# Patient Record
Sex: MALE | Race: WHITE | NOT HISPANIC OR LATINO | Employment: OTHER | URBAN - METROPOLITAN AREA
[De-identification: names, ages, dates, MRNs, and addresses within clinical notes are randomized per-mention and may not be internally consistent; named-entity substitution may affect disease eponyms.]

---

## 2017-02-08 DIAGNOSIS — K21.9 GERD (GASTROESOPHAGEAL REFLUX DISEASE): Primary | ICD-10-CM

## 2017-02-08 RX ORDER — OMEPRAZOLE 40 MG/1
CAPSULE, DELAYED RELEASE ORAL
Qty: 90 CAPSULE | Refills: 2 | Status: SHIPPED | OUTPATIENT
Start: 2017-02-08 | End: 2017-09-13

## 2017-02-08 NOTE — TELEPHONE ENCOUNTER
omeprazole (PRILOSEC) 40 MG capsule     Last Written Prescription Date: 2/22/16  Last Fill Quantity: 90,  # refills: 2   Last Office Visit with [facility], [facility] or [facility] prescribing provider: 11/3/16                                             Prescription approved per [facility] Refill Protocol.

## 2017-02-08 NOTE — TELEPHONE ENCOUNTER
Reason for Call:  Medication or medication refill:    Do you use a [facility]?  Name of the pharmacy and phone number for the current request:  [facility]    Name of the medication requested: omeprazole (PRILOSEC) 40 MG capsule    Other request: Pt would like refills and filled as soon as possible.    Can we leave a detailed message on this number? YES    Phone number patient can be reached at: Home number on file [phone] (home)    Best Time: any    Call taken on 2/8/2017 at 2:14 PM by YARON LUO

## 2017-03-16 ENCOUNTER — OFFICE VISIT (OUTPATIENT)
Dept: FAMILY MEDICINE | Facility: CLINIC | Age: 82
End: 2017-03-16
Payer: COMMERCIAL

## 2017-03-16 VITALS
BODY MASS INDEX: 26.37 KG/M2 | TEMPERATURE: 96.5 F | HEIGHT: 67 IN | WEIGHT: 168 LBS | HEART RATE: 84 BPM | DIASTOLIC BLOOD PRESSURE: 80 MMHG | OXYGEN SATURATION: 97 % | SYSTOLIC BLOOD PRESSURE: 136 MMHG | RESPIRATION RATE: 18 BRPM

## 2017-03-16 DIAGNOSIS — M25.512 ACUTE PAIN OF LEFT SHOULDER: Primary | ICD-10-CM

## 2017-03-16 PROCEDURE — 99213 OFFICE O/P EST LOW 20 MIN: CPT | Performed by: FAMILY MEDICINE

## 2017-03-16 RX ORDER — METHYLPREDNISOLONE 4 MG
TABLET, DOSE PACK ORAL
Qty: 21 TABLET | Refills: 0 | Status: SHIPPED | OUTPATIENT
Start: 2017-03-16 | End: 2017-09-13

## 2017-03-16 NOTE — MR AVS SNAPSHOT
"              After Visit Summary   3/16/2017    Cale Wagoner    MRN: [id number]           Patient Information     Date Of Birth          5/26/1933        Visit Information        Provider Department      3/16/2017 10:15 AM Mukesh Luna MD [facility]        Today's Diagnoses     Acute pain of left shoulder    -  1      Care Instructions    Ice packs to area for 3-4 days, then alternate ice & heat.  Use Ice massage on trigger point areas.  Do gentle Range of motion exercises        Follow-ups after your visit        Who to contact     If you have questions or need follow up information about today's clinic visit or your schedule please contact [facility] directly at [phone].  Normal or non-critical lab and imaging results will be communicated to you by [employer], letter or phone within 4 business days after the clinic has received the results. If you do not hear from us within 7 days, please contact the clinic through [employer] or phone. If you have a critical or abnormal lab result, we will notify you by phone as soon as possible.  Submit refill requests through [employer] or call your pharmacy and they will forward the refill request to us. Please allow 3 business days for your refill to be completed.          Additional Information About Your Visit        [employer] Information     [employer] lets you send messages to your doctor, view your test results, renew your prescriptions, schedule appointments and more. To sign up, go to [email or web address] . Click on \"Log in\" on the left side of the screen, which will take you to the Welcome page. Then click on \"Sign up Now\" on the right side of the page.     You will be asked to enter the access code listed below, as well as some personal information. Please follow the directions to create your username and password.     Your access code is: [email or web address]  Expires: 6/14/2017 10:27 AM     Your access code " "will  in 90 days. If you need help or a new code, please call your [place] clinic or [phone].        Care EveryWhere ID     This is your Care EveryWhere ID. This could be used by other organizations to access your [place] medical records  [id number]        Your Vitals Were     Pulse Temperature Respirations Height Pulse Oximetry BMI (Body Mass Index)    84 96.5  F (35.8  C) (Tympanic) 18 5' 6.5\" (1.689 m) 97% 26.71 kg/m2       Blood Pressure from Last 3 Encounters:   17 136/80   16 144/80   16 158/70    Weight from Last 3 Encounters:   17 168 lb (76.2 kg)   16 176 lb 8 oz (80.1 kg)   16 176 lb (79.8 kg)              Today, you had the following     No orders found for display         Today's Medication Changes          These changes are accurate as of: 3/16/17 10:27 AM.  If you have any questions, ask your nurse or doctor.               Start taking these medicines.        Dose/Directions    methylPREDNISolone 4 MG tablet   Commonly known as:  MEDROL DOSEPAK   Used for:  Acute pain of left shoulder   Started by:  Mukesh Luna MD        Follow package instructions   Quantity:  21 tablet   Refills:  0            Where to get your medicines      [id number] [address]  [address], [facility] [address]     Phone:  [phone]     methylPREDNISolone 4 MG tablet                Primary Care Provider Office Phone # Fax #    Gwyn Zheng [phone]       [facility] [address] [facility] [address]        Thank you!     Thank you for choosing [facility]  for your care. Our goal is always to provide you with excellent care. Hearing back from our patients is one way we can continue to improve our services. Please take a few minutes to complete the written survey that you may receive in the mail after your visit with " us. Thank you!             Your Updated Medication List - Protect others around you: Learn how to safely use, store and throw away your medicines at www.disposemymeds.org.          This list is accurate as of: 3/16/17 10:27 AM.  Always use your most recent med list.                   Brand Name Dispense Instructions for use    aspirin 325 MG EC tablet     30 tablet    Take 1 tablet (325 mg) by mouth daily       benzonatate 200 MG capsule    TESSALON    21 capsule    Take 1 capsule (200 mg) by mouth 3 times daily as needed for cough       FAMOTIDINE PO      Take 20 mg by mouth 2 times daily       methylPREDNISolone 4 MG tablet    MEDROL DOSEPAK    21 tablet    Follow package instructions       MULTIVITAMIN & MINERAL PO      Take 1 tablet by mouth daily       niacin 500 MG tablet      Take 1,000 mg by mouth daily (with breakfast)       OMEGA-3 FISH OIL PO      Take 1 g by mouth daily       omeprazole 40 MG capsule    priLOSEC    90 capsule    Take 1 capsule by mouth once daily (Take 30 to 60 minutes before a meal)       polyethylene glycol powder    MIRALAX/GLYCOLAX    1581 g    Dissolve 17 grams in 4 to 8 ounces of water, juice, soda, or coffee and drink once daily in the evening       predniSONE 20 MG tablet    DELTASONE    7 tablet    Take 1 tablet (20 mg) by mouth daily

## 2017-03-16 NOTE — PROGRESS NOTES
"  SUBJECTIVE:                                                    Cale Wagoner is a 83 year old male who presents to clinic today for the following health issues:    Musculoskeletal problem/pain      Duration: x 1month    Description  Location: Left Arm, Below the Shoulder Blade    Intensity:  moderate    Accompanying signs and symptoms: none    History  Previous similar problem: no   Previous evaluation:  none    Precipitating or alleviating factors:  Trauma or overuse: no   Aggravating factors include: overuse    Therapies tried and outcome: rest/inactivity and NSAID - Advil/ Some Relief   No injury, no chest pain or dyspnea      Problem list and histories reviewed & adjusted, as indicated.  Additional history: as documented    Labs reviewed in EPIC    Reviewed and updated as needed this visit by clinical staff  Tobacco  Allergies  Meds  Med Hx  Surg Hx  Fam Hx  Soc Hx      Reviewed and updated as needed this visit by Provider         ROS:  CONSTITUTIONAL:NEGATIVE for fever, chills, change in weight  RESP:NEGATIVE for significant cough or SOB  CV: NEGATIVE for chest pain, palpitations or peripheral edema  MUSCULOSKELETAL: POSITIVE  for arthralgias Lt shoulder and upper arm    OBJECTIVE:                                                    /80 (BP Location: Right arm, Patient Position: Chair, Cuff Size: Adult Regular)  Pulse 84  Temp 96.5  F (35.8  C) (Tympanic)  Resp 18  Ht 5' 6.5\" (1.689 m)  Wt 168 lb (76.2 kg)  SpO2 97%  BMI 26.71 kg/m2  Body mass index is 26.71 kg/(m^2).  GENERAL APPEARANCE: healthy, alert and no distress  RESP: lungs clear to auscultation - no rales, rhonchi or wheezes  CV: regular rates and rhythm, normal S1 S2, no S3 or S4 and no murmur, click or rub  MS: extremities normal- no gross deformities noted  ORTHO:   SHOULDER Exam-Left   Inspection: no swelling, no bruising, no discoloration, no obvious deformity, no asymmetry, no glenohumeral joint anterior bulge, no distal " clavicle elevation, no muscle atrophy, no scapular winging   Tenderness of: SC joint- no , clavicle(prox-mid)- no , clavicle-(mid-distal)- no , AC joint- no , acromion- no , anterior capsule- no , prox bicep tendon- YES, greater tuberosity- no , prox humerus- YES, supraspinatous- no , infraspinatous- no , superior trapezious- no , rhomboids- no    Range of Motion: Active- limited due to pain.  Range of Motion: Passive- limited due to pain.   Strength: forward flexion- 5/5, abduction- 5/5, internal rotation- 5/5, external rotation- 5/5 and bicep- full   Special tests: none               ASSESSMENT/PLAN:                                                        ICD-10-CM    1. Acute pain of left shoulder M25.512 methylPREDNISolone (MEDROL DOSEPAK) 4 MG tablet       Follow up with Provider - as needed if not improving   Patient Instructions   Ice packs to area for 3-4 days, then alternate ice & heat.  Use Ice massage on trigger point areas.  Do gentle Range of motion exercises      Mukesh Luna MD  [facility]

## 2017-03-16 NOTE — NURSING NOTE
"Chief Complaint   Patient presents with     Musculoskeletal Problem     /80 (BP Location: Right arm, Patient Position: Chair, Cuff Size: Adult Regular)  Pulse 84  Temp 96.5  F (35.8  C) (Tympanic)  Resp 18  Ht 5' 6.5\" (1.689 m)  Wt 168 lb (76.2 kg)  SpO2 97%  BMI 26.71 kg/m2 Estimated body mass index is 26.71 kg/(m^2) as calculated from the following:    Height as of this encounter: 5' 6.5\" (1.689 m).    Weight as of this encounter: 168 lb (76.2 kg).  BP completed using cuff size: regular   Donna Chirinos CMA    Health Maintenance Due   Topic Date Due     PNEUMOCOCCAL (1 of 2 - PCV13) 05/26/1998     Health Maintenance reviewed at today's visit patient asked to schedule/complete:   Immunizations:  Patient agrees to schedule    "

## 2017-03-16 NOTE — PATIENT INSTRUCTIONS
Ice packs to area for 3-4 days, then alternate ice & heat.  Use Ice massage on trigger point areas.  Do gentle Range of motion exercises

## 2017-09-13 ENCOUNTER — OFFICE VISIT (OUTPATIENT)
Dept: FAMILY MEDICINE | Facility: CLINIC | Age: 82
End: 2017-09-13
Payer: COMMERCIAL

## 2017-09-13 VITALS
DIASTOLIC BLOOD PRESSURE: 70 MMHG | WEIGHT: 169 LBS | RESPIRATION RATE: 16 BRPM | HEIGHT: 67 IN | TEMPERATURE: 98 F | BODY MASS INDEX: 26.53 KG/M2 | SYSTOLIC BLOOD PRESSURE: 130 MMHG | HEART RATE: 74 BPM

## 2017-09-13 DIAGNOSIS — Z00.00 ROUTINE MEDICAL EXAM: Primary | ICD-10-CM

## 2017-09-13 DIAGNOSIS — K59.01 SLOW TRANSIT CONSTIPATION: ICD-10-CM

## 2017-09-13 DIAGNOSIS — M25.561 CHRONIC PAIN OF RIGHT KNEE: ICD-10-CM

## 2017-09-13 DIAGNOSIS — K21.9 GASTROESOPHAGEAL REFLUX DISEASE WITHOUT ESOPHAGITIS: ICD-10-CM

## 2017-09-13 DIAGNOSIS — Z23 NEED FOR PROPHYLACTIC VACCINATION AGAINST STREPTOCOCCUS PNEUMONIAE (PNEUMOCOCCUS): ICD-10-CM

## 2017-09-13 DIAGNOSIS — G89.29 CHRONIC PAIN OF RIGHT KNEE: ICD-10-CM

## 2017-09-13 DIAGNOSIS — I63.9 CEREBROVASCULAR ACCIDENT (CVA), UNSPECIFIED MECHANISM (H): ICD-10-CM

## 2017-09-13 DIAGNOSIS — E78.2 MIXED HYPERLIPIDEMIA: ICD-10-CM

## 2017-09-13 DIAGNOSIS — Z12.5 SCREENING FOR PROSTATE CANCER: ICD-10-CM

## 2017-09-13 LAB
ALBUMIN SERPL-MCNC: 3.8 G/DL (ref 3.4–5)
ALP SERPL-CCNC: 57 U/L (ref 40–150)
ALT SERPL W P-5'-P-CCNC: 34 U/L (ref 0–70)
ANION GAP SERPL CALCULATED.3IONS-SCNC: 6 MMOL/L (ref 3–14)
AST SERPL W P-5'-P-CCNC: 25 U/L (ref 0–45)
BASOPHILS # BLD AUTO: 0 10E9/L (ref 0–0.2)
BASOPHILS NFR BLD AUTO: 0.6 %
BILIRUB SERPL-MCNC: 0.9 MG/DL (ref 0.2–1.3)
BUN SERPL-MCNC: 11 MG/DL (ref 7–30)
CALCIUM SERPL-MCNC: 8.7 MG/DL (ref 8.5–10.1)
CHLORIDE SERPL-SCNC: 104 MMOL/L (ref 94–109)
CHOLEST SERPL-MCNC: 177 MG/DL
CO2 SERPL-SCNC: 28 MMOL/L (ref 20–32)
CREAT SERPL-MCNC: 0.95 MG/DL (ref 0.66–1.25)
DIFFERENTIAL METHOD BLD: NORMAL
EOSINOPHIL # BLD AUTO: 0 10E9/L (ref 0–0.7)
EOSINOPHIL NFR BLD AUTO: 0.6 %
ERYTHROCYTE [DISTWIDTH] IN BLOOD BY AUTOMATED COUNT: 12.7 % (ref 10–15)
GFR SERPL CREATININE-BSD FRML MDRD: 75 ML/MIN/1.7M2
GLUCOSE SERPL-MCNC: 100 MG/DL (ref 70–99)
HCT VFR BLD AUTO: 46 % (ref 40–53)
HDLC SERPL-MCNC: 40 MG/DL
HGB BLD-MCNC: 16.2 G/DL (ref 13.3–17.7)
LDLC SERPL CALC-MCNC: 115 MG/DL
LYMPHOCYTES # BLD AUTO: 2.2 10E9/L (ref 0.8–5.3)
LYMPHOCYTES NFR BLD AUTO: 40 %
MCH RBC QN AUTO: 32.2 PG (ref 26.5–33)
MCHC RBC AUTO-ENTMCNC: 35.2 G/DL (ref 31.5–36.5)
MCV RBC AUTO: 92 FL (ref 78–100)
MONOCYTES # BLD AUTO: 0.7 10E9/L (ref 0–1.3)
MONOCYTES NFR BLD AUTO: 12.1 %
NEUTROPHILS # BLD AUTO: 2.5 10E9/L (ref 1.6–8.3)
NEUTROPHILS NFR BLD AUTO: 46.7 %
NONHDLC SERPL-MCNC: 137 MG/DL
PLATELET # BLD AUTO: 164 10E9/L (ref 150–450)
POTASSIUM SERPL-SCNC: 4.1 MMOL/L (ref 3.4–5.3)
PROT SERPL-MCNC: 7.6 G/DL (ref 6.8–8.8)
PSA SERPL-ACNC: 1.8 UG/L (ref 0–4)
RBC # BLD AUTO: 5.03 10E12/L (ref 4.4–5.9)
SODIUM SERPL-SCNC: 138 MMOL/L (ref 133–144)
TRIGL SERPL-MCNC: 109 MG/DL
WBC # BLD AUTO: 5.4 10E9/L (ref 4–11)

## 2017-09-13 PROCEDURE — 85025 COMPLETE CBC W/AUTO DIFF WBC: CPT | Performed by: FAMILY MEDICINE

## 2017-09-13 PROCEDURE — G0009 ADMIN PNEUMOCOCCAL VACCINE: HCPCS | Performed by: FAMILY MEDICINE

## 2017-09-13 PROCEDURE — 36415 COLL VENOUS BLD VENIPUNCTURE: CPT | Performed by: FAMILY MEDICINE

## 2017-09-13 PROCEDURE — 80053 COMPREHEN METABOLIC PANEL: CPT | Performed by: FAMILY MEDICINE

## 2017-09-13 PROCEDURE — G0103 PSA SCREENING: HCPCS | Performed by: FAMILY MEDICINE

## 2017-09-13 PROCEDURE — 99397 PER PM REEVAL EST PAT 65+ YR: CPT | Mod: 25 | Performed by: FAMILY MEDICINE

## 2017-09-13 PROCEDURE — 80061 LIPID PANEL: CPT | Performed by: FAMILY MEDICINE

## 2017-09-13 PROCEDURE — 90670 PCV13 VACCINE IM: CPT | Performed by: FAMILY MEDICINE

## 2017-09-13 RX ORDER — OMEPRAZOLE 40 MG/1
CAPSULE, DELAYED RELEASE ORAL
Qty: 90 CAPSULE | Refills: 3 | Status: SHIPPED | OUTPATIENT
Start: 2017-09-13 | End: 2017-09-27

## 2017-09-13 ASSESSMENT — PATIENT HEALTH QUESTIONNAIRE - PHQ9
SUM OF ALL RESPONSES TO PHQ QUESTIONS 1-9: 0
SUM OF ALL RESPONSES TO PHQ QUESTIONS 1-9: 0

## 2017-09-13 NOTE — NURSING NOTE
"Chief Complaint   Patient presents with     Physical       Initial /70  Pulse 74  Temp 98  F (36.7  C) (Tympanic)  Resp 16  Ht 5' 6.5\" (1.689 m)  Wt 169 lb (76.7 kg)  BMI 26.87 kg/m2 Estimated body mass index is 26.87 kg/(m^2) as calculated from the following:    Height as of this encounter: 5' 6.5\" (1.689 m).    Weight as of this encounter: 169 lb (76.7 kg).  Medication Reconciliation: complete     Ann Joel CMA      "

## 2017-09-13 NOTE — PROGRESS NOTES
SUBJECTIVE:   Cale Wagoner is a 84 year old male who presents for Preventive Visit.  click delete button to remove this line now  click delete button to remove this line now  Are you in the first 12 months of your Medicare coverage?  No    Physical   Annual:     Getting at least 3 servings of Calcium per day::  Yes    Bi-annual eye exam::  NO    Dental care twice a year::  NO    Sleep apnea or symptoms of sleep apnea::  None    Diet::  Regular (no restrictions)    Frequency of exercise::  2-3 days/week    Duration of exercise::  30-45 minutes    Taking medications regularly::  Yes    Medication side effects::  None    Additional concerns today::  No      COGNITIVE SCREEN  1) Repeat 3 items (Banana, Sunrise, Chair)    2) Clock draw: NORMAL  3) 3 item recall: Recalls 2 objects   Results: NORMAL clock, 1-2 items recalled: COGNITIVE IMPAIRMENT LESS LIKELY    Mini-CogTM Copyright WILLIE Fontaine. Licensed by the author for use in [facility]; reprinted with permission (jann@[facility]). All rights reserved.          Reviewed and updated as needed this visit by clinical staffTobacco  Allergies  Meds  Med Hx  Surg Hx  Fam Hx  Soc Hx        Reviewed and updated as needed this visit by Provider        Social History   Substance Use Topics     Smoking status: Former Smoker     Quit date: 8/19/1968     Smokeless tobacco: Never Used     Alcohol use Yes      Comment: Occ       The patient does not drink >3 drinks per day nor >7 drinks per week.              Today's PHQ-2 Score:   PHQ-2 ( 1999 Pfizer) 9/13/2017   Q1: Little interest or pleasure in doing things 0   Q2: Feeling down, depressed or hopeless 0   PHQ-2 Score 0   Q1: Little interest or pleasure in doing things Nearly every day   Q2: Feeling down, depressed or hopeless Not at all   PHQ-2 Score 3       Do you feel safe in your environment - Yes    Do you have a Health Care Directive?: Yes: Advance Directive has been received and scanned.    Current  providers sharing in care for this patient include:   Patient Care Team:  Gwyn Zheng MD as PCP - General (Family Practice)      Hearing impairment: Yes,    Ability to successfully perform activities of daily living: Yes, no assistance needed     Fall risk:  Fallen 2 or more times in the past year?: No  Any fall with injury in the past year?: No      Home safety:  none identified  click delete button to remove this line now    The following health maintenance items are reviewed in Epic and correct as of today:  Health Maintenance   Topic Date Due     JULIETA QUESTIONNAIRE 1 YEAR  05/26/1951     PNEUMOCOCCAL (2 of 2 - PCV13) 10/09/2011     INFLUENZA VACCINE (SYSTEM ASSIGNED)  09/01/2017     FALL RISK ASSESSMENT  03/16/2018     PHQ-9 Q1YR  09/13/2018     ADVANCE DIRECTIVE PLANNING Q5 YRS  02/04/2020     TETANUS IMMUNIZATION (SYSTEM ASSIGNED)  08/12/2020     Patient Active Problem List   Diagnosis     Bloating     GERD (gastroesophageal reflux disease)     Health Care Home     Cerebrovascular accident (CVA), unspecified mechanism (H)     Hyperlipidemia     Slow transit constipation     Tingling of left upper extremity     Chronic pain of right knee     Past Surgical History:   Procedure Laterality Date     APPENDECTOMY       ENT SURGERY      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     EYE SURGERY      cataracts     HERNIA REPAIR      twice       Social History   Substance Use Topics     Smoking status: Former Smoker     Quit date: 8/19/1968     Smokeless tobacco: Never Used     Alcohol use Yes      Comment: Occ     Family History   Problem Relation Age of Onset     Respiratory Father      pneumonia     Unknown/Adopted Father      Alzheimer Disease Brother      Alzheimer Disease Sister      Respiratory Sister      COPD     Unknown/Adopted Mother      DIABETES No family hx of      Coronary Artery Disease No family hx of      Hypertension No family hx of      Hyperlipidemia No family hx of      CEREBROVASCULAR DISEASE No  "family hx of      Breast Cancer No family hx of      Colon Cancer No family hx of      Prostate Cancer No family hx of      Other Cancer No family hx of      Depression No family hx of      Anxiety Disorder No family hx of      MENTAL ILLNESS No family hx of      Substance Abuse No family hx of      Anesthesia Reaction No family hx of      Asthma No family hx of      OSTEOPOROSIS No family hx of      Genetic Disorder No family hx of      Thyroid Disease No family hx of      Obesity No family hx of              Pneumonia Vaccine:Adults age 65+ who received Pneumovax (PPSV23) at 65 years or older: Should be given PCV13 > 1 year after their most recent PPSV23    ROS:  C: NEGATIVE for fever, chills, change in weight  I: NEGATIVE for worrisome rashes, moles or lesions  E: NEGATIVE for vision changes or irritation  E/M: NEGATIVE for ear, mouth and throat problems  R: NEGATIVE for significant cough or SOB  B: NEGATIVE for masses, tenderness or discharge  CV: NEGATIVE for chest pain, palpitations or peripheral edema  GI: NEGATIVE for nausea, abdominal pain, heartburn, or change in bowel habits  : NEGATIVE for frequency, dysuria, or hematuria  MUSCULOSKELETAL:POSITIVE  for arthralgias right knee  N: NEGATIVE for weakness, dizziness or paresthesias  E: NEGATIVE for temperature intolerance, skin/hair changes  H: NEGATIVE for bleeding problems  P: NEGATIVE for changes in mood or affect    OBJECTIVE:   /70  Pulse 74  Temp 98  F (36.7  C) (Tympanic)  Resp 16  Ht 5' 6.5\" (1.689 m)  Wt 169 lb (76.7 kg)  BMI 26.87 kg/m2 Estimated body mass index is 26.87 kg/(m^2) as calculated from the following:    Height as of this encounter: 5' 6.5\" (1.689 m).    Weight as of this encounter: 169 lb (76.7 kg).  EXAM:   GENERAL: healthy, alert and no distress  EYES: Eyes grossly normal to inspection, PERRL and conjunctivae and sclerae normal  HENT: ear canals and TM's normal, nose and mouth without ulcers or lesions  NECK: no " "adenopathy, no asymmetry, masses, or scars and thyroid normal to palpation  RESP: lungs clear to auscultation - no rales, rhonchi or wheezes  CV: regular rate and rhythm, normal S1 S2, no S3 or S4, no murmur, click or rub, no peripheral edema and peripheral pulses strong  ABDOMEN: soft, nontender, no hepatosplenomegaly, no masses and bowel sounds normal   (male): normal male genitalia without lesions or urethral discharge, no hernia  MS: no gross musculoskeletal defects noted, no edema  SKIN: no suspicious lesions or rashes  NEURO: Normal strength and tone, mentation intact and speech normal  PSYCH: mentation appears normal, affect normal/bright    ASSESSMENT / PLAN:       ICD-10-CM    1. Routine medical exam Z00.00    2. Need for prophylactic vaccination against Streptococcus pneumoniae (pneumococcus) Z23 PNEUMOCOCCAL CONJ VACCINE 13 VALENT IM     ADMIN 1st VACCINE   3. Gastroesophageal reflux disease without esophagitis K21.9 omeprazole (PRILOSEC) 40 MG capsule   4. Mixed hyperlipidemia E78.2    5. Slow transit constipation K59.01    6. Cerebrovascular accident (CVA), unspecified mechanism (H) I63.9    7. Chronic pain of right knee M25.561     G89.29        End of Life Planning:  Patient currently has an advanced directive: yes and the patient will get us a copy    COUNSELING:  Reviewed preventive health counseling, as reflected in patient instructions       Regular exercise       Healthy diet/nutrition       Vision screening       Immunizations    Vaccinated for: Pneumococcal              Estimated body mass index is 26.87 kg/(m^2) as calculated from the following:    Height as of this encounter: 5' 6.5\" (1.689 m).    Weight as of this encounter: 169 lb (76.7 kg).     reports that he quit smoking about 49 years ago. He has never used smokeless tobacco.        Appropriate preventive services were discussed with this patient, including applicable screening as appropriate for cardiovascular disease, diabetes, " osteopenia/osteoporosis, and glaucoma.  As appropriate for age/gender, discussed screening for colorectal cancer, prostate cancer, breast cancer, and cervical cancer. Checklist reviewing preventive services available has been given to the patient.    Reviewed patients plan of care and provided an AVS. The Basic Care Plan (routine screening as documented in Health Maintenance) for Cale meets the Care Plan requirement. This Care Plan has been established and reviewed with the Patient.    Counseling Resources:  ATP IV Guidelines  Pooled Cohorts Equation Calculator  Breast Cancer Risk Calculator  FRAX Risk Assessment  ICSI Preventive Guidelines  Dietary Guidelines for Americans, 2010  [employer] MyPlate  ASA Prophylaxis  Lung CA Screening    Gwyn Zheng MD  [facility]ESAnswers for HPI/ROS submitted by the patient on 9/13/2017   PHQ-2 Score: 3  PHQ9 TOTAL SCORE: 0

## 2017-09-13 NOTE — NURSING NOTE
Screening Questionnaire for Adult Immunization    Are you sick today?   No   Do you have allergies to medications, food, a vaccine component or latex?   No   Have you ever had a serious reaction after receiving a vaccination?   No   Do you have a long-term health problem with heart disease, lung disease, asthma, kidney disease, metabolic disease (e.g. diabetes), anemia, or other blood disorder?   No   Do you have cancer, leukemia, HIV/AIDS, or any other immune system problem?   No   In the past 3 months, have you taken medications that affect  your immune system, such as prednisone, other steroids, or anticancer drugs; drugs for the treatment of rheumatoid arthritis, Crohn s disease, or psoriasis; or have you had radiation treatments?   No   Have you had a seizure, or a brain or other nervous system problem?   No   During the past year, have you received a transfusion of blood or blood     products, or been given immune (gamma) globulin or antiviral drug?   No   For women: Are you pregnant or is there a chance you could become        pregnant during the next month?   No   Have you received any vaccinations in the past 4 weeks?   No     Immunization questionnaire answers were all negative.        Per orders of Dr. Zheng, injection of prevnar given by Ann Joel. Patient instructed to remain in clinic for 15 minutes afterwards, and to report any adverse reaction to me immediately.       Screening performed by Ann Joel on 9/13/2017 at 10:25 AM.

## 2017-09-13 NOTE — MR AVS SNAPSHOT
After Visit Summary   9/13/2017    Cale Wagoner    MRN: [id number]           Patient Information     Date Of Birth          5/26/1933        Visit Information        Provider Department      9/13/2017 9:30 AM Gwyn Zheng MD [facility]        Today's Diagnoses     Routine medical exam    -  1    Need for prophylactic vaccination against Streptococcus pneumoniae (pneumococcus)        Gastroesophageal reflux disease without esophagitis        Mixed hyperlipidemia        Slow transit constipation        Cerebrovascular accident (CVA), unspecified mechanism (H)        Chronic pain of right knee        Screening for prostate cancer          Care Instructions      Preventive Health Recommendations:   Male Ages 65 and over    Yearly exam:             See your health care provider every year in order to  o   Review health changes.   o   Discuss preventive care.    o   Review your medicines if your doctor has prescribed any.    Talk with your health care provider about whether you should have a test to screen for prostate cancer (PSA).    Every 3 years, have a diabetes test (fasting glucose). If you are at risk for diabetes, you should have this test more often.    Every 5 years, have a cholesterol test. Have this test more often if you are at risk for high cholesterol or heart disease.     Every 10 years, have a colonoscopy. Or, have a yearly FIT test (stool test). These exams will check for colon cancer.    Talk to with your health care provider about screening for Abdominal Aortic Aneurysm if you have a family history of AAA or have a history of smoking.    Shots:     Get a flu shot each year.     Get a tetanus shot every 10 years.     Talk to your doctor about your pneumonia vaccines. There are now two you should receive - Pneumovax (PPSV 23) and Prevnar (PCV 13).     Talk to your doctor about a shingles vaccine.     Talk to your doctor about the hepatitis B  vaccine.  Nutrition:     Eat at least 5 servings of fruits and vegetables each day.     Eat whole-grain bread, whole-wheat pasta and brown rice instead of white grains and rice.     Talk to your provider about Calcium and Vitamin D.   Lifestyle    Exercise for at least 150 minutes a week (30 minutes a day, 5 days a week). This will help you control your weight and prevent disease.     Limit alcohol to one drink per day.     No smoking.     Wear sunscreen to prevent skin cancer.     See your dentist every six months for an exam and cleaning.     See your eye doctor every 1 to 2 years to screen for conditions such as glaucoma, macular degeneration, cataracts, etc           Follow-ups after your visit        Additional Services     ORTHOPEDICS ADULT REFERRAL       Your provider has referred you to: [facility]  [place] [phone]   [email or web address]    Please be aware that coverage of these services is subject to the terms and limitations of your health insurance plan.  Call member services at your health plan with any benefit or coverage questions.      Please bring the following to your appointment:    >>   Any x-rays, CTs or MRIs which have been performed.  Contact the facility where they were done to arrange for  prior to your scheduled appointment.    >>   List of current medications   >>   This referral request   >>   Any documents/labs given to you for this referral                  Who to contact     If you have questions or need follow up information about today's clinic visit or your schedule please contact [facility] directly at [phone].  Normal or non-critical lab and imaging results will be communicated to you by MyChart, letter or phone within 4 business days after the clinic has received the results. If you do not hear from us within 7 days, please contact the clinic through MyChart or phone. If you have a critical or abnormal  "lab result, we will notify you by phone as soon as possible.  Submit refill requests through [employer] or call your pharmacy and they will forward the refill request to us. Please allow 3 business days for your refill to be completed.          Additional Information About Your Visit        Care EveryWhere ID     This is your Care EveryWhere ID. This could be used by other organizations to access your [place] medical records  [id number]        Your Vitals Were     Pulse Temperature Respirations Height BMI (Body Mass Index)       74 98  F (36.7  C) (Tympanic) 16 5' 6.5\" (1.689 m) 26.87 kg/m2        Blood Pressure from Last 3 Encounters:   09/13/17 130/70   03/16/17 136/80   11/03/16 144/80    Weight from Last 3 Encounters:   09/13/17 169 lb (76.7 kg)   03/16/17 168 lb (76.2 kg)   11/03/16 176 lb 8 oz (80.1 kg)              We Performed the Following     ADMIN 1st VACCINE     CBC with platelets differential     Comprehensive metabolic panel     Lipid Profile     ORTHOPEDICS ADULT REFERRAL     PNEUMOCOCCAL CONJ VACCINE 13 VALENT IM     Prostate spec antigen screen     UA with Microscopic          Today's Medication Changes          These changes are accurate as of: 9/13/17 10:20 AM.  If you have any questions, ask your nurse or doctor.               These medicines have changed or have updated prescriptions.        Dose/Directions    aspirin 325 MG EC tablet   This may have changed:  how much to take   Used for:  TIA (transient ischaemic attack)        Dose:  325 mg   Take 1 tablet (325 mg) by mouth daily   Quantity:  30 tablet   Refills:  0         Stop taking these medicines if you haven't already. Please contact your care team if you have questions.     niacin 500 MG tablet   Stopped by:  Gwyn Zheng MD                Where to get your medicines      These medications were sent to [id number] - [place], [place] - [address]   [address] [facility] [address]     Phone:  " [phone]     omeprazole 40 MG capsule                Primary Care Provider Office Phone # Fax #    Gwyn Zheng [phone]       [address] [facility] [address]        Equal Access to Services     TAYO SANCHEZ : Hadii porter ku hadnéstoro Soomaali, waaxda luqadaha, qaybta kaalmada adeegyada, waxay idiin haysandran adewilliam khjulia lucila montoya. So [facility] [phone].    ATENCIÓN: Si habla español, tiene a rene disposición servicios gratuitos de asistencia lingüística. Llame al [phone].    We comply with applicable federal civil rights laws and [place] laws. We do not discriminate on the basis of race, color, national origin, age, disability sex, sexual orientation or gender identity.            Thank you!     Thank you for choosing [facility]  for your care. Our goal is always to provide you with excellent care. Hearing back from our patients is one way we can continue to improve our services. Please take a few minutes to complete the written survey that you may receive in the mail after your visit with us. Thank you!             Your Updated Medication List - Protect others around you: Learn how to safely use, store and throw away your medicines at www.disposemymeds.org.          This list is accurate as of: 9/13/17 10:20 AM.  Always use your most recent med list.                   Brand Name Dispense Instructions for use Diagnosis    aspirin 325 MG EC tablet     30 tablet    Take 1 tablet (325 mg) by mouth daily    TIA (transient ischaemic attack)       FAMOTIDINE PO      Take 20 mg by mouth 2 times daily        MULTIVITAMIN & MINERAL PO      Take 1 tablet by mouth daily        OMEGA-3 FISH OIL PO      Take 1 g by mouth daily        omeprazole 40 MG capsule    priLOSEC    90 capsule    Take 1 capsule by mouth once daily (Take 30 to 60 minutes before a meal)    Gastroesophageal reflux disease without esophagitis       polyethylene glycol powder     MIRALAX/GLYCOLAX    1581 g    Dissolve 17 grams in 4 to 8 ounces of water, juice, soda, or coffee and drink once daily in the evening    Slow transit constipation

## 2017-09-13 NOTE — LETTER
September 16, 2017      Cale Wagoner  [address]  [facility] [address]        Dear ,    We are writing to inform you of your test results.    Your test results fall within the expected range(s) or remain unchanged from previous results.  Please continue with current treatment plan.    Resulted Orders   CBC with platelets differential   Result Value Ref Range    WBC 5.4 4.0 - 11.0 10e9/L    RBC Count 5.03 4.4 - 5.9 10e12/L    Hemoglobin 16.2 13.3 - 17.7 g/dL    Hematocrit 46.0 40.0 - 53.0 %    MCV 92 78 - 100 fl    MCH 32.2 26.5 - 33.0 pg    MCHC 35.2 31.5 - 36.5 g/dL    RDW 12.7 10.0 - 15.0 %    Platelet Count 164 150 - 450 10e9/L    Diff Method Automated Method     % Neutrophils 46.7 %    % Lymphocytes 40.0 %    % Monocytes 12.1 %    % Eosinophils 0.6 %    % Basophils 0.6 %    Absolute Neutrophil 2.5 1.6 - 8.3 10e9/L    Absolute Lymphocytes 2.2 0.8 - 5.3 10e9/L    Absolute Monocytes 0.7 0.0 - 1.3 10e9/L    Absolute Eosinophils 0.0 0.0 - 0.7 10e9/L    Absolute Basophils 0.0 0.0 - 0.2 10e9/L   Comprehensive metabolic panel   Result Value Ref Range    Sodium 138 133 - 144 mmol/L    Potassium 4.1 3.4 - 5.3 mmol/L    Chloride 104 94 - 109 mmol/L    Carbon Dioxide 28 20 - 32 mmol/L    Anion Gap 6 3 - 14 mmol/L    Glucose 100 (H) 70 - 99 mg/dL      Comment:      Fasting specimen    Urea Nitrogen 11 7 - 30 mg/dL    Creatinine 0.95 0.66 - 1.25 mg/dL    GFR Estimate 75 >60 mL/min/1.7m2      Comment:      Non  GFR Calc    GFR Estimate If Black >90 >60 mL/min/1.7m2      Comment:       GFR Calc    Calcium 8.7 8.5 - 10.1 mg/dL    Bilirubin Total 0.9 0.2 - 1.3 mg/dL    Albumin 3.8 3.4 - 5.0 g/dL    Protein Total 7.6 6.8 - 8.8 g/dL    Alkaline Phosphatase 57 40 - 150 U/L    ALT 34 0 - 70 U/L    AST 25 0 - 45 U/L   Lipid Profile   Result Value Ref Range    Cholesterol 177 <200 mg/dL    Triglycerides 109 <150 mg/dL      Comment:      Fasting specimen    HDL Cholesterol 40  >39 mg/dL    LDL Cholesterol Calculated 115 (H) <100 mg/dL      Comment:      Above desirable:  100-129 mg/dl  Borderline High:  130-159 mg/dL  High:             160-189 mg/dL  Very high:       >189 mg/dl      Non HDL Cholesterol 137 (H) <130 mg/dL      Comment:      Above Desirable:  130-159 mg/dl  Borderline high:  160-189 mg/dl  High:             190-219 mg/dl  Very high:       >219 mg/dl     Prostate spec antigen screen   Result Value Ref Range    PSA 1.80 0 - 4 ug/L      Comment:      Assay Method:  Chemiluminescence using Siemens Vista analyzer       If you have any questions or concerns, please call the clinic at the number listed above.       Sincerely,        Gwyn Zheng MD

## 2017-09-14 ENCOUNTER — APPOINTMENT (OUTPATIENT)
Dept: GENERAL RADIOLOGY | Facility: CLINIC | Age: 82
DRG: 247 | End: 2017-09-14
Attending: EMERGENCY MEDICINE
Payer: COMMERCIAL

## 2017-09-14 ENCOUNTER — HOSPITAL ENCOUNTER (INPATIENT)
Facility: CLINIC | Age: 82
LOS: 2 days | Discharge: HOME | DRG: 247 | End: 2017-09-16
Attending: EMERGENCY MEDICINE | Admitting: HOSPITALIST
Payer: COMMERCIAL

## 2017-09-14 DIAGNOSIS — R07.9 CHEST PAIN, UNSPECIFIED TYPE: Primary | ICD-10-CM

## 2017-09-14 DIAGNOSIS — I21.4 NSTEMI (NON-ST ELEVATED MYOCARDIAL INFARCTION) (H): ICD-10-CM

## 2017-09-14 PROBLEM — R07.89 ATYPICAL CHEST PAIN: Status: ACTIVE | Noted: 2017-09-14

## 2017-09-14 LAB
ALBUMIN SERPL-MCNC: 3.8 G/DL (ref 3.4–5)
ALP SERPL-CCNC: 73 U/L (ref 40–150)
ALT SERPL W P-5'-P-CCNC: 39 U/L (ref 0–70)
ANION GAP SERPL CALCULATED.3IONS-SCNC: 7 MMOL/L (ref 3–14)
AST SERPL W P-5'-P-CCNC: 28 U/L (ref 0–45)
BASOPHILS # BLD AUTO: 0 10E9/L (ref 0–0.2)
BASOPHILS NFR BLD AUTO: 0.6 %
BILIRUB SERPL-MCNC: 0.6 MG/DL (ref 0.2–1.3)
BUN SERPL-MCNC: 13 MG/DL (ref 7–30)
CALCIUM SERPL-MCNC: 8.7 MG/DL (ref 8.5–10.1)
CHLORIDE SERPL-SCNC: 102 MMOL/L (ref 94–109)
CO2 SERPL-SCNC: 28 MMOL/L (ref 20–32)
CREAT SERPL-MCNC: 1.04 MG/DL (ref 0.66–1.25)
DIFFERENTIAL METHOD BLD: NORMAL
EOSINOPHIL # BLD AUTO: 0 10E9/L (ref 0–0.7)
EOSINOPHIL NFR BLD AUTO: 0.5 %
ERYTHROCYTE [DISTWIDTH] IN BLOOD BY AUTOMATED COUNT: 12.6 % (ref 10–15)
GFR SERPL CREATININE-BSD FRML MDRD: 68 ML/MIN/1.7M2
GLUCOSE SERPL-MCNC: 110 MG/DL (ref 70–99)
HCT VFR BLD AUTO: 46.2 % (ref 40–53)
HGB BLD-MCNC: 16.6 G/DL (ref 13.3–17.7)
IMM GRANULOCYTES # BLD: 0 10E9/L (ref 0–0.4)
IMM GRANULOCYTES NFR BLD: 0.2 %
INTERPRETATION ECG - MUSE: NORMAL
INTERPRETATION ECG - MUSE: NORMAL
LIPASE SERPL-CCNC: 350 U/L (ref 73–393)
LYMPHOCYTES # BLD AUTO: 2.7 10E9/L (ref 0.8–5.3)
LYMPHOCYTES NFR BLD AUTO: 42 %
MCH RBC QN AUTO: 32.5 PG (ref 26.5–33)
MCHC RBC AUTO-ENTMCNC: 35.9 G/DL (ref 31.5–36.5)
MCV RBC AUTO: 91 FL (ref 78–100)
MONOCYTES # BLD AUTO: 0.6 10E9/L (ref 0–1.3)
MONOCYTES NFR BLD AUTO: 9.1 %
NEUTROPHILS # BLD AUTO: 3 10E9/L (ref 1.6–8.3)
NEUTROPHILS NFR BLD AUTO: 47.6 %
NRBC # BLD AUTO: 0 10*3/UL
NRBC BLD AUTO-RTO: 0 /100
PLATELET # BLD AUTO: 165 10E9/L (ref 150–450)
POTASSIUM SERPL-SCNC: 3.8 MMOL/L (ref 3.4–5.3)
PROT SERPL-MCNC: 8.2 G/DL (ref 6.8–8.8)
RBC # BLD AUTO: 5.1 10E12/L (ref 4.4–5.9)
SODIUM SERPL-SCNC: 137 MMOL/L (ref 133–144)
TROPONIN I SERPL-MCNC: 1.98 UG/L (ref 0–0.04)
TROPONIN I SERPL-MCNC: <0.015 UG/L (ref 0–0.04)
WBC # BLD AUTO: 6.4 10E9/L (ref 4–11)

## 2017-09-14 PROCEDURE — 25000132 ZZH RX MED GY IP 250 OP 250 PS 637: Performed by: EMERGENCY MEDICINE

## 2017-09-14 PROCEDURE — 85025 COMPLETE CBC W/AUTO DIFF WBC: CPT | Performed by: EMERGENCY MEDICINE

## 2017-09-14 PROCEDURE — 93005 ELECTROCARDIOGRAM TRACING: CPT

## 2017-09-14 PROCEDURE — 83690 ASSAY OF LIPASE: CPT | Performed by: EMERGENCY MEDICINE

## 2017-09-14 PROCEDURE — 99223 1ST HOSP IP/OBS HIGH 75: CPT | Mod: AI | Performed by: HOSPITALIST

## 2017-09-14 PROCEDURE — 25000132 ZZH RX MED GY IP 250 OP 250 PS 637: Performed by: HOSPITALIST

## 2017-09-14 PROCEDURE — 25000128 H RX IP 250 OP 636: Performed by: HOSPITALIST

## 2017-09-14 PROCEDURE — 25000125 ZZHC RX 250: Performed by: EMERGENCY MEDICINE

## 2017-09-14 PROCEDURE — 99285 EMERGENCY DEPT VISIT HI MDM: CPT | Mod: 25

## 2017-09-14 PROCEDURE — 84484 ASSAY OF TROPONIN QUANT: CPT | Performed by: HOSPITALIST

## 2017-09-14 PROCEDURE — 40000275 ZZH STATISTIC RCP TIME EA 10 MIN

## 2017-09-14 PROCEDURE — 21000001 ZZH R&B HEART CARE

## 2017-09-14 PROCEDURE — 36415 COLL VENOUS BLD VENIPUNCTURE: CPT | Performed by: HOSPITALIST

## 2017-09-14 PROCEDURE — 80053 COMPREHEN METABOLIC PANEL: CPT | Performed by: EMERGENCY MEDICINE

## 2017-09-14 PROCEDURE — 84484 ASSAY OF TROPONIN QUANT: CPT | Performed by: EMERGENCY MEDICINE

## 2017-09-14 PROCEDURE — 93005 ELECTROCARDIOGRAM TRACING: CPT | Mod: 76

## 2017-09-14 PROCEDURE — 71020 XR CHEST 2 VW: CPT

## 2017-09-14 PROCEDURE — 25000132 ZZH RX MED GY IP 250 OP 250 PS 637: Performed by: PHYSICIAN ASSISTANT

## 2017-09-14 PROCEDURE — 96374 THER/PROPH/DIAG INJ IV PUSH: CPT

## 2017-09-14 PROCEDURE — G0378 HOSPITAL OBSERVATION PER HR: HCPCS

## 2017-09-14 RX ORDER — NITROGLYCERIN 20 MG/100ML
0.07-2 INJECTION INTRAVENOUS CONTINUOUS
Status: DISCONTINUED | OUTPATIENT
Start: 2017-09-14 | End: 2017-09-15

## 2017-09-14 RX ORDER — NITROGLYCERIN 20 MG/100ML
.07-2 INJECTION INTRAVENOUS CONTINUOUS
Status: DISCONTINUED | OUTPATIENT
Start: 2017-09-14 | End: 2017-09-14

## 2017-09-14 RX ORDER — NITROGLYCERIN 0.4 MG/1
0.4 TABLET SUBLINGUAL EVERY 5 MIN PRN
Status: COMPLETED | OUTPATIENT
Start: 2017-09-14 | End: 2017-09-14

## 2017-09-14 RX ORDER — NITROGLYCERIN 0.4 MG/1
0.4 TABLET SUBLINGUAL EVERY 5 MIN PRN
Status: DISCONTINUED | OUTPATIENT
Start: 2017-09-14 | End: 2017-09-15

## 2017-09-14 RX ORDER — ACETAMINOPHEN 325 MG/1
650 TABLET ORAL EVERY 4 HOURS PRN
Status: DISCONTINUED | OUTPATIENT
Start: 2017-09-14 | End: 2017-09-15 | Stop reason: DRUGHIGH

## 2017-09-14 RX ORDER — NITROGLYCERIN 20 MG/100ML
INJECTION INTRAVENOUS
Status: DISCONTINUED
Start: 2017-09-14 | End: 2017-09-14 | Stop reason: HOSPADM

## 2017-09-14 RX ORDER — MORPHINE SULFATE 2 MG/ML
2-4 INJECTION, SOLUTION INTRAMUSCULAR; INTRAVENOUS
Status: DISCONTINUED | OUTPATIENT
Start: 2017-09-14 | End: 2017-09-15

## 2017-09-14 RX ORDER — LIDOCAINE 40 MG/G
CREAM TOPICAL
Status: DISCONTINUED | OUTPATIENT
Start: 2017-09-14 | End: 2017-09-16 | Stop reason: HOSPADM

## 2017-09-14 RX ORDER — ASPIRIN 81 MG/1
162 TABLET, CHEWABLE ORAL ONCE
Status: COMPLETED | OUTPATIENT
Start: 2017-09-14 | End: 2017-09-14

## 2017-09-14 RX ORDER — ASPIRIN 81 MG/1
81 TABLET ORAL DAILY
Status: DISCONTINUED | OUTPATIENT
Start: 2017-09-15 | End: 2017-09-15 | Stop reason: DRUGHIGH

## 2017-09-14 RX ORDER — HYDRALAZINE HYDROCHLORIDE 20 MG/ML
10 INJECTION INTRAMUSCULAR; INTRAVENOUS EVERY 4 HOURS PRN
Status: DISCONTINUED | OUTPATIENT
Start: 2017-09-14 | End: 2017-09-16 | Stop reason: HOSPADM

## 2017-09-14 RX ORDER — FAMOTIDINE 20 MG/1
20 TABLET, FILM COATED ORAL 2 TIMES DAILY
Status: DISCONTINUED | OUTPATIENT
Start: 2017-09-14 | End: 2017-09-16 | Stop reason: HOSPADM

## 2017-09-14 RX ORDER — METOPROLOL TARTRATE 25 MG/1
25 TABLET, FILM COATED ORAL 2 TIMES DAILY
Status: DISCONTINUED | OUTPATIENT
Start: 2017-09-14 | End: 2017-09-15

## 2017-09-14 RX ORDER — ALUMINA, MAGNESIA, AND SIMETHICONE 2400; 2400; 240 MG/30ML; MG/30ML; MG/30ML
15-30 SUSPENSION ORAL EVERY 4 HOURS PRN
Status: DISCONTINUED | OUTPATIENT
Start: 2017-09-14 | End: 2017-09-16 | Stop reason: HOSPADM

## 2017-09-14 RX ORDER — NALOXONE HYDROCHLORIDE 0.4 MG/ML
.1-.4 INJECTION, SOLUTION INTRAMUSCULAR; INTRAVENOUS; SUBCUTANEOUS
Status: DISCONTINUED | OUTPATIENT
Start: 2017-09-14 | End: 2017-09-15

## 2017-09-14 RX ORDER — ACETAMINOPHEN 650 MG/1
650 SUPPOSITORY RECTAL EVERY 4 HOURS PRN
Status: DISCONTINUED | OUTPATIENT
Start: 2017-09-14 | End: 2017-09-16 | Stop reason: HOSPADM

## 2017-09-14 RX ADMIN — ALUMINUM HYDROXIDE, MAGNESIUM HYDROXIDE, AND DIMETHICONE 30 ML: 400; 400; 40 SUSPENSION ORAL at 18:38

## 2017-09-14 RX ADMIN — NITROGLYCERIN 0.4 MG: 0.4 TABLET SUBLINGUAL at 23:16

## 2017-09-14 RX ADMIN — NITROGLYCERIN 0.4 MG: 0.4 TABLET SUBLINGUAL at 21:26

## 2017-09-14 RX ADMIN — NITROGLYCERIN 0.4 MG: 0.4 TABLET SUBLINGUAL at 12:40

## 2017-09-14 RX ADMIN — LIDOCAINE HYDROCHLORIDE 30 ML: 20 SOLUTION ORAL; TOPICAL at 12:56

## 2017-09-14 RX ADMIN — HYDRALAZINE HYDROCHLORIDE 10 MG: 20 INJECTION INTRAMUSCULAR; INTRAVENOUS at 17:35

## 2017-09-14 RX ADMIN — NITROGLYCERIN 0.4 MG: 0.4 TABLET SUBLINGUAL at 21:52

## 2017-09-14 RX ADMIN — ACETAMINOPHEN 650 MG: 325 TABLET, FILM COATED ORAL at 15:11

## 2017-09-14 RX ADMIN — METOPROLOL TARTRATE 25 MG: 25 TABLET ORAL at 20:36

## 2017-09-14 RX ADMIN — NITROGLYCERIN 0.4 MG: 0.4 TABLET SUBLINGUAL at 12:52

## 2017-09-14 RX ADMIN — HEPARIN SODIUM 850 UNITS/HR: 10000 INJECTION, SOLUTION INTRAVENOUS at 21:33

## 2017-09-14 RX ADMIN — Medication 4300 UNITS: at 21:27

## 2017-09-14 RX ADMIN — ASPIRIN 81 MG 162 MG: 81 TABLET ORAL at 17:19

## 2017-09-14 RX ADMIN — FAMOTIDINE 20 MG: 20 TABLET, FILM COATED ORAL at 19:29

## 2017-09-14 RX ADMIN — NITROGLYCERIN 0.4 MG: 0.4 TABLET SUBLINGUAL at 12:46

## 2017-09-14 ASSESSMENT — ACTIVITIES OF DAILY LIVING (ADL)
BATHING: 0-->INDEPENDENT
RETIRED_COMMUNICATION: 0-->UNDERSTANDS/COMMUNICATES WITHOUT DIFFICULTY
AMBULATION: 0-->INDEPENDENT
FALL_HISTORY_WITHIN_LAST_SIX_MONTHS: NO
DRESS: 0-->INDEPENDENT
TRANSFERRING: 0-->INDEPENDENT
TOILETING: 0-->INDEPENDENT
RETIRED_EATING: 0-->INDEPENDENT
COGNITION: 0 - NO COGNITION ISSUES REPORTED
SWALLOWING: 0-->SWALLOWS FOODS/LIQUIDS WITHOUT DIFFICULTY

## 2017-09-14 ASSESSMENT — ENCOUNTER SYMPTOMS
BLOOD IN STOOL: 0
HEMATURIA: 0
VOMITING: 0
DIARRHEA: 0
SHORTNESS OF BREATH: 0
CONSTIPATION: 0
DIZZINESS: 0
ABDOMINAL PAIN: 1
DYSURIA: 0
NAUSEA: 0
BACK PAIN: 0
FREQUENCY: 0
LIGHT-HEADEDNESS: 0

## 2017-09-14 ASSESSMENT — PAIN DESCRIPTION - DESCRIPTORS: DESCRIPTORS: DULL

## 2017-09-14 NOTE — PLAN OF CARE
Observation goals PRIOR TO DISCHARGE     Comments: List all goals to be met before discharge home:   - Serial troponins and stress test complete: not met  - Seen and cleared by consultant if applicable: not met  - Adequate pain control on oral analgesia: partially met  - Vital signs normal or at patient baseline:  Not met  - Safe disposition plan has been identified: met  - Nurse to notify provider when observation goals have been met and patient is ready for discharge.

## 2017-09-14 NOTE — ED NOTES
[facility]  ED Nurse Handoff Report    ED Chief complaint: Chest Pain (x 1hr )      ED Diagnosis:   Final diagnoses:   Chest pain, unspecified type       Code Status: See H&P    Allergies:   Allergies   Allergen Reactions     Penicillins        Activity level - Baseline/Home:  Independent    Activity Level - Current:   Independent     Needed?: No    Isolation: No  Infection: Not Applicable    Bariatric?: No    Vital Signs:   Vitals:    09/14/17 1320 09/14/17 1330 09/14/17 1350 09/14/17 1352   BP: (!) 152/92 (!) 158/97 (!) 144/93    Resp:    8   SpO2:    94%       Cardiac Rhythm: ,        Pain level: 0-10 Pain Scale: 5    Is this patient confused?: No    Patient Report: Initial Complaint: midline upper chest pain, started when walking from garage into house, rating 10/10 on arrival.   Focused Assessment: AOx3, hypertensive on arrival. Chest pain improved after SL nitro x 3 and GI cocktail. Denies SOB. Now c/o abdominal pain, no nausea. NPO in ED.   Tests Performed: ekg x2, labs, chest xray  Abnormal Results: none  Treatments provided: nitro SL x 3, GI cocktail    Family Comments: at bedside    OBS brochure/video discussed/provided to patient: Yes    ED Medications:   Medications   nitroGLYcerin (NITROSTAT) sublingual tablet 0.4 mg (0.4 mg Sublingual Given 9/14/17 1252)   lidocaine (viscous) (XYLOCAINE) 2 % 15 mL, alum & mag hydroxide-simethicone (MYLANTA ES/MAALOX  ES) 15 mL GI Cocktail (30 mLs Oral Given 9/14/17 1256)       Drips infusing?:  No      ED NURSE PHONE NUMBER: *[phone]

## 2017-09-14 NOTE — H&P
PRIMARY CARE PHYSICIAN:  Gwyn Zheng MD.       DATE OF ADMISSION:  09/14/2017.       CHIEF COMPLAINT:  Chest pain and epigastric pain.      HISTORY OF PRESENT ILLNESS:  Mr. Cale Wagoner is an 84-year-old pleasant male with a past medical history significant for GERD, TIA, chronic right knee pain, PFO, who presented to the ER today with complaints of chest pain and epigastric pain.  He states for past 2 days he has been having pain in the abdomen, in the epigastric area with no specific aggravating factor but did get somewhat better with Tylenol but then today he started having substernal chest pain, felt like pressure radiated to his throat and did improve with sublingual nitroglycerin.  He denies any prior CAD history.  No specific aggravating or relieving factor for the chest pain except for the nitroglycerin.  He denies any fever, chills or rigors.  No shortness of breath.  Denies bowel or bladder disturbances.  Here in the ER, he was seen by Dr. Cruz.  His blood pressure was initially elevated at 206/114.  It did improve subsequently and he is currently chest pain-free.  Hospitalist was requested admission for further evaluation.      REVIEW OF SYSTEMS:  A 10-point review of systems was done and was negative apart from those mentioned in the history of present illness.      PAST MEDICAL HISTORY:   1.  GERD.   2.  TIA, 09/2014.   3.  Chronic right knee pain.   4.  PFO noted while evaluating TIA in 2014.      MEDICATIONS PRIOR TO ADMISSION:   Prescriptions Prior to Admission   Medication Sig Dispense Refill Last Dose     ASPIRIN EC PO Take 81 mg by mouth At Bedtime   9/13/2017 at hs     Acetaminophen (TYLENOL PO) Take 500 mg by mouth every 8 hours as needed for mild pain or fever   9/14/2017 at am     omeprazole (PRILOSEC) 40 MG capsule Take 1 capsule by mouth once daily (Take 30 to 60 minutes before a meal) 90 capsule 3 9/14/2017 at am     polyethylene glycol (MIRALAX/GLYCOLAX) powder Dissolve 17  grams in 4 to 8 ounces of water, juice, soda, or coffee and drink once daily in the evening (Patient taking differently: Take 17 g by mouth daily as needed Dissolve 17 grams in 4 to 8 ounces of water, juice, soda, or coffee and drink once daily in the evening) 1581 g 3 prn     FAMOTIDINE PO Take 20 mg by mouth 2 times daily   9/14/2017 at am     Omega-3 Fatty Acids (OMEGA-3 FISH OIL PO) Take 2 g by mouth daily    9/14/2017 at am     Multiple Vitamins-Minerals (MULTIVITAMIN & MINERAL PO) Take 1 tablet by mouth daily   9/14/2017 at am         ALLERGIES:  Penicillin.      SOCIAL HISTORY:  He denies smoking, alcohol or illicit drug use.      FAMILY HISTORY:  Reviewed and not pertinent to current presentation, particularly no history of CAD or CVA.      PHYSICAL EXAMINATION:   GENERAL:  The patient is conscious, alert, oriented x3, lying comfortably in bed in no apparent distress.   VITAL SIGNS:  Temperature 98 degrees Fahrenheit, heart rate of 74, blood pressure 144/93, saturation 94% on room air.   HEENT:  Pupils are equal and reactive to light and accommodation.  Extraocular movements are intact.  Oral mucosa is moist.   NECK:  Supple.  No JVD.   RESPIRATORY:  Lungs sounds bilaterally clear to auscultation, no wheezes or crepitation.   CARDIOVASCULAR:  Normal S1-S2, regular rate and rhythm, no murmur.   ABDOMEN:  Soft, nontender, nondistended, no guarding, rigidity or rebound tenderness.   LOWER EXTREMITIES:  With no edema.   NEUROLOGIC:  No focal neurologic deficits noted.  Cranial nerves II-XII grossly intact.   PSYCHIATRIC:  Normal mood and affect.      LABORATORY DATA:  CMP reviewed from today and yesterday is mostly unremarkable.  Troponin I negative x1.  CBC with WBC of 6.4, hemoglobin 16.6, platelet of 165.  EKG were reviewed by me shows normal sinus rhythm with no acute ST-T wave changes.      Chest x-ray reviewed by me shows no acute infiltrate, effusion or pneumothorax.  Final radiology read is pending.       ASSESSMENT AND PLAN:  Mr. Cale Wagoner is an 84-year-old male with past medical history significant for gastroesophageal reflux disease, transient ischemic attack, chronic right knee pain, patent foramen ovale, who presented to the ER today with complaints of chest pain and epigastric pain.     1.  Atypical chest pain/epigastric pain.  His epigastric pain is probably related to his history of gastroesophageal reflux disease and probably has some underlying peptic ulcer disease which will probably need further evaluation as an outpatient with upper gastrointestinal endoscopy once he rules out for acute coronary syndrome.  Regarding the chest pain, we will admit him for observation to rule out acute coronary syndrome.  We will follow serial troponins, will monitor him on telemetry and will obtain a Lexiscan in a.m.  Will continue with aspirin and sublingual nitroglycerin p.r.n.     2.  Elevated blood pressure, likely related to pain and anxiety of being in the hospital.  He has no diagnosed hypertension.  His blood pressure a primary care physician's visit yesterday was noted to be normal.  We will monitor blood pressure.   3.  Gastroesophageal reflux disease.  Resume Prilosec and famotidine when verified by pharmacy.   4.  History of transient ischemic attack with patent foramen ovale.  The patent foramen ovale has not been intervened.  He has not had any transient ischemic attack symptoms after his presentation in .  Will continue with aspirin.   5.  Deep venous encourage ambulation.  Anticipate a short stay.      CODE STATUS:  Full code.         JAMI TAI MD             D: 2017 14:03   T: 2017 15:40   MT: JEAN MARIE      Name:     CALE WAGONER   MRN:      [id number]        Account:      [id number]   :      1933           Admitted:     [id number]      Document: [id number]       cc: Gwyn Zheng MD

## 2017-09-14 NOTE — ED PROVIDER NOTES
"  History     Chief Complaint:  Chest Pain      HPI   Cale Wagoner is a 84 year old male on aspirin who presents to the emergency department today for evaluation of chest pain. The patient reports having central chest pain since 1130 this morning. Due to concern for symptoms, he presents to the emergency department for evaluation. On presentation, the patient reports central chest pain radiating into central upper abdomen. He states it feels like \"really bad heartburn.\" There are no alleviating or aggravating factors and he has never had pain like this before. He denies nausea, vomiting, shortness of breath, pain with inspiration/movement, back pain, no new bowel/urinary symptoms, lightheadedness, dizziness, heart problems, or abdominal surgery. Of note the patient ate toast, cereal, and milk around 0800 this morning.    Allergies:  Penicillins     Medications:    omeprazole (PRILOSEC) 40 MG capsule  aspirin  MG EC tablet  polyethylene glycol (MIRALAX/GLYCOLAX) powder  FAMOTIDINE PO    Past Medical History:    Hyperlipidemia  TIA  Hypertension  GERD  Bloating    Past Surgical History:    Appendectomy  ENT surgery  Cataracts surgery  Hernia repair x2    Family History:    Pneumonia  Alzheimer disease  COPD    Social History:  The patient was accompanied to the ED by family.  Smoking Status: Former  Smokeless Tobacco: Never  Alcohol Use: Yes  Marital Status:       Review of Systems   Respiratory: Negative for shortness of breath.    Cardiovascular: Positive for chest pain (central).   Gastrointestinal: Positive for abdominal pain (upper central). Negative for blood in stool, constipation, diarrhea, nausea and vomiting.   Genitourinary: Negative for dysuria, frequency and hematuria.   Musculoskeletal: Negative for back pain.   Neurological: Negative for dizziness and light-headedness.   All other systems reviewed and are negative.    Physical Exam   First Vitals:  BP (!) 206/114  SpO2 96%   Pulse: " 87    Physical Exam  Nursing note and vitals reviewed.  Constitutional:  Oriented to person, place, and time. Cooperative. Appears uncomfortable.  HENT:   Nose:    Nose normal.   Mouth/Throat:   Mucous membranes are normal.   Eyes:    Conjunctivae normal and EOM are normal.      Pupils are equal, round, and reactive to light.   Neck:    Trachea normal.   Cardiovascular:  Normal rate, regular rhythm, normal heart sounds and normal pulses. No murmur heard.  Pulmonary/Chest:  Effort normal and breath sounds normal.   Abdominal:   Soft. Normal appearance and bowel sounds are normal.      There is no tenderness.      There is no rebound and no CVA tenderness.   Musculoskeletal:  Extremities atraumatic x 4.   Lymphadenopathy:  No cervical adenopathy.   Neurological:   Alert and oriented to person, place, and time. Normal strength.      No cranial nerve deficit or sensory deficit. GCS eye subscore is 4. GCS verbal subscore is 5. GCS motor subscore is 6.   Skin:    Skin is intact. No rash noted.   Psychiatric:   Normal mood and affect.    Emergency Department Course     ECG:  Indication: chest pain   Completed at 1217.  Read at 1220.   Normal sinus rhythm  Normal ECG    Rate 86 bpm. CO interval 166. QRS duration 88. QT/QTc 358/428. P-R-T axes 57 20 40.    Completed at 1318.  Read at 1322.   Normal sinus rhythm  Normal ECG    Rate 68 bpm. CO interval 174. QRS duration 84. QT/QTc 382/406. P-R-T axes 34 20 41.    Imaging:  Radiology findings were communicated with the patient who voiced understanding of the findings.  XR Chest 2 Views  XR Chest 2 Views (Final result) Result time: 09/14/17 14:04:19     Final result by Ruperto Carvalho MD (09/14/17 14:04:19)     Impression:     IMPRESSION: The lungs are clear. No focal pulmonary opacities. Heart  and mediastinum are unremarkable. No acute cardiopulmonary  abnormalities.    RUPERTO CARVALHO MD     Narrative:     XR CHEST 2 VW 9/14/2017 1:40 PM    HISTORY: Pain.    COMPARISON: None.        Report per radiology        Laboratory:  Laboratory findings were communicated with the patient who voiced understanding of the findings.  CBC: AWNL. (WBC 6.4, HGB 16.6, )   CMP: Glucose 110(H) o/w WNL (Creatinine 1.04)  Lipase: 350  Troponin (Collected 1235): <0.015    Interventions:  1240 Nitrostat 0.4 mg Sublingual  1246 Nitrostat 0.4 mg Sublingual  1252 Nitrostat 0.4 mg Sublingual  1256 GI Cocktail 30mg PO     Emergency Department Course:  Nursing notes and vitals reviewed.  IV was inserted and blood was drawn for laboratory testing, results above.  The patient was sent for a XR Chest 2 Views while in the emergency department, results above.   1224: I performed an exam of the patient as documented above.   1347: I spoke with Dr. Hardin of the hospitalist service regarding patient's presentation, findings, and plan of care.  1358: Patient rechecked and updated.   I discussed the treatment plan with the patient. They expressed understanding of this plan and consented to admission. I discussed the patient with Dr. Hardin, who will admit the patient to a monitored bed for further evaluation and treatment.  I personally reviewed the laboratory and imaging results with the Patient and answered all related questions prior to admission.    Impression & Plan      Medical Decision Making:  Cale Wagoner is an 84 year old male who came in with chest pain. He appeared quite uncomfortable upon arrival to the emergency department. However, his ECG was normal. I proceeded with the above workup including the blood work and chest XR. He seemed to improve with sublingual nitroglycerin, which also brought down his blood pressure. He had a repeat ECG that was unchanged as well. I think it is best that he be brought in for a rule out protocol and likely stress testing. Certainly other etiologies are potentially the cause as well, including GERD, gastritis or gallbladder disease. However, he has no other  worrisome findings on his exam or with his laboratory workup to suggest any type of biliary obstruction or infection. I spoke with Dr. Hardin who will be taking care of him.    Diagnosis:    ICD-10-CM    1. Chest pain, unspecified type R07.9        Disposition:  Admitted to obs    Scribe Disclosure:  I, Niyah Love, am serving as a scribe at 12:20 PM on 9/14/2017 to document services personally performed by Orestes Cruz MD based on my observations and the provider's statements to me.    9/14/2017    EMERGENCY DEPARTMENT       Orestes Cruz MD  09/14/17 9989

## 2017-09-14 NOTE — PHARMACY-ADMISSION MEDICATION HISTORY
Admission medication history interview status for the 9/14/2017  admission is complete. See EPIC admission navigator for prior to admission medications     Medication history source reliability:Good , pt interview  Actions taken by pharmacist (provider contacted, etc): ASA dose is 81mg    Additional medication history information not noted on PTA med list :None    Medication reconciliation/reorder completed by provider prior to medication history? No    Time spent in this activity: 10 minutes    Prior to Admission medications    Medication Sig Last Dose Taking? Auth Provider   ASPIRIN EC PO Take 81 mg by mouth At Bedtime 9/13/2017 at hs Yes Unknown, Entered By History   Acetaminophen (TYLENOL PO) Take 500 mg by mouth every 8 hours as needed for mild pain or fever 9/14/2017 at am Yes Unknown, Entered By History   omeprazole (PRILOSEC) 40 MG capsule Take 1 capsule by mouth once daily (Take 30 to 60 minutes before a meal) 9/14/2017 at am Yes Gwyn Zheng MD   polyethylene glycol (MIRALAX/GLYCOLAX) powder Dissolve 17 grams in 4 to 8 ounces of water, juice, soda, or coffee and drink once daily in the evening  Patient taking differently: Take 17 g by mouth daily as needed Dissolve 17 grams in 4 to 8 ounces of water, juice, soda, or coffee and drink once daily in the evening prn Yes Gwyn Zheng MD   FAMOTIDINE PO Take 20 mg by mouth 2 times daily 9/14/2017 at am Yes Reported, Patient   Omega-3 Fatty Acids (OMEGA-3 FISH OIL PO) Take 2 g by mouth daily  9/14/2017 at am Yes Unknown, Entered By History   Multiple Vitamins-Minerals (MULTIVITAMIN & MINERAL PO) Take 1 tablet by mouth daily 9/14/2017 at am Yes Reported, Patient       9/15 UPDATE:  Pt's spouse brought in bag of meds. List updated to include metamucin, garlic. Gwendolyn CalderonD

## 2017-09-14 NOTE — PROVIDER NOTIFICATION
MD Notification    Notified Person:  MD    Notified Persons Name:    Notification Date/Time:~1630    Notification Interaction:  Talked with Physician Lashawn    Purpose of Notification: SBP consists >170's    Orders Received: Hydralazine PRN SBP> 180    Comments:

## 2017-09-14 NOTE — IP AVS SNAPSHOT
MRN:[id number]                      After Visit Summary   9/14/2017    Cale Wagoner    MRN: [id number]           Thank you!     Thank you for choosing [facility] for your care. Our goal is always to provide you with excellent care. Hearing back from our patients is one way we can continue to improve our services. Please take a few minutes to complete the written survey that you may receive in the mail after you visit with us. Thank you!        Patient Information     Date Of Birth          5/26/1933        Designated Caregiver       Most Recent Value    Caregiver    Will someone help with your care after discharge? yes    Name of designated caregiver daughter Argentina and wife Dalila    Phone number of caregiver in EPIC    Caregiver address In [facility]      About your hospital stay     You were admitted on:  September 14, 2017 You last received care in the:  [facility]    You were discharged on:  September 16, 2017        Reason for your hospital stay       You were admitted with unstable angina and underwent heart cath and stent placement.                  Who to Call     For medical emergencies, please call 911.  For non-urgent questions about your medical care, please call your primary care provider or clinic, [phone]          Attending Provider     Provider Specialty    Orestes Cruz MD Emergency Medicine    MauriceCarlos bourgeois MD Internal Medicine       Primary Care Provider Office Phone # Fax #    Gwyn Zheng [phone]      After Care Instructions     Activity       Your activity upon discharge: activity as tolerated            Diet       Follow this diet upon discharge: Orders Placed This Encounter      Advance Diet as Tolerated: Regular Diet Adult; 2 gm NA Diet; Low Saturated Fat Diet                  Follow-up Appointments     Follow-up and recommended labs and tests        Follow up with primary care provider, Gwyn Wiseman  "Marce, in 2-3 weeks  for hospital follow- up.  No follow up labs or test are needed. Please call his office for appointment    [facility] will contact you this week to help you schedule an appointment with a Nurse Practitioner/Physicians Assistant.   [address]  [place] [address]  Phone: [phone].    Need follow up with P.A. 7-10 days  Need follow up with Dr Yang 2-3 months                  Additional Services     CARDIAC REHAB REFERRAL       Patient may choose their preference of the site for Cardiac Rehab.            CARDIAC REHAB REFERRAL       Please be aware that coverage of these services is subject to the terms and limitations of your health insurance plan. Call member services at your health plan with any benefit or coverage questions.    Order is sent electronically to central rehab scheduling. Call [phone] if you haven't been contacted regarding these appointments within 2 business days of discharge.            Follow-Up with Cardiac Advanced Practice Provider           Follow-Up with Cardiologist                 Pending Results     No orders found from 9/12/2017 to 9/15/2017.            Statement of Approval     Ordered          09/16/17 1326  I have reviewed and agree with all the recommendations and orders detailed in this document.  EFFECTIVE NOW     Approved and electronically signed by:  Juan Grubbs MD             Admission Information     Date & Time Provider Department Dept. Phone    9/14/2017 Carlos Hardin MD [facility] [phone]      Your Vitals Were     Blood Pressure Pulse Temperature Respirations Height Weight    105/66 77 97.9  F (36.6  C) (Oral) 18 1.702 m (5' 7\") 77.1 kg (169 lb 15.6 oz)    Pulse Oximetry BMI (Body Mass Index)                93% 26.62 kg/m2          Care EveryWhere ID     This is your Care EveryWhere ID. This could be used by other organizations to access your " [place] medical records  [id number]        Equal Access to Services     HODANERNIE LAURA : Hadii aad ku hadnéstornaomi Rossijoe, wakyrada luiggyadaha, qaroldanta kacrpaulo cosbycampbellpaulo, wilton chawlasvetlanajazmin montoya. So [facility] [phone].    ATENCIÓN: Si habla español, tiene a rene disposición servicios gratuitos de asistencia lingüística. Llame al [phone].    We comply with applicable federal civil rights laws and [place] laws. We do not discriminate on the basis of race, color, national origin, age, disability sex, sexual orientation or gender identity.               Review of your medicines      START taking        Dose / Directions    atorvastatin 40 MG tablet   Commonly known as:  LIPITOR   Used for:  Chest pain, unspecified type        Dose:  40 mg   Take 1 tablet (40 mg) by mouth daily   Quantity:  30 tablet   Refills:  3       clopidogrel 75 MG tablet   Commonly known as:  PLAVIX   Used for:  Chest pain, unspecified type        Dose:  75 mg   Take 1 tablet (75 mg) by mouth daily   Quantity:  30 tablet   Refills:  3       metoprolol 50 MG tablet   Commonly known as:  LOPRESSOR   Used for:  Chest pain, unspecified type        Dose:  50 mg   Take 1 tablet (50 mg) by mouth 2 times daily   Quantity:  60 tablet   Refills:  3       nitroGLYcerin 0.4 MG sublingual tablet   Commonly known as:  NITROSTAT   Used for:  Chest pain, unspecified type        For chest pain place 1 tablet under the tongue every 5 minutes for 3 doses. If symptoms persist 5 minutes after 1st dose call 911.   Quantity:  25 tablet   Refills:  1         CONTINUE these medicines which may have CHANGED, or have new prescriptions. If we are uncertain of the size of tablets/capsules you have at home, strength may be listed as something that might have changed.        Dose / Directions    polyethylene glycol powder   Commonly known as:  MIRALAX/GLYCOLAX   This may have changed:    - how much to take  - how to take this  - when to take this  - reasons to  take this  - additional instructions   Used for:  Slow transit constipation        Dissolve 17 grams in 4 to 8 ounces of water, juice, soda, or coffee and drink once daily in the evening   Quantity:  1581 g   Refills:  3         CONTINUE these medicines which have NOT CHANGED        Dose / Directions    ASPIRIN EC PO        Dose:  81 mg   Take 81 mg by mouth At Bedtime   Refills:  0       FAMOTIDINE PO        Dose:  20 mg   Take 20 mg by mouth 2 times daily   Refills:  0       Garlic 1000 MG Caps        Dose:  1 capsule   Take 1 capsule by mouth daily   Refills:  0       MULTIVITAMIN & MINERAL PO        Dose:  1 tablet   Take 1 tablet by mouth daily   Refills:  0       OMEGA-3 FISH OIL PO        Dose:  2 g   Take 2 g by mouth daily   Refills:  0       omeprazole 40 MG capsule   Commonly known as:  priLOSEC   Used for:  Gastroesophageal reflux disease without esophagitis        Take 1 capsule by mouth once daily (Take 30 to 60 minutes before a meal)   Quantity:  90 capsule   Refills:  3       psyllium Packet   Commonly known as:  METAMUCIL/KONSYL        Dose:  1 packet   Take 1 packet by mouth daily   Refills:  0       TYLENOL PO        Dose:  500 mg   Take 500 mg by mouth every 8 hours as needed for mild pain or fever   Refills:  0            Where to get your medicines      Some of these will need a paper prescription and others can be bought over the counter. Ask your nurse if you have questions.     Bring a paper prescription for each of these medications     atorvastatin 40 MG tablet    clopidogrel 75 MG tablet    metoprolol 50 MG tablet    nitroGLYcerin 0.4 MG sublingual tablet                Protect others around you: Learn how to safely use, store and throw away your medicines at www.disposemymeds.org.             Medication List: This is a list of all your medications and when to take them. Check marks below indicate your daily home schedule. Keep this list as a reference.      Medications           Morning  Afternoon Evening Bedtime As Needed    ASPIRIN EC PO   Take 81 mg by mouth At Bedtime   Last time this was given:  81 mg on 9/16/2017  8:47 AM   Next Dose Due:  9/17                                   atorvastatin 40 MG tablet   Commonly known as:  LIPITOR   Take 1 tablet (40 mg) by mouth daily   Last time this was given:  40 mg on 9/15/2017  8:50 PM   Next Dose Due:  9/16                                   clopidogrel 75 MG tablet   Commonly known as:  PLAVIX   Take 1 tablet (75 mg) by mouth daily   Last time this was given:  75 mg on 9/16/2017  8:48 AM   Next Dose Due:  9/17                                     FAMOTIDINE PO   Take 20 mg by mouth 2 times daily   Last time this was given:  20 mg on 9/16/2017  8:48 AM   Next Dose Due:  9/16                                      Garlic 1000 MG Caps   Take 1 capsule by mouth daily   Next Dose Due:  9/17                                   metoprolol 50 MG tablet   Commonly known as:  LOPRESSOR   Take 1 tablet (50 mg) by mouth 2 times daily   Last time this was given:  50 mg on 9/16/2017  8:48 AM   Next Dose Due:  9/16                                      MULTIVITAMIN & MINERAL PO   Take 1 tablet by mouth daily                                   nitroGLYcerin 0.4 MG sublingual tablet   Commonly known as:  NITROSTAT   For chest pain place 1 tablet under the tongue every 5 minutes for 3 doses. If symptoms persist 5 minutes after 1st dose call 911.   Last time this was given:  0.4 mg on 9/15/2017 10:32 AM                                   OMEGA-3 FISH OIL PO   Take 2 g by mouth daily   Next Dose Due:  9/17                                   omeprazole 40 MG capsule   Commonly known as:  priLOSEC   Take 1 capsule by mouth once daily (Take 30 to 60 minutes before a meal)   Last time this was given:  40 mg on 9/16/2017  8:47 AM   Next Dose Due:  9/17                                   polyethylene glycol powder   Commonly known as:  MIRALAX/GLYCOLAX   Dissolve 17 grams in 4 to 8  ounces of water, juice, soda, or coffee and drink once daily in the evening                                psyllium Packet   Commonly known as:  METAMUCIL/KONSYL   Take 1 packet by mouth daily                                TYLENOL PO   Take 500 mg by mouth every 8 hours as needed for mild pain or fever   Last time this was given:  650 mg on 9/14/2017  3:11 PM

## 2017-09-14 NOTE — IP AVS SNAPSHOT
[facility]    [address]    [place] [address]    Phone:  [phone]                                       After Visit Summary   9/14/2017    Cale Wagoner    MRN: [id number]           After Visit Summary Signature Page     I have received my discharge instructions, and my questions have been answered. I have discussed any challenges I see with this plan with the nurse or doctor.    ..........................................................................................................................................  Patient/Patient Representative Signature      ..........................................................................................................................................  Patient Representative Print Name and Relationship to Patient    ..................................................               ................................................  Date                                            Time    ..........................................................................................................................................  Reviewed by Signature/Title    ...................................................              ..............................................  Date                                                            Time

## 2017-09-15 ENCOUNTER — APPOINTMENT (OUTPATIENT)
Dept: CARDIOLOGY | Facility: CLINIC | Age: 82
DRG: 247 | End: 2017-09-15
Attending: INTERNAL MEDICINE
Payer: COMMERCIAL

## 2017-09-15 LAB
KCT BLD-ACNC: 149 SEC (ref 105–167)
LMWH PPP CHRO-ACNC: 0.37 IU/ML
LMWH PPP CHRO-ACNC: 0.5 IU/ML
TROPONIN I SERPL-MCNC: 11.13 UG/L (ref 0–0.04)

## 2017-09-15 PROCEDURE — 27210759 ZZH DEVICE INFLATION CR6

## 2017-09-15 PROCEDURE — 27210946 ZZH KIT HC TOTES DISP CR8

## 2017-09-15 PROCEDURE — 27210998 ZZH ACCESS HEART CATH CR6

## 2017-09-15 PROCEDURE — 25000128 H RX IP 250 OP 636: Performed by: INTERNAL MEDICINE

## 2017-09-15 PROCEDURE — 99233 SBSQ HOSP IP/OBS HIGH 50: CPT | Performed by: INTERNAL MEDICINE

## 2017-09-15 PROCEDURE — C1769 GUIDE WIRE: HCPCS

## 2017-09-15 PROCEDURE — 27211089 ZZH KIT ACIST INJECTOR CR3

## 2017-09-15 PROCEDURE — 25000132 ZZH RX MED GY IP 250 OP 250 PS 637: Performed by: INTERNAL MEDICINE

## 2017-09-15 PROCEDURE — 99152 MOD SED SAME PHYS/QHP 5/>YRS: CPT | Performed by: INTERNAL MEDICINE

## 2017-09-15 PROCEDURE — 36415 COLL VENOUS BLD VENIPUNCTURE: CPT | Performed by: INTERNAL MEDICINE

## 2017-09-15 PROCEDURE — 93005 ELECTROCARDIOGRAM TRACING: CPT

## 2017-09-15 PROCEDURE — C1725 CATH, TRANSLUMIN NON-LASER: HCPCS

## 2017-09-15 PROCEDURE — C9600 PERC DRUG-EL COR STENT SING: HCPCS | Mod: LC

## 2017-09-15 PROCEDURE — 85520 HEPARIN ASSAY: CPT | Performed by: HOSPITALIST

## 2017-09-15 PROCEDURE — 84484 ASSAY OF TROPONIN QUANT: CPT | Performed by: HOSPITALIST

## 2017-09-15 PROCEDURE — C1887 CATHETER, GUIDING: HCPCS

## 2017-09-15 PROCEDURE — C1874 STENT, COATED/COV W/DEL SYS: HCPCS

## 2017-09-15 PROCEDURE — 93454 CORONARY ARTERY ANGIO S&I: CPT | Mod: 26 | Performed by: INTERNAL MEDICINE

## 2017-09-15 PROCEDURE — 85520 HEPARIN ASSAY: CPT | Performed by: INTERNAL MEDICINE

## 2017-09-15 PROCEDURE — 25000125 ZZHC RX 250: Performed by: INTERNAL MEDICINE

## 2017-09-15 PROCEDURE — 27210795 ZZH PAD DEFIB QUICK CR4

## 2017-09-15 PROCEDURE — 85347 COAGULATION TIME ACTIVATED: CPT

## 2017-09-15 PROCEDURE — 99223 1ST HOSP IP/OBS HIGH 75: CPT | Mod: 25 | Performed by: INTERNAL MEDICINE

## 2017-09-15 PROCEDURE — 99153 MOD SED SAME PHYS/QHP EA: CPT

## 2017-09-15 PROCEDURE — 27210787 ZZH MANIFOLD CR2

## 2017-09-15 PROCEDURE — 25000132 ZZH RX MED GY IP 250 OP 250 PS 637: Performed by: HOSPITALIST

## 2017-09-15 PROCEDURE — 92928 PRQ TCAT PLMT NTRAC ST 1 LES: CPT | Mod: LC | Performed by: INTERNAL MEDICINE

## 2017-09-15 PROCEDURE — B2111ZZ FLUOROSCOPY OF MULTIPLE CORONARY ARTERIES USING LOW OSMOLAR CONTRAST: ICD-10-PCS | Performed by: INTERNAL MEDICINE

## 2017-09-15 PROCEDURE — 99152 MOD SED SAME PHYS/QHP 5/>YRS: CPT

## 2017-09-15 PROCEDURE — 27210910 ZZH NEEDLE CR6

## 2017-09-15 PROCEDURE — 21000000 ZZH R&B IMCU HEART CARE

## 2017-09-15 PROCEDURE — 027135Z DILATION OF CORONARY ARTERY, TWO ARTERIES WITH TWO DRUG-ELUTING INTRALUMINAL DEVICES, PERCUTANEOUS APPROACH: ICD-10-PCS | Performed by: INTERNAL MEDICINE

## 2017-09-15 PROCEDURE — 27210742 ZZH CATH CR1

## 2017-09-15 PROCEDURE — 93454 CORONARY ARTERY ANGIO S&I: CPT

## 2017-09-15 PROCEDURE — 93010 ELECTROCARDIOGRAM REPORT: CPT | Performed by: INTERNAL MEDICINE

## 2017-09-15 PROCEDURE — 36415 COLL VENOUS BLD VENIPUNCTURE: CPT | Performed by: HOSPITALIST

## 2017-09-15 RX ORDER — PROCHLORPERAZINE 25 MG
12.5 SUPPOSITORY, RECTAL RECTAL EVERY 12 HOURS PRN
Status: DISCONTINUED | OUTPATIENT
Start: 2017-09-15 | End: 2017-09-16 | Stop reason: HOSPADM

## 2017-09-15 RX ORDER — EPTIFIBATIDE 2 MG/ML
2 INJECTION, SOLUTION INTRAVENOUS CONTINUOUS PRN
Status: DISCONTINUED | OUTPATIENT
Start: 2017-09-15 | End: 2017-09-15 | Stop reason: HOSPADM

## 2017-09-15 RX ORDER — POTASSIUM CHLORIDE 7.45 MG/ML
10 INJECTION INTRAVENOUS
Status: DISCONTINUED | OUTPATIENT
Start: 2017-09-15 | End: 2017-09-15 | Stop reason: HOSPADM

## 2017-09-15 RX ORDER — NITROGLYCERIN 0.4 MG/1
0.4 TABLET SUBLINGUAL EVERY 5 MIN PRN
Status: DISCONTINUED | OUTPATIENT
Start: 2017-09-15 | End: 2017-09-16 | Stop reason: HOSPADM

## 2017-09-15 RX ORDER — ASPIRIN 325 MG
325 TABLET ORAL
Status: DISCONTINUED | OUTPATIENT
Start: 2017-09-15 | End: 2017-09-15 | Stop reason: HOSPADM

## 2017-09-15 RX ORDER — METHYLPREDNISOLONE SODIUM SUCCINATE 125 MG/2ML
125 INJECTION, POWDER, LYOPHILIZED, FOR SOLUTION INTRAMUSCULAR; INTRAVENOUS
Status: DISCONTINUED | OUTPATIENT
Start: 2017-09-15 | End: 2017-09-15 | Stop reason: HOSPADM

## 2017-09-15 RX ORDER — NITROGLYCERIN 5 MG/ML
100-500 VIAL (ML) INTRAVENOUS
Status: DISCONTINUED | OUTPATIENT
Start: 2017-09-15 | End: 2017-09-15 | Stop reason: HOSPADM

## 2017-09-15 RX ORDER — LORAZEPAM 2 MG/ML
.5-2 INJECTION INTRAMUSCULAR EVERY 4 HOURS PRN
Status: DISCONTINUED | OUTPATIENT
Start: 2017-09-15 | End: 2017-09-15 | Stop reason: HOSPADM

## 2017-09-15 RX ORDER — FENTANYL CITRATE 50 UG/ML
25-50 INJECTION, SOLUTION INTRAMUSCULAR; INTRAVENOUS
Status: COMPLETED | OUTPATIENT
Start: 2017-09-15 | End: 2017-09-15

## 2017-09-15 RX ORDER — METOPROLOL TARTRATE 1 MG/ML
5 INJECTION, SOLUTION INTRAVENOUS EVERY 5 MIN PRN
Status: DISCONTINUED | OUTPATIENT
Start: 2017-09-15 | End: 2017-09-15 | Stop reason: HOSPADM

## 2017-09-15 RX ORDER — PHENYLEPHRINE HCL IN 0.9% NACL 1 MG/10 ML
20-100 SYRINGE (ML) INTRAVENOUS
Status: DISCONTINUED | OUTPATIENT
Start: 2017-09-15 | End: 2017-09-15 | Stop reason: HOSPADM

## 2017-09-15 RX ORDER — ASPIRIN 81 MG/1
81-324 TABLET, CHEWABLE ORAL
Status: COMPLETED | OUTPATIENT
Start: 2017-09-15 | End: 2017-09-15

## 2017-09-15 RX ORDER — FENTANYL CITRATE 50 UG/ML
25-50 INJECTION, SOLUTION INTRAMUSCULAR; INTRAVENOUS
Status: DISCONTINUED | OUTPATIENT
Start: 2017-09-15 | End: 2017-09-15 | Stop reason: HOSPADM

## 2017-09-15 RX ORDER — CLOPIDOGREL 300 MG/1
300-600 TABLET, FILM COATED ORAL
Status: DISCONTINUED | OUTPATIENT
Start: 2017-09-15 | End: 2017-09-15 | Stop reason: HOSPADM

## 2017-09-15 RX ORDER — LORAZEPAM 0.5 MG/1
0.5 TABLET ORAL
Status: DISCONTINUED | OUTPATIENT
Start: 2017-09-15 | End: 2017-09-15 | Stop reason: HOSPADM

## 2017-09-15 RX ORDER — NALOXONE HYDROCHLORIDE 0.4 MG/ML
.2-.4 INJECTION, SOLUTION INTRAMUSCULAR; INTRAVENOUS; SUBCUTANEOUS
Status: ACTIVE | OUTPATIENT
Start: 2017-09-15 | End: 2017-09-16

## 2017-09-15 RX ORDER — POTASSIUM CHLORIDE 29.8 MG/ML
20 INJECTION INTRAVENOUS
Status: DISCONTINUED | OUTPATIENT
Start: 2017-09-15 | End: 2017-09-15 | Stop reason: HOSPADM

## 2017-09-15 RX ORDER — LIDOCAINE HYDROCHLORIDE 10 MG/ML
1-10 INJECTION, SOLUTION EPIDURAL; INFILTRATION; INTRACAUDAL; PERINEURAL
Status: DISCONTINUED | OUTPATIENT
Start: 2017-09-15 | End: 2017-09-15 | Stop reason: HOSPADM

## 2017-09-15 RX ORDER — PRASUGREL 10 MG/1
10-60 TABLET, FILM COATED ORAL
Status: DISCONTINUED | OUTPATIENT
Start: 2017-09-15 | End: 2017-09-15 | Stop reason: HOSPADM

## 2017-09-15 RX ORDER — LIDOCAINE HYDROCHLORIDE 10 MG/ML
1-10 INJECTION, SOLUTION INFILTRATION; PERINEURAL
Status: DISCONTINUED | OUTPATIENT
Start: 2017-09-15 | End: 2017-09-15 | Stop reason: HOSPADM

## 2017-09-15 RX ORDER — FENTANYL CITRATE 50 UG/ML
25-50 INJECTION, SOLUTION INTRAMUSCULAR; INTRAVENOUS
Status: ACTIVE | OUTPATIENT
Start: 2017-09-15 | End: 2017-09-16

## 2017-09-15 RX ORDER — NALOXONE HYDROCHLORIDE 0.4 MG/ML
0.4 INJECTION, SOLUTION INTRAMUSCULAR; INTRAVENOUS; SUBCUTANEOUS EVERY 5 MIN PRN
Status: DISCONTINUED | OUTPATIENT
Start: 2017-09-15 | End: 2017-09-15 | Stop reason: HOSPADM

## 2017-09-15 RX ORDER — SODIUM NITROPRUSSIDE 25 MG/ML
100-200 INJECTION INTRAVENOUS
Status: DISCONTINUED | OUTPATIENT
Start: 2017-09-15 | End: 2017-09-15 | Stop reason: HOSPADM

## 2017-09-15 RX ORDER — LIDOCAINE 40 MG/G
CREAM TOPICAL
Status: DISCONTINUED | OUTPATIENT
Start: 2017-09-15 | End: 2017-09-15

## 2017-09-15 RX ORDER — ADENOSINE 3 MG/ML
12-12000 INJECTION, SOLUTION INTRAVENOUS
Status: DISCONTINUED | OUTPATIENT
Start: 2017-09-15 | End: 2017-09-15 | Stop reason: HOSPADM

## 2017-09-15 RX ORDER — SODIUM CHLORIDE 9 MG/ML
INJECTION, SOLUTION INTRAVENOUS CONTINUOUS
Status: DISCONTINUED | OUTPATIENT
Start: 2017-09-15 | End: 2017-09-15 | Stop reason: HOSPADM

## 2017-09-15 RX ORDER — PROTAMINE SULFATE 10 MG/ML
25-100 INJECTION, SOLUTION INTRAVENOUS EVERY 5 MIN PRN
Status: DISCONTINUED | OUTPATIENT
Start: 2017-09-15 | End: 2017-09-15 | Stop reason: HOSPADM

## 2017-09-15 RX ORDER — DEXTROSE MONOHYDRATE 25 G/50ML
12.5-5 INJECTION, SOLUTION INTRAVENOUS EVERY 30 MIN PRN
Status: DISCONTINUED | OUTPATIENT
Start: 2017-09-15 | End: 2017-09-15 | Stop reason: HOSPADM

## 2017-09-15 RX ORDER — HEPARIN SODIUM 1000 [USP'U]/ML
1000-10000 INJECTION, SOLUTION INTRAVENOUS; SUBCUTANEOUS EVERY 5 MIN PRN
Status: DISCONTINUED | OUTPATIENT
Start: 2017-09-15 | End: 2017-09-15 | Stop reason: HOSPADM

## 2017-09-15 RX ORDER — IOPAMIDOL 755 MG/ML
150 INJECTION, SOLUTION INTRAVASCULAR ONCE
Status: DISCONTINUED | OUTPATIENT
Start: 2017-09-15 | End: 2017-09-15

## 2017-09-15 RX ORDER — ONDANSETRON 2 MG/ML
4 INJECTION INTRAMUSCULAR; INTRAVENOUS EVERY 4 HOURS PRN
Status: DISCONTINUED | OUTPATIENT
Start: 2017-09-15 | End: 2017-09-15 | Stop reason: HOSPADM

## 2017-09-15 RX ORDER — LORAZEPAM 2 MG/ML
0.5 INJECTION INTRAMUSCULAR
Status: DISCONTINUED | OUTPATIENT
Start: 2017-09-15 | End: 2017-09-15 | Stop reason: HOSPADM

## 2017-09-15 RX ORDER — PROTAMINE SULFATE 10 MG/ML
1-5 INJECTION, SOLUTION INTRAVENOUS
Status: DISCONTINUED | OUTPATIENT
Start: 2017-09-15 | End: 2017-09-15 | Stop reason: HOSPADM

## 2017-09-15 RX ORDER — LIDOCAINE HYDROCHLORIDE 10 MG/ML
30 INJECTION, SOLUTION EPIDURAL; INFILTRATION; INTRACAUDAL; PERINEURAL
Status: COMPLETED | OUTPATIENT
Start: 2017-09-15 | End: 2017-09-15

## 2017-09-15 RX ORDER — NICARDIPINE HYDROCHLORIDE 2.5 MG/ML
100 INJECTION INTRAVENOUS
Status: DISCONTINUED | OUTPATIENT
Start: 2017-09-15 | End: 2017-09-15 | Stop reason: HOSPADM

## 2017-09-15 RX ORDER — ONDANSETRON 2 MG/ML
8 INJECTION INTRAMUSCULAR; INTRAVENOUS EVERY 6 HOURS PRN
Status: DISCONTINUED | OUTPATIENT
Start: 2017-09-15 | End: 2017-09-16 | Stop reason: HOSPADM

## 2017-09-15 RX ORDER — FUROSEMIDE 10 MG/ML
20-100 INJECTION INTRAMUSCULAR; INTRAVENOUS
Status: DISCONTINUED | OUTPATIENT
Start: 2017-09-15 | End: 2017-09-15 | Stop reason: HOSPADM

## 2017-09-15 RX ORDER — DOPAMINE HYDROCHLORIDE 160 MG/100ML
2-20 INJECTION, SOLUTION INTRAVENOUS CONTINUOUS PRN
Status: DISCONTINUED | OUTPATIENT
Start: 2017-09-15 | End: 2017-09-15 | Stop reason: HOSPADM

## 2017-09-15 RX ORDER — HYDROCODONE BITARTRATE AND ACETAMINOPHEN 5; 325 MG/1; MG/1
1-2 TABLET ORAL EVERY 4 HOURS PRN
Status: DISCONTINUED | OUTPATIENT
Start: 2017-09-15 | End: 2017-09-16 | Stop reason: HOSPADM

## 2017-09-15 RX ORDER — FLUMAZENIL 0.1 MG/ML
0.2 INJECTION, SOLUTION INTRAVENOUS
Status: DISCONTINUED | OUTPATIENT
Start: 2017-09-15 | End: 2017-09-15 | Stop reason: HOSPADM

## 2017-09-15 RX ORDER — LIDOCAINE HYDROCHLORIDE 10 MG/ML
30 INJECTION, SOLUTION EPIDURAL; INFILTRATION; INTRACAUDAL; PERINEURAL
Status: DISCONTINUED | OUTPATIENT
Start: 2017-09-15 | End: 2017-09-15 | Stop reason: HOSPADM

## 2017-09-15 RX ORDER — NITROGLYCERIN 5 MG/ML
100-200 VIAL (ML) INTRAVENOUS
Status: DISCONTINUED | OUTPATIENT
Start: 2017-09-15 | End: 2017-09-15 | Stop reason: HOSPADM

## 2017-09-15 RX ORDER — DIPHENHYDRAMINE HYDROCHLORIDE 50 MG/ML
25-50 INJECTION INTRAMUSCULAR; INTRAVENOUS
Status: DISCONTINUED | OUTPATIENT
Start: 2017-09-15 | End: 2017-09-15 | Stop reason: HOSPADM

## 2017-09-15 RX ORDER — CLOPIDOGREL BISULFATE 75 MG/1
75 TABLET ORAL
Status: DISCONTINUED | OUTPATIENT
Start: 2017-09-15 | End: 2017-09-15 | Stop reason: HOSPADM

## 2017-09-15 RX ORDER — MORPHINE SULFATE 2 MG/ML
1-2 INJECTION, SOLUTION INTRAMUSCULAR; INTRAVENOUS EVERY 5 MIN PRN
Status: DISCONTINUED | OUTPATIENT
Start: 2017-09-15 | End: 2017-09-15 | Stop reason: HOSPADM

## 2017-09-15 RX ORDER — ACETAMINOPHEN 325 MG/1
325-650 TABLET ORAL EVERY 4 HOURS PRN
Status: DISCONTINUED | OUTPATIENT
Start: 2017-09-15 | End: 2017-09-16 | Stop reason: HOSPADM

## 2017-09-15 RX ORDER — PROMETHAZINE HYDROCHLORIDE 25 MG/ML
6.25-25 INJECTION, SOLUTION INTRAMUSCULAR; INTRAVENOUS EVERY 4 HOURS PRN
Status: DISCONTINUED | OUTPATIENT
Start: 2017-09-15 | End: 2017-09-15 | Stop reason: HOSPADM

## 2017-09-15 RX ORDER — HYDRALAZINE HYDROCHLORIDE 20 MG/ML
10-20 INJECTION INTRAMUSCULAR; INTRAVENOUS
Status: DISCONTINUED | OUTPATIENT
Start: 2017-09-15 | End: 2017-09-15 | Stop reason: HOSPADM

## 2017-09-15 RX ORDER — ASPIRIN 81 MG/1
81 TABLET ORAL DAILY
Status: DISCONTINUED | OUTPATIENT
Start: 2017-09-16 | End: 2017-09-16 | Stop reason: HOSPADM

## 2017-09-15 RX ORDER — VERAPAMIL HYDROCHLORIDE 2.5 MG/ML
1-2.5 INJECTION, SOLUTION INTRAVENOUS
Status: DISCONTINUED | OUTPATIENT
Start: 2017-09-15 | End: 2017-09-15 | Stop reason: HOSPADM

## 2017-09-15 RX ORDER — SODIUM CHLORIDE 9 MG/ML
INJECTION, SOLUTION INTRAVENOUS CONTINUOUS
Status: ACTIVE | OUTPATIENT
Start: 2017-09-15 | End: 2017-09-15

## 2017-09-15 RX ORDER — NIFEDIPINE 10 MG/1
10 CAPSULE ORAL
Status: DISCONTINUED | OUTPATIENT
Start: 2017-09-15 | End: 2017-09-15 | Stop reason: HOSPADM

## 2017-09-15 RX ORDER — FENTANYL CITRATE 50 UG/ML
25 INJECTION, SOLUTION INTRAMUSCULAR; INTRAVENOUS EVERY 5 MIN PRN
Status: ACTIVE | OUTPATIENT
Start: 2017-09-15 | End: 2017-09-16

## 2017-09-15 RX ORDER — EPTIFIBATIDE 2 MG/ML
180 INJECTION, SOLUTION INTRAVENOUS EVERY 10 MIN PRN
Status: DISCONTINUED | OUTPATIENT
Start: 2017-09-15 | End: 2017-09-15 | Stop reason: HOSPADM

## 2017-09-15 RX ORDER — METOPROLOL TARTRATE 1 MG/ML
5 INJECTION, SOLUTION INTRAVENOUS EVERY 5 MIN PRN
Status: DISCONTINUED | OUTPATIENT
Start: 2017-09-15 | End: 2017-09-16 | Stop reason: HOSPADM

## 2017-09-15 RX ORDER — MORPHINE SULFATE 2 MG/ML
2-4 INJECTION, SOLUTION INTRAMUSCULAR; INTRAVENOUS
Status: DISCONTINUED | OUTPATIENT
Start: 2017-09-15 | End: 2017-09-16 | Stop reason: HOSPADM

## 2017-09-15 RX ORDER — DOBUTAMINE HYDROCHLORIDE 200 MG/100ML
2-20 INJECTION INTRAVENOUS CONTINUOUS PRN
Status: DISCONTINUED | OUTPATIENT
Start: 2017-09-15 | End: 2017-09-15 | Stop reason: HOSPADM

## 2017-09-15 RX ORDER — NITROGLYCERIN 20 MG/100ML
.07-2 INJECTION INTRAVENOUS CONTINUOUS PRN
Status: DISCONTINUED | OUTPATIENT
Start: 2017-09-15 | End: 2017-09-15 | Stop reason: HOSPADM

## 2017-09-15 RX ORDER — NALOXONE HYDROCHLORIDE 0.4 MG/ML
.1-.4 INJECTION, SOLUTION INTRAMUSCULAR; INTRAVENOUS; SUBCUTANEOUS
Status: DISCONTINUED | OUTPATIENT
Start: 2017-09-15 | End: 2017-09-15

## 2017-09-15 RX ORDER — METOPROLOL TARTRATE 50 MG
50 TABLET ORAL 2 TIMES DAILY
Status: DISCONTINUED | OUTPATIENT
Start: 2017-09-15 | End: 2017-09-16 | Stop reason: HOSPADM

## 2017-09-15 RX ORDER — CLOPIDOGREL 300 MG/1
300-600 TABLET, FILM COATED ORAL
Status: COMPLETED | OUTPATIENT
Start: 2017-09-15 | End: 2017-09-15

## 2017-09-15 RX ORDER — ENALAPRILAT 1.25 MG/ML
1.25-2.5 INJECTION INTRAVENOUS
Status: DISCONTINUED | OUTPATIENT
Start: 2017-09-15 | End: 2017-09-15 | Stop reason: HOSPADM

## 2017-09-15 RX ORDER — ATROPINE SULFATE 0.1 MG/ML
.5-1 INJECTION INTRAVENOUS
Status: DISCONTINUED | OUTPATIENT
Start: 2017-09-15 | End: 2017-09-15 | Stop reason: HOSPADM

## 2017-09-15 RX ORDER — BUPIVACAINE HYDROCHLORIDE 2.5 MG/ML
1-10 INJECTION, SOLUTION EPIDURAL; INFILTRATION; INTRACAUDAL
Status: DISCONTINUED | OUTPATIENT
Start: 2017-09-15 | End: 2017-09-15 | Stop reason: HOSPADM

## 2017-09-15 RX ORDER — PROCHLORPERAZINE MALEATE 5 MG
5 TABLET ORAL EVERY 6 HOURS PRN
Status: DISCONTINUED | OUTPATIENT
Start: 2017-09-15 | End: 2017-09-16 | Stop reason: HOSPADM

## 2017-09-15 RX ORDER — ATORVASTATIN CALCIUM 40 MG/1
40 TABLET, FILM COATED ORAL
Status: DISCONTINUED | OUTPATIENT
Start: 2017-09-15 | End: 2017-09-16 | Stop reason: HOSPADM

## 2017-09-15 RX ORDER — ONDANSETRON 2 MG/ML
INJECTION INTRAMUSCULAR; INTRAVENOUS
Status: DISPENSED
Start: 2017-09-15 | End: 2017-09-16

## 2017-09-15 RX ORDER — NALOXONE HYDROCHLORIDE 0.4 MG/ML
.1-.4 INJECTION, SOLUTION INTRAMUSCULAR; INTRAVENOUS; SUBCUTANEOUS
Status: DISCONTINUED | OUTPATIENT
Start: 2017-09-15 | End: 2017-09-16 | Stop reason: HOSPADM

## 2017-09-15 RX ORDER — FLUMAZENIL 0.1 MG/ML
0.2 INJECTION, SOLUTION INTRAVENOUS
Status: ACTIVE | OUTPATIENT
Start: 2017-09-15 | End: 2017-09-16

## 2017-09-15 RX ORDER — NITROGLYCERIN 0.4 MG/1
0.4 TABLET SUBLINGUAL EVERY 5 MIN PRN
Status: DISCONTINUED | OUTPATIENT
Start: 2017-09-15 | End: 2017-09-15 | Stop reason: HOSPADM

## 2017-09-15 RX ORDER — ASPIRIN 81 MG/1
81-324 TABLET, CHEWABLE ORAL
Status: DISCONTINUED | OUTPATIENT
Start: 2017-09-15 | End: 2017-09-15 | Stop reason: HOSPADM

## 2017-09-15 RX ORDER — ATROPINE SULFATE 0.1 MG/ML
0.5 INJECTION INTRAVENOUS EVERY 5 MIN PRN
Status: DISPENSED | OUTPATIENT
Start: 2017-09-15 | End: 2017-09-16

## 2017-09-15 RX ORDER — LORAZEPAM 0.5 MG/1
0.5 TABLET ORAL EVERY 4 HOURS PRN
Status: DISCONTINUED | OUTPATIENT
Start: 2017-09-15 | End: 2017-09-16 | Stop reason: HOSPADM

## 2017-09-15 RX ORDER — LIDOCAINE 40 MG/G
CREAM TOPICAL
Status: DISCONTINUED | OUTPATIENT
Start: 2017-09-15 | End: 2017-09-15 | Stop reason: HOSPADM

## 2017-09-15 RX ORDER — FLUMAZENIL 0.1 MG/ML
0.2 INJECTION, SOLUTION INTRAVENOUS
Status: DISCONTINUED | OUTPATIENT
Start: 2017-09-15 | End: 2017-09-16 | Stop reason: HOSPADM

## 2017-09-15 RX ORDER — CLOPIDOGREL BISULFATE 75 MG/1
75 TABLET ORAL DAILY
Status: DISCONTINUED | OUTPATIENT
Start: 2017-09-16 | End: 2017-09-16 | Stop reason: HOSPADM

## 2017-09-15 RX ORDER — POTASSIUM CHLORIDE 1500 MG/1
20 TABLET, EXTENDED RELEASE ORAL
Status: DISCONTINUED | OUTPATIENT
Start: 2017-09-15 | End: 2017-09-15 | Stop reason: HOSPADM

## 2017-09-15 RX ORDER — CLOPIDOGREL 300 MG/1
300 TABLET, FILM COATED ORAL ONCE
Status: COMPLETED | OUTPATIENT
Start: 2017-09-15 | End: 2017-09-15

## 2017-09-15 RX ADMIN — FAMOTIDINE 20 MG: 20 TABLET, FILM COATED ORAL at 09:06

## 2017-09-15 RX ADMIN — FENTANYL CITRATE 25 MCG: 50 INJECTION, SOLUTION INTRAMUSCULAR; INTRAVENOUS at 11:58

## 2017-09-15 RX ADMIN — MORPHINE SULFATE 2 MG: 2 INJECTION, SOLUTION INTRAMUSCULAR; INTRAVENOUS at 02:25

## 2017-09-15 RX ADMIN — NITROGLYCERIN 0.07 MCG/KG/MIN: 20 INJECTION INTRAVENOUS at 00:50

## 2017-09-15 RX ADMIN — MIDAZOLAM HYDROCHLORIDE 0.5 MG: 1 INJECTION, SOLUTION INTRAMUSCULAR; INTRAVENOUS at 11:58

## 2017-09-15 RX ADMIN — NITROGLYCERIN 100 MCG: 5 INJECTION, SOLUTION INTRAVENOUS at 12:13

## 2017-09-15 RX ADMIN — ONDANSETRON 8 MG: 2 SOLUTION INTRAMUSCULAR; INTRAVENOUS at 13:41

## 2017-09-15 RX ADMIN — CLOPIDOGREL BISULFATE 300 MG: 300 TABLET, FILM COATED ORAL at 14:55

## 2017-09-15 RX ADMIN — BIVALIRUDIN 1.75 MG/KG/HR: 250 INJECTION, POWDER, LYOPHILIZED, FOR SOLUTION INTRAVENOUS at 12:09

## 2017-09-15 RX ADMIN — METOPROLOL TARTRATE 50 MG: 50 TABLET, FILM COATED ORAL at 20:50

## 2017-09-15 RX ADMIN — Medication 57.5 MG: at 12:09

## 2017-09-15 RX ADMIN — FENTANYL CITRATE 25 MCG: 50 INJECTION, SOLUTION INTRAMUSCULAR; INTRAVENOUS at 12:34

## 2017-09-15 RX ADMIN — METOROPROLOL TARTRATE 5 MG: 5 INJECTION, SOLUTION INTRAVENOUS at 01:04

## 2017-09-15 RX ADMIN — FENTANYL CITRATE 50 MCG: 50 INJECTION, SOLUTION INTRAMUSCULAR; INTRAVENOUS at 01:02

## 2017-09-15 RX ADMIN — MIDAZOLAM HYDROCHLORIDE 0.5 MG: 1 INJECTION, SOLUTION INTRAMUSCULAR; INTRAVENOUS at 12:30

## 2017-09-15 RX ADMIN — NITROGLYCERIN 0.4 MG: 0.4 TABLET SUBLINGUAL at 10:32

## 2017-09-15 RX ADMIN — ASPIRIN 81 MG: 81 TABLET, COATED ORAL at 09:06

## 2017-09-15 RX ADMIN — NITROGLYCERIN 200 MCG: 5 INJECTION, SOLUTION INTRAVENOUS at 12:34

## 2017-09-15 RX ADMIN — SODIUM CHLORIDE 150 ML/HR: 9 INJECTION, SOLUTION INTRAVENOUS at 10:32

## 2017-09-15 RX ADMIN — METOPROLOL TARTRATE 50 MG: 50 TABLET, FILM COATED ORAL at 09:06

## 2017-09-15 RX ADMIN — CLOPIDOGREL BISULFATE 600 MG: 300 TABLET, FILM COATED ORAL at 12:43

## 2017-09-15 RX ADMIN — ATORVASTATIN CALCIUM 40 MG: 40 TABLET, FILM COATED ORAL at 20:50

## 2017-09-15 RX ADMIN — ASPIRIN 81 MG 81 MG: 81 TABLET ORAL at 12:43

## 2017-09-15 RX ADMIN — LORAZEPAM 0.5 MG: 0.5 TABLET ORAL at 04:44

## 2017-09-15 RX ADMIN — NITROGLYCERIN 0.27 MCG/KG/MIN: 20 INJECTION INTRAVENOUS at 04:30

## 2017-09-15 RX ADMIN — ASPIRIN 325 MG: 325 TABLET, DELAYED RELEASE ORAL at 10:32

## 2017-09-15 RX ADMIN — LIDOCAINE HYDROCHLORIDE 80 MG: 10 INJECTION, SOLUTION EPIDURAL; INFILTRATION; INTRACAUDAL; PERINEURAL at 11:53

## 2017-09-15 RX ADMIN — MORPHINE SULFATE 2 MG: 2 INJECTION, SOLUTION INTRAMUSCULAR; INTRAVENOUS at 04:36

## 2017-09-15 RX ADMIN — OMEPRAZOLE 40 MG: 20 CAPSULE, DELAYED RELEASE ORAL at 09:06

## 2017-09-15 RX ADMIN — FAMOTIDINE 20 MG: 20 TABLET, FILM COATED ORAL at 20:50

## 2017-09-15 RX ADMIN — NITROGLYCERIN 0.17 MCG/KG/MIN: 20 INJECTION INTRAVENOUS at 01:09

## 2017-09-15 ASSESSMENT — PAIN DESCRIPTION - DESCRIPTORS
DESCRIPTORS: PRESSURE
DESCRIPTORS: DISCOMFORT
DESCRIPTORS: PRESSURE

## 2017-09-15 NOTE — PROVIDER NOTIFICATION
Pt transferred to [facility] [place] at 2230. Pt c/o chest pain. Nitro x1 was given and no relief. Paged Dr Grubbs, orders given: transfer to CCU for Nitro drip and PRN morphine.

## 2017-09-15 NOTE — PROGRESS NOTES
@2126 Pt c/o some chest pain/presure,  EKG just obtained prior to this episode, O2 applied, NTG SL 0.4 mg given /76, then pt has a large emesis undigested foods, pt felt much better.  Heparin gtt bolus and started, family has been informed.  Awaiting for bed available in [facility]  @2152 pt c/o chest pain rate at 7/10, NTG SL 0.4 mg given with bring chest pain to 3/10,  77, HR 84  @2205 12 lead EKG obtained no significant change, /82, HR 83., rate pain 1/10 now. Pt will be transferred to [facility]

## 2017-09-15 NOTE — PLAN OF CARE
Problem: Goal Outcome Summary  Goal: Goal Outcome Summary  Outcome: No Change  Pt transferred from [facility] with increased chest pain needing Nitro gtt; already started on Heparin gtt. O2 bumped to 4L after an episode of desaturation; current O2 WDL. Cardiology saw patient; treatment plan on board. SR/monitor. Morphine administered for another episode of chest pain without associating symptoms. Pt verbalized understanding of treatment process to improving quality outcome. RN will continue to monitor.

## 2017-09-15 NOTE — PROGRESS NOTES
[facility]    Hospitalist Progress Note    Date of Service (when I saw the patient): 09/15/2017    Assessment & Plan   Cale Wagoner is a 84 year old male with history of GERD, TIA, PFO who was admitted on 9/14/2017 with chest pain, found to have NSTEMI.    NSTEMI  Coronary artery disease s/p MOSES to mid-distal Cx x2  Presented with substernal chest pain, subsequently with troponin elevation with peak measured at 11.127.  - Cardiology following, appreciate assistance   - Angiogram 9/15/2017 with stent placement as above  - Continue aspirin, clopidogrel, metoprolol   - Ordered atorvastatin, reports he was on a statin following his TIA, stopped after a month however not for any particular allergy / intolerance  - Ordered echocardiogram   - Cardiac rehab    Elevated blood pressure  No formal diagnosis of hypertension, receives regular medical care and just had his regular physical the day prior to admission (BP documented as 130/70).   - Continue metoprolol, add ACE-I pending EF assessment on angiogram     GERD  Epigastric pain  Nausea/vomiting  Patient and family have noted epigastric pain occurring 30-60 minutes after eating, lasting for hours particularly after evening meal.  - Continue prior to admission omeprazole, famotidine  - Anti-emetics ordered  - Has Maalox PRN  - Monitor symptom progression following stent placement. If persists, consider outpatient GI referral for EGD once further out from acute MI.     History of TIA with PFO  - Continue anti-platelets as above     DVT Prophylaxis: Pneumatic Compression Devices  Code Status: Full Code    Disposition: Possible discharge 9/16/17 post-PCI pending work with cardiac rehab, cardiology clearance.     Truong Brown    Interval History   Underwent angiogram. Has been having epigastric pain, however none since angiogram and stent placed. Has been having some nausea and vomiting, currently trying to eat again and has received anti-emetic now.  Denies any chest pain or shortness of breath.     -Data reviewed today: I reviewed all new labs and imaging results over the last 24 hours. I personally reviewed no images or EKG's today.    Physical Exam   Temp: 97.6  F (36.4  C) Temp src: Oral BP: 114/67 Pulse: 63 Heart Rate: 67 Resp: 14 SpO2: 95 % O2 Device: Nasal cannula Oxygen Delivery: 2 LPM  Vitals:    09/14/17 1543 09/15/17 0049   Weight: 78 kg (172 lb) 76.7 kg (169 lb)     Vital Signs with Ranges  Temp:  [95.7  F (35.4  C)-98.4  F (36.9  C)] 97.6  F (36.4  C)  Pulse:  [63-85] 63  Heart Rate:  [55-85] 67  Resp:  [9-19] 14  BP: (102-182)/(65-91) 114/67  SpO2:  [91 %-100 %] 95 %  I/O last 3 completed shifts:  In: 360 [P.O.:360]  Out: 400 [Emesis/NG output:400]    Constitutional: Well-appearing, NAD  Respiratory: Clear to auscultation bilaterally, good air movement bilaterally  Cardiovascular: RRR, no m/r/g. No peripheral edema.  GI: Soft, non-tender, non-distended. BS normoactive.   Skin/Integumen: Warm, dry  Other:     Medications     NaCl 75 mL/hr at 09/15/17 1310     Percutaneous Coronary Intervention orders placed (this is information for BPA alerting)       - MEDICATION INSTRUCTIONS -       Continuing ACE inhibitor/ARB from home medication list OR ACE inhibitor/ARB order already placed during this visit       - MEDICATION INSTRUCTIONS -         metoprolol  50 mg Oral BID     sodium chloride (PF)  3 mL Intracatheter Q8H     [START ON 9/16/2017] aspirin EC  81 mg Oral Daily     [START ON 9/16/2017] clopidogrel  75 mg Oral Daily     clopidogrel  300 mg Oral Once     sodium chloride (PF)  3 mL Intracatheter Q8H     sodium chloride (PF)  10 mL Intravenous Once     famotidine (PEPCID) tablet 20 mg  20 mg Oral BID     omeprazole  40 mg Oral QAM       Data     Recent Labs  Lab 09/15/17  0045 09/14/17  1917 09/14/17  1235 09/13/17  1020   WBC  --   --  6.4 5.4   HGB  --   --  16.6 16.2   MCV  --   --  91 92   PLT  --   --  165 164   NA  --   --  137 138    POTASSIUM  --   --  3.8 4.1   CHLORIDE  --   --  102 104   CO2  --   --  28 28   BUN  --   --  13 11   CR  --   --  1.04 0.95   ANIONGAP  --   --  7 6   RUSTY  --   --  8.7 8.7   GLC  --   --  110* 100*   ALBUMIN  --   --  3.8 3.8   PROTTOTAL  --   --  8.2 7.6   BILITOTAL  --   --  0.6 0.9   ALKPHOS  --   --  73 57   ALT  --   --  39 34   AST  --   --  28 25   LIPASE  --   --  350  --    TROPI 11.127* 1.976* <0.015  --        No results found for this or any previous visit (from the past 24 hour(s)).

## 2017-09-15 NOTE — CONSULTS
Mr. Cale Wagoner is an 84-year-old pleasant male with a past medical history significant for GERD, TIA, chronic right knee pain, PFO, who presented to the ER today with complaints of chest pain and epigastric pain.  He states for past 2 days he has been having pain in the abdomen, in the epigastric area with no specific aggravating factor but did get somewhat better with Tylenol but then today he started having substernal chest pain, felt like pressure radiated to his throat and did improve with sublingual nitroglycerin.  He denies any prior CAD history.  No specific aggravating or relieving factor for the chest pain except for the nitroglycerin.     He has continued to have mid chest discomfort and is being moved appropriately to CICU.  EKGs are faintly suggestive of lateral ischemia but really non diagnostic.      Recommend coronary angiography in the AM.    I have reviewed the catheterization procedures including risks and benefits.   This could include angiography, angioplasty, stenting, and other coronary interventions as the  Interventional cardiologist deems necessary.  Risks were discussed but may not be limited to death, heart attack, bleeding, kidney injury, stroke, urgent coronary bypass surgery and vascular injury.  NICK Boggs        PAST MEDICAL HISTORY:   1.  GERD.   2.  TIA, 09/2014.   3.  Chronic right knee pain.   4.  PFO noted while evaluating TIA in 2014      Trop = 1.9    [place]/nonlocalized SEMI/CAD severity unclear.  Nrmal renal function  HTN  LDL ~ 130 to 155

## 2017-09-15 NOTE — PROGRESS NOTES
Paged by nursing regarding bump in troponin to 1.976. Pt admitted for ACS r/o. Currently chest pain free. Hypertensive. Plan to initiation heparin gtt, metoprolol 25 mg BID, transfer to [facility] [place], Cardiology consult in the AM for NSTEMI.  mg given on admission. EKG without overt ST depression, elevation or T wave abnormalities noted; will repeat EKG, however pt is chest pain free as above.

## 2017-09-15 NOTE — PLAN OF CARE
Observation goals PRIOR TO DISCHARGE     Comments: List all goals to be met before discharge home:   - Serial troponins and stress test complete: not met  - Seen and cleared by consultant if applicable: not met  - Adequate pain control on oral analgesia: partially met  - Vital signs normal or at patient baseline:  Not met  - Safe disposition plan has been identified: met  - Nurse to notify provider when observation goals have been met and patient is ready for discharge.     Troponin elevated to 1.976, Jennyfer CUNHA on call was notified, pt will be transferred to in patient/[facility].  EKG obtained, heparin gtt protocol per pharmacy, Metoprolol 25 mg given, BP somewhat is better than previously.  Pt has some epigastric discomfort earlier, sx resolved with maalox and walking.  Report will be given to [facility] RN, family will be notified when pt is transferred.

## 2017-09-15 NOTE — CONSULTS
BRIEF NUTRITION NOTE:    Received routine Nutrition Consult for Heart Healthy Diet education s/p angiogram --> stent procedure today.  Will check diet education needs prior to discharge.    Penelope Jensen, BRAD, LD, CNSC

## 2017-09-15 NOTE — PROGRESS NOTES
X cover    Patient had ongoing chest pain not relieved with SL NTG x3 with noted troponin elevation.  EKG shows no acute injury pattern.    Discussed with Dr Boggs who briefly saw the patient with me.    Plan:  1. Transfer to CICU  2. Start NTG gtt  3. Better blood pressure control  4. Morphine for pain.      Juan Grubbs MD

## 2017-09-15 NOTE — PLAN OF CARE
Problem: Goal Outcome Summary  Goal: Goal Outcome Summary  Outcome: No Change  Angio today, right groin sheath, bedrest, VSS, 2L NC sat 98%, voiding in the urinal. Angiomax is done at 1400.

## 2017-09-15 NOTE — PLAN OF CARE
Problem: Goal Outcome Summary  Goal: Goal Outcome Summary  Outcome: Declining  See progress notes, pt will be transferred to [facility]

## 2017-09-15 NOTE — PROGRESS NOTES
Assuming care from Dr. Boggs.  Discussed angiogram with patient again.  Still having minor chest discomfort.  ECG without acute ST-T abnormalities.

## 2017-09-15 NOTE — CONSULTS
DATE OF CONSULTATION:  09/14/2017      URGENT CARDIOLOGY CONSULTATION       PRIMARY CARE PHYSICIAN:  Gwyn Zheng MD      REQUESTING PHYSICIAN:  None stated      HISTORY OF PRESENT ILLNESS:  Cale Wagoner is 84.  He noted the onset this morning out of the blue of upper chest discomfort going somewhat into the throat.  This was a new experience for him and he had not had it.  He told me that it was new this morning, although he told the admitting physician that he has had pain also in the upper abdomen and epigastric area.  This did not scream unstable angina, but the discomfort he had today was upper chest going into the throat.  It did not radiate to any other area.  He was not short of breath.  He subsequently came to the emergency room.  There was a clinical suspicion that this could be cardiac and he was admitted to observation for evaluation.  Four serial EKGs were done.  All showed sinus rhythm with a little bit of ST depression in V5 and V6 faintly in I and aVL.  The EKGs are nondiagnostic but faintly suggestive of lateral apical ischemia.  With this, he continued to have chest discomfort on and off and finally he was transferred to the [facility] and subsequently to CICU.  When I saw the patient at midnight, he had very mild residual chest discomfort.  Heparin has been initiated.  I am going to increase his beta blocker and a nitroglycerin drip has been ordered.      He has not had a history of known coronary disease in the past.  He had a history of a TIA several years ago and an echo was done in 09/2014 that showed normal ejection fraction of 55% to 60%.  The right heart was normal.  Cardiac valves were normal.  A bubble study was faintly positive.  His rhythm is normal sinus.      Accordingly, he was being monitored.  His troponin initially was normal, but it jumped to 1.976.  Hemoglobin 16.6 grams, white count 6000.  Creatinine 1.0, BUN 13, potassium 3.8.  Electrolytes were otherwise normal.  Serum  albumin 3.8.  Liver profile normal.  Cholesterol has been in the 170-200 range.  LDL in the 115-130 range.      MEDICATIONS PRIOR TO ADMISSION:   1.  Aspirin 81 mg a day.   2.  Prilosec 40 mg daily.   3.  Famotidine 20 mg 2 times daily.   4.  Multivitamins.   5.  Omega 3 fatty acids.    6.  Tylenol.   7.  MiraLAX p.r.n.      SOCIAL HISTORY:  He lives with his wife.  He drives a car.  They live in a townhouse.  He does not drink or smoke.  He is retired.      REVIEW OF SYSTEMS:  Essentially negative for a 10-point review.      DRUG INTOLERANCES:  He reportedly has an allergy to penicillin.      PAST MEDICAL HISTORY:   1.  GERD.   2.  TIA in 09/2014.   3.  Chronic knee pain.   4.  PFO was suspected in 2014, although the bubble study was faintly positive.  This supported the presence of a PFO at the time.      PHYSICAL EXAMINATION:   GENERAL:  Well-developed, well-nourished, bright, alert male with faint chest discomfort that persists.   VITAL SIGNS:  Blood pressure has been in the 120/70 to 150/80 range and as high as 170.  Heart rates have been 80 beats per minute.  He was hypertensive earlier today.  O2 sats 96% to 98% on 3 liters of nasal oxygen.   HEENT:  Normal.   NECK:  Free of neck vein distention or bruit.   HEART:  Regular without gallop or murmur.   LUNGS:  Clear.   ABDOMEN:  Soft without organomegaly.   EXTREMITIES:  Free of edema.      This gentleman I believe has unstable angina and a nonlocalized subendocardial myocardial infarction.  I believe he has coronary disease based on his history, vaguely abnormal EKGs and his positive troponins.  Coronary angiography I feel is indicated.  Will try to quiet him down with nitro, beta blocker, heparin through the evening and recommend coronary angiography in the morning.  I have reviewed the risks and benefits with him.  He understands and agrees to proceed.  My team will be involved in his care in the morning in concert with Internal Medicine.      IMPRESSION:    1.  Unstable angina-myocardial infarction, currently relatively minor.   2.  Coronary artery disease, severity unclear.   3.  Intermittent hypotension.   4.  Hyperlipidemia.      PLAN:  Coronary angiography to direct therapy and usual care for arterial disease if documented.      Hypertension control tonight with beta blocker, heparin, nitrates and sedation.         JAYA ALMEIDA MD, [facility]             D: 09/15/2017 00:36   T: 09/15/2017 01:27   MT: mg      Name:     EDMAR LEMUS   MRN:      [id number]        Account:       [id number]   :      1933           Consult Date:  2017      Document: [id number]       cc: Gwyn Zheng MD

## 2017-09-16 ENCOUNTER — APPOINTMENT (OUTPATIENT)
Dept: OCCUPATIONAL THERAPY | Facility: CLINIC | Age: 82
DRG: 247 | End: 2017-09-16
Attending: INTERNAL MEDICINE
Payer: COMMERCIAL

## 2017-09-16 ENCOUNTER — APPOINTMENT (OUTPATIENT)
Dept: CARDIOLOGY | Facility: CLINIC | Age: 82
DRG: 247 | End: 2017-09-16
Attending: INTERNAL MEDICINE
Payer: COMMERCIAL

## 2017-09-16 VITALS
BODY MASS INDEX: 26.68 KG/M2 | OXYGEN SATURATION: 93 % | RESPIRATION RATE: 18 BRPM | WEIGHT: 169.97 LBS | TEMPERATURE: 97.9 F | SYSTOLIC BLOOD PRESSURE: 105 MMHG | DIASTOLIC BLOOD PRESSURE: 66 MMHG | HEART RATE: 77 BPM | HEIGHT: 67 IN

## 2017-09-16 LAB
ANION GAP SERPL CALCULATED.3IONS-SCNC: 7 MMOL/L (ref 3–14)
BUN SERPL-MCNC: 12 MG/DL (ref 7–30)
CALCIUM SERPL-MCNC: 7.8 MG/DL (ref 8.5–10.1)
CHLORIDE SERPL-SCNC: 102 MMOL/L (ref 94–109)
CO2 SERPL-SCNC: 29 MMOL/L (ref 20–32)
CREAT SERPL-MCNC: 1.01 MG/DL (ref 0.66–1.25)
ERYTHROCYTE [DISTWIDTH] IN BLOOD BY AUTOMATED COUNT: 12.9 % (ref 10–15)
GFR SERPL CREATININE-BSD FRML MDRD: 70 ML/MIN/1.7M2
GLUCOSE SERPL-MCNC: 102 MG/DL (ref 70–99)
HCT VFR BLD AUTO: 39.6 % (ref 40–53)
HGB BLD-MCNC: 14 G/DL (ref 13.3–17.7)
INTERPRETATION ECG - MUSE: NORMAL
MCH RBC QN AUTO: 32.5 PG (ref 26.5–33)
MCHC RBC AUTO-ENTMCNC: 35.4 G/DL (ref 31.5–36.5)
MCV RBC AUTO: 92 FL (ref 78–100)
PLATELET # BLD AUTO: 136 10E9/L (ref 150–450)
POTASSIUM SERPL-SCNC: 4.4 MMOL/L (ref 3.4–5.3)
RBC # BLD AUTO: 4.31 10E12/L (ref 4.4–5.9)
SODIUM SERPL-SCNC: 138 MMOL/L (ref 133–144)
TROPONIN I SERPL-MCNC: 19.59 UG/L (ref 0–0.04)
TROPONIN I SERPL-MCNC: 34.55 UG/L (ref 0–0.04)
WBC # BLD AUTO: 8.2 10E9/L (ref 4–11)

## 2017-09-16 PROCEDURE — 93306 TTE W/DOPPLER COMPLETE: CPT | Mod: 26 | Performed by: INTERNAL MEDICINE

## 2017-09-16 PROCEDURE — 84484 ASSAY OF TROPONIN QUANT: CPT | Performed by: INTERNAL MEDICINE

## 2017-09-16 PROCEDURE — 80048 BASIC METABOLIC PNL TOTAL CA: CPT | Performed by: INTERNAL MEDICINE

## 2017-09-16 PROCEDURE — 99207 ZZC CDG-CODE INCORRECT PER BILLING BASED ON TIME: CPT | Performed by: INTERNAL MEDICINE

## 2017-09-16 PROCEDURE — 97535 SELF CARE MNGMENT TRAINING: CPT | Mod: GO | Performed by: OCCUPATIONAL THERAPIST

## 2017-09-16 PROCEDURE — 36415 COLL VENOUS BLD VENIPUNCTURE: CPT | Performed by: INTERNAL MEDICINE

## 2017-09-16 PROCEDURE — 99239 HOSP IP/OBS DSCHRG MGMT >30: CPT | Performed by: INTERNAL MEDICINE

## 2017-09-16 PROCEDURE — 97165 OT EVAL LOW COMPLEX 30 MIN: CPT | Mod: GO | Performed by: OCCUPATIONAL THERAPIST

## 2017-09-16 PROCEDURE — 25000132 ZZH RX MED GY IP 250 OP 250 PS 637: Performed by: INTERNAL MEDICINE

## 2017-09-16 PROCEDURE — 40000133 ZZH STATISTIC OT WARD VISIT: Performed by: OCCUPATIONAL THERAPIST

## 2017-09-16 PROCEDURE — 25500064 ZZH RX 255 OP 636: Performed by: HOSPITALIST

## 2017-09-16 PROCEDURE — 40000264 ECHO COMPLETE WITH LUMASON

## 2017-09-16 PROCEDURE — 99233 SBSQ HOSP IP/OBS HIGH 50: CPT | Mod: 25 | Performed by: INTERNAL MEDICINE

## 2017-09-16 PROCEDURE — 25000132 ZZH RX MED GY IP 250 OP 250 PS 637: Performed by: HOSPITALIST

## 2017-09-16 PROCEDURE — 85027 COMPLETE CBC AUTOMATED: CPT | Performed by: INTERNAL MEDICINE

## 2017-09-16 PROCEDURE — 97110 THERAPEUTIC EXERCISES: CPT | Mod: GO | Performed by: OCCUPATIONAL THERAPIST

## 2017-09-16 RX ORDER — METOPROLOL TARTRATE 50 MG
50 TABLET ORAL 2 TIMES DAILY
Qty: 60 TABLET | Refills: 3 | Status: SHIPPED | OUTPATIENT
Start: 2017-09-16 | End: 2017-09-27

## 2017-09-16 RX ORDER — CLOPIDOGREL BISULFATE 75 MG/1
75 TABLET ORAL DAILY
Qty: 30 TABLET | Refills: 3 | Status: SHIPPED | OUTPATIENT
Start: 2017-09-16 | End: 2017-10-09

## 2017-09-16 RX ORDER — NITROGLYCERIN 0.4 MG/1
TABLET SUBLINGUAL
Qty: 25 TABLET | Refills: 1 | Status: SHIPPED | OUTPATIENT
Start: 2017-09-16

## 2017-09-16 RX ORDER — ATORVASTATIN CALCIUM 40 MG/1
40 TABLET, FILM COATED ORAL DAILY
Qty: 30 TABLET | Refills: 3 | Status: SHIPPED | OUTPATIENT
Start: 2017-09-16 | End: 2017-10-09

## 2017-09-16 RX ADMIN — SULFUR HEXAFLUORIDE 5 ML: KIT at 09:46

## 2017-09-16 RX ADMIN — CLOPIDOGREL 75 MG: 75 TABLET, FILM COATED ORAL at 08:48

## 2017-09-16 RX ADMIN — ASPIRIN 81 MG: 81 TABLET, COATED ORAL at 08:47

## 2017-09-16 RX ADMIN — FAMOTIDINE 20 MG: 20 TABLET, FILM COATED ORAL at 08:48

## 2017-09-16 RX ADMIN — METOPROLOL TARTRATE 50 MG: 50 TABLET, FILM COATED ORAL at 08:48

## 2017-09-16 RX ADMIN — OMEPRAZOLE 40 MG: 20 CAPSULE, DELAYED RELEASE ORAL at 08:47

## 2017-09-16 ASSESSMENT — ACTIVITIES OF DAILY LIVING (ADL): PREVIOUS_RESPONSIBILITIES: SHOPPING;DRIVING

## 2017-09-16 NOTE — PLAN OF CARE
Problem: Goal Outcome Summary  Goal: Goal Outcome Summary  Outcome: Adequate for Discharge Date Met:  09/16/17  Pt d/c'd home with all belongings. All medications, d/c instructions, and follow up instructions/recommendations reviewed with pt and family. Pt verbalized understanding. He will make all follow up appointments as noted in AVS.

## 2017-09-16 NOTE — PLAN OF CARE
Problem: Individualization  Goal: Patient Preferences  Outcome: Improving  Patient had stable shift and slept fair.  Up to BR every 2 hours.  VSS.  Right groin C/D/I.  Patient's mentation seems to be improving compared to evening shift (taking off gown/tele leads/thinking he was at home), but patient still did not use call light and put on shorts in morning with plans of going home.  Will have Echo and Cardiac Rehab today.

## 2017-09-16 NOTE — PROGRESS NOTES
[facility]  Hospitalist Progress Note  Juan Grubbs MD  09/16/2017    Assessment & Plan   Cale Wagoner is a 84 year old male with history of GERD, TIA, PFO who was admitted on 9/14/2017 with chest pain, found to have NSTEMI.     NSTEMI  Coronary artery disease s/p MOSES to mid-distal Cx x2  Presented with substernal chest pain, subsequently with troponin elevation with peak measured at 11.127.  - Cardiology following, appreciate assistance   - Angiogram 9/15/2017 with stent placement as above  - Continue aspirin, clopidogrel, metoprolol   - Ordered atorvastatin, reports he was on a statin following his TIA, stopped after a month however not for any particular allergy / intolerance  - awaiting results of echocardiogram   - doing well with cardiac rehab     Elevated blood pressure  No formal diagnosis of hypertension, receives regular medical care and just had his regular physical the day prior to admission (BP documented as 130/70).   - Continue metoprolol, add ACE-I pending EF assessment on angiogram      GERD  Epigastric pain  Nausea/vomiting  Patient and family have noted epigastric pain occurring 30-60 minutes after eating, lasting for hours particularly after evening meal.  - Continue prior to admission omeprazole, famotidine  - Anti-emetics ordered  - Has Maalox PRN  - symptoms have resolved since stent placement. If persists, consider outpatient GI referral for EGD once further out from acute MI.      History of TIA with PFO  - Continue anti-platelets as above      DVT Prophylaxis: Pneumatic Compression Devices    Code Status: Full Code     Disposition:    - home today    Interval History   - chart reviewed  - echo done and results pending  - working with cardiac rehab    -Data reviewed today: I reviewed all new labs and imaging over the last 24 hours. I personally reviewed no images or EKG's today.    Physical Exam   Heart Rate: 86, Blood pressure 105/66, pulse 77, temperature 97.9  " F (36.6  C), temperature source Oral, resp. rate 18, height 1.702 m (5' 7\"), weight 77.1 kg (169 lb 15.6 oz), SpO2 94 %.  Vitals:    09/14/17 1543 09/15/17 0049 09/16/17 0500   Weight: 78 kg (172 lb) 76.7 kg (169 lb) 77.1 kg (169 lb 15.6 oz)     Vital Signs with Ranges  Temp:  [97.8  F (36.6  C)-98.9  F (37.2  C)] 97.9  F (36.6  C)  Pulse:  [77-81] 77  Heart Rate:  [56-86] 86  Resp:  [9-23] 18  BP: ()/(51-85) 105/66  SpO2:  [93 %-100 %] 94 %  I/O's Last 24 hours  I/O last 3 completed shifts:  In: 982.5 [P.O.:450; I.V.:532.5]  Out: 1650 [Urine:1050; Emesis/NG output:600]    Constitutional: Awake, alert, cooperative, no apparent distress  Respiratory: Clear to auscultation bilaterally, no crackles or wheezing  Cardiovascular: Regular rate and rhythm, normal S1 and S2, and no murmur noted  GI: Normal bowel sounds, soft, non-distended, non-tender  Skin/Integumen: No rashes, no cyanosis, no edema  Other:      Medications   All medications were reviewed.    Percutaneous Coronary Intervention orders placed (this is information for BPA alerting)       - MEDICATION INSTRUCTIONS -       - MEDICATION INSTRUCTIONS -         metoprolol  50 mg Oral BID     sodium chloride (PF)  3 mL Intracatheter Q8H     aspirin EC  81 mg Oral Daily     clopidogrel  75 mg Oral Daily     atorvastatin  40 mg Oral Daily at 8 pm     famotidine (PEPCID) tablet 20 mg  20 mg Oral BID     omeprazole  40 mg Oral QAM        Data     Recent Labs  Lab 09/16/17  0517 09/15/17  0045 09/14/17  1917 09/14/17  1235 09/13/17  1020   WBC 8.2  --   --  6.4 5.4   HGB 14.0  --   --  16.6 16.2   MCV 92  --   --  91 92   *  --   --  165 164     --   --  137 138   POTASSIUM 4.4  --   --  3.8 4.1   CHLORIDE 102  --   --  102 104   CO2 29  --   --  28 28   BUN 12  --   --  13 11   CR 1.01  --   --  1.04 0.95   ANIONGAP 7  --   --  7 6   RUSTY 7.8*  --   --  8.7 8.7   *  --   --  110* 100*   ALBUMIN  --   --   --  3.8 3.8   PROTTOTAL  --   --   --  " 8.2 7.6   BILITOTAL  --   --   --  0.6 0.9   ALKPHOS  --   --   --  73 57   ALT  --   --   --  39 34   AST  --   --   --  28 25   LIPASE  --   --   --  350  --    TROPI  --  11.127* 1.976* <0.015  --        No results found for this or any previous visit (from the past 24 hour(s)).    Juan Grubbs MD  Pager [phone]

## 2017-09-16 NOTE — PROGRESS NOTES
" 09/16/17 0904   Quick Adds   Type of Visit Initial Occupational Therapy Evaluation   Living Environment   Lives With spouse   Living Arrangements house   Home Accessibility stairs within home   Number of Stairs to Enter Home 0   Number of Stairs Within Home 13   Stair Railings at Home inside, present on right side   Transportation Available car;family or friend will provide   Self-Care   Usual Activity Tolerance good   Current Activity Tolerance moderate   Regular Exercise yes   Activity/Exercise/Self-Care Comment uses TM at home 1-2 times per week at 1.5-2.0 mph and walks 1 mile.  No longer volunteers   Functional Level Prior   Ambulation 0-->independent   Transferring 0-->independent   Toileting 0-->independent   Bathing 0-->independent   Dressing 0-->independent   Eating 0-->independent   Communication 0-->understands/communicates without difficulty   Swallowing 0-->swallows foods/liquids without difficulty   Cognition 0 - no cognition issues reported   General Information   Onset of Illness/Injury or Date of Surgery - Date 09/14/17   Referring Physician Imtiaz Rizzo   Patient/Family Goals Statement To return home when medically able   Additional Occupational Profile Info/Pertinent History of Current Problem NSTEMI, PTCA/MOSES Cx x2,  Came to ED with chest discomfort feeling like \"heartburn\".  Hx TIA 9/2014, PFO,  chronic right knee pain, GERD      Precautions/Limitations other (see comments)  (post PTCR groin precautions)   Heart Disease Risk Factors Smoking;High blood pressure;Dislipidemia;Overweight;Gender  (former smoker )   General Observations PT cooperative with all aspects of OT/CR evaluation and treatment.  PT alert and oriented, however appears a bit forgetful.   General Info Comments CV responses WNL's.   Cognitive Status Examination   Orientation orientation to person, place and time   Level of Consciousness alert   Able to Follow Commands WNL/WFL   Personal Safety (Cognitive) WNL/WFL   Memory " impaired   Attention No deficits were identified   Cognitive Comment PT appears forgetful when discussing the events thus far in the hospital.   Visual Perception   Visual Perception Wears glasses   Sensory Examination   Sensory Quick Adds No deficits were identified   Pain Assessment   Patient Currently in Pain No   Posture   Posture not impaired   Range of Motion (ROM)   ROM Comment Grossly WFL's   Muscle Tone Assessment   Muscle Tone Quick Adds No deficits were identified   Coordination   Upper Extremity Coordination No deficits were identified   Mobility   Bed Mobility Comments SBA   Transfer Skills   Transfer Comments CGA to SBA    Balance   Balance Comments CGA-SBA for safety.  No Loss of balance noted, however PT does walk in short, shuffling steps.      Instrumental Activities of Daily Living (IADL)   Previous Responsibilities shopping;driving   IADL Comments PT retired.  Likes to do woodworking (small and larger/furniture projects)   Activities of Daily Living Analysis   Impairments Contributing to Impaired Activities of Daily Living post surgical precautions;strength decreased   General Therapy Interventions   Planned Therapy Interventions home program guidelines;progressive activity/exercise;risk factor education   Clinical Impression   Criteria for Skilled Therapeutic Interventions Met yes, treatment indicated   OT Diagnosis Decreased independence in ADL's/IADL's and decreased knowledge of post MI/PTCR self care.    Influenced by the following impairments Post PTCR precautions (groin approach)   Assessment of Occupational Performance 1-3 Performance Deficits   Identified Performance Deficits Decreased strength/activity tolerance and decreased knowledge of post MI self care.    Clinical Decision Making (Complexity) Low complexity   Therapy Frequency 2 times/day   Predicted Duration of Therapy Intervention (days/wks) 2 days   Anticipated Discharge Disposition Home with Assist;Home with Outpatient Therapy  "  Risks and Benefits of Treatment have been explained. Yes   Patient, Family & other staff in agreement with plan of care Yes   [facility] TM \"6 Clicks\"   2016, Trustees of [employer], under license to [employer].  All rights reserved.   6 Clicks Short Forms Daily Activity Inpatient Short Form   [employer] [facility] [facility] [employer] [facility] AM-PAC  \"6 Clicks\" Daily Activity Inpatient Short Form   1. Putting on and taking off regular lower body clothing? 3 - A Little   2. Bathing (including washing, rinsing, drying)? 3 - A Little   3. Toileting, which includes using toilet, bedpan or urinal? 4 - None   4. Putting on and taking off regular upper body clothing? 4 - None   5. Taking care of personal grooming such as brushing teeth? 4 - None   6. Eating meals? 4 - None   Daily Activity Raw Score (Score out of 24.Lower scores equate to lower levels of function) 22   Total Evaluation Time   Total Evaluation Time (Minutes) 13     "

## 2017-09-16 NOTE — PLAN OF CARE
Problem: Goal Outcome Summary  Goal: Goal Outcome Summary  VSS. Tele SR. Denies CP, SOB. R groin site CDI, no bruit. Bedrest until 2300. Cardiac rehab and echo tomorrow. Continue to monitor.

## 2017-09-16 NOTE — PLAN OF CARE
Problem: Goal Outcome Summary  Goal: Goal Outcome Summary  Outcome: Improving  VSS, denies pain, right groin site cdi with thrombix patch in place. Trop was 34.553 this AM, Dr. Watkins wanted one added on now and states he can discharge if the number has leveled out. Up independently, tolerating cardiac rehab, echo in process.  Continue to monitor.

## 2017-09-16 NOTE — PLAN OF CARE
Problem: Goal Outcome Summary  Goal: Goal Outcome Summary  Occupational Therapy Discharge Summary     Reason for therapy discharge:    Discharged to home with outpatient therapy.     Progress towards therapy goal(s). See goals on Care Plan in [facility] electronic health record for goal details.  Goals partially met.  Barriers to achieving goals:   discharge from facility.     Therapy recommendation(s):    Continued therapy is recommended.  Rationale/Recommendations:  Out PT cardiac rehab for monitored exercise progression and education/behavior change counseling related to secondary CAD prevention.

## 2017-09-16 NOTE — PROGRESS NOTES
Pt to be discharged. UMP and Outpatient Cardiac Rehab orders in- to call him Monday with appointments. 4 Rx called in to [facility] Elisa Barber

## 2017-09-16 NOTE — PLAN OF CARE
Problem: Goal Outcome Summary  Goal: Goal Outcome Summary  Outcome: Therapy, progress toward functional goals as expected  Discharge Planner OT   Patient plan for discharge:  Return home with assist from wife at discharge.  Current status: PT ambulates with CGA-SBA in hallway.  Has short steps/shuffling gait, however no LOB noted.  Performed 15 steps with SBA and rare cue for safety.  Denies all cv sx with activity.  CV resposes WNL's.  O2 sats WNL's on RA.   Barriers to return to prior living situation: 13 steps to main living area.  Recent MI/stent with multiple new medications (PT states no medication routine prior to admit).  Mild forgetfulness.     Recommendations for discharge:  Return home with assist from wife and Out PT cardiac rehab at [facility].  Rationale for recommendations: PT able to tolerate 15 min almanza walk (in 2 bouts) and 15 steps with normal cv responses and lack of sx.  PT alert and oriented x3, although mildly forgetful regarding some events of his stay.  Recommend PT to have assist from wife for setting up medications at discharge.           Entered by: Dejan Tomlin 09/16/2017 11:23 AM

## 2017-09-16 NOTE — DISCHARGE SUMMARY
[facility]  Discharge Summary        Cale Wagoner MRN# [id number]   YOB: 1933 Age: 84 year old     Date of Admission:  9/14/2017  Date of Discharge:  9/16/2017  Admitting Physician:  Carlos Hardin MD  Discharge Physician: Juan Grubbs MD  Discharging Service: Hospitalist     Primary Provider:  Gwyn Zheng  Primary Care Physician Phone Number: [phone]         Discharge Diagnoses/Problem Oriented Hospital Course (Providers):    Cale Wagoner was admitted on 9/14/2017 by Carlos Hardin MD and I would refer you to their history and physical.  The following problems were addressed during his hospitalization:    Cale Wagoner is a 84 year old male with history of GERD, TIA, PFO who was admitted on 9/14/2017 with chest pain, found to have NSTEMI.      NSTEMI  Coronary artery disease s/p MOSES to mid-distal Cx x2  Presented with substernal chest pain, subsequently with troponin elevation with peak measured at 34.55.  - Cardiology following, appreciate assistance   - Angiogram 9/15/2017 with stent placement as above  - Continue aspirin, clopidogrel, metoprolol   - Ordered atorvastatin, reports he was on a statin following his TIA, stopped after a month however not for any particular allergy / intolerance  - echocardiogram shows inferolateral hypokinesis but LVEF is 55%  - doing well with cardiac rehab      Elevated blood pressure  No formal diagnosis of hypertension, receives regular medical care and just had his regular physical the day prior to admission (BP documented as 130/70).   - Continue metoprolol, add ACE-I preserved EF       GERD  Epigastric pain  Nausea/vomiting  Patient and family have noted epigastric pain occurring 30-60 minutes after eating, lasting for hours particularly after evening meal.  - Continue prior to admission omeprazole, famotidine  - Anti-emetics ordered  - Has Maalox PRN  - symptoms have resolved since stent  placement. If persists, consider outpatient GI referral for EGD once further out from acute MI.       History of TIA with PFO  - Continue anti-platelets as above             Code Status:      Full Code        Brief Hospital Stay Summary Sent Home With Patient in AVS:        Reason for your hospital stay       You were admitted with unstable angina and underwent heart cath and stent   placement.                        Important Results:      See below        Pending Results:        Unresulted Labs Ordered in the Past 30 Days of this Admission     No orders found from 7/16/2017 to 9/15/2017.            Discharge Instructions and Follow-Up:      Follow-up Appointments     Follow-up and recommended labs and tests        Follow up with primary care provider, Gwyn Zhegn, in 2-3 weeks    for hospital follow- up.  No follow up labs or test are needed. Please   call his office for appointment    [facility] will contact you this week to   help you schedule an appointment with a Nurse Practitioner/Physicians   Assistant.   [address]  [place] [address]  Phone: [phone].    Need follow up with P.A. 7-10 days  Need follow up with Dr Yang 2-3 months                      Discharge Disposition:      Discharged to home        Discharge Medications:        Current Discharge Medication List      START taking these medications    Details   nitroGLYcerin (NITROSTAT) 0.4 MG sublingual tablet For chest pain place 1 tablet under the tongue every 5 minutes for 3 doses. If symptoms persist 5 minutes after 1st dose call 911.  Qty: 25 tablet, Refills: 1    Associated Diagnoses: Chest pain, unspecified type      atorvastatin (LIPITOR) 40 MG tablet Take 1 tablet (40 mg) by mouth daily  Qty: 30 tablet, Refills: 3    Associated Diagnoses: Chest pain, unspecified type      metoprolol (LOPRESSOR) 50 MG tablet Take 1 tablet (50 mg) by mouth 2 times daily  Qty: 60 tablet, Refills: 3     Associated Diagnoses: Chest pain, unspecified type      clopidogrel (PLAVIX) 75 MG tablet Take 1 tablet (75 mg) by mouth daily  Qty: 30 tablet, Refills: 3    Associated Diagnoses: Chest pain, unspecified type         CONTINUE these medications which have NOT CHANGED    Details   Garlic 1000 MG CAPS Take 1 capsule by mouth daily       psyllium (METAMUCIL/KONSYL) Packet Take 1 packet by mouth daily      ASPIRIN EC PO Take 81 mg by mouth At Bedtime      Acetaminophen (TYLENOL PO) Take 500 mg by mouth every 8 hours as needed for mild pain or fever      omeprazole (PRILOSEC) 40 MG capsule Take 1 capsule by mouth once daily (Take 30 to 60 minutes before a meal)  Qty: 90 capsule, Refills: 3    Associated Diagnoses: Gastroesophageal reflux disease without esophagitis      polyethylene glycol (MIRALAX/GLYCOLAX) powder Dissolve 17 grams in 4 to 8 ounces of water, juice, soda, or coffee and drink once daily in the evening  Qty: 1581 g, Refills: 3    Associated Diagnoses: Slow transit constipation      FAMOTIDINE PO Take 20 mg by mouth 2 times daily      Omega-3 Fatty Acids (OMEGA-3 FISH OIL PO) Take 2 g by mouth daily       Multiple Vitamins-Minerals (MULTIVITAMIN & MINERAL PO) Take 1 tablet by mouth daily               Allergies:         Allergies   Allergen Reactions     Penicillins            Consultations This Hospital Stay:      Consultation during this admission received from cardiology        Discharge Time:       Greater than 30 minutes.        Image Results From This Hospital Stay (For Non-EPIC Providers):        Results for orders placed or performed during the hospital encounter of 09/14/17   XR Chest 2 Views    Narrative    XR CHEST 2 VW 9/14/2017 1:40 PM    HISTORY: Pain.    COMPARISON: None.      Impression    IMPRESSION: The lungs are clear. No focal pulmonary opacities. Heart  and mediastinum are unremarkable. No acute cardiopulmonary  abnormalities.    ERNESTO CARVALHO MD           Most Recent Lab Results In EPIC  (For Non-EPIC Providers):    Most Recent 3 CBC's:  Recent Labs   Lab Test  09/16/17   0517  09/14/17   1235  09/13/17   1020   WBC  8.2  6.4  5.4   HGB  14.0  16.6  16.2   MCV  92  91  92   PLT  136*  165  164      Most Recent 3 BMP's:  Recent Labs   Lab Test  09/16/17   0517  09/14/17   1235  09/13/17   1020   NA  138  137  138   POTASSIUM  4.4  3.8  4.1   CHLORIDE  102  102  104   CO2  29  28  28   BUN  12  13  11   CR  1.01  1.04  0.95   ANIONGAP  7  7  6   RUSTY  7.8*  8.7  8.7   GLC  102*  110*  100*     Most Recent 3 Troponin's:  Recent Labs   Lab Test  09/16/17   1503  09/16/17   0517  09/15/17   0045   TROPI  19.590*  34.553*  11.127*     Most Recent 3 INR's:  Recent Labs   Lab Test  09/12/14   1510   INR  1.01     Most Recent 2 LFT's:  Recent Labs   Lab Test  09/14/17   1235  09/13/17   1020   AST  28  25   ALT  39  34   ALKPHOS  73  57   BILITOTAL  0.6  0.9     Most Recent Cholesterol Panel:  Recent Labs   Lab Test  09/13/17   1020   CHOL  177   LDL  115*   HDL  40   TRIG  109     Most Recent 6 Bacteria Isolates From Any Culture (See EPIC Reports for Culture Details):No lab results found.  Most Recent TSH, T4 and HgbA1c:   Recent Labs   Lab Test  09/13/14   0857   TSH  2.27

## 2017-09-16 NOTE — PROGRESS NOTES
[facility] Cardiology Progress Note          Assessment and Plan:   1. NSTEMI s/p PCI with MOSES placement in the distal circumflex  2. Hypertension  3. Hyperlipidemia    - He is hemodynamically and denies recurrent chest pain  - Dual antiplatelet therapy for minimum of year. Continue rest of medical program  - Follow TnI trend and d/c today if declining or leveling off         Interval History:   Doing well. No acute events overnight       Review of Systems:   C: NEGATIVE for fever, chills, change in weight  E/M: NEGATIVE for ear, mouth and throat problems  R: NEGATIVE for significant cough or SOB  CV: NEGATIVE for chest pain, palpitations or peripheral edema          Medications:     Current Outpatient Prescriptions   Medication Sig Dispense Refill     nitroGLYcerin (NITROSTAT) 0.4 MG sublingual tablet For chest pain place 1 tablet under the tongue every 5 minutes for 3 doses. If symptoms persist 5 minutes after 1st dose call 911. 25 tablet 1     atorvastatin (LIPITOR) 40 MG tablet Take 1 tablet (40 mg) by mouth daily 30 tablet 3     metoprolol (LOPRESSOR) 50 MG tablet Take 1 tablet (50 mg) by mouth 2 times daily 60 tablet 3     clopidogrel (PLAVIX) 75 MG tablet Take 1 tablet (75 mg) by mouth daily 30 tablet 3               Physical Exam:   All vitals have been reviewed  Patient Vitals for the past 24 hrs:   BP Temp Temp src Pulse Heart Rate Resp SpO2 Weight   09/16/17 1228 - 97.9  F (36.6  C) Oral - - - - -   09/16/17 1110 105/66 - - - 73 - 93 % -   09/16/17 1100 105/66 - - 77 - 18 94 % -   09/16/17 0848 112/54 - - 81 - 18 99 % -   09/16/17 0700 114/64 97.8  F (36.6  C) Oral - 86 17 - -   09/16/17 0500 - - - - - - - 77.1 kg (169 lb 15.6 oz)   09/16/17 0200 108/62 - - - 70 16 - -   09/16/17 0100 104/51 - - - 76 22 - -   09/16/17 0000 105/59 98  F (36.7  C) Oral - 73 20 100 % -   09/15/17 2300 104/58 - - - 71 21 - -   09/15/17 2200 96/75 - - - 70 15 - -   09/15/17 2100 120/67 - - - 75 22 - -   09/15/17 2000  121/69 - - - 78 18 - -   09/15/17 1940 - 98.9  F (37.2  C) Oral - - - - -   09/15/17 1845 118/67 - - - 71 20 - -   09/15/17 1830 114/68 - - - 68 19 - -   09/15/17 1815 118/70 - - - 74 19 - -   09/15/17 1800 110/64 - - - 68 19 - -   09/15/17 1745 111/65 - - - 67 18 - -   09/15/17 1730 111/56 - - - 73 14 - -   09/15/17 1715 118/69 - - - 74 18 99 % -   09/15/17 1710 102/68 - - - 68 16 - -   09/15/17 1706 117/67 - - - 72 17 - -   09/15/17 1700 112/70 - - - 79 16 98 % -   09/15/17 1630 114/68 - - - 65 18 - -   09/15/17 1530 - 98.2  F (36.8  C) Oral - - - - -   09/15/17 1445 109/85 - - - 68 16 98 % -       Intake/Output Summary (Last 24 hours) at 09/16/17 1440  Last data filed at 09/16/17 0900   Gross per 24 hour   Intake              825 ml   Output              850 ml   Net              -25 ml     Gen: NAD  Neck: Normal JVP  Lung: CTAB  CV: RRR, no murmurs  Ext: no edema, right groin free of complications.           Data:   All laboratory data reviewed  ROUTINE IP LABS (Last four results)  BMP  Recent Labs  Lab 09/16/17 0517 09/14/17  1235 09/13/17  1020    137 138   POTASSIUM 4.4 3.8 4.1   CHLORIDE 102 102 104   RUSTY 7.8* 8.7 8.7   CO2 29 28 28   BUN 12 13 11   CR 1.01 1.04 0.95   * 110* 100*     CBC  Recent Labs  Lab 09/16/17  0517 09/14/17  1235 09/13/17  1020   WBC 8.2 6.4 5.4   RBC 4.31* 5.10 5.03   HGB 14.0 16.6 16.2   HCT 39.6* 46.2 46.0   MCV 92 91 92   MCH 32.5 32.5 32.2   MCHC 35.4 35.9 35.2   RDW 12.9 12.6 12.7   * 165 164     INRNo lab results found in last 7 days.               Attestation:  I have reviewed today's vital signs, notes, medications, labs and imaging.  Amount of time performed on this hospital visit: 20 minutes.     Sumanth Watkins MD

## 2017-09-19 ENCOUNTER — TELEPHONE (OUTPATIENT)
Dept: FAMILY MEDICINE | Facility: CLINIC | Age: 82
End: 2017-09-19

## 2017-09-20 NOTE — TELEPHONE ENCOUNTER
"Hospital/TCU/ED for chronic condition Discharge Protocol    \"Hi, my name is Jesus Manuel Pearson, a registered nurse, and I am calling from [facility].  I am calling to follow up and see how things are going for you after your recent emergency visit/hospital/TCU stay.\"    Tell me how you are doing now that you are home?\" good      Discharge Instructions    \"Let's review your discharge instructions.  What is/are the follow-up recommendations?  Pt. Response: follow up with PCP  \"Has an appointment with your primary care provider been scheduled?\"   Yes. (confirm)    \"When you see the provider, I would recommend that you bring your medications with you.\"    Medications    \"Tell me what changed about your medicines when you discharged?\"    Changes to chronic meds?    2 or more - Epic MTM referral needed    \"What questions do you have about your medications?\"    None     New diagnoses of heart failure, COPD, diabetes, or MI?    Chest Pain, Unspecified Type                  Medication reconciliation completed? Yes  Was MTM referral placed (*Make sure to put transitions as reason for referral)?   No    Call Summary    \"What questions or concerns do you have about your recent visit and your follow-up care?\"     none    \"If you have questions or things don't continue to improve, we encourage you contact us through the main clinic number (give number).  Even if the clinic is not open, triage nurses are available 24/7 to help you.     We would like you to know that our clinic has extended hours (provide information).  We also have urgent care (provide details on closest location and hours/contact info)\"      \"Thank you for your time and take care!\"         "

## 2017-09-25 ENCOUNTER — OFFICE VISIT (OUTPATIENT)
Dept: CARDIOLOGY | Facility: CLINIC | Age: 82
End: 2017-09-25
Attending: INTERNAL MEDICINE
Payer: COMMERCIAL

## 2017-09-25 VITALS
BODY MASS INDEX: 26.93 KG/M2 | HEIGHT: 67 IN | SYSTOLIC BLOOD PRESSURE: 144 MMHG | DIASTOLIC BLOOD PRESSURE: 75 MMHG | WEIGHT: 171.6 LBS | HEART RATE: 60 BPM

## 2017-09-25 DIAGNOSIS — I10 BENIGN ESSENTIAL HYPERTENSION: ICD-10-CM

## 2017-09-25 DIAGNOSIS — I25.10 CORONARY ARTERY DISEASE INVOLVING NATIVE CORONARY ARTERY OF NATIVE HEART WITHOUT ANGINA PECTORIS: Primary | ICD-10-CM

## 2017-09-25 DIAGNOSIS — E78.5 HYPERLIPIDEMIA LDL GOAL <70: ICD-10-CM

## 2017-09-25 PROCEDURE — 99213 OFFICE O/P EST LOW 20 MIN: CPT | Performed by: PHYSICIAN ASSISTANT

## 2017-09-25 NOTE — MR AVS SNAPSHOT
After Visit Summary   9/25/2017    Cale Wagoner    MRN: [id number]           Patient Information     Date Of Birth          5/26/1933        Visit Information        Provider Department      9/25/2017 1:00 PM Addis Lopez PA-C [facility] [place]        Today's Diagnoses     Coronary artery disease involving native coronary artery of native heart without angina pectoris    -  1    NSTEMI (non-ST elevated myocardial infarction) (H)        Hyperlipidemia LDL goal <70        Benign essential hypertension          Care Instructions    Today's Plan:   1) Continue with same medications.   2) Your blood pressure is high. If it is still high in follow up, we may need to add a second blood pressure medication.   3) Follow up with Dr. Watkins/Dr. Yang in November with fasting lab work beforehand.     If you have questions or concerns please call my nurse at [phone].     Scheduling phone number: [phone]  Reminder: Please bring in all current medications, over the counter supplements and vitamin bottles to your next appointment.    It was a pleasure seeing you today!     Addis Lopez  9/25/2017              Follow-ups after your visit        Your next 10 appointments already scheduled     Sep 27, 2017 10:30 AM CDT   Office Visit with Gwyn Zheng MD   [facility] ([facility])    [address] [place] [id number] [phone]           Bring a current list of meds and any records pertaining to this visit. For Physicals, please bring immunization records and any forms needing to be filled out. Please arrive 10 minutes early to complete paperwork.              Future tests that were ordered for you today     Open Future Orders        Priority Expected Expires Ordered    Lipid Profile Routine 10/25/2017 9/25/2018 9/25/2017    ALT Routine 10/25/2017  "9/25/2018 9/25/2017            Who to contact     If you have questions or need follow up information about today's clinic visit or your schedule please contact [facility] [place] directly at [phone].  Normal or non-critical lab and imaging results will be communicated to you by MyChart, letter or phone within 4 business days after the clinic has received the results. If you do not hear from us within 7 days, please contact the clinic through MyChart or phone. If you have a critical or abnormal lab result, we will notify you by phone as soon as possible.  Submit refill requests through [employer] or call your pharmacy and they will forward the refill request to us. Please allow 3 business days for your refill to be completed.          Additional Information About Your Visit        Care EveryWhere ID     This is your Care EveryWhere ID. This could be used by other organizations to access your [facility] medical records  [id number]        Your Vitals Were     Pulse Height BMI (Body Mass Index)             60 1.689 m (5' 6.5\") 27.28 kg/m2          Blood Pressure from Last 3 Encounters:   09/25/17 144/75   09/16/17 105/66   09/13/17 130/70    Weight from Last 3 Encounters:   09/25/17 77.8 kg (171 lb 9.6 oz)   09/16/17 77.1 kg (169 lb 15.6 oz)   09/13/17 76.7 kg (169 lb)              We Performed the Following     Follow-Up with Cardiac Advanced Practice Provider        Primary Care Provider Office Phone # Fax #    Gwyn Zheng [phone] [address] [facility] [address]        Equal Access to Services     TAYO SANCHEZ AH: Hadii porter Leslie, waaxda luqish, qaybta kaalwilton holder. So [facility] [phone].    ATENCIÓN: Si habla español, tiene a rene disposición servicios gratuitos de asistencia lingüística. Llame al [phone].    We comply with applicable federal civil rights laws and " [place] laws. We do not discriminate on the basis of race, color, national origin, age, disability sex, sexual orientation or gender identity.            Thank you!     Thank you for choosing [facility] AT [place]  for your care. Our goal is always to provide you with excellent care. Hearing back from our patients is one way we can continue to improve our services. Please take a few minutes to complete the written survey that you may receive in the mail after your visit with us. Thank you!             Your Updated Medication List - Protect others around you: Learn how to safely use, store and throw away your medicines at www.disposemymeds.org.          This list is accurate as of: 9/25/17  1:36 PM.  Always use your most recent med list.                   Brand Name Dispense Instructions for use Diagnosis    ASPIRIN EC PO      Take 81 mg by mouth At Bedtime        atorvastatin 40 MG tablet    LIPITOR    30 tablet    Take 1 tablet (40 mg) by mouth daily    Chest pain, unspecified type       clopidogrel 75 MG tablet    PLAVIX    30 tablet    Take 1 tablet (75 mg) by mouth daily    Chest pain, unspecified type       FAMOTIDINE PO      Take 20 mg by mouth 2 times daily        Garlic 1000 MG Caps      Take 1 capsule by mouth daily        metoprolol 50 MG tablet    LOPRESSOR    60 tablet    Take 1 tablet (50 mg) by mouth 2 times daily    Chest pain, unspecified type       MULTIVITAMIN & MINERAL PO      Take 1 tablet by mouth daily        nitroGLYcerin 0.4 MG sublingual tablet    NITROSTAT    25 tablet    For chest pain place 1 tablet under the tongue every 5 minutes for 3 doses. If symptoms persist 5 minutes after 1st dose call 911.    Chest pain, unspecified type       OMEGA-3 FISH OIL PO      Take 2 g by mouth daily        omeprazole 40 MG capsule    priLOSEC    90 capsule    Take 1 capsule by mouth once daily (Take 30 to 60 minutes before a meal)    Gastroesophageal reflux disease  without esophagitis       PANTOPRAZOLE SODIUM PO      Take 40 mg by mouth daily        psyllium Packet    METAMUCIL/KONSYL     Take 1 packet by mouth daily        TYLENOL PO      Take 500 mg by mouth every 8 hours as needed for mild pain or fever

## 2017-09-25 NOTE — PROGRESS NOTES
History of Present Illness:   This is a pleasant 84-year-old gentleman who presents to cardiology clinic today for posthospitalization follow-up.  He was admitted on 9/14/2017 with central chest discomfort that was described as burning sensation.  His past medical history is notable for hypertension, hyperlipidemia, and TIA in 2014 with discovery of PFO.  He received 2 drug-eluting stents to mid and distal left circumflex with complete resolution of his symptoms.  He was started on metoprolol and atorvastatin.  He was placed on Plavix and aspirin as part of dual antiplatelet therapy.  His echocardiogram demonstrated preserved ejection fraction.    The patient states that he is doing well.  He denies recurrent chest discomfort.  No lightheadedness, dizziness, near syncope, or syncope.  He is tolerating his medications.  He has no bleeding issues.  He is not interested in cardiac rehabilitation.    Physical examination:  General-NAD  Neck-normal JVP at 90 degree angle no carotid bruit  Respiratory-clear to auscultation bilaterally  Cardiac-regular rate and rhythm without murmur rub or gallop  Abdomen-soft nontender  Extremities-no edema    Assessment and plan:  This is a very pleasant 84-year-old gentleman who presents to cardiology clinic today for posthospitalization follow-up.  He was admitted recently with central chest discomfort.  He received 2 drug-eluting stents to his left circumflex with complete resolution of his symptoms.  He is doing well without any recurrent symptoms.  He is tolerating his medications.  His baseline LDL is 1:15.  We will repeat lipid panel and ALT when he follows up with Dr. Watkins/Dr. Yang in November.  His blood pressure is supple optimal.  I recommended addition of lisinopril.  Patient declined this as he claims his blood pressure always been perfect.  He would prefer to recheck this during his follow-up in November.    I encourage importance of cardiac rehabilitation and ask him to  reconsider participating in this.    Thank you for allowing me to participate in the care of this delightful patient today.    This note was completed in part using Dragon voice recognition software. Although reviewed after completion, some word and grammatical errors may occur.    Orders this Visit:  Orders Placed This Encounter   Procedures     Lipid Profile     ALT     Orders Placed This Encounter   Medications     PANTOPRAZOLE SODIUM PO     Sig: Take 40 mg by mouth daily     Medications Discontinued During This Encounter   Medication Reason     polyethylene glycol (MIRALAX/GLYCOLAX) powder Medication Reconciliation Clean Up         Encounter Diagnoses   Name Primary?     NSTEMI (non-ST elevated myocardial infarction) (H)      Coronary artery disease involving native coronary artery of native heart without angina pectoris Yes     Hyperlipidemia LDL goal <70      Benign essential hypertension        CURRENT MEDICATIONS:  Current Outpatient Prescriptions   Medication Sig Dispense Refill     PANTOPRAZOLE SODIUM PO Take 40 mg by mouth daily       nitroGLYcerin (NITROSTAT) 0.4 MG sublingual tablet For chest pain place 1 tablet under the tongue every 5 minutes for 3 doses. If symptoms persist 5 minutes after 1st dose call 911. 25 tablet 1     atorvastatin (LIPITOR) 40 MG tablet Take 1 tablet (40 mg) by mouth daily 30 tablet 3     metoprolol (LOPRESSOR) 50 MG tablet Take 1 tablet (50 mg) by mouth 2 times daily 60 tablet 3     clopidogrel (PLAVIX) 75 MG tablet Take 1 tablet (75 mg) by mouth daily 30 tablet 3     Garlic 1000 MG CAPS Take 1 capsule by mouth daily        psyllium (METAMUCIL/KONSYL) Packet Take 1 packet by mouth daily       ASPIRIN EC PO Take 81 mg by mouth At Bedtime       Acetaminophen (TYLENOL PO) Take 500 mg by mouth every 8 hours as needed for mild pain or fever       omeprazole (PRILOSEC) 40 MG capsule Take 1 capsule by mouth once daily (Take 30 to 60 minutes before a meal) 90 capsule 3     FAMOTIDINE  PO Take 20 mg by mouth 2 times daily       Omega-3 Fatty Acids (OMEGA-3 FISH OIL PO) Take 2 g by mouth daily        Multiple Vitamins-Minerals (MULTIVITAMIN & MINERAL PO) Take 1 tablet by mouth daily         ALLERGIES     Allergies   Allergen Reactions     Penicillins        PAST MEDICAL HISTORY:  Past Medical History:   Diagnosis Date     Benign essential hypertension      Bloating      CAD (coronary artery disease)      Hyperlipidemia      NSTEMI (non-ST elevated myocardial infarction) (H)      PFO (patent foramen ovale)      TIA (transient ischemic attack)        PAST SURGICAL HISTORY:  Past Surgical History:   Procedure Laterality Date     APPENDECTOMY       CARDIAC CATHERIZATION  09/15/2017     MOSES to mid-distal Cx x2     ENT SURGERY      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     EYE SURGERY      cataracts     HERNIA REPAIR      twice       FAMILY HISTORY:  Family History   Problem Relation Age of Onset     Respiratory Father      pneumonia     Unknown/Adopted Father      Alzheimer Disease Brother      Alzheimer Disease Sister      Respiratory Sister      COPD     Unknown/Adopted Mother      DIABETES No family hx of      Coronary Artery Disease No family hx of      Hypertension No family hx of      Hyperlipidemia No family hx of      CEREBROVASCULAR DISEASE No family hx of      Breast Cancer No family hx of      Colon Cancer No family hx of      Prostate Cancer No family hx of      Other Cancer No family hx of      Depression No family hx of      Anxiety Disorder No family hx of      MENTAL ILLNESS No family hx of      Substance Abuse No family hx of      Anesthesia Reaction No family hx of      Asthma No family hx of      OSTEOPOROSIS No family hx of      Genetic Disorder No family hx of      Thyroid Disease No family hx of      Obesity No family hx of        SOCIAL HISTORY:  Social History     Social History     Marital status:      Spouse name: N/A     Number of children: N/A     Years of education: N/A  "    Social History Main Topics     Smoking status: Former Smoker     Quit date: 8/19/1968     Smokeless tobacco: Never Used     Alcohol use Yes      Comment: Occ     Drug use: No     Sexual activity: No     Other Topics Concern     Parent/Sibling W/ Cabg, Mi Or Angioplasty Before 65f 55m? No     Social History Narrative       Review of Systems:  Skin:  Negative     Eyes:  Positive for glasses;cataracts  ENT:  Negative    Respiratory:  Negative    Cardiovascular:    Positive for;exercise intolerance  Gastroenterology: Positive for reflux;heartburn  Genitourinary:  Positive for decreased urinary stream;urinary frequency  Musculoskeletal:       Neurologic:  Positive for stroke  Psychiatric:  Negative    Heme/Lymph/Imm:  Negative    Endocrine:  Negative      Physical Exam:  Vitals: /75  Pulse 60  Ht 1.689 m (5' 6.5\")  Wt 77.8 kg (171 lb 9.6 oz)  BMI 27.28 kg/m2   Please refer to dictation for physical exam    Recent Lab Results:  LIPID RESULTS:  Lab Results   Component Value Date    CHOL 177 09/13/2017    HDL 40 09/13/2017     (H) 09/13/2017    TRIG 109 09/13/2017    CHOLHDLRATIO 4.4 08/31/2015       LIVER ENZYME RESULTS:  Lab Results   Component Value Date    AST 28 09/14/2017    ALT 39 09/14/2017       CBC RESULTS:  Lab Results   Component Value Date    WBC 8.2 09/16/2017    RBC 4.31 (L) 09/16/2017    HGB 14.0 09/16/2017    HCT 39.6 (L) 09/16/2017    MCV 92 09/16/2017    MCH 32.5 09/16/2017    MCHC 35.4 09/16/2017    RDW 12.9 09/16/2017     (L) 09/16/2017       BMP RESULTS:  Lab Results   Component Value Date     09/16/2017    POTASSIUM 4.4 09/16/2017    CHLORIDE 102 09/16/2017    CO2 29 09/16/2017    ANIONGAP 7 09/16/2017     (H) 09/16/2017    BUN 12 09/16/2017    CR 1.01 09/16/2017    GFRESTIMATED 70 09/16/2017    GFRESTBLACK 85 09/16/2017    RUSTY 7.8 (L) 09/16/2017        A1C RESULTS:  No results found for: A1C    INR RESULTS:  Lab Results   Component Value Date    INR 1.01 " 09/12/2014           Addis Lopez PA-C   September 25, 2017

## 2017-09-25 NOTE — LETTER
9/25/2017    Gwyn Zheng MD  [address]  [facility] [address]    RE: Cale [place]omond       Dear Colleague,    I had the pleasure of seeing Cale Orozcostar RomanoCiaran in the [facility].    History of Present Illness:   This is a pleasant 84-year-old gentleman who presents to cardiology clinic today for posthospitalization follow-up.  He was admitted on 9/14/2017 with central chest discomfort that was described as burning sensation.  His past medical history is notable for hypertension, hyperlipidemia, and TIA in 2014 with discovery of PFO.  He received 2 drug-eluting stents to mid and distal left circumflex with complete resolution of his symptoms.  He was started on metoprolol and atorvastatin.  He was placed on Plavix and aspirin as part of dual antiplatelet therapy.  His echocardiogram demonstrated preserved ejection fraction.    The patient states that he is doing well.  He denies recurrent chest discomfort.  No lightheadedness, dizziness, near syncope, or syncope.  He is tolerating his medications.  He has no bleeding issues.  He is not interested in cardiac rehabilitation.    Physical examination:  General-NAD  Neck-normal JVP at 90 degree angle no carotid bruit  Respiratory-clear to auscultation bilaterally  Cardiac-regular rate and rhythm without murmur rub or gallop  Abdomen-soft nontender  Extremities-no edema    Assessment and plan:  This is a very pleasant 84-year-old gentleman who presents to cardiology clinic today for posthospitalization follow-up.  He was admitted recently with central chest discomfort.  He received 2 drug-eluting stents to his left circumflex with complete resolution of his symptoms.  He is doing well without any recurrent symptoms.  He is tolerating his medications.  His baseline LDL is 1:15.  We will repeat lipid panel and ALT when he follows up with Dr. Watkins/Dr. Yang in November.  His blood pressure is supple optimal.  I recommended  addition of lisinopril.  Patient declined this as he claims his blood pressure always been perfect.  He would prefer to recheck this during his follow-up in November.    I encourage importance of cardiac rehabilitation and ask him to reconsider participating in this.    Thank you for allowing me to participate in the care of this delightful patient today.    This note was completed in part using Dragon voice recognition software. Although reviewed after completion, some word and grammatical errors may occur.    Orders this Visit:  Orders Placed This Encounter   Procedures     Lipid Profile     ALT     Orders Placed This Encounter   Medications     PANTOPRAZOLE SODIUM PO     Sig: Take 40 mg by mouth daily     Medications Discontinued During This Encounter   Medication Reason     polyethylene glycol (MIRALAX/GLYCOLAX) powder Medication Reconciliation Clean Up         Encounter Diagnoses   Name Primary?     NSTEMI (non-ST elevated myocardial infarction) (H)      Coronary artery disease involving native coronary artery of native heart without angina pectoris Yes     Hyperlipidemia LDL goal <70      Benign essential hypertension        CURRENT MEDICATIONS:  Current Outpatient Prescriptions   Medication Sig Dispense Refill     PANTOPRAZOLE SODIUM PO Take 40 mg by mouth daily       nitroGLYcerin (NITROSTAT) 0.4 MG sublingual tablet For chest pain place 1 tablet under the tongue every 5 minutes for 3 doses. If symptoms persist 5 minutes after 1st dose call 911. 25 tablet 1     atorvastatin (LIPITOR) 40 MG tablet Take 1 tablet (40 mg) by mouth daily 30 tablet 3     metoprolol (LOPRESSOR) 50 MG tablet Take 1 tablet (50 mg) by mouth 2 times daily 60 tablet 3     clopidogrel (PLAVIX) 75 MG tablet Take 1 tablet (75 mg) by mouth daily 30 tablet 3     Garlic 1000 MG CAPS Take 1 capsule by mouth daily        psyllium (METAMUCIL/KONSYL) Packet Take 1 packet by mouth daily       ASPIRIN EC PO Take 81 mg by mouth At Bedtime        Acetaminophen (TYLENOL PO) Take 500 mg by mouth every 8 hours as needed for mild pain or fever       omeprazole (PRILOSEC) 40 MG capsule Take 1 capsule by mouth once daily (Take 30 to 60 minutes before a meal) 90 capsule 3     FAMOTIDINE PO Take 20 mg by mouth 2 times daily       Omega-3 Fatty Acids (OMEGA-3 FISH OIL PO) Take 2 g by mouth daily        Multiple Vitamins-Minerals (MULTIVITAMIN & MINERAL PO) Take 1 tablet by mouth daily         ALLERGIES     Allergies   Allergen Reactions     Penicillins        PAST MEDICAL HISTORY:  Past Medical History:   Diagnosis Date     Benign essential hypertension      Bloating      CAD (coronary artery disease)      Hyperlipidemia      NSTEMI (non-ST elevated myocardial infarction) (H)      PFO (patent foramen ovale)      TIA (transient ischemic attack)        PAST SURGICAL HISTORY:  Past Surgical History:   Procedure Laterality Date     APPENDECTOMY       CARDIAC CATHERIZATION  09/15/2017     MOSES to mid-distal Cx x2     ENT SURGERY      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     EYE SURGERY      cataracts     HERNIA REPAIR      twice       FAMILY HISTORY:  Family History   Problem Relation Age of Onset     Respiratory Father      pneumonia     Unknown/Adopted Father      Alzheimer Disease Brother      Alzheimer Disease Sister      Respiratory Sister      COPD     Unknown/Adopted Mother      DIABETES No family hx of      Coronary Artery Disease No family hx of      Hypertension No family hx of      Hyperlipidemia No family hx of      CEREBROVASCULAR DISEASE No family hx of      Breast Cancer No family hx of      Colon Cancer No family hx of      Prostate Cancer No family hx of      Other Cancer No family hx of      Depression No family hx of      Anxiety Disorder No family hx of      MENTAL ILLNESS No family hx of      Substance Abuse No family hx of      Anesthesia Reaction No family hx of      Asthma No family hx of      OSTEOPOROSIS No family hx of      Genetic Disorder No family hx  "of      Thyroid Disease No family hx of      Obesity No family hx of        SOCIAL HISTORY:  Social History     Social History     Marital status:      Spouse name: N/A     Number of children: N/A     Years of education: N/A     Social History Main Topics     Smoking status: Former Smoker     Quit date: 8/19/1968     Smokeless tobacco: Never Used     Alcohol use Yes      Comment: Occ     Drug use: No     Sexual activity: No     Other Topics Concern     Parent/Sibling W/ Cabg, Mi Or Angioplasty Before 65f 55m? No     Social History Narrative       Review of Systems:  Skin:  Negative     Eyes:  Positive for glasses;cataracts  ENT:  Negative    Respiratory:  Negative    Cardiovascular:    Positive for;exercise intolerance  Gastroenterology: Positive for reflux;heartburn  Genitourinary:  Positive for decreased urinary stream;urinary frequency  Musculoskeletal:       Neurologic:  Positive for stroke  Psychiatric:  Negative    Heme/Lymph/Imm:  Negative    Endocrine:  Negative      Physical Exam:  Vitals: /75  Pulse 60  Ht 1.689 m (5' 6.5\")  Wt 77.8 kg (171 lb 9.6 oz)  BMI 27.28 kg/m2   Please refer to dictation for physical exam    Recent Lab Results:  LIPID RESULTS:  Lab Results   Component Value Date    CHOL 177 09/13/2017    HDL 40 09/13/2017     (H) 09/13/2017    TRIG 109 09/13/2017    CHOLHDLRATIO 4.4 08/31/2015       LIVER ENZYME RESULTS:  Lab Results   Component Value Date    AST 28 09/14/2017    ALT 39 09/14/2017       CBC RESULTS:  Lab Results   Component Value Date    WBC 8.2 09/16/2017    RBC 4.31 (L) 09/16/2017    HGB 14.0 09/16/2017    HCT 39.6 (L) 09/16/2017    MCV 92 09/16/2017    MCH 32.5 09/16/2017    MCHC 35.4 09/16/2017    RDW 12.9 09/16/2017     (L) 09/16/2017       BMP RESULTS:  Lab Results   Component Value Date     09/16/2017    POTASSIUM 4.4 09/16/2017    CHLORIDE 102 09/16/2017    CO2 29 09/16/2017    ANIONGAP 7 09/16/2017     (H) 09/16/2017    BUN 12 " 09/16/2017    CR 1.01 09/16/2017    GFRESTIMATED 70 09/16/2017    GFRESTBLACK 85 09/16/2017    RUSTY 7.8 (L) 09/16/2017        A1C RESULTS:  No results found for: A1C    INR RESULTS:  Lab Results   Component Value Date    INR 1.01 09/12/2014     Thank you for allowing me to participate in the care of your patient.    Sincerely,     Addis Lopez PA-C     [facility]

## 2017-09-25 NOTE — PATIENT INSTRUCTIONS
Today's Plan:   1) Continue with same medications.   2) Your blood pressure is high. If it is still high in follow up, we may need to add a second blood pressure medication.   3) Follow up with Dr. Watkins/Dr. Yang in November with fasting lab work beforehand.     If you have questions or concerns please call my nurse at [phone].     Scheduling phone number: [phone]  Reminder: Please bring in all current medications, over the counter supplements and vitamin bottles to your next appointment.    It was a pleasure seeing you today!     Addis Lopez  9/25/2017

## 2017-09-27 ENCOUNTER — OFFICE VISIT (OUTPATIENT)
Dept: FAMILY MEDICINE | Facility: CLINIC | Age: 82
End: 2017-09-27
Payer: COMMERCIAL

## 2017-09-27 ENCOUNTER — TELEPHONE (OUTPATIENT)
Dept: FAMILY MEDICINE | Facility: CLINIC | Age: 82
End: 2017-09-27

## 2017-09-27 VITALS
WEIGHT: 167 LBS | RESPIRATION RATE: 20 BRPM | HEART RATE: 63 BPM | TEMPERATURE: 97.7 F | DIASTOLIC BLOOD PRESSURE: 84 MMHG | SYSTOLIC BLOOD PRESSURE: 136 MMHG | BODY MASS INDEX: 26.55 KG/M2 | OXYGEN SATURATION: 94 %

## 2017-09-27 DIAGNOSIS — R07.9 CHEST PAIN, UNSPECIFIED TYPE: Primary | ICD-10-CM

## 2017-09-27 DIAGNOSIS — I21.4 NSTEMI (NON-ST ELEVATED MYOCARDIAL INFARCTION) (H): ICD-10-CM

## 2017-09-27 DIAGNOSIS — I25.10 CORONARY ARTERY DISEASE INVOLVING NATIVE CORONARY ARTERY OF NATIVE HEART WITHOUT ANGINA PECTORIS: ICD-10-CM

## 2017-09-27 DIAGNOSIS — K21.9 GASTROESOPHAGEAL REFLUX DISEASE WITHOUT ESOPHAGITIS: ICD-10-CM

## 2017-09-27 DIAGNOSIS — R07.9 CHEST PAIN, UNSPECIFIED TYPE: ICD-10-CM

## 2017-09-27 DIAGNOSIS — I10 BENIGN ESSENTIAL HYPERTENSION: ICD-10-CM

## 2017-09-27 PROCEDURE — 99495 TRANSJ CARE MGMT MOD F2F 14D: CPT | Performed by: FAMILY MEDICINE

## 2017-09-27 RX ORDER — METOPROLOL TARTRATE 50 MG
75 TABLET ORAL 2 TIMES DAILY
Qty: 270 TABLET | Refills: 1 | Status: SHIPPED | OUTPATIENT
Start: 2017-09-27 | End: 2018-01-08

## 2017-09-27 RX ORDER — METOPROLOL TARTRATE 50 MG
75 TABLET ORAL 2 TIMES DAILY
Qty: 135 TABLET | Refills: 1 | Status: SHIPPED | OUTPATIENT
Start: 2017-09-27 | End: 2017-09-27

## 2017-09-27 ASSESSMENT — ANXIETY QUESTIONNAIRES
7. FEELING AFRAID AS IF SOMETHING AWFUL MIGHT HAPPEN: NOT AT ALL
1. FEELING NERVOUS, ANXIOUS, OR ON EDGE: NOT AT ALL
5. BEING SO RESTLESS THAT IT IS HARD TO SIT STILL: NOT AT ALL
GAD7 TOTAL SCORE: 0
GAD7 TOTAL SCORE: 0
3. WORRYING TOO MUCH ABOUT DIFFERENT THINGS: NOT AT ALL
7. FEELING AFRAID AS IF SOMETHING AWFUL MIGHT HAPPEN: NOT AT ALL
GAD7 TOTAL SCORE: 0
4. TROUBLE RELAXING: NOT AT ALL
2. NOT BEING ABLE TO STOP OR CONTROL WORRYING: NOT AT ALL
6. BECOMING EASILY ANNOYED OR IRRITABLE: NOT AT ALL

## 2017-09-27 NOTE — PROGRESS NOTES
SUBJECTIVE:   Cale Wagoner is a 84 year old male who presents to clinic today for the following health issues:          Hospital Follow-up Visit:    Hospital/Nursing Home/IP Rehab Facility: [facility]  Date of Admission: 9/14/17  Date of Discharge: 9/16/17  Reason(s) for Admission: chest pain            Problems taking medications regularly:  None       Medication changes since discharge: start-nitrostat, atorvastatin, metoprolol and clopidogrel       Problems adhering to non-medication therapy:  None    Summary of hospitalization:  [facility] discharge summary reviewed  Diagnostic Tests/Treatments reviewed.  Follow up needed: none  Other Healthcare Providers Involved in Patient s Care:         cardiology last monday and in December  Update since discharge: improved.     Post Discharge Medication Reconciliation: discharge medications reconciled, continue medications without change.  Plan of care communicated with patient     Coding guidelines for this visit:  Type of Medical   Decision Making Face-to-Face Visit       within 7 Days of discharge Face-to-Face Visit        within 14 days of discharge   Moderate Complexity [id number]   High Complexity [id number]                  Problem list and histories reviewed & adjusted, as indicated.  Additional history: as documented    Patient Active Problem List   Diagnosis     Bloating     GERD (gastroesophageal reflux disease)     Health Care Home     Cerebrovascular accident (CVA), unspecified mechanism (H)     Hyperlipidemia     Slow transit constipation     Tingling of left upper extremity     Chronic pain of right knee     Screening for prostate cancer     Atypical chest pain     NSTEMI (non-ST elevated myocardial infarction) (H)     CAD (coronary artery disease)     Benign essential hypertension     PFO (patent foramen ovale)     TIA (transient ischemic attack)     Past Surgical History:   Procedure Laterality Date     APPENDECTOMY        CARDIAC CATHERIZATION  09/15/2017     MOSES to mid-distal Cx x2     ENT SURGERY      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     EYE SURGERY      cataracts     HERNIA REPAIR      twice       Social History   Substance Use Topics     Smoking status: Former Smoker     Quit date: 8/19/1968     Smokeless tobacco: Never Used     Alcohol use Yes      Comment: Occ     Family History   Problem Relation Age of Onset     Respiratory Father      pneumonia     Unknown/Adopted Father      Alzheimer Disease Brother      Alzheimer Disease Sister      Respiratory Sister      COPD     Unknown/Adopted Mother      DIABETES No family hx of      Coronary Artery Disease No family hx of      Hypertension No family hx of      Hyperlipidemia No family hx of      CEREBROVASCULAR DISEASE No family hx of      Breast Cancer No family hx of      Colon Cancer No family hx of      Prostate Cancer No family hx of      Other Cancer No family hx of      Depression No family hx of      Anxiety Disorder No family hx of      MENTAL ILLNESS No family hx of      Substance Abuse No family hx of      Anesthesia Reaction No family hx of      Asthma No family hx of      OSTEOPOROSIS No family hx of      Genetic Disorder No family hx of      Thyroid Disease No family hx of      Obesity No family hx of              Reviewed and updated as needed this visit by clinical staffTobacco  Allergies  Meds  Med Hx  Surg Hx  Fam Hx  Soc Hx      Reviewed and updated as needed this visit by Provider         ROS:  Constitutional, HEENT, cardiovascular, pulmonary, gi and gu systems are negative, except as otherwise noted.  RESP:NEGATIVE for significant cough or SOB  CV: NEGATIVE for chest pain, palpitations or peripheral edema  GI: POSITIVE for heartburn or reflux      OBJECTIVE:                                                    /84  Pulse 63  Temp 97.7  F (36.5  C) (Tympanic)  Resp 20  Wt 167 lb (75.8 kg)  SpO2 94%  BMI 26.55 kg/m2  Body mass index is 26.55  kg/(m^2).  GENERAL APPEARANCE: healthy, alert and no distress  RESP: lungs clear to auscultation - no rales, rhonchi or wheezes  CV: regular rates and rhythm, normal S1 S2, no S3 or S4 and no murmur, click or rub         ASSESSMENT/PLAN:                                                        ICD-10-CM    1. Chest pain, unspecified type R07.9 metoprolol (LOPRESSOR) 50 MG tablet   2. NSTEMI (non-ST elevated myocardial infarction) (H) I21.4    3. Coronary artery disease involving native coronary artery of native heart without angina pectoris I25.10    4. Benign essential hypertension I10        Patient Instructions   Will increase metoprolol and see back in one month. Also, with heartburn, will take 2 tablespoons of liquid antacid as needed up to every 2 hours.  Potassium-Rich Foods  The normal adult diet usually contains 2,000 mg to 4,000 mg of potassium per day. More potassium is needed when you lose too much potassium from your body. This can happen if you have diarrhea or vomiting. It can also happen if you take a medicine to make you urinate more (diuretic). To increase the amount of potassium in your diet, include these high-potassium foods.     [The (*) indicates foods highest in potassium.]  Vegetables  Artichokes. Cooked 1/2 cup, 200 mg to 300 mg*  Asparagus. Cooked 1/2 cup, 200 mg to 300 mg  Beans. White, red, matthew cooked 1/2 cup, 300 mg to 500 mg*  Beets. Cooked 1/2 cup, 200 mg to 300 mg  Broccoli. Cooked or raw 1 cup, 200 mg to 500 mg*  [place] sprouts. Cooked 1/2 cup, 200 mg to 300 mg  Cabbage. Raw 1 cup, 100 mg to 200 mg  Carrots. Raw or cooked 1/2 cup, 100 mg to 200 mg  Celery. Raw 1 cup, 200 mg to 300 mg  Lima beans. Fresh or frozen 1/2 cup, 300 mg to 500 mg*   Mushrooms. Raw or cooked 1/2 cup, 100 mg to 300 mg  Peas. Cooked 1/2 cup, 150 mg to 250 mg   Potatoes. Baked 1 medium, 500 mg to 900 mg*   Spinach. Cooked 1 cup, 800 mg to 900 mg*   Spinach. Raw 2 cups, 300 mg to 400 mg *  Squash, winter.  Fresh, frozen, or cooked 1/2 cup, 200 mg to 400 mg   Tomato. Fresh 1 medium, 200 mg to 300 mg   Tomato juice. Canned 1/2 cup, 200 mg to 300 mg   Fruits  Apple juice. Unsweetened 1 cup, 200 mg to 300 mg   Apricots. Canned 1/2 cup, 200 mg to 300 mg   Apricots. Dried 4 pieces, 100 mg to 200 mg   Avocado. Raw 1/2 cup, 300 mg to 400 mg*  Banana. Fresh 1 small, 300 mg to 400 mg*   Cantaloupe. Fresh 1 cup diced, 300 mg to 400 mg*   Grape juice. Unsweetened 1 cup, 200 mg to 300 mg   Honeydew melon. Fresh 1 cup diced, 300 mg to 400 mg*   Orange. Fresh 1 medium, 200 mg to 300 mg    Orange juice. Unsweetened, fresh or frozen 1/2 cup, 200 mg to 300 mg  Pineapple juice. Unsweetened 1 cup, 300 mg to 400 mg   Prune juice. Unsweetened 1/2 cup, 300 mg to 400 mg*   Prunes. Dried 5 pieces, 300 mg to 400 mg*   Strawberries. Fresh or frozen 1 cup, 200 mg to 300 mg  Meat  Red meat. Cooked 3 ounces, 100 mg to 300 mg   Seafood  Cod, flounder, halibut. Cooked 3 ounces, 100 mg to 300 mg*  [place]. Cooked, 3 ounces 300 mg to 400 mg*   Scallops. Cooked 3 ounces, 200 mg to 300 mg*  Shrimp. Cooked 3/4 cup, 100 mg to 200 mg   Tuna. Fresh or canned 3/4 cup, 200 mg to 500 mg   Date Last Reviewed: 10/1/2016    4027-7752 [employer]. [address] [place] [address]. All rights reserved. This information is not intended as a substitute for professional medical care. Always follow your healthcare professional's instructions.            Gwyn Zheng MD  [facility]  Answers for HPI/ROS submitted by the patient on 9/27/2017   JULIETA 7 TOTAL SCORE: 0

## 2017-09-27 NOTE — NURSING NOTE
"Chief Complaint   Patient presents with     Hospital F/U       Initial /84  Pulse 63  Temp 97.7  F (36.5  C) (Tympanic)  Resp 20  Wt 167 lb (75.8 kg)  SpO2 94%  BMI 26.55 kg/m2 Estimated body mass index is 26.55 kg/(m^2) as calculated from the following:    Height as of 9/25/17: 5' 6.5\" (1.689 m).    Weight as of this encounter: 167 lb (75.8 kg).  Medication Reconciliation: complete    "

## 2017-09-27 NOTE — TELEPHONE ENCOUNTER
[facility] called asking if the provider meant for metoprolol be 45 days supply plus 1 refill.   Huddled with Dr. Zheng: he would like it to be 90 days supply plus 1 refill.   New Rx generated for 90 days supply plus 1 refill.

## 2017-09-27 NOTE — PATIENT INSTRUCTIONS
Will increase metoprolol and see back in one month. Also, with heartburn, will take 2 tablespoons of liquid antacid as needed up to every 2 hours.  Potassium-Rich Foods  The normal adult diet usually contains 2,000 mg to 4,000 mg of potassium per day. More potassium is needed when you lose too much potassium from your body. This can happen if you have diarrhea or vomiting. It can also happen if you take a medicine to make you urinate more (diuretic). To increase the amount of potassium in your diet, include these high-potassium foods.     [The (*) indicates foods highest in potassium.]  Vegetables  Artichokes. Cooked 1/2 cup, 200 mg to 300 mg*  Asparagus. Cooked 1/2 cup, 200 mg to 300 mg  Beans. White, red, matthew cooked 1/2 cup, 300 mg to 500 mg*  Beets. Cooked 1/2 cup, 200 mg to 300 mg  Broccoli. Cooked or raw 1 cup, 200 mg to 500 mg*  [place] sprouts. Cooked 1/2 cup, 200 mg to 300 mg  Cabbage. Raw 1 cup, 100 mg to 200 mg  Carrots. Raw or cooked 1/2 cup, 100 mg to 200 mg  Celery. Raw 1 cup, 200 mg to 300 mg  Lima beans. Fresh or frozen 1/2 cup, 300 mg to 500 mg*   Mushrooms. Raw or cooked 1/2 cup, 100 mg to 300 mg  Peas. Cooked 1/2 cup, 150 mg to 250 mg   Potatoes. Baked 1 medium, 500 mg to 900 mg*   Spinach. Cooked 1 cup, 800 mg to 900 mg*   Spinach. Raw 2 cups, 300 mg to 400 mg *  Squash, winter. Fresh, frozen, or cooked 1/2 cup, 200 mg to 400 mg   Tomato. Fresh 1 medium, 200 mg to 300 mg   Tomato juice. Canned 1/2 cup, 200 mg to 300 mg   Fruits  Apple juice. Unsweetened 1 cup, 200 mg to 300 mg   Apricots. Canned 1/2 cup, 200 mg to 300 mg   Apricots. Dried 4 pieces, 100 mg to 200 mg   Avocado. Raw 1/2 cup, 300 mg to 400 mg*  Banana. Fresh 1 small, 300 mg to 400 mg*   Cantaloupe. Fresh 1 cup diced, 300 mg to 400 mg*   Grape juice. Unsweetened 1 cup, 200 mg to 300 mg   Honeydew melon. Fresh 1 cup diced, 300 mg to 400 mg*   Orange. Fresh 1 medium, 200 mg to 300 mg    Orange juice. Unsweetened, fresh or frozen 1/2  cup, 200 mg to 300 mg  Pineapple juice. Unsweetened 1 cup, 300 mg to 400 mg   Prune juice. Unsweetened 1/2 cup, 300 mg to 400 mg*   Prunes. Dried 5 pieces, 300 mg to 400 mg*   Strawberries. Fresh or frozen 1 cup, 200 mg to 300 mg  Meat  Red meat. Cooked 3 ounces, 100 mg to 300 mg   Seafood  Cod, flounder, halibut. Cooked 3 ounces, 100 mg to 300 mg*  [place]. Cooked, 3 ounces 300 mg to 400 mg*   Scallops. Cooked 3 ounces, 200 mg to 300 mg*  Shrimp. Cooked 3/4 cup, 100 mg to 200 mg   Tuna. Fresh or canned 3/4 cup, 200 mg to 500 mg   Date Last Reviewed: 10/1/2016    8474-3511 [employer]. [address] [place] [address], [place] [address]. All rights reserved. This information is not intended as a substitute for professional medical care. Always follow your healthcare professional's instructions.    Will see back in one month.

## 2017-09-27 NOTE — MR AVS SNAPSHOT
After Visit Summary   9/27/2017    Cale Wagoner    MRN: [id number]           Patient Information     Date Of Birth          5/26/1933        Visit Information        Provider Department      9/27/2017 10:30 AM Gwyn Zheng MD [facility]        Today's Diagnoses     Chest pain, unspecified type    -  1    NSTEMI (non-ST elevated myocardial infarction) (H)        Coronary artery disease involving native coronary artery of native heart without angina pectoris        Benign essential hypertension        Gastroesophageal reflux disease without esophagitis          Care Instructions    Will increase metoprolol and see back in one month. Also, with heartburn, will take 2 tablespoons of liquid antacid as needed up to every 2 hours.  Potassium-Rich Foods  The normal adult diet usually contains 2,000 mg to 4,000 mg of potassium per day. More potassium is needed when you lose too much potassium from your body. This can happen if you have diarrhea or vomiting. It can also happen if you take a medicine to make you urinate more (diuretic). To increase the amount of potassium in your diet, include these high-potassium foods.     [The (*) indicates foods highest in potassium.]  Vegetables  Artichokes. Cooked 1/2 cup, 200 mg to 300 mg*  Asparagus. Cooked 1/2 cup, 200 mg to 300 mg  Beans. White, red, matthew cooked 1/2 cup, 300 mg to 500 mg*  Beets. Cooked 1/2 cup, 200 mg to 300 mg  Broccoli. Cooked or raw 1 cup, 200 mg to 500 mg*  Cook sprouts. Cooked 1/2 cup, 200 mg to 300 mg  Cabbage. Raw 1 cup, 100 mg to 200 mg  Carrots. Raw or cooked 1/2 cup, 100 mg to 200 mg  Celery. Raw 1 cup, 200 mg to 300 mg  Lima beans. Fresh or frozen 1/2 cup, 300 mg to 500 mg*   Mushrooms. Raw or cooked 1/2 cup, 100 mg to 300 mg  Peas. Cooked 1/2 cup, 150 mg to 250 mg   Potatoes. Baked 1 medium, 500 mg to 900 mg*   Spinach. Cooked 1 cup, 800 mg to 900 mg*   Spinach. Raw 2 cups, 300 mg to 400  mg *  Squash, winter. Fresh, frozen, or cooked 1/2 cup, 200 mg to 400 mg   Tomato. Fresh 1 medium, 200 mg to 300 mg   Tomato juice. Canned 1/2 cup, 200 mg to 300 mg   Fruits  Apple juice. Unsweetened 1 cup, 200 mg to 300 mg   Apricots. Canned 1/2 cup, 200 mg to 300 mg   Apricots. Dried 4 pieces, 100 mg to 200 mg   Avocado. Raw 1/2 cup, 300 mg to 400 mg*  Banana. Fresh 1 small, 300 mg to 400 mg*   Cantaloupe. Fresh 1 cup diced, 300 mg to 400 mg*   Grape juice. Unsweetened 1 cup, 200 mg to 300 mg   Honeydew melon. Fresh 1 cup diced, 300 mg to 400 mg*   Orange. Fresh 1 medium, 200 mg to 300 mg    Orange juice. Unsweetened, fresh or frozen 1/2 cup, 200 mg to 300 mg  Pineapple juice. Unsweetened 1 cup, 300 mg to 400 mg   Prune juice. Unsweetened 1/2 cup, 300 mg to 400 mg*   Prunes. Dried 5 pieces, 300 mg to 400 mg*   Strawberries. Fresh or frozen 1 cup, 200 mg to 300 mg  Meat  Red meat. Cooked 3 ounces, 100 mg to 300 mg   Seafood  Cod, flounder, halibut. Cooked 3 ounces, 100 mg to 300 mg*  [place]. Cooked, 3 ounces 300 mg to 400 mg*   Scallops. Cooked 3 ounces, 200 mg to 300 mg*  Shrimp. Cooked 3/4 cup, 100 mg to 200 mg   Tuna. Fresh or canned 3/4 cup, 200 mg to 500 mg   Date Last Reviewed: 10/1/2016    4712-3065 [employer]. [address] [place] [address], [place] [address]. All rights reserved. This information is not intended as a substitute for professional medical care. Always follow your healthcare professional's instructions.                Follow-ups after your visit        Who to contact     If you have questions or need follow up information about today's clinic visit or your schedule please contact [facility] directly at [phone].  Normal or non-critical lab and imaging results will be communicated to you by MyChart, letter or phone within 4 business days after the clinic has received the results. If you do not hear from us within 7 days, please contact the clinic through  [employer] or phone. If you have a critical or abnormal lab result, we will notify you by phone as soon as possible.  Submit refill requests through [employer] or call your pharmacy and they will forward the refill request to us. Please allow 3 business days for your refill to be completed.          Additional Information About Your Visit        Care EveryWhere ID     This is your Care EveryWhere ID. This could be used by other organizations to access your [place] medical records  [id number]        Your Vitals Were     Pulse Temperature Respirations Pulse Oximetry BMI (Body Mass Index)       63 97.7  F (36.5  C) (Tympanic) 20 94% 26.55 kg/m2        Blood Pressure from Last 3 Encounters:   09/27/17 136/84   09/25/17 144/75   09/16/17 105/66    Weight from Last 3 Encounters:   09/27/17 167 lb (75.8 kg)   09/25/17 171 lb 9.6 oz (77.8 kg)   09/16/17 169 lb 15.6 oz (77.1 kg)              Today, you had the following     No orders found for display         Today's Medication Changes          These changes are accurate as of: 9/27/17 11:03 AM.  If you have any questions, ask your nurse or doctor.               These medicines have changed or have updated prescriptions.        Dose/Directions    metoprolol 50 MG tablet   Commonly known as:  LOPRESSOR   This may have changed:  how much to take   Used for:  Chest pain, unspecified type   Changed by:  Gwyn Zheng MD        Dose:  75 mg   Take 1.5 tablets (75 mg) by mouth 2 times daily   Quantity:  135 tablet   Refills:  1         Stop taking these medicines if you haven't already. Please contact your care team if you have questions.     omeprazole 40 MG capsule   Commonly known as:  priLOSEC   Stopped by:  Gwyn Zheng MD                Where to get your medicines      These medications were sent to [facility][id number] - [place] [address] [facility]   [address] [facility] [address]     Phone:  [phone]     metoprolol 50 MG tablet                 Primary Care Provider Office Phone # Fax #    Gwyn Zheng [phone]       [address] [facility] [address]        Equal Access to Services     TAYO SANCHEZ : Promise abreu shainao Sogailali, waaxda luqadaha, qaybta kaalmada adeegyada, wilton huber laAbigailangela montoya. So [facility] [phone].    ATENCIÓN: Si habla español, tiene a rene disposición servicios gratuitos de asistencia lingüística. Llame al [phone].    We comply with applicable federal civil rights laws and [place] laws. We do not discriminate on the basis of race, color, national origin, age, disability sex, sexual orientation or gender identity.            Thank you!     Thank you for choosing [facility]  for your care. Our goal is always to provide you with excellent care. Hearing back from our patients is one way we can continue to improve our services. Please take a few minutes to complete the written survey that you may receive in the mail after your visit with us. Thank you!             Your Updated Medication List - Protect others around you: Learn how to safely use, store and throw away your medicines at www.disposemymeds.org.          This list is accurate as of: 9/27/17 11:03 AM.  Always use your most recent med list.                   Brand Name Dispense Instructions for use Diagnosis    ASPIRIN EC PO      Take 81 mg by mouth At Bedtime        atorvastatin 40 MG tablet    LIPITOR    30 tablet    Take 1 tablet (40 mg) by mouth daily    Chest pain, unspecified type       clopidogrel 75 MG tablet    PLAVIX    30 tablet    Take 1 tablet (75 mg) by mouth daily    Chest pain, unspecified type       FAMOTIDINE PO      Take 20 mg by mouth 2 times daily        Garlic 1000 MG Caps      Take 1 capsule by mouth daily        metoprolol 50 MG tablet    LOPRESSOR    135 tablet    Take 1.5 tablets (75 mg) by mouth 2 times daily    Chest pain, unspecified type        MULTIVITAMIN & MINERAL PO      Take 1 tablet by mouth daily        nitroGLYcerin 0.4 MG sublingual tablet    NITROSTAT    25 tablet    For chest pain place 1 tablet under the tongue every 5 minutes for 3 doses. If symptoms persist 5 minutes after 1st dose call 911.    Chest pain, unspecified type       OMEGA-3 FISH OIL PO      Take 2 g by mouth daily        PANTOPRAZOLE SODIUM PO      Take 40 mg by mouth daily        psyllium Packet    METAMUCIL/KONSYL     Take 1 packet by mouth daily        TYLENOL PO      Take 500 mg by mouth every 8 hours as needed for mild pain or fever

## 2017-09-28 ASSESSMENT — ANXIETY QUESTIONNAIRES: GAD7 TOTAL SCORE: 0

## 2017-10-09 DIAGNOSIS — R07.9 CHEST PAIN, UNSPECIFIED TYPE: ICD-10-CM

## 2017-10-09 NOTE — TELEPHONE ENCOUNTER
Atorvastatin 40 mg      Last Written Prescription Date: 9/16/17  Last Fill Quantity: 30, # refills: 3  Last Office Visit with [facility] or  Health prescribing provider: 9/27/17  Next 5 appointments (look out 90 days)     Oct 31, 2017  9:30 AM CDT   SHORT with Gwyn Zheng MD   [facility] ([facility]    [address] [place] [address] [address]  [facility] [address]   [phone]                   Lab Results   Component Value Date    CHOL 177 09/13/2017     Lab Results   Component Value Date    HDL 40 09/13/2017     Lab Results   Component Value Date     09/13/2017     Lab Results   Component Value Date    TRIG 109 09/13/2017     Lab Results   Component Value Date    CHOLHDLRATIO 4.4 08/31/2015       Pantoprazole dr 40 mg       Last Written Prescription Date:    Last Fill Quantity:  ,  # refills:     Last Office Visit with [facility], [facility] or  Health prescribing provider: 9/27/17                                         Next 5 appointments (look out 90 days)     Oct 31, 2017  9:30 AM CDT   SHORT with Gwyn Zheng MD   [facility] ([facility])    [address] [place] [address]   [phone]                    clopidogrel (PLAVIX) 75 MG tablet           Last Written Prescription Date: 9/16/17  Last Fill Quantity: 30, # refills: 0    Last Office Visit with [facility], [facility] or  Health prescribing provider:  9/27/17   Future Office Visit:    Next 5 appointments (look out 90 days)     Oct 31, 2017  9:30 AM CDT   SHORT with Gwyn Zheng MD   [facility] ([facility])    [address] [place] [address]   [phone]                   Lab Results   Component Value Date    WBC 8.2 09/16/2017     Lab Results   Component Value Date    RBC 4.31 09/16/2017     Lab  Results   Component Value Date    HGB 14.0 09/16/2017     Lab Results   Component Value Date    HCT 39.6 09/16/2017     No components found for: MCT  Lab Results   Component Value Date    MCV 92 09/16/2017     Lab Results   Component Value Date    MCH 32.5 09/16/2017     Lab Results   Component Value Date    MCHC 35.4 09/16/2017     Lab Results   Component Value Date    RDW 12.9 09/16/2017     Lab Results   Component Value Date     09/16/2017     Lab Results   Component Value Date    AST 28 09/14/2017     Lab Results   Component Value Date    ALT 39 09/14/2017     Creatinine   Date Value Ref Range Status   09/16/2017 1.01 0.66 - 1.25 mg/dL Final   ]

## 2017-10-10 RX ORDER — PANTOPRAZOLE SODIUM 40 MG/1
40 TABLET, DELAYED RELEASE ORAL DAILY
Qty: 90 TABLET | Refills: 3 | Status: SHIPPED | OUTPATIENT
Start: 2017-10-10 | End: 2018-09-17

## 2017-10-10 RX ORDER — ATORVASTATIN CALCIUM 40 MG/1
40 TABLET, FILM COATED ORAL DAILY
Qty: 90 TABLET | Refills: 3 | Status: SHIPPED | OUTPATIENT
Start: 2017-10-10 | End: 2018-09-26

## 2017-10-10 RX ORDER — CLOPIDOGREL BISULFATE 75 MG/1
75 TABLET ORAL DAILY
Qty: 90 TABLET | Refills: 3 | Status: SHIPPED | OUTPATIENT
Start: 2017-10-10 | End: 2018-09-26

## 2017-10-31 ENCOUNTER — OFFICE VISIT (OUTPATIENT)
Dept: FAMILY MEDICINE | Facility: CLINIC | Age: 82
End: 2017-10-31
Payer: COMMERCIAL

## 2017-10-31 VITALS
WEIGHT: 170 LBS | BODY MASS INDEX: 26.68 KG/M2 | DIASTOLIC BLOOD PRESSURE: 68 MMHG | RESPIRATION RATE: 16 BRPM | HEART RATE: 58 BPM | TEMPERATURE: 97.7 F | SYSTOLIC BLOOD PRESSURE: 134 MMHG | HEIGHT: 67 IN

## 2017-10-31 DIAGNOSIS — I10 BENIGN ESSENTIAL HYPERTENSION: ICD-10-CM

## 2017-10-31 DIAGNOSIS — K21.9 GASTROESOPHAGEAL REFLUX DISEASE, ESOPHAGITIS PRESENCE NOT SPECIFIED: ICD-10-CM

## 2017-10-31 DIAGNOSIS — I25.10 CORONARY ARTERY DISEASE INVOLVING NATIVE CORONARY ARTERY OF NATIVE HEART WITHOUT ANGINA PECTORIS: Primary | ICD-10-CM

## 2017-10-31 DIAGNOSIS — R07.89 ATYPICAL CHEST PAIN: ICD-10-CM

## 2017-10-31 DIAGNOSIS — E78.2 MIXED HYPERLIPIDEMIA: ICD-10-CM

## 2017-10-31 LAB
BASOPHILS # BLD AUTO: 0 10E9/L (ref 0–0.2)
BASOPHILS NFR BLD AUTO: 0.7 %
DIFFERENTIAL METHOD BLD: NORMAL
EOSINOPHIL # BLD AUTO: 0.2 10E9/L (ref 0–0.7)
EOSINOPHIL NFR BLD AUTO: 3.4 %
ERYTHROCYTE [DISTWIDTH] IN BLOOD BY AUTOMATED COUNT: 12.6 % (ref 10–15)
HCT VFR BLD AUTO: 44.9 % (ref 40–53)
HGB BLD-MCNC: 15.5 G/DL (ref 13.3–17.7)
LYMPHOCYTES # BLD AUTO: 2.1 10E9/L (ref 0.8–5.3)
LYMPHOCYTES NFR BLD AUTO: 36.9 %
MCH RBC QN AUTO: 32 PG (ref 26.5–33)
MCHC RBC AUTO-ENTMCNC: 34.5 G/DL (ref 31.5–36.5)
MCV RBC AUTO: 93 FL (ref 78–100)
MONOCYTES # BLD AUTO: 0.8 10E9/L (ref 0–1.3)
MONOCYTES NFR BLD AUTO: 13.6 %
NEUTROPHILS # BLD AUTO: 2.5 10E9/L (ref 1.6–8.3)
NEUTROPHILS NFR BLD AUTO: 45.4 %
PLATELET # BLD AUTO: 151 10E9/L (ref 150–450)
RBC # BLD AUTO: 4.84 10E12/L (ref 4.4–5.9)
WBC # BLD AUTO: 5.6 10E9/L (ref 4–11)

## 2017-10-31 PROCEDURE — 36415 COLL VENOUS BLD VENIPUNCTURE: CPT | Performed by: FAMILY MEDICINE

## 2017-10-31 PROCEDURE — 80048 BASIC METABOLIC PNL TOTAL CA: CPT | Performed by: FAMILY MEDICINE

## 2017-10-31 PROCEDURE — 99214 OFFICE O/P EST MOD 30 MIN: CPT | Performed by: FAMILY MEDICINE

## 2017-10-31 PROCEDURE — 80061 LIPID PANEL: CPT | Performed by: FAMILY MEDICINE

## 2017-10-31 PROCEDURE — 85025 COMPLETE CBC W/AUTO DIFF WBC: CPT | Performed by: FAMILY MEDICINE

## 2017-10-31 NOTE — MR AVS SNAPSHOT
After Visit Summary   10/31/2017    Cale Wagoner    MRN: [id number]           Patient Information     Date Of Birth          5/26/1933        Visit Information        Provider Department      10/31/2017 9:30 AM Gwyn Zheng MD [facility]        Today's Diagnoses     Coronary artery disease involving native coronary artery of native heart without angina pectoris    -  1    Benign essential hypertension        Atypical chest pain        Gastroesophageal reflux disease, esophagitis presence not specified        Mixed hyperlipidemia          Care Instructions    No changes in medications. Will follow up in one month. Still waiting for appointment with cardiology. Will see back pending review of tests.          Follow-ups after your visit        Follow-up notes from your care team     Return in about 4 weeks (around 11/28/2017).      Who to contact     If you have questions or need follow up information about today's clinic visit or your schedule please contact [facility] directly at [phone].  Normal or non-critical lab and imaging results will be communicated to you by MyChart, letter or phone within 4 business days after the clinic has received the results. If you do not hear from us within 7 days, please contact the clinic through MyChart or phone. If you have a critical or abnormal lab result, we will notify you by phone as soon as possible.  Submit refill requests through [employer] or call your pharmacy and they will forward the refill request to us. Please allow 3 business days for your refill to be completed.          Additional Information About Your Visit        Care EveryWhere ID     This is your Care EveryWhere ID. This could be used by other organizations to access your [facility] medical records  [id number]        Your Vitals Were     Pulse Temperature Respirations Height BMI (Body Mass Index)       58 97.7  F  "(36.5  C) (Tympanic) 16 5' 6.5\" (1.689 m) 27.03 kg/m2        Blood Pressure from Last 3 Encounters:   10/31/17 134/68   09/27/17 136/84   09/25/17 144/75    Weight from Last 3 Encounters:   10/31/17 170 lb (77.1 kg)   09/27/17 167 lb (75.8 kg)   09/25/17 171 lb 9.6 oz (77.8 kg)              We Performed the Following     Basic metabolic panel     CBC with platelets differential     Lipid Profile        Primary Care Provider Office Phone # Fax #    Gwyn Zheng [phone]       [address] [facility] [address]        Equal Access to Services     TAYO SANCHEZ : Hadii porter ku hadasho Soomaali, waaxda luqadaha, qaybta kaalmada adeegyada, waxay idiin haysandran alea gaytan . So [facility] [phone].    ATENCIÓN: Si habla español, tiene a rene disposición servicios gratuitos de asistencia lingüística. Llame al [phone].    We comply with applicable federal civil rights laws and [place] laws. We do not discriminate on the basis of race, color, national origin, age, disability, sex, sexual orientation, or gender identity.            Thank you!     Thank you for choosing [facility]  for your care. Our goal is always to provide you with excellent care. Hearing back from our patients is one way we can continue to improve our services. Please take a few minutes to complete the written survey that you may receive in the mail after your visit with us. Thank you!             Your Updated Medication List - Protect others around you: Learn how to safely use, store and throw away your medicines at www.disposemymeds.org.          This list is accurate as of: 10/31/17  9:51 AM.  Always use your most recent med list.                   Brand Name Dispense Instructions for use Diagnosis    ASPIRIN EC PO      Take 81 mg by mouth At Bedtime        atorvastatin 40 MG tablet    LIPITOR    90 tablet    Take 1 tablet (40 mg) by mouth daily    Chest pain, unspecified type    "    clopidogrel 75 MG tablet    PLAVIX    90 tablet    Take 1 tablet (75 mg) by mouth daily    Chest pain, unspecified type       FAMOTIDINE PO      Take 20 mg by mouth 2 times daily        Garlic 1000 MG Caps      Take 1 capsule by mouth daily        metoprolol 50 MG tablet    LOPRESSOR    270 tablet    Take 1.5 tablets (75 mg) by mouth 2 times daily    Chest pain, unspecified type       MULTIVITAMIN & MINERAL PO      Take 1 tablet by mouth daily        nitroGLYcerin 0.4 MG sublingual tablet    NITROSTAT    25 tablet    For chest pain place 1 tablet under the tongue every 5 minutes for 3 doses. If symptoms persist 5 minutes after 1st dose call 911.    Chest pain, unspecified type       OMEGA-3 FISH OIL PO      Take 2 g by mouth daily        pantoprazole 40 MG EC tablet    PROTONIX    90 tablet    Take 1 tablet (40 mg) by mouth daily    Chest pain, unspecified type       psyllium Packet    METAMUCIL/KONSYL     Take 1 packet by mouth daily        TYLENOL PO      Take 500 mg by mouth every 8 hours as needed for mild pain or fever

## 2017-10-31 NOTE — LETTER
November 2, 2017      Cale Wagoner  [address]  [facility] [address]        Dear ,    We are writing to inform you of your test results.    Your test results fall within the expected range(s) or remain unchanged from previous results.  Please continue with current treatment plan. This is all very stable and can be repeated in 6 months.    Resulted Orders   CBC with platelets differential   Result Value Ref Range    WBC 5.6 4.0 - 11.0 10e9/L    RBC Count 4.84 4.4 - 5.9 10e12/L    Hemoglobin 15.5 13.3 - 17.7 g/dL    Hematocrit 44.9 40.0 - 53.0 %    MCV 93 78 - 100 fl    MCH 32.0 26.5 - 33.0 pg    MCHC 34.5 31.5 - 36.5 g/dL    RDW 12.6 10.0 - 15.0 %    Platelet Count 151 150 - 450 10e9/L    Diff Method Automated Method     % Neutrophils 45.4 %    % Lymphocytes 36.9 %    % Monocytes 13.6 %    % Eosinophils 3.4 %    % Basophils 0.7 %    Absolute Neutrophil 2.5 1.6 - 8.3 10e9/L    Absolute Lymphocytes 2.1 0.8 - 5.3 10e9/L    Absolute Monocytes 0.8 0.0 - 1.3 10e9/L    Absolute Eosinophils 0.2 0.0 - 0.7 10e9/L    Absolute Basophils 0.0 0.0 - 0.2 10e9/L   Basic metabolic panel   Result Value Ref Range    Sodium 141 133 - 144 mmol/L    Potassium 4.2 3.4 - 5.3 mmol/L    Chloride 104 94 - 109 mmol/L    Carbon Dioxide 27 20 - 32 mmol/L    Anion Gap 10 3 - 14 mmol/L    Glucose 94 70 - 99 mg/dL      Comment:      Fasting specimen    Urea Nitrogen 20 7 - 30 mg/dL    Creatinine 1.11 0.66 - 1.25 mg/dL    GFR Estimate 63 >60 mL/min/1.7m2      Comment:      Non  GFR Calc    GFR Estimate If Black 76 >60 mL/min/1.7m2      Comment:       GFR Calc    Calcium 8.7 8.5 - 10.1 mg/dL   Lipid Profile   Result Value Ref Range    Cholesterol 98 <200 mg/dL    Triglycerides 92 <150 mg/dL      Comment:      Fasting specimen    HDL Cholesterol 39 (L) >39 mg/dL    LDL Cholesterol Calculated 41 <100 mg/dL      Comment:      Desirable:       <100 mg/dl    Non HDL Cholesterol 59 <130 mg/dL        If you have any questions or concerns, please call the clinic at the number listed above.       Sincerely,        Gwyn Zheng MD

## 2017-10-31 NOTE — PATIENT INSTRUCTIONS
No changes in medications. Will follow up in one month. Still waiting for appointment with cardiology. Will see back pending review of tests.

## 2017-10-31 NOTE — NURSING NOTE
"Chief Complaint   Patient presents with     Hypertension     Recheck Medication     Metoprolol       Initial /68  Pulse 58  Temp 97.7  F (36.5  C) (Tympanic)  Resp 16  Ht 5' 6.5\" (1.689 m)  Wt 170 lb (77.1 kg)  BMI 27.03 kg/m2 Estimated body mass index is 27.03 kg/(m^2) as calculated from the following:    Height as of this encounter: 5' 6.5\" (1.689 m).    Weight as of this encounter: 170 lb (77.1 kg).  Medication Reconciliation: complete     Ann Joel CMA      "

## 2017-10-31 NOTE — PROGRESS NOTES
SUBJECTIVE:   Cale Wagoner is a 84 year old male who presents to clinic today for the following health issues:      Hypertension Follow-up      Outpatient blood pressures are not being checked.    Low Salt Diet: not monitoring salt        Amount of exercise or physical activity: 2-3 days/week for an average of 30-45 minutes    Problems taking medications regularly: No    Medication side effects: none    Diet: regular (no restrictions)        Vascular Disease Follow-up:  Coronary Artery Disease (CAD)      Chest pain or pressure, left side neck or arm pain: No    Shortness of breath/increased sweats/nausea with exertion: No    Pain in calves walking 1-2 blocks: No    Worsened or new symptoms since last visit: No    Nitroglycerin use: yes    Daily aspirin use: Yes          Problem list and histories reviewed & adjusted, as indicated.  Additional history: as documented    Patient Active Problem List   Diagnosis     Bloating     GERD (gastroesophageal reflux disease)     Health Care Home     Cerebrovascular accident (CVA), unspecified mechanism (H)     Hyperlipidemia     Slow transit constipation     Tingling of left upper extremity     Chronic pain of right knee     Screening for prostate cancer     Atypical chest pain     NSTEMI (non-ST elevated myocardial infarction) (H)     CAD (coronary artery disease)     Benign essential hypertension     PFO (patent foramen ovale)     TIA (transient ischemic attack)     Past Surgical History:   Procedure Laterality Date     APPENDECTOMY       CARDIAC CATHERIZATION  09/15/2017     MOSES to mid-distal Cx x2     ENT SURGERY      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     EYE SURGERY      cataracts     HERNIA REPAIR      twice       Social History   Substance Use Topics     Smoking status: Former Smoker     Quit date: 8/19/1968     Smokeless tobacco: Never Used     Alcohol use Yes      Comment: Occ     Family History   Problem Relation Age of Onset     Respiratory Father      pneumonia  "    Unknown/Adopted Father      Alzheimer Disease Brother      Alzheimer Disease Sister      Respiratory Sister      COPD     Unknown/Adopted Mother      DIABETES No family hx of      Coronary Artery Disease No family hx of      Hypertension No family hx of      Hyperlipidemia No family hx of      CEREBROVASCULAR DISEASE No family hx of      Breast Cancer No family hx of      Colon Cancer No family hx of      Prostate Cancer No family hx of      Other Cancer No family hx of      Depression No family hx of      Anxiety Disorder No family hx of      MENTAL ILLNESS No family hx of      Substance Abuse No family hx of      Anesthesia Reaction No family hx of      Asthma No family hx of      OSTEOPOROSIS No family hx of      Genetic Disorder No family hx of      Thyroid Disease No family hx of      Obesity No family hx of              Reviewed and updated as needed this visit by clinical staffTobacco  Allergies  Meds  Med Hx  Surg Hx  Fam Hx  Soc Hx      Reviewed and updated as needed this visit by Provider         ROS:  Constitutional, neuro, ENT, endocrine, pulmonary, cardiac, gastrointestinal, genitourinary, musculoskeletal, integument and psychiatric systems are negative, except as otherwise noted.  RESP:NEGATIVE for significant cough or SOB  CV: POSITIVE for chest pain/chest pressure  GI: POSITIVE for dyspepsia and heartburn or reflux      OBJECTIVE:                                                    /68  Pulse 58  Temp 97.7  F (36.5  C) (Tympanic)  Resp 16  Ht 5' 6.5\" (1.689 m)  Wt 170 lb (77.1 kg)  BMI 27.03 kg/m2  Body mass index is 27.03 kg/(m^2).  GENERAL APPEARANCE: healthy, alert and no distress  RESP: lungs clear to auscultation - no rales, rhonchi or wheezes  CV: regular rates and rhythm, normal S1 S2, no S3 or S4 and no murmur, click or rub  ABDOMEN: soft, nontender, without hepatosplenomegaly or masses and bowel sounds normal         ASSESSMENT/PLAN:                                       "                  ICD-10-CM    1. Coronary artery disease involving native coronary artery of native heart without angina pectoris I25.10    2. Benign essential hypertension I10 CBC with platelets differential     Basic metabolic panel   3. Atypical chest pain R07.89    4. Gastroesophageal reflux disease, esophagitis presence not specified K21.9    5. Mixed hyperlipidemia E78.2 Lipid Profile       Patient Instructions   No changes in medications. Will follow up in one month. Still waiting for appointment with cardiology. Will see back pending review of tests.      Gwyn Zheng MD  [facility]

## 2017-11-01 LAB
ANION GAP SERPL CALCULATED.3IONS-SCNC: 10 MMOL/L (ref 3–14)
BUN SERPL-MCNC: 20 MG/DL (ref 7–30)
CALCIUM SERPL-MCNC: 8.7 MG/DL (ref 8.5–10.1)
CHLORIDE SERPL-SCNC: 104 MMOL/L (ref 94–109)
CHOLEST SERPL-MCNC: 98 MG/DL
CO2 SERPL-SCNC: 27 MMOL/L (ref 20–32)
CREAT SERPL-MCNC: 1.11 MG/DL (ref 0.66–1.25)
GFR SERPL CREATININE-BSD FRML MDRD: 63 ML/MIN/1.7M2
GLUCOSE SERPL-MCNC: 94 MG/DL (ref 70–99)
HDLC SERPL-MCNC: 39 MG/DL
LDLC SERPL CALC-MCNC: 41 MG/DL
NONHDLC SERPL-MCNC: 59 MG/DL
POTASSIUM SERPL-SCNC: 4.2 MMOL/L (ref 3.4–5.3)
SODIUM SERPL-SCNC: 141 MMOL/L (ref 133–144)
TRIGL SERPL-MCNC: 92 MG/DL

## 2017-11-03 DIAGNOSIS — E78.5 HYPERLIPIDEMIA LDL GOAL <70: ICD-10-CM

## 2017-11-03 DIAGNOSIS — I10 BENIGN ESSENTIAL HYPERTENSION: ICD-10-CM

## 2017-11-03 DIAGNOSIS — I25.10 CORONARY ARTERY DISEASE INVOLVING NATIVE CORONARY ARTERY OF NATIVE HEART WITHOUT ANGINA PECTORIS: ICD-10-CM

## 2017-11-03 PROCEDURE — 36415 COLL VENOUS BLD VENIPUNCTURE: CPT | Performed by: PHYSICIAN ASSISTANT

## 2017-11-03 PROCEDURE — 84460 ALANINE AMINO (ALT) (SGPT): CPT | Performed by: PHYSICIAN ASSISTANT

## 2017-11-03 PROCEDURE — 80061 LIPID PANEL: CPT | Performed by: PHYSICIAN ASSISTANT

## 2017-11-04 LAB
ALT SERPL W P-5'-P-CCNC: 33 U/L (ref 0–70)
CHOLEST SERPL-MCNC: 98 MG/DL
HDLC SERPL-MCNC: 40 MG/DL
LDLC SERPL CALC-MCNC: 41 MG/DL
NONHDLC SERPL-MCNC: 58 MG/DL
TRIGL SERPL-MCNC: 84 MG/DL

## 2017-11-28 ENCOUNTER — OFFICE VISIT (OUTPATIENT)
Dept: FAMILY MEDICINE | Facility: CLINIC | Age: 82
End: 2017-11-28
Payer: COMMERCIAL

## 2017-11-28 VITALS
BODY MASS INDEX: 26.87 KG/M2 | RESPIRATION RATE: 16 BRPM | HEART RATE: 56 BPM | WEIGHT: 169 LBS | SYSTOLIC BLOOD PRESSURE: 116 MMHG | DIASTOLIC BLOOD PRESSURE: 78 MMHG | OXYGEN SATURATION: 94 % | TEMPERATURE: 97.2 F

## 2017-11-28 DIAGNOSIS — I25.10 CORONARY ARTERY DISEASE INVOLVING NATIVE CORONARY ARTERY OF NATIVE HEART WITHOUT ANGINA PECTORIS: ICD-10-CM

## 2017-11-28 DIAGNOSIS — L57.0 AK (ACTINIC KERATOSIS): ICD-10-CM

## 2017-11-28 DIAGNOSIS — I10 BENIGN ESSENTIAL HYPERTENSION: ICD-10-CM

## 2017-11-28 DIAGNOSIS — I21.4 NSTEMI (NON-ST ELEVATED MYOCARDIAL INFARCTION) (H): Primary | ICD-10-CM

## 2017-11-28 PROCEDURE — 99214 OFFICE O/P EST MOD 30 MIN: CPT | Mod: 25 | Performed by: FAMILY MEDICINE

## 2017-11-28 PROCEDURE — 17000 DESTRUCT PREMALG LESION: CPT | Performed by: FAMILY MEDICINE

## 2017-11-28 NOTE — MR AVS SNAPSHOT
After Visit Summary   11/28/2017    Cale Wagoner    MRN: [id number]           Patient Information     Date Of Birth          5/26/1933        Visit Information        Provider Department      11/28/2017 9:30 AM Gwyn Zheng MD [facility] [address] [facility]        Today's Diagnoses     NSTEMI (non-ST elevated myocardial infarction) (H)    -  1    Coronary artery disease involving native coronary artery of native heart without angina pectoris        Benign essential hypertension        AK (actinic keratosis)          Care Instructions    Will see back in January. Increase aerobic activity to 30 minutes daily.    Scaled lesion was treated with freeze/thaw twice. Tolerated well.          Follow-ups after your visit        Your next 10 appointments already scheduled     Feb 09, 2018  9:45 AM CST   Return Visit with Chichi Yang MD   [facility] ([facility])    [address] [place] [id number] [phone]              Who to contact     If you have questions or need follow up information about today's clinic visit or your schedule please contact [facility] directly at [phone].  Normal or non-critical lab and imaging results will be communicated to you by MyChart, letter or phone within 4 business days after the clinic has received the results. If you do not hear from us within 7 days, please contact the clinic through MyChart or phone. If you have a critical or abnormal lab result, we will notify you by phone as soon as possible.  Submit refill requests through [employer] or call your pharmacy and they will forward the refill request to us. Please allow 3 business days for your refill to be completed.          Additional Information About Your Visit        Care EveryWhere ID     This is your Care EveryWhere ID. This could be used by other organizations to access your [facility]  medical records  [id number]        Your Vitals Were     Pulse Temperature Respirations Pulse Oximetry BMI (Body Mass Index)       56 97.2  F (36.2  C) (Tympanic) 16 94% 26.87 kg/m2        Blood Pressure from Last 3 Encounters:   11/28/17 116/78   10/31/17 134/68   09/27/17 136/84    Weight from Last 3 Encounters:   11/28/17 169 lb (76.7 kg)   10/31/17 170 lb (77.1 kg)   09/27/17 167 lb (75.8 kg)              Today, you had the following     No orders found for display       Primary Care Provider Office Phone # Fax #    Gwyn Zheng [phone]       [address] [facility] [address]        Equal Access to Services     TAYO SANCHEZ : Hadii porter abreu hadasho Soomaali, waaxda luqadaha, qaybta kaalmada adeegyada, wilton gaytan . So [facility] [phone].    ATENCIÓN: Si habla español, tiene a rene disposición servicios gratuitos de asistencia lingüística. Kaley al [phone].    We comply with applicable federal civil rights laws and [place] laws. We do not discriminate on the basis of race, color, national origin, age, disability, sex, sexual orientation, or gender identity.            Thank you!     Thank you for choosing [facility]  for your care. Our goal is always to provide you with excellent care. Hearing back from our patients is one way we can continue to improve our services. Please take a few minutes to complete the written survey that you may receive in the mail after your visit with us. Thank you!             Your Updated Medication List - Protect others around you: Learn how to safely use, store and throw away your medicines at www.disposemymeds.org.          This list is accurate as of: 11/28/17  9:59 AM.  Always use your most recent med list.                   Brand Name Dispense Instructions for use Diagnosis    ASPIRIN EC PO      Take 81 mg by mouth At Bedtime        atorvastatin 40 MG tablet    LIPITOR    90 tablet     Take 1 tablet (40 mg) by mouth daily    Chest pain, unspecified type       clopidogrel 75 MG tablet    PLAVIX    90 tablet    Take 1 tablet (75 mg) by mouth daily    Chest pain, unspecified type       FAMOTIDINE PO      Take 20 mg by mouth 2 times daily        Garlic 1000 MG Caps      Take 1 capsule by mouth daily        metoprolol 50 MG tablet    LOPRESSOR    270 tablet    Take 1.5 tablets (75 mg) by mouth 2 times daily    Chest pain, unspecified type       MULTIVITAMIN & MINERAL PO      Take 1 tablet by mouth daily        nitroGLYcerin 0.4 MG sublingual tablet    NITROSTAT    25 tablet    For chest pain place 1 tablet under the tongue every 5 minutes for 3 doses. If symptoms persist 5 minutes after 1st dose call 911.    Chest pain, unspecified type       OMEGA-3 FISH OIL PO      Take 2 g by mouth daily        pantoprazole 40 MG EC tablet    PROTONIX    90 tablet    Take 1 tablet (40 mg) by mouth daily    Chest pain, unspecified type       psyllium Packet    METAMUCIL/KONSYL     Take 1 packet by mouth daily        TYLENOL PO      Take 500 mg by mouth every 8 hours as needed for mild pain or fever

## 2017-11-28 NOTE — PATIENT INSTRUCTIONS
Will see back in January. Increase aerobic activity to 30 minutes daily.    Scaled lesion was treated with freeze/thaw twice. Tolerated well.

## 2017-11-28 NOTE — NURSING NOTE
"Chief Complaint   Patient presents with     Hypertension     Derm Problem     skin lesions on both sides of head       Initial /78  Pulse 56  Temp 97.2  F (36.2  C) (Tympanic)  Resp 16  Wt 169 lb (76.7 kg)  SpO2 94%  BMI 26.87 kg/m2 Estimated body mass index is 26.87 kg/(m^2) as calculated from the following:    Height as of 10/31/17: 5' 6.5\" (1.689 m).    Weight as of this encounter: 169 lb (76.7 kg).  Medication Reconciliation: complete     Ann Joel CMA      "

## 2017-11-28 NOTE — PROGRESS NOTES
SUBJECTIVE:   Cale Wagoner is a 84 year old male who presents to clinic today for the following health issues:      Hypertension Follow-up      Outpatient blood pressures are not being checked.    Low Salt Diet: not monitoring salt        Amount of exercise or physical activity: 6-7 days/week for an average of 15-30 minutes    Problems taking medications regularly: No    Medication side effects: none    Diet: regular (no restrictions)    Vascular Disease Follow-up:  Coronary Artery Disease (CAD)      Chest pain or pressure, left side neck or arm pain: No    Shortness of breath/increased sweats/nausea with exertion: No    Pain in calves walking 1-2 blocks: No    Worsened or new symptoms since last visit: No    Nitroglycerin use: no    Daily aspirin use: Yes       Skin lesion      Duration: many years    Description  Location: top of the left ear  Itching: no    Intensity:  mild    Accompanying signs and symptoms: None    History (similar episodes/previous evaluation): None    Precipitating or alleviating factors:  New exposures:  None  Recent travel: no      Therapies tried and outcome: none        Problem list and histories reviewed & adjusted, as indicated.  Additional history: as documented    Patient Active Problem List   Diagnosis     Bloating     GERD (gastroesophageal reflux disease)     Health Care Home     Cerebrovascular accident (CVA), unspecified mechanism (H)     Hyperlipidemia     Slow transit constipation     Tingling of left upper extremity     Chronic pain of right knee     Screening for prostate cancer     Atypical chest pain     NSTEMI (non-ST elevated myocardial infarction) (H)     CAD (coronary artery disease)     Benign essential hypertension     PFO (patent foramen ovale)     TIA (transient ischemic attack)     Past Surgical History:   Procedure Laterality Date     APPENDECTOMY       CARDIAC CATHERIZATION  09/15/2017     MOSES to mid-distal Cx x2     ENT SURGERY      malignant tumor on  the nose     EYE SURGERY      cataracts     HERNIA REPAIR      twice       Social History   Substance Use Topics     Smoking status: Former Smoker     Quit date: 8/19/1968     Smokeless tobacco: Never Used     Alcohol use Yes      Comment: Occ     Family History   Problem Relation Age of Onset     Respiratory Father      pneumonia     Unknown/Adopted Father      Alzheimer Disease Brother      Alzheimer Disease Sister      Respiratory Sister      COPD     Unknown/Adopted Mother      DIABETES No family hx of      Coronary Artery Disease No family hx of      Hypertension No family hx of      Hyperlipidemia No family hx of      CEREBROVASCULAR DISEASE No family hx of      Breast Cancer No family hx of      Colon Cancer No family hx of      Prostate Cancer No family hx of      Other Cancer No family hx of      Depression No family hx of      Anxiety Disorder No family hx of      MENTAL ILLNESS No family hx of      Substance Abuse No family hx of      Anesthesia Reaction No family hx of      Asthma No family hx of      OSTEOPOROSIS No family hx of      Genetic Disorder No family hx of      Thyroid Disease No family hx of      Obesity No family hx of              Reviewed and updated as needed this visit by clinical staffTobacco  Allergies  Meds  Med Hx  Surg Hx  Fam Hx  Soc Hx      Reviewed and updated as needed this visit by Provider         ROS:  Constitutional, HEENT, cardiovascular, pulmonary, gi and gu systems are negative, except as otherwise noted.      OBJECTIVE:                                                    /78  Pulse 56  Temp 97.2  F (36.2  C) (Tympanic)  Resp 16  Wt 169 lb (76.7 kg)  SpO2 94%  BMI 26.87 kg/m2  Body mass index is 26.87 kg/(m^2).  GENERAL APPEARANCE: healthy, alert and no distress  RESP: lungs clear to auscultation - no rales, rhonchi or wheezes  CV: regular rates and rhythm, normal S1 S2, no S3 or S4 and no murmur, click or rub  SKIN: lichenification - top of left ear with  scaling, measures 4 mm         ASSESSMENT/PLAN:                                                        ICD-10-CM    1. NSTEMI (non-ST elevated myocardial infarction) (H) I21.4    2. Coronary artery disease involving native coronary artery of native heart without angina pectoris I25.10    3. Benign essential hypertension I10    4. AK (actinic keratosis) L57.0        Patient Instructions   Will see back in January. Increase aerobic activity to 30 minutes daily.    Scaled lesion was treated with freeze/thaw twice. Tolerated well.      Gwyn Zheng MD  [facility]

## 2018-01-08 ENCOUNTER — OFFICE VISIT (OUTPATIENT)
Dept: FAMILY MEDICINE | Facility: CLINIC | Age: 83
End: 2018-01-08
Payer: COMMERCIAL

## 2018-01-08 VITALS
SYSTOLIC BLOOD PRESSURE: 124 MMHG | OXYGEN SATURATION: 96 % | WEIGHT: 170 LBS | BODY MASS INDEX: 26.68 KG/M2 | HEART RATE: 58 BPM | TEMPERATURE: 97.8 F | DIASTOLIC BLOOD PRESSURE: 82 MMHG | HEIGHT: 67 IN | RESPIRATION RATE: 14 BRPM

## 2018-01-08 DIAGNOSIS — I25.10 CORONARY ARTERY DISEASE INVOLVING NATIVE CORONARY ARTERY OF NATIVE HEART WITHOUT ANGINA PECTORIS: Primary | ICD-10-CM

## 2018-01-08 DIAGNOSIS — I21.4 NSTEMI (NON-ST ELEVATED MYOCARDIAL INFARCTION) (H): ICD-10-CM

## 2018-01-08 DIAGNOSIS — I10 BENIGN ESSENTIAL HYPERTENSION: ICD-10-CM

## 2018-01-08 DIAGNOSIS — R07.9 CHEST PAIN, UNSPECIFIED TYPE: ICD-10-CM

## 2018-01-08 PROCEDURE — 99214 OFFICE O/P EST MOD 30 MIN: CPT | Performed by: FAMILY MEDICINE

## 2018-01-08 RX ORDER — METOPROLOL TARTRATE 50 MG
50 TABLET ORAL 2 TIMES DAILY
Qty: 270 TABLET | Refills: 1
Start: 2018-01-08 | End: 2018-05-23

## 2018-01-08 NOTE — NURSING NOTE
"Chief Complaint   Patient presents with     Heart Problem     Follow up     Hypertension       Initial /82 (BP Location: Left arm, Patient Position: Sitting, Cuff Size: Adult Regular)  Pulse 58  Temp 97.8  F (36.6  C)  Resp 14  Ht 5' 6.5\" (1.689 m)  Wt 170 lb (77.1 kg)  SpO2 96%  BMI 27.03 kg/m2 Estimated body mass index is 27.03 kg/(m^2) as calculated from the following:    Height as of this encounter: 5' 6.5\" (1.689 m).    Weight as of this encounter: 170 lb (77.1 kg).  Medication Reconciliation: complete     Gladis Ly LPN  "

## 2018-01-08 NOTE — PROGRESS NOTES
SUBJECTIVE:   Cale Wagoner is a 84 year old male who presents to clinic today for the following health issues:        Hypertension Follow-up      Outpatient blood pressures are not being checked.    Low Salt Diet: no added salt    Vascular Disease Follow-up:  Coronary Artery Disease (CAD)      Chest pain or pressure, left side neck or arm pain: No    Shortness of breath/increased sweats/nausea with exertion: No    Pain in calves walking 1-2 blocks: No    Worsened or new symptoms since last visit: No    Nitroglycerin use: no    Daily aspirin use: Yes          Amount of exercise or physical activity: 6-7 days/week for an average of less than 15 minutes    Problems taking medications regularly: No    Medication side effects: none  Diet: low salt          Problem list and histories reviewed & adjusted, as indicated.  Additional history: as documented    Patient Active Problem List   Diagnosis     Bloating     GERD (gastroesophageal reflux disease)     Health Care Home     Cerebrovascular accident (CVA), unspecified mechanism (H)     Hyperlipidemia     Slow transit constipation     Tingling of left upper extremity     Chronic pain of right knee     Screening for prostate cancer     Atypical chest pain     NSTEMI (non-ST elevated myocardial infarction) (H)     CAD (coronary artery disease)     Benign essential hypertension     PFO (patent foramen ovale)     TIA (transient ischemic attack)     Past Surgical History:   Procedure Laterality Date     APPENDECTOMY       CARDIAC CATHERIZATION  09/15/2017     MOSES to mid-distal Cx x2     ENT SURGERY      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     EYE SURGERY      cataracts     HERNIA REPAIR      twice       Social History   Substance Use Topics     Smoking status: Former Smoker     Quit date: 8/19/1968     Smokeless tobacco: Never Used     Alcohol use Yes      Comment: Occ     Family History   Problem Relation Age of Onset     Respiratory Father      pneumonia     Unknown/Adopted  "Father      Alzheimer Disease Brother      Alzheimer Disease Sister      Respiratory Sister      COPD     Unknown/Adopted Mother      DIABETES No family hx of      Coronary Artery Disease No family hx of      Hypertension No family hx of      Hyperlipidemia No family hx of      CEREBROVASCULAR DISEASE No family hx of      Breast Cancer No family hx of      Colon Cancer No family hx of      Prostate Cancer No family hx of      Other Cancer No family hx of      Depression No family hx of      Anxiety Disorder No family hx of      MENTAL ILLNESS No family hx of      Substance Abuse No family hx of      Anesthesia Reaction No family hx of      Asthma No family hx of      OSTEOPOROSIS No family hx of      Genetic Disorder No family hx of      Thyroid Disease No family hx of      Obesity No family hx of              Reviewed and updated as needed this visit by clinical staffTobacco  Allergies  Meds  Med Hx  Surg Hx  Fam Hx  Soc Hx      Reviewed and updated as needed this visit by Provider         ROS:  Constitutional, neuro, ENT, endocrine, pulmonary, cardiac, gastrointestinal, genitourinary, musculoskeletal, integument and psychiatric systems are negative, except as otherwise noted.      OBJECTIVE:                                                    /82 (BP Location: Left arm, Patient Position: Sitting, Cuff Size: Adult Regular)  Pulse 58  Temp 97.8  F (36.6  C)  Resp 14  Ht 5' 6.5\" (1.689 m)  Wt 170 lb (77.1 kg)  SpO2 96%  BMI 27.03 kg/m2  Body mass index is 27.03 kg/(m^2).  GENERAL APPEARANCE: healthy, alert and no distress  RESP: lungs clear to auscultation - no rales, rhonchi or wheezes  CV: regular rates and rhythm, normal S1 S2, no S3 or S4 and no murmur, click or rub         ASSESSMENT/PLAN:                                                        ICD-10-CM    1. Coronary artery disease involving native coronary artery of native heart without angina pectoris I25.10    2. Chest pain, unspecified " type R07.9 metoprolol (LOPRESSOR) 50 MG tablet   3. Benign essential hypertension I10    4. NSTEMI (non-ST elevated myocardial infarction) (H) I21.4        There are no Patient Instructions on file for this visit.    Gwyn Zheng MD  [facility]

## 2018-01-08 NOTE — MR AVS SNAPSHOT
After Visit Summary   1/8/2018    Cale Wagoner    MRN: [id number]           Patient Information     Date Of Birth          5/26/1933        Visit Information        Provider Department      1/8/2018 9:30 AM Gwyn Zheng MD [facility]        Today's Diagnoses     Coronary artery disease involving native coronary artery of native heart without angina pectoris    -  1    Chest pain, unspecified type        Benign essential hypertension        NSTEMI (non-ST elevated myocardial infarction) (H)          Care Instructions    Will see back in May and will need labs at that time.          Follow-ups after your visit        Your next 10 appointments already scheduled     Feb 09, 2018  9:45 AM CST   Return Visit with Chichi Yang MD   [facility]    [address] [place] [id number] [phone]              Who to contact     If you have questions or need follow up information about today's clinic visit or your schedule please contact [facility] directly at [phone].  Normal or non-critical lab and imaging results will be communicated to you by "[employer]hart, letter or phone within 4 business days after the clinic has received the results. If you do not hear from us within 7 days, please contact the clinic through "[employer]hart or phone. If you have a critical or abnormal lab result, we will notify you by phone as soon as possible.  Submit refill requests through [employer] or call your pharmacy and they will forward the refill request to us. Please allow 3 business days for your refill to be completed.          Additional Information About Your Visit        MyChart Information     [employer] lets you send messages to your doctor, view your test results, renew your prescriptions, schedule appointments and more. To sign up, go to [email or web address] . Click on  "\"Log in\" on the left side of the screen, which will take you to the Welcome page. Then click on \"Sign up Now\" on the right side of the page.     You will be asked to enter the access code listed below, as well as some personal information. Please follow the directions to create your username and password.     Your access code is: [email or web address]  Expires: 2018  9:45 AM     Your access code will  in 90 days. If you need help or a new code, please call your [place] clinic or [phone].        Care EveryWhere ID     This is your Care EveryWhere ID. This could be used by other organizations to access your [place] medical records  [id number]        Your Vitals Were     Pulse Temperature Respirations Height Pulse Oximetry BMI (Body Mass Index)    58 97.8  F (36.6  C) 14 5' 6.5\" (1.689 m) 96% 27.03 kg/m2       Blood Pressure from Last 3 Encounters:   18 124/82   17 116/78   10/31/17 134/68    Weight from Last 3 Encounters:   18 170 lb (77.1 kg)   17 169 lb (76.7 kg)   10/31/17 170 lb (77.1 kg)              Today, you had the following     No orders found for display         Today's Medication Changes          These changes are accurate as of: 18  9:45 AM.  If you have any questions, ask your nurse or doctor.               These medicines have changed or have updated prescriptions.        Dose/Directions    metoprolol 50 MG tablet   Commonly known as:  LOPRESSOR   This may have changed:  how much to take   Used for:  Chest pain, unspecified type   Changed by:  Gwyn Zheng MD        Dose:  50 mg   Take 1 tablet (50 mg) by mouth 2 times daily   Quantity:  270 tablet   Refills:  1            Where to get your medicines      Some of these will need a paper prescription and others can be bought over the counter.  Ask your nurse if you have questions.     You don't need a prescription for these medications     metoprolol 50 MG tablet                Primary Care Provider Office " Phone # Fax #    Gwyn Zheng [phone] [address] [facility] [place] [facility] [address]        Equal Access to Services     TAYO SANCHEZ : Promise abreu shainao Sogailali, waaxda luqadaha, qaybta kaalmada adeannetteda, wilton fernandez normanwilliam huber lucila montoya. So [facility] [phone].    ATENCIÓN: Si habla español, tiene a rene disposición servicios gratuitos de asistencia lingüística. Llame al [phone].    We comply with applicable federal civil rights laws and [place] laws. We do not discriminate on the basis of race, color, national origin, age, disability, sex, sexual orientation, or gender identity.            Thank you!     Thank you for choosing [facility]  for your care. Our goal is always to provide you with excellent care. Hearing back from our patients is one way we can continue to improve our services. Please take a few minutes to complete the written survey that you may receive in the mail after your visit with us. Thank you!             Your Updated Medication List - Protect others around you: Learn how to safely use, store and throw away your medicines at www.disposemymeds.org.          This list is accurate as of: 1/8/18  9:45 AM.  Always use your most recent med list.                   Brand Name Dispense Instructions for use Diagnosis    ASPIRIN EC PO      Take 81 mg by mouth At Bedtime        atorvastatin 40 MG tablet    LIPITOR    90 tablet    Take 1 tablet (40 mg) by mouth daily    Chest pain, unspecified type       clopidogrel 75 MG tablet    PLAVIX    90 tablet    Take 1 tablet (75 mg) by mouth daily    Chest pain, unspecified type       FAMOTIDINE PO      Take 20 mg by mouth 2 times daily        Garlic 1000 MG Caps      Take 1 capsule by mouth daily        metoprolol 50 MG tablet    LOPRESSOR    270 tablet    Take 1 tablet (50 mg) by mouth 2 times daily    Chest pain, unspecified type       MULTIVITAMIN & MINERAL PO      Take 1 tablet by  mouth daily        nitroGLYcerin 0.4 MG sublingual tablet    NITROSTAT    25 tablet    For chest pain place 1 tablet under the tongue every 5 minutes for 3 doses. If symptoms persist 5 minutes after 1st dose call 911.    Chest pain, unspecified type       OMEGA-3 FISH OIL PO      Take 2 g by mouth daily        pantoprazole 40 MG EC tablet    PROTONIX    90 tablet    Take 1 tablet (40 mg) by mouth daily    Chest pain, unspecified type       psyllium Packet    METAMUCIL/KONSYL     Take 1 packet by mouth daily        TYLENOL PO      Take 500 mg by mouth every 8 hours as needed for mild pain or fever

## 2018-02-09 ENCOUNTER — OFFICE VISIT (OUTPATIENT)
Dept: CARDIOLOGY | Facility: CLINIC | Age: 83
End: 2018-02-09
Attending: INTERNAL MEDICINE
Payer: COMMERCIAL

## 2018-02-09 VITALS
WEIGHT: 170 LBS | DIASTOLIC BLOOD PRESSURE: 77 MMHG | HEART RATE: 73 BPM | BODY MASS INDEX: 26.68 KG/M2 | HEIGHT: 67 IN | SYSTOLIC BLOOD PRESSURE: 132 MMHG

## 2018-02-09 DIAGNOSIS — I21.4 NSTEMI (NON-ST ELEVATED MYOCARDIAL INFARCTION) (H): ICD-10-CM

## 2018-02-09 PROCEDURE — 99214 OFFICE O/P EST MOD 30 MIN: CPT | Performed by: INTERNAL MEDICINE

## 2018-02-09 RX ORDER — LOSARTAN POTASSIUM 50 MG/1
50 TABLET ORAL DAILY
Qty: 90 TABLET | Refills: 3 | Status: SHIPPED | OUTPATIENT
Start: 2018-02-09 | End: 2018-05-09

## 2018-02-09 NOTE — LETTER
"2/9/2018    Gwyn Zheng MD  [address]  [facility] [address]    RE: Cale Wagoner       Dear Colleague,    I had the pleasure of seeing Cale Wagoner in the [facility].    HPI and Plan:   Mr Cale Wagoner is 85 yo and had an MI 5 months ago. He is here for follow-up after he underwent PCI with 2 stents to the circumflex.  He had epigastric pain which resulted in his visit to the ED at that time.    He is new to myself but I do see his wife, Dalila. He still exercises 15 minutes on the treadmill but since his discharge, he has struggled.  His metoprolol has been decreased by Dr. Zheng but it is not clear that he has seen any difference.    His LV function has been demonstrated to be normal as of 5 months ago despite an inferolateral wall motion.  He does have \"heartburn\" but not with activity.  He has used nitroglycerin without benefit.  He does not have myalgias, abnormal bleeding or light-headedness    With his angiogram, he was noted to have single vessel disease and the occluded native circumflex gave rise to two sizeable marginal vessels..    On exam, Blood pressure 132/77, pulse 73, height 1.689 m (5' 6.5\"), weight 77.1 kg (170 lb).  The chest was clear and on cardiac exam, there was an S1 and an S2 without a murmur, even at the apex.    In assessment, Mr. Wagoner is post-PCI with decreased exertional tolerance since the procedure.  His tolerance has remained stable but at a somewhat lower level than prior to his infarct and coronary intervention.    Possibilities include intolerance of the metoprolol tartrate (including chronotropic incompetence), incompletely controlled hypertension, recurrent obstructive CAD, anemia on DAPT.    The plan is to:  1. Decrease metoprolol,  2. Add losartan 50 mg,  3. Check BMP (and hemoglobin),  4. Exercise lexiscan stress study,  5. A return visit is planned in follow-up.    Orders Placed This Encounter "   Procedures     NM Lexiscan stress test (nuc card)     Basic metabolic panel     CBC with platelets     Follow-Up with Cardiologist       Orders Placed This Encounter   Medications     losartan (COZAAR) 50 MG tablet     Sig: Take 1 tablet (50 mg) by mouth daily     Dispense:  90 tablet     Refill:  3       There are no discontinued medications.      Encounter Diagnosis   Name Primary?     NSTEMI (non-ST elevated myocardial infarction) (H)        CURRENT MEDICATIONS:  Current Outpatient Prescriptions   Medication Sig Dispense Refill     losartan (COZAAR) 50 MG tablet Take 1 tablet (50 mg) by mouth daily 90 tablet 3     metoprolol (LOPRESSOR) 50 MG tablet Take 1 tablet (50 mg) by mouth 2 times daily 270 tablet 1     clopidogrel (PLAVIX) 75 MG tablet Take 1 tablet (75 mg) by mouth daily 90 tablet 3     atorvastatin (LIPITOR) 40 MG tablet Take 1 tablet (40 mg) by mouth daily 90 tablet 3     pantoprazole (PROTONIX) 40 MG EC tablet Take 1 tablet (40 mg) by mouth daily 90 tablet 3     nitroGLYcerin (NITROSTAT) 0.4 MG sublingual tablet For chest pain place 1 tablet under the tongue every 5 minutes for 3 doses. If symptoms persist 5 minutes after 1st dose call 911. 25 tablet 1     Garlic 1000 MG CAPS Take 1 capsule by mouth daily        psyllium (METAMUCIL/KONSYL) Packet Take 1 packet by mouth daily       ASPIRIN EC PO Take 81 mg by mouth At Bedtime       Acetaminophen (TYLENOL PO) Take 500 mg by mouth every 8 hours as needed for mild pain or fever       FAMOTIDINE PO Take 20 mg by mouth 2 times daily       Omega-3 Fatty Acids (OMEGA-3 FISH OIL PO) Take 2 g by mouth daily        Multiple Vitamins-Minerals (MULTIVITAMIN & MINERAL PO) Take 1 tablet by mouth daily         ALLERGIES     Allergies   Allergen Reactions     Penicillins        PAST MEDICAL HISTORY:  Past Medical History:   Diagnosis Date     Benign essential hypertension      Bloating      CAD (coronary artery disease)      Hyperlipidemia      NSTEMI (non-ST  elevated myocardial infarction) (H)      PFO (patent foramen ovale)      TIA (transient ischemic attack)        PAST SURGICAL HISTORY:  Past Surgical History:   Procedure Laterality Date     APPENDECTOMY       CARDIAC CATHERIZATION  09/15/2017     MOSES to mid-distal Cx x2     ENT SURGERY      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     EYE SURGERY      cataracts     HERNIA REPAIR      twice       FAMILY HISTORY:  Family History   Problem Relation Age of Onset     Respiratory Father      pneumonia     Unknown/Adopted Father      Alzheimer Disease Brother      Alzheimer Disease Sister      Respiratory Sister      COPD     Unknown/Adopted Mother      DIABETES No family hx of      Coronary Artery Disease No family hx of      Hypertension No family hx of      Hyperlipidemia No family hx of      CEREBROVASCULAR DISEASE No family hx of      Breast Cancer No family hx of      Colon Cancer No family hx of      Prostate Cancer No family hx of      Other Cancer No family hx of      Depression No family hx of      Anxiety Disorder No family hx of      MENTAL ILLNESS No family hx of      Substance Abuse No family hx of      Anesthesia Reaction No family hx of      Asthma No family hx of      OSTEOPOROSIS No family hx of      Genetic Disorder No family hx of      Thyroid Disease No family hx of      Obesity No family hx of        SOCIAL HISTORY:  Social History     Social History     Marital status:      Spouse name: N/A     Number of children: N/A     Years of education: N/A     Social History Main Topics     Smoking status: Former Smoker     Quit date: 8/19/1968     Smokeless tobacco: Never Used     Alcohol use Yes      Comment: Occ     Drug use: No     Sexual activity: No     Other Topics Concern     Parent/Sibling W/ Cabg, Mi Or Angioplasty Before 65f 55m? No     Social History Narrative       Review of Systems:  Skin:  Negative       Eyes:  Positive for glasses;cataracts both eyes and surgery was 5 years ago.  ENT:  Negative     "  Respiratory:  Negative       Cardiovascular:    Positive for;exercise intolerance pt states he has regained strength but not stamina.  Gastroenterology: Positive for reflux;heartburn new medication pantoprazole not as effective as prilosec.  Genitourinary:  Positive for decreased urinary stream;urinary frequency    Musculoskeletal:         Neurologic:  Positive for stroke Pt reports  he had ministroke 5 years ago.  Psychiatric:  Negative      Heme/Lymph/Imm:  Negative      Endocrine:  Negative        Physical Exam:  Vitals: /77  Pulse 73  Ht 1.689 m (5' 6.5\")  Wt 77.1 kg (170 lb)  BMI 27.03 kg/m2    Constitutional:  cooperative, alert and oriented, well developed, well nourished, in no acute distress        Skin:  warm and dry to the touch          Head:  normocephalic        Eyes:  sclera white        Lymph:      ENT:           Neck:           Respiratory:  normal breath sounds, clear to auscultation, normal A-P diameter, normal symmetry, normal respiratory excursion, no use of accessory muscles         Cardiac: regular rhythm, normal S1/S2, no S3 or S4, apical impulse not displaced, no murmurs, gallops or rubs                                                         GI:           Extremities and Muscular Skeletal:  no deformities, clubbing, cyanosis, erythema observed;no edema              Neurological:  no gross motor deficits;affect appropriate        Psych:  Alert and Oriented x 3;affect appropriate, oriented to time, person and place            Thank you for allowing me to participate in the care of your patient.    Sincerely,     Chichi Yang MD     [facility]    "

## 2018-02-09 NOTE — PROGRESS NOTES
"HPI and Plan:   Mr Cale Wagoner is 83 yo and had an MI 5 months ago. He is here for follow-up after he underwent PCI with 2 stents to the circumflex.  He had epigastric pain which resulted in his visit to the ED at that time.    He is new to myself but I do see his wife, Dalila. He still exercises 15 minutes on the treadmill but since his discharge, he has struggled.  His metoprolol has been decreased by Dr. Zheng but it is not clear that he has seen any difference.    His LV function has been demonstrated to be normal as of 5 months ago despite an inferolateral wall motion.  He does have \"heartburn\" but not with activity.  He has used nitroglycerin without benefit.  He does not have myalgias, abnormal bleeding or light-headedness    With his angiogram, he was noted to have single vessel disease and the occluded native circumflex gave rise to two sizeable marginal vessels..    On exam, Blood pressure 132/77, pulse 73, height 1.689 m (5' 6.5\"), weight 77.1 kg (170 lb).  The chest was clear and on cardiac exam, there was an S1 and an S2 without a murmur, even at the apex.    In assessment, Mr. Wagoner is post-PCI with decreased exertional tolerance since the procedure.  His tolerance has remained stable but at a somewhat lower level than prior to his infarct and coronary intervention.    Possibilities include intolerance of the metoprolol tartrate (including chronotropic incompetence), incompletely controlled hypertension, recurrent obstructive CAD, anemia on DAPT.    The plan is to:  1. Decrease metoprolol,  2. Add losartan 50 mg,  3. Check BMP (and hemoglobin),  4. Exercise lexiscan stress study,  5. A return visit is planned in follow-up.    Orders Placed This Encounter   Procedures     NM Lexiscan stress test (nuc card)     Basic metabolic panel     CBC with platelets     Follow-Up with Cardiologist       Orders Placed This Encounter   Medications     losartan (COZAAR) 50 MG tablet     Sig: Take 1 tablet (50 " mg) by mouth daily     Dispense:  90 tablet     Refill:  3       There are no discontinued medications.      Encounter Diagnosis   Name Primary?     NSTEMI (non-ST elevated myocardial infarction) (H)        CURRENT MEDICATIONS:  Current Outpatient Prescriptions   Medication Sig Dispense Refill     losartan (COZAAR) 50 MG tablet Take 1 tablet (50 mg) by mouth daily 90 tablet 3     metoprolol (LOPRESSOR) 50 MG tablet Take 1 tablet (50 mg) by mouth 2 times daily 270 tablet 1     clopidogrel (PLAVIX) 75 MG tablet Take 1 tablet (75 mg) by mouth daily 90 tablet 3     atorvastatin (LIPITOR) 40 MG tablet Take 1 tablet (40 mg) by mouth daily 90 tablet 3     pantoprazole (PROTONIX) 40 MG EC tablet Take 1 tablet (40 mg) by mouth daily 90 tablet 3     nitroGLYcerin (NITROSTAT) 0.4 MG sublingual tablet For chest pain place 1 tablet under the tongue every 5 minutes for 3 doses. If symptoms persist 5 minutes after 1st dose call 911. 25 tablet 1     Garlic 1000 MG CAPS Take 1 capsule by mouth daily        psyllium (METAMUCIL/KONSYL) Packet Take 1 packet by mouth daily       ASPIRIN EC PO Take 81 mg by mouth At Bedtime       Acetaminophen (TYLENOL PO) Take 500 mg by mouth every 8 hours as needed for mild pain or fever       FAMOTIDINE PO Take 20 mg by mouth 2 times daily       Omega-3 Fatty Acids (OMEGA-3 FISH OIL PO) Take 2 g by mouth daily        Multiple Vitamins-Minerals (MULTIVITAMIN & MINERAL PO) Take 1 tablet by mouth daily         ALLERGIES     Allergies   Allergen Reactions     Penicillins        PAST MEDICAL HISTORY:  Past Medical History:   Diagnosis Date     Benign essential hypertension      Bloating      CAD (coronary artery disease)      Hyperlipidemia      NSTEMI (non-ST elevated myocardial infarction) (H)      PFO (patent foramen ovale)      TIA (transient ischemic attack)        PAST SURGICAL HISTORY:  Past Surgical History:   Procedure Laterality Date     APPENDECTOMY       CARDIAC CATHERIZATION  09/15/2017      MOSES to mid-distal Cx x2     ENT SURGERY      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     EYE SURGERY      cataracts     HERNIA REPAIR      twice       FAMILY HISTORY:  Family History   Problem Relation Age of Onset     Respiratory Father      pneumonia     Unknown/Adopted Father      Alzheimer Disease Brother      Alzheimer Disease Sister      Respiratory Sister      COPD     Unknown/Adopted Mother      DIABETES No family hx of      Coronary Artery Disease No family hx of      Hypertension No family hx of      Hyperlipidemia No family hx of      CEREBROVASCULAR DISEASE No family hx of      Breast Cancer No family hx of      Colon Cancer No family hx of      Prostate Cancer No family hx of      Other Cancer No family hx of      Depression No family hx of      Anxiety Disorder No family hx of      MENTAL ILLNESS No family hx of      Substance Abuse No family hx of      Anesthesia Reaction No family hx of      Asthma No family hx of      OSTEOPOROSIS No family hx of      Genetic Disorder No family hx of      Thyroid Disease No family hx of      Obesity No family hx of        SOCIAL HISTORY:  Social History     Social History     Marital status:      Spouse name: N/A     Number of children: N/A     Years of education: N/A     Social History Main Topics     Smoking status: Former Smoker     Quit date: 8/19/1968     Smokeless tobacco: Never Used     Alcohol use Yes      Comment: Occ     Drug use: No     Sexual activity: No     Other Topics Concern     Parent/Sibling W/ Cabg, Mi Or Angioplasty Before 65f 55m? No     Social History Narrative       Review of Systems:  Skin:  Negative       Eyes:  Positive for glasses;cataracts both eyes and surgery was 5 years ago.  ENT:  Negative      Respiratory:  Negative       Cardiovascular:    Positive for;exercise intolerance pt states he has regained strength but not stamina.  Gastroenterology: Positive for reflux;heartburn new medication pantoprazole not as effective as  "prilosec.  Genitourinary:  Positive for decreased urinary stream;urinary frequency    Musculoskeletal:         Neurologic:  Positive for stroke Pt reports  he had ministroke 5 years ago.  Psychiatric:  Negative      Heme/Lymph/Imm:  Negative      Endocrine:  Negative        Physical Exam:  Vitals: /77  Pulse 73  Ht 1.689 m (5' 6.5\")  Wt 77.1 kg (170 lb)  BMI 27.03 kg/m2    Constitutional:  cooperative, alert and oriented, well developed, well nourished, in no acute distress        Skin:  warm and dry to the touch          Head:  normocephalic        Eyes:  sclera white        Lymph:      ENT:           Neck:           Respiratory:  normal breath sounds, clear to auscultation, normal A-P diameter, normal symmetry, normal respiratory excursion, no use of accessory muscles         Cardiac: regular rhythm, normal S1/S2, no S3 or S4, apical impulse not displaced, no murmurs, gallops or rubs                                                         GI:           Extremities and Muscular Skeletal:  no deformities, clubbing, cyanosis, erythema observed;no edema              Neurological:  no gross motor deficits;affect appropriate        Psych:  Alert and Oriented x 3;affect appropriate, oriented to time, person and place          CC  Sumanth Watkins MD  [address]  [place] [address]                  "

## 2018-02-09 NOTE — MR AVS SNAPSHOT
After Visit Summary   2/9/2018    Cale Wagoner    MRN: [id number]           Patient Information     Date Of Birth          5/26/1933        Visit Information        Provider Department      2/9/2018 9:45 AM Chichi Yang MD [facility]        Today's Diagnoses     NSTEMI (non-ST elevated myocardial infarction) (H)          Care Instructions    [phone] (Angie and Maura): Call with any issues or questions.    2. Decrease metoprolol to 25 mg twice daily (that is half the dose you are now taking).    3. The next day add losartan 50 mg daily.    4. Stress test and hemoglobin.          Follow-ups after your visit        Additional Services     Follow-Up with Cardiologist                 Your next 10 appointments already scheduled     May 08, 2018  9:30 AM CDT   SHORT with Gwyn Zheng MD   [facility] ([facility])    [address] [place] [id number] [phone]              Future tests that were ordered for you today     Open Future Orders        Priority Expected Expires Ordered    Basic metabolic panel Routine 3/11/2018 2/9/2019 2/9/2018    CBC with platelets Routine 3/11/2018 2/9/2019 2/9/2018    NM Lexiscan stress test (nuc card) Routine 3/11/2018 2/9/2019 2/9/2018    Follow-Up with Cardiologist Routine 4/11/2018 2/9/2019 2/9/2018            Who to contact     If you have questions or need follow up information about today's clinic visit or your schedule please contact [facility] directly at [phone].  Normal or non-critical lab and imaging results will be communicated to you by MyChart, letter or phone within 4 business days after the clinic has received the results. If you do not hear from us within 7 days, please contact the clinic through MyChart or phone. If you have a critical or abnormal lab result, we  "will notify you by phone as soon as possible.  Submit refill requests through [employer] or call your pharmacy and they will forward the refill request to us. Please allow 3 business days for your refill to be completed.          Additional Information About Your Visit        [employer] Information     [employer] lets you send messages to your doctor, view your test results, renew your prescriptions, schedule appointments and more. To sign up, go to [email or web address] . Click on \"Log in\" on the left side of the screen, which will take you to the Welcome page. Then click on \"Sign up Now\" on the right side of the page.     You will be asked to enter the access code listed below, as well as some personal information. Please follow the directions to create your username and password.     Your access code is: [email or web address]  Expires: 2018  9:45 AM     Your access code will  in 90 days. If you need help or a new code, please call your [place] clinic or [phone].        Care EveryWhere ID     This is your Care EveryWhere ID. This could be used by other organizations to access your [place] medical records  [id number]        Your Vitals Were     Pulse Height BMI (Body Mass Index)             73 1.689 m (5' 6.5\") 27.03 kg/m2          Blood Pressure from Last 3 Encounters:   18 132/77   18 124/82   17 116/78    Weight from Last 3 Encounters:   18 77.1 kg (170 lb)   18 77.1 kg (170 lb)   17 76.7 kg (169 lb)              We Performed the Following     Follow-Up with Cardiologist          Today's Medication Changes          These changes are accurate as of 18 10:42 AM.  If you have any questions, ask your nurse or doctor.               Start taking these medicines.        Dose/Directions    losartan 50 MG tablet   Commonly known as:  COZAAR   Used for:  NSTEMI (non-ST elevated myocardial infarction) (H)   Started by:  Chichi Yang MD        Dose:  50 mg   Take 1 tablet (50 " mg) by mouth daily   Quantity:  90 tablet   Refills:  3            Where to get your medicines      These medications were sent to [id number] - [place], [place] - [address]  [address] [facility] [address]     Phone:  [phone]     losartan 50 MG tablet                Primary Care Provider Office Phone # Fax #    Gwyn Zheng [phone]       [address] [facility] [address]        Equal Access to Services     [facility] : Hadii aad ku hadasho Soomaali, waaxda luqadaha, qaybta kaalmada adeegyada, waxay idiin hayaan adeeg cecile gaytan . So [facility] [phone].    ATENCIÓN: Si habla español, tiene a rene disposición servicios gratuitos de asistencia lingüística. [facility] [phone].    We comply with applicable federal civil rights laws and [place] laws. We do not discriminate on the basis of race, color, national origin, age, disability, sex, sexual orientation, or gender identity.            Thank you!     Thank you for choosing [facility]  for your care. Our goal is always to provide you with excellent care. Hearing back from our patients is one way we can continue to improve our services. Please take a few minutes to complete the written survey that you may receive in the mail after your visit with us. Thank you!             Your Updated Medication List - Protect others around you: Learn how to safely use, store and throw away your medicines at www.disposemymeds.org.          This list is accurate as of 2/9/18 10:42 AM.  Always use your most recent med list.                   Brand Name Dispense Instructions for use Diagnosis    ASPIRIN EC PO      Take 81 mg by mouth At Bedtime        atorvastatin 40 MG tablet    LIPITOR    90 tablet    Take 1 tablet (40 mg) by mouth daily    Chest pain, unspecified type       clopidogrel 75 MG tablet    PLAVIX    90 tablet    Take 1 tablet (75 mg) by mouth daily     Chest pain, unspecified type       FAMOTIDINE PO      Take 20 mg by mouth 2 times daily        Garlic 1000 MG Caps      Take 1 capsule by mouth daily        losartan 50 MG tablet    COZAAR    90 tablet    Take 1 tablet (50 mg) by mouth daily    NSTEMI (non-ST elevated myocardial infarction) (H)       metoprolol tartrate 50 MG tablet    LOPRESSOR    270 tablet    Take 1 tablet (50 mg) by mouth 2 times daily    Chest pain, unspecified type       MULTIVITAMIN & MINERAL PO      Take 1 tablet by mouth daily        nitroGLYcerin 0.4 MG sublingual tablet    NITROSTAT    25 tablet    For chest pain place 1 tablet under the tongue every 5 minutes for 3 doses. If symptoms persist 5 minutes after 1st dose call 911.    Chest pain, unspecified type       OMEGA-3 FISH OIL PO      Take 2 g by mouth daily        pantoprazole 40 MG EC tablet    PROTONIX    90 tablet    Take 1 tablet (40 mg) by mouth daily    Chest pain, unspecified type       psyllium Packet    METAMUCIL/KONSYL     Take 1 packet by mouth daily        TYLENOL PO      Take 500 mg by mouth every 8 hours as needed for mild pain or fever

## 2018-02-09 NOTE — Clinical Note
"2/9/2018    Gwyn Zheng MD  [address]  [facility] [address]    RE: Cale Wagoner       Dear Colleague,    I had the pleasure of seeing Cale Wagoner in the [facility].    HPI and Plan:   Mr Cale Wagoner is 83 yo and had an MI 5 months ago. He is here for follow-up after he underwent PCI with 2 stents to the circumflex.  He had epigastric pain which resulted in his visit to the ED at that time.    He is new to myself but I do see his wife, Dalila. He still exercises 15 minutes on the treadmill but since his discharge, he has struggled.  His metoprolol has been decreased by Dr. Zheng but it is not clear that he has seen any difference.    His LV function has been demonstrated to be normal as of 5 months ago despite an inferolateral wall motion.  He does have \"heartburn\" but not with activity.  He has used nitroglycerin without benefit.  He does not have myalgias, abnormal bleeding or light-headedness    With his angiogram, he was noted to have single vessel disease and the occluded native circumflex gave rise to two sizeable marginal vessels..    On exam, Blood pressure 132/77, pulse 73, height 1.689 m (5' 6.5\"), weight 77.1 kg (170 lb).  The chest was clear and on cardiac exam, there was an S1 and an S2 without a murmur, even at the apex.    In assessment, Mr. Wagoner is post-PCI with decreased exertional tolerance since the procedure.  His tolerance has remained stable but at a somewhat lower level than prior to his infarct and coronary intervention.    Possibilities include intolerance of the metoprolol tartrate (including chronotropic incompetence), incompletely controlled hypertension, recurrent obstructive CAD, anemia on DAPT.    The plan is to:  1. Decrease metoprolol,  2. Add losartan 50 mg,  3. Check BMP (and hemoglobin),  4. Exercise lexiscan stress study,  5. A return visit is planned in follow-up.    Orders Placed This Encounter "   Procedures     NM Lexiscan stress test (nuc card)     Basic metabolic panel     CBC with platelets     Follow-Up with Cardiologist       Orders Placed This Encounter   Medications     losartan (COZAAR) 50 MG tablet     Sig: Take 1 tablet (50 mg) by mouth daily     Dispense:  90 tablet     Refill:  3       There are no discontinued medications.      Encounter Diagnosis   Name Primary?     NSTEMI (non-ST elevated myocardial infarction) (H)        CURRENT MEDICATIONS:  Current Outpatient Prescriptions   Medication Sig Dispense Refill     losartan (COZAAR) 50 MG tablet Take 1 tablet (50 mg) by mouth daily 90 tablet 3     metoprolol (LOPRESSOR) 50 MG tablet Take 1 tablet (50 mg) by mouth 2 times daily 270 tablet 1     clopidogrel (PLAVIX) 75 MG tablet Take 1 tablet (75 mg) by mouth daily 90 tablet 3     atorvastatin (LIPITOR) 40 MG tablet Take 1 tablet (40 mg) by mouth daily 90 tablet 3     pantoprazole (PROTONIX) 40 MG EC tablet Take 1 tablet (40 mg) by mouth daily 90 tablet 3     nitroGLYcerin (NITROSTAT) 0.4 MG sublingual tablet For chest pain place 1 tablet under the tongue every 5 minutes for 3 doses. If symptoms persist 5 minutes after 1st dose call 911. 25 tablet 1     Garlic 1000 MG CAPS Take 1 capsule by mouth daily        psyllium (METAMUCIL/KONSYL) Packet Take 1 packet by mouth daily       ASPIRIN EC PO Take 81 mg by mouth At Bedtime       Acetaminophen (TYLENOL PO) Take 500 mg by mouth every 8 hours as needed for mild pain or fever       FAMOTIDINE PO Take 20 mg by mouth 2 times daily       Omega-3 Fatty Acids (OMEGA-3 FISH OIL PO) Take 2 g by mouth daily        Multiple Vitamins-Minerals (MULTIVITAMIN & MINERAL PO) Take 1 tablet by mouth daily         ALLERGIES     Allergies   Allergen Reactions     Penicillins        PAST MEDICAL HISTORY:  Past Medical History:   Diagnosis Date     Benign essential hypertension      Bloating      CAD (coronary artery disease)      Hyperlipidemia      NSTEMI (non-ST  elevated myocardial infarction) (H)      PFO (patent foramen ovale)      TIA (transient ischemic attack)        PAST SURGICAL HISTORY:  Past Surgical History:   Procedure Laterality Date     APPENDECTOMY       CARDIAC CATHERIZATION  09/15/2017     MOSES to mid-distal Cx x2     ENT SURGERY      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     EYE SURGERY      cataracts     HERNIA REPAIR      twice       FAMILY HISTORY:  Family History   Problem Relation Age of Onset     Respiratory Father      pneumonia     Unknown/Adopted Father      Alzheimer Disease Brother      Alzheimer Disease Sister      Respiratory Sister      COPD     Unknown/Adopted Mother      DIABETES No family hx of      Coronary Artery Disease No family hx of      Hypertension No family hx of      Hyperlipidemia No family hx of      CEREBROVASCULAR DISEASE No family hx of      Breast Cancer No family hx of      Colon Cancer No family hx of      Prostate Cancer No family hx of      Other Cancer No family hx of      Depression No family hx of      Anxiety Disorder No family hx of      MENTAL ILLNESS No family hx of      Substance Abuse No family hx of      Anesthesia Reaction No family hx of      Asthma No family hx of      OSTEOPOROSIS No family hx of      Genetic Disorder No family hx of      Thyroid Disease No family hx of      Obesity No family hx of        SOCIAL HISTORY:  Social History     Social History     Marital status:      Spouse name: N/A     Number of children: N/A     Years of education: N/A     Social History Main Topics     Smoking status: Former Smoker     Quit date: 8/19/1968     Smokeless tobacco: Never Used     Alcohol use Yes      Comment: Occ     Drug use: No     Sexual activity: No     Other Topics Concern     Parent/Sibling W/ Cabg, Mi Or Angioplasty Before 65f 55m? No     Social History Narrative       Review of Systems:  Skin:  Negative       Eyes:  Positive for glasses;cataracts both eyes and surgery was 5 years ago.  ENT:  Negative     "  Respiratory:  Negative       Cardiovascular:    Positive for;exercise intolerance pt states he has regained strength but not stamina.  Gastroenterology: Positive for reflux;heartburn new medication pantoprazole not as effective as prilosec.  Genitourinary:  Positive for decreased urinary stream;urinary frequency    Musculoskeletal:         Neurologic:  Positive for stroke Pt reports  he had ministroke 5 years ago.  Psychiatric:  Negative      Heme/Lymph/Imm:  Negative      Endocrine:  Negative        Physical Exam:  Vitals: /77  Pulse 73  Ht 1.689 m (5' 6.5\")  Wt 77.1 kg (170 lb)  BMI 27.03 kg/m2    Constitutional:  cooperative, alert and oriented, well developed, well nourished, in no acute distress        Skin:  warm and dry to the touch          Head:  normocephalic        Eyes:  sclera white        Lymph:      ENT:           Neck:           Respiratory:  normal breath sounds, clear to auscultation, normal A-P diameter, normal symmetry, normal respiratory excursion, no use of accessory muscles         Cardiac: regular rhythm, normal S1/S2, no S3 or S4, apical impulse not displaced, no murmurs, gallops or rubs                                                         GI:           Extremities and Muscular Skeletal:  no deformities, clubbing, cyanosis, erythema observed;no edema              Neurological:  no gross motor deficits;affect appropriate        Psych:  Alert and Oriented x 3;affect appropriate, oriented to time, person and place          CC  Sumanth Watkins MD  [address]  [place] [address]                    Thank you for allowing me to participate in the care of your patient.      Sincerely,     Chichi Yang MD     [facility]    cc:   Sumanth Watkins MD  [address]  [place] [address]      "

## 2018-02-14 ENCOUNTER — TELEPHONE (OUTPATIENT)
Dept: CARDIOLOGY | Facility: CLINIC | Age: 83
End: 2018-02-14

## 2018-02-14 NOTE — TELEPHONE ENCOUNTER
Patient called to report that at the dentist today his BP was 180/84. Patient was told to contact his Dr.  Last OV 2-9-18 with Dr. Yang. Metoprolol was decreased to 50 mg twice  daily and Losartan 50 mg daily added.  Patient will go to pharmacy to have BP rechecked and call with results.   Patient called back after going to pharmacy for BP recheck. BP now is 153/78. Patient now mentions that on his own after returning home from dentist he took an extra 50 mg metoprolol tartrate tablet. (total  of 75 mg this AM).   Reviewed call with Dr. Yang. Recommendation is no more metoprolol today. Check BP tomorrow morning and call with BP reading.   Patient called agrees with no further metoprolol today. Will call tomorrow with BP. Patient denies complaints of HA, dizziness, lightheadedness, shortness of breath or chest discomfort.

## 2018-02-15 NOTE — TELEPHONE ENCOUNTER
Spoke with patient who reports that he has not checked his BP today as he is feeling well. Patient will try to check a BP in the next week or so and call with an update.

## 2018-02-22 NOTE — TELEPHONE ENCOUNTER
Attempted to contact patient to review BP readings and how he is tolerating change to metoprolol 50mg BD (decreased) and adding losartan 50mg daily.  Unable to leave a message

## 2018-02-23 NOTE — TELEPHONE ENCOUNTER
Spoke with patient, he states he is feeling fine. He noted that a shaving nick took 3 hours to heal. Reviewed with patient that he will have slower clotting with plavix. Reviewed to maintain at least 20 min of pressure with small cuts to assist clotting. Patient verbalized understanding and agreed with plan.

## 2018-03-08 ENCOUNTER — HOSPITAL ENCOUNTER (OUTPATIENT)
Dept: CARDIOLOGY | Facility: CLINIC | Age: 83
Discharge: HOME | End: 2018-03-08
Attending: INTERNAL MEDICINE | Admitting: INTERNAL MEDICINE
Payer: COMMERCIAL

## 2018-03-08 ENCOUNTER — DOCUMENTATION ONLY (OUTPATIENT)
Dept: CARDIOLOGY | Facility: CLINIC | Age: 83
End: 2018-03-08

## 2018-03-08 DIAGNOSIS — I21.4 NSTEMI (NON-ST ELEVATED MYOCARDIAL INFARCTION) (H): ICD-10-CM

## 2018-03-08 LAB
ANION GAP SERPL CALCULATED.3IONS-SCNC: 11.4 MMOL/L (ref 6–17)
BUN SERPL-MCNC: 13 MG/DL (ref 7–30)
CALCIUM SERPL-MCNC: 9 MG/DL (ref 8.5–10.5)
CHLORIDE SERPL-SCNC: 101 MMOL/L (ref 98–107)
CO2 SERPL-SCNC: 29 MMOL/L (ref 23–29)
CREAT SERPL-MCNC: 1.08 MG/DL (ref 0.7–1.3)
ERYTHROCYTE [DISTWIDTH] IN BLOOD BY AUTOMATED COUNT: 12.4 % (ref 10–15)
GFR SERPL CREATININE-BSD FRML MDRD: 65 ML/MIN/1.7M2
GLUCOSE SERPL-MCNC: 99 MG/DL (ref 70–105)
HCT VFR BLD AUTO: 44.4 % (ref 40–53)
HGB BLD-MCNC: 15.4 G/DL (ref 13.3–17.7)
MCH RBC QN AUTO: 32.2 PG (ref 26.5–33)
MCHC RBC AUTO-ENTMCNC: 34.7 G/DL (ref 31.5–36.5)
MCV RBC AUTO: 93 FL (ref 78–100)
PLATELET # BLD AUTO: 163 10E9/L (ref 150–450)
POTASSIUM SERPL-SCNC: 4.4 MMOL/L (ref 3.5–5.1)
RBC # BLD AUTO: 4.78 10E12/L (ref 4.4–5.9)
SODIUM SERPL-SCNC: 137 MMOL/L (ref 136–145)
WBC # BLD AUTO: 5.9 10E9/L (ref 4–11)

## 2018-03-08 PROCEDURE — 36415 COLL VENOUS BLD VENIPUNCTURE: CPT | Performed by: INTERNAL MEDICINE

## 2018-03-08 PROCEDURE — 93016 CV STRESS TEST SUPVJ ONLY: CPT | Performed by: INTERNAL MEDICINE

## 2018-03-08 PROCEDURE — 93018 CV STRESS TEST I&R ONLY: CPT | Performed by: INTERNAL MEDICINE

## 2018-03-08 PROCEDURE — 78452 HT MUSCLE IMAGE SPECT MULT: CPT

## 2018-03-08 PROCEDURE — 25000128 H RX IP 250 OP 636: Performed by: INTERNAL MEDICINE

## 2018-03-08 PROCEDURE — 85027 COMPLETE CBC AUTOMATED: CPT | Performed by: INTERNAL MEDICINE

## 2018-03-08 PROCEDURE — 80048 BASIC METABOLIC PNL TOTAL CA: CPT | Performed by: INTERNAL MEDICINE

## 2018-03-08 PROCEDURE — 34300033 ZZH RX 343: Performed by: INTERNAL MEDICINE

## 2018-03-08 PROCEDURE — 78452 HT MUSCLE IMAGE SPECT MULT: CPT | Mod: 26 | Performed by: INTERNAL MEDICINE

## 2018-03-08 PROCEDURE — A9502 TC99M TETROFOSMIN: HCPCS | Performed by: INTERNAL MEDICINE

## 2018-03-08 RX ORDER — ACYCLOVIR 200 MG/1
0-1 CAPSULE ORAL
Status: DISCONTINUED | OUTPATIENT
Start: 2018-03-08 | End: 2018-03-09 | Stop reason: HOSPADM

## 2018-03-08 RX ORDER — ALBUTEROL SULFATE 90 UG/1
2 AEROSOL, METERED RESPIRATORY (INHALATION) EVERY 5 MIN PRN
Status: DISCONTINUED | OUTPATIENT
Start: 2018-03-08 | End: 2018-03-09 | Stop reason: HOSPADM

## 2018-03-08 RX ORDER — REGADENOSON 0.08 MG/ML
0.4 INJECTION, SOLUTION INTRAVENOUS ONCE
Status: COMPLETED | OUTPATIENT
Start: 2018-03-08 | End: 2018-03-08

## 2018-03-08 RX ORDER — AMINOPHYLLINE 25 MG/ML
50-100 INJECTION, SOLUTION INTRAVENOUS
Status: DISCONTINUED | OUTPATIENT
Start: 2018-03-08 | End: 2018-03-09 | Stop reason: HOSPADM

## 2018-03-08 RX ADMIN — TETROFOSMIN 3.6 MCI.: 1.38 INJECTION, POWDER, LYOPHILIZED, FOR SOLUTION INTRAVENOUS at 13:05

## 2018-03-08 RX ADMIN — TETROFOSMIN 9.16 MCI.: 1.38 INJECTION, POWDER, LYOPHILIZED, FOR SOLUTION INTRAVENOUS at 14:12

## 2018-03-08 RX ADMIN — REGADENOSON 0.4 MG: 0.08 INJECTION, SOLUTION INTRAVENOUS at 14:10

## 2018-03-08 NOTE — PROGRESS NOTES
Nuclear study and labs 3/8/18 noted, to be reviewed at OV with Dr. Yang 5/23/18.  Contacted patient with results of normal labs and stable nuclear, good pumping function, no new ischemia.  Patient states he is tolerating the med changes (decreased lopressor, added losartan) well. He is concerned about using plavix as he bruises easily but is willing to day on for the needed year of therapy.  Will message Dr. Yang to review

## 2018-04-11 ENCOUNTER — OFFICE VISIT (OUTPATIENT)
Dept: FAMILY MEDICINE | Facility: CLINIC | Age: 83
End: 2018-04-11
Payer: COMMERCIAL

## 2018-04-11 VITALS
RESPIRATION RATE: 16 BRPM | DIASTOLIC BLOOD PRESSURE: 70 MMHG | WEIGHT: 172 LBS | HEART RATE: 66 BPM | SYSTOLIC BLOOD PRESSURE: 154 MMHG | BODY MASS INDEX: 27.35 KG/M2 | TEMPERATURE: 96 F

## 2018-04-11 DIAGNOSIS — R51.9 RIGHT SIDED FACIAL PAIN: Primary | ICD-10-CM

## 2018-04-11 DIAGNOSIS — R51.9 RIGHT-SIDED HEADACHE: ICD-10-CM

## 2018-04-11 LAB
BASOPHILS # BLD AUTO: 0 10E9/L (ref 0–0.2)
BASOPHILS NFR BLD AUTO: 0.7 %
DIFFERENTIAL METHOD BLD: ABNORMAL
EOSINOPHIL # BLD AUTO: 0.1 10E9/L (ref 0–0.7)
EOSINOPHIL NFR BLD AUTO: 1.2 %
ERYTHROCYTE [DISTWIDTH] IN BLOOD BY AUTOMATED COUNT: 12.5 % (ref 10–15)
ERYTHROCYTE [SEDIMENTATION RATE] IN BLOOD BY WESTERGREN METHOD: 7 MM/H (ref 0–20)
HCT VFR BLD AUTO: 42.6 % (ref 40–53)
HGB BLD-MCNC: 14.8 G/DL (ref 13.3–17.7)
LYMPHOCYTES # BLD AUTO: 2.1 10E9/L (ref 0.8–5.3)
LYMPHOCYTES NFR BLD AUTO: 37 %
MCH RBC QN AUTO: 32.4 PG (ref 26.5–33)
MCHC RBC AUTO-ENTMCNC: 34.7 G/DL (ref 31.5–36.5)
MCV RBC AUTO: 93 FL (ref 78–100)
MONOCYTES # BLD AUTO: 0.9 10E9/L (ref 0–1.3)
MONOCYTES NFR BLD AUTO: 14.8 %
NEUTROPHILS # BLD AUTO: 2.7 10E9/L (ref 1.6–8.3)
NEUTROPHILS NFR BLD AUTO: 46.3 %
PLATELET # BLD AUTO: 144 10E9/L (ref 150–450)
RBC # BLD AUTO: 4.57 10E12/L (ref 4.4–5.9)
WBC # BLD AUTO: 5.8 10E9/L (ref 4–11)

## 2018-04-11 PROCEDURE — 36415 COLL VENOUS BLD VENIPUNCTURE: CPT | Performed by: INTERNAL MEDICINE

## 2018-04-11 PROCEDURE — 85025 COMPLETE CBC W/AUTO DIFF WBC: CPT | Performed by: INTERNAL MEDICINE

## 2018-04-11 PROCEDURE — 99213 OFFICE O/P EST LOW 20 MIN: CPT | Performed by: INTERNAL MEDICINE

## 2018-04-11 PROCEDURE — 85652 RBC SED RATE AUTOMATED: CPT | Performed by: INTERNAL MEDICINE

## 2018-04-11 RX ORDER — VALACYCLOVIR HYDROCHLORIDE 1 G/1
1000 TABLET, FILM COATED ORAL 3 TIMES DAILY
Qty: 21 TABLET | Refills: 0 | Status: SHIPPED | OUTPATIENT
Start: 2018-04-11 | End: 2018-06-26

## 2018-04-11 NOTE — PROGRESS NOTES
SUBJECTIVE:   Cale Wagoner is a 84 year old male who presents to clinic today for the following health issues:      Neck Pain      Duration: 1 day    Description:  Location: rt side up to RT side of head  Radiation: up to rt side of head    Intensity:  moderate    Accompanying signs and symptoms: sharp, shooting pain    History (similar episodes/previous evaluation): None    Precipitating or alleviating factors: None    Therapies tried and outcome: Tylenol- helped                  He reports onset yesterday of severe sharp shooting pains in the right temporal parietal area, radiating down just anterior to the ear to the angle of the mandible area.            Slight improvement by today.         No visual or auditory symptoms.  No fever chills or skin rash.                     No jaw claudication or widespread myalgias or arthralgias.    Problem list and histories reviewed & adjusted, as indicated.  Additional history: as documented    Patient Active Problem List   Diagnosis     Bloating     GERD (gastroesophageal reflux disease)     Health Care Home     Cerebrovascular accident (CVA), unspecified mechanism (H)     Hyperlipidemia     Slow transit constipation     Tingling of left upper extremity     Chronic pain of right knee     Screening for prostate cancer     Atypical chest pain     NSTEMI (non-ST elevated myocardial infarction) (H)     CAD (coronary artery disease)     Benign essential hypertension     PFO (patent foramen ovale)     TIA (transient ischemic attack)     Past Surgical History:   Procedure Laterality Date     APPENDECTOMY       CARDIAC CATHERIZATION  09/15/2017     MOSES to mid-distal Cx x2     ENT SURGERY      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     EYE SURGERY      cataracts     HERNIA REPAIR      twice       Social History   Substance Use Topics     Smoking status: Former Smoker     Quit date: 8/19/1968     Smokeless tobacco: Never Used     Alcohol use Yes      Comment: Occ     Family History    Problem Relation Age of Onset     Respiratory Father      pneumonia     Unknown/Adopted Father      Alzheimer Disease Brother      Alzheimer Disease Sister      Respiratory Sister      COPD     Unknown/Adopted Mother      DIABETES No family hx of      Coronary Artery Disease No family hx of      Hypertension No family hx of      Hyperlipidemia No family hx of      CEREBROVASCULAR DISEASE No family hx of      Breast Cancer No family hx of      Colon Cancer No family hx of      Prostate Cancer No family hx of      Other Cancer No family hx of      Depression No family hx of      Anxiety Disorder No family hx of      MENTAL ILLNESS No family hx of      Substance Abuse No family hx of      Anesthesia Reaction No family hx of      Asthma No family hx of      OSTEOPOROSIS No family hx of      Genetic Disorder No family hx of      Thyroid Disease No family hx of      Obesity No family hx of          Current Outpatient Prescriptions   Medication Sig Dispense Refill     losartan (COZAAR) 50 MG tablet Take 1 tablet (50 mg) by mouth daily 90 tablet 3     metoprolol (LOPRESSOR) 50 MG tablet Take 1 tablet (50 mg) by mouth 2 times daily 270 tablet 1     clopidogrel (PLAVIX) 75 MG tablet Take 1 tablet (75 mg) by mouth daily 90 tablet 3     atorvastatin (LIPITOR) 40 MG tablet Take 1 tablet (40 mg) by mouth daily 90 tablet 3     pantoprazole (PROTONIX) 40 MG EC tablet Take 1 tablet (40 mg) by mouth daily 90 tablet 3     nitroGLYcerin (NITROSTAT) 0.4 MG sublingual tablet For chest pain place 1 tablet under the tongue every 5 minutes for 3 doses. If symptoms persist 5 minutes after 1st dose call 911. 25 tablet 1     Garlic 1000 MG CAPS Take 1 capsule by mouth daily        psyllium (METAMUCIL/KONSYL) Packet Take 1 packet by mouth daily       ASPIRIN EC PO Take 81 mg by mouth At Bedtime       Acetaminophen (TYLENOL PO) Take 500 mg by mouth every 8 hours as needed for mild pain or fever       FAMOTIDINE PO Take 20 mg by mouth 2 times  daily       Omega-3 Fatty Acids (OMEGA-3 FISH OIL PO) Take 2 g by mouth daily        Multiple Vitamins-Minerals (MULTIVITAMIN & MINERAL PO) Take 1 tablet by mouth daily       Allergies   Allergen Reactions     Penicillins      BP Readings from Last 3 Encounters:   04/11/18 154/70   02/09/18 132/77   01/08/18 124/82    Wt Readings from Last 3 Encounters:   04/11/18 172 lb (78 kg)   02/09/18 170 lb (77.1 kg)   01/08/18 170 lb (77.1 kg)                    Reviewed and updated as needed this visit by clinical staff       Reviewed and updated as needed this visit by Provider         ROS: See above plus  CONSTITUTIONAL:NEGATIVE for fever, chills, change in weight  EYES: NEGATIVE for vision changes or irritation  ENT/MOUTH: NEGATIVE for ear, mouth and throat problems  NEURO: He had a generalized low-grade headache yesterday and NEGATIVE for dysarthria, dysphagia and HX head injury      OBJECTIVE:                                                    /70  Pulse 66  Temp 96  F (35.6  C) (Tympanic)  Resp 16  Wt 172 lb (78 kg)  BMI 27.35 kg/m2  Body mass index is 27.35 kg/(m^2).  GENERAL APPEARANCE: alert, no distress and fatigued  HENT: ear canals and TM's normal and nose and mouth without ulcers or lesions  NECK: no adenopathy, no asymmetry, masses, or scars and thyroid normal to palpation  SKIN: no suspicious lesions or rashes, other than a 1 cm area in the right temporal area which looks like an actinic keratosis.  He reports noticing this as of about 2 months ago.        Specifically there are no signs of herpes zoster; no vesicles, erythema otherwise etc.  NEURO: Normal strength and tone, mentation intact and speech normal    Diagnostic test results:  Results for orders placed or performed in visit on 04/11/18   CBC with platelets and differential   Result Value Ref Range    WBC 5.8 4.0 - 11.0 10e9/L    RBC Count 4.57 4.4 - 5.9 10e12/L    Hemoglobin 14.8 13.3 - 17.7 g/dL    Hematocrit 42.6 40.0 - 53.0 %    MCV 93  78 - 100 fl    MCH 32.4 26.5 - 33.0 pg    MCHC 34.7 31.5 - 36.5 g/dL    RDW 12.5 10.0 - 15.0 %    Platelet Count 144 (L) 150 - 450 10e9/L    Diff Method Automated Method     % Neutrophils 46.3 %    % Lymphocytes 37.0 %    % Monocytes 14.8 %    % Eosinophils 1.2 %    % Basophils 0.7 %    Absolute Neutrophil 2.7 1.6 - 8.3 10e9/L    Absolute Lymphocytes 2.1 0.8 - 5.3 10e9/L    Absolute Monocytes 0.9 0.0 - 1.3 10e9/L    Absolute Eosinophils 0.1 0.0 - 0.7 10e9/L    Absolute Basophils 0.0 0.0 - 0.2 10e9/L   ESR: Erythrocyte sedimentation rate   Result Value Ref Range    Sed Rate 7 0 - 20 mm/h        ASSESSMENT/PLAN:                                                        ICD-10-CM    1. Right sided facial pain R51 CBC with platelets and differential     ESR: Erythrocyte sedimentation rate     valACYclovir (VALTREX) 1000 mg tablet   2. Right-sided headache R51 CBC with platelets and differential     ESR: Erythrocyte sedimentation rate     valACYclovir (VALTREX) 1000 mg tablet           Summary and implications:  His pain complaints suggest this could be early herpes zoster in a C2/C3 distribution.                                      His est GFR= 65 cc.             Patient Instructions   I suggest that you take the valacyclovir for 7 days, in case this is shingles.                   If the pain stops and you do not have a facial rash,you can stop taking it.              If you develop a rash, or if the pain gets much worse, please follow up.                        Az Taylor MD  [facility]

## 2018-04-11 NOTE — NURSING NOTE
"Chief Complaint   Patient presents with     Pain     tender spot RT side of head       Initial /70  Pulse 66  Temp 96  F (35.6  C) (Tympanic)  Resp 16  Wt 172 lb (78 kg)  BMI 27.35 kg/m2 Estimated body mass index is 27.35 kg/(m^2) as calculated from the following:    Height as of 2/9/18: 5' 6.5\" (1.689 m).    Weight as of this encounter: 172 lb (78 kg).  Medication Reconciliation: complete     Ann Joel CMA      "

## 2018-04-11 NOTE — PATIENT INSTRUCTIONS
I suggest that you take the valacyclovir for 7 days, in case this is shingles.                   If the pain stops and you do not have a facial rash,you can stop taking it.              If you develop a rash, or if the pain gets much worse, please follow up.

## 2018-04-11 NOTE — MR AVS SNAPSHOT
After Visit Summary   4/11/2018    Cale Wagoner    MRN: [id number]           Patient Information     Date Of Birth          5/26/1933        Visit Information        Provider Department      4/11/2018 2:45 PM Az Taylor MD [facility]        Today's Diagnoses     Right sided facial pain    -  1    Right-sided headache          Care Instructions    I suggest that you take the valacyclovir for 7 days, in case this is shingles.                   If the pain stops and you do not have a facial rash,you can stop taking it.              If you develop a rash, or if the pain gets much worse, please follow up.                            Follow-ups after your visit        Your next 10 appointments already scheduled     May 08, 2018  9:30 AM CDT   SHORT with Gwyn Zheng MD   [facility]    [address]  [place] [address] [phone]            May 23, 2018  9:45 AM CDT   Return Visit with Chichi Yang MD   [facility] ([facility])    [address]  [place] [address] [phone] OPT 2              Who to contact     If you have questions or need follow up information about today's clinic visit or your schedule [facility] at [phone].  Normal or non-critical lab and imaging results will be communicated to you by MyChart, letter or phone within 4 business days after the clinic has received the results. If you do not hear from us within 7 days, please contact the clinic through MyChart or phone. If you have a critical or abnormal lab result, we will notify you by phone as soon as possible.  Submit refill requests through MoodMe or call your pharmacy and they will forward the refill request to us. Please allow 3 business days for  "your refill to be completed.          Additional Information About Your Visit        NervedaharPhotoSolar Information     Acusphere lets you send messages to your doctor, view your test results, renew your prescriptions, schedule appointments and more. To sign up, go to [email or web address] . Click on \"Log in\" on the left side of the screen, which will take you to the Welcome page. Then click on \"Sign up Now\" on the right side of the page.     You will be asked to enter the access code listed below, as well as some personal information. Please follow the directions to create your username and password.     Your access code is: [email or web address]  Expires: 7/10/2018  3:51 PM     Your access code will  in 90 days. If you need help or a new code, please call your [place] clinic or [phone].        Care EveryWhere ID     This is your Care EveryWhere ID. This could be used by other organizations to access your [place] medical records  [id number]        Your Vitals Were     Pulse Temperature Respirations BMI (Body Mass Index)          66 96  F (35.6  C) (Tympanic) 16 27.35 kg/m2         Blood Pressure from Last 3 Encounters:   18 154/70   18 132/77   18 124/82    Weight from Last 3 Encounters:   18 172 lb (78 kg)   18 170 lb (77.1 kg)   18 170 lb (77.1 kg)              We Performed the Following     CBC with platelets and differential     ESR: Erythrocyte sedimentation rate          Today's Medication Changes          These changes are accurate as of 18  3:51 PM.  If you have any questions, ask your nurse or doctor.               Start taking these medicines.        Dose/Directions    valACYclovir 1000 mg tablet   Commonly known as:  VALTREX   Used for:  Right sided facial pain, Right-sided headache   Started by:  Az Taylor MD        Dose:  1000 mg   Take 1 tablet (1,000 mg) by mouth 3 times daily   Quantity:  21 tablet   Refills:  0            Where to get your medicines    "   These medications were sent to [id number] - [place], [place] - [address]  [address], [facility] [address]     Phone:  [phone]     valACYclovir 1000 mg tablet                Primary Care Provider Office Phone # Fax #    Gwyn Zheng [phone]       [address] [facility] [address]        Equal Access to Services     TAYO SANCHEZ : Hadii aad ku hadasho Soomaali, waaxda luqadaha, qaybta kaalmada adeegyada, waxay idiin hayaan adeeg kharash la'sandran . So [facility] [phone].    ATENCIÓN: Si habla español, tiene a rene disposición servicios gratuitos de asistencia lingüística. [facility] [phone].    We comply with applicable federal civil rights laws and [place] laws. We do not discriminate on the basis of race, color, national origin, age, disability, sex, sexual orientation, or gender identity.            Thank you!     Thank you for choosing [facility]  for your care. Our goal is always to provide you with excellent care. Hearing back from our patients is one way we can continue to improve our services. Please take a few minutes to complete the written survey that you may receive in the mail after your visit with us. Thank you!             Your Updated Medication List - Protect others around you: Learn how to safely use, store and throw away your medicines at www.disposemymeds.org.          This list is accurate as of 4/11/18  3:51 PM.  Always use your most recent med list.                   Brand Name Dispense Instructions for use Diagnosis    ASPIRIN EC PO      Take 81 mg by mouth At Bedtime        atorvastatin 40 MG tablet    LIPITOR    90 tablet    Take 1 tablet (40 mg) by mouth daily    Chest pain, unspecified type       clopidogrel 75 MG tablet    PLAVIX    90 tablet    Take 1 tablet (75 mg) by mouth daily    Chest pain, unspecified type       FAMOTIDINE PO      Take 20 mg by mouth 2 times daily        Garlic  1000 MG Caps      Take 1 capsule by mouth daily        losartan 50 MG tablet    COZAAR    90 tablet    Take 1 tablet (50 mg) by mouth daily    NSTEMI (non-ST elevated myocardial infarction) (H)       metoprolol tartrate 50 MG tablet    LOPRESSOR    270 tablet    Take 1 tablet (50 mg) by mouth 2 times daily    Chest pain, unspecified type       MULTIVITAMIN & MINERAL PO      Take 1 tablet by mouth daily        nitroGLYcerin 0.4 MG sublingual tablet    NITROSTAT    25 tablet    For chest pain place 1 tablet under the tongue every 5 minutes for 3 doses. If symptoms persist 5 minutes after 1st dose call 911.    Chest pain, unspecified type       OMEGA-3 FISH OIL PO      Take 2 g by mouth daily        pantoprazole 40 MG EC tablet    PROTONIX    90 tablet    Take 1 tablet (40 mg) by mouth daily    Chest pain, unspecified type       psyllium Packet    METAMUCIL/KONSYL     Take 1 packet by mouth daily        TYLENOL PO      Take 500 mg by mouth every 8 hours as needed for mild pain or fever        valACYclovir 1000 mg tablet    VALTREX    21 tablet    Take 1 tablet (1,000 mg) by mouth 3 times daily    Right sided facial pain, Right-sided headache

## 2018-05-09 ENCOUNTER — OFFICE VISIT (OUTPATIENT)
Dept: FAMILY MEDICINE | Facility: CLINIC | Age: 83
End: 2018-05-09
Payer: COMMERCIAL

## 2018-05-09 VITALS
SYSTOLIC BLOOD PRESSURE: 146 MMHG | TEMPERATURE: 96 F | BODY MASS INDEX: 26.87 KG/M2 | WEIGHT: 169 LBS | DIASTOLIC BLOOD PRESSURE: 68 MMHG | HEART RATE: 66 BPM | RESPIRATION RATE: 16 BRPM

## 2018-05-09 DIAGNOSIS — I10 BENIGN ESSENTIAL HYPERTENSION: Primary | ICD-10-CM

## 2018-05-09 PROCEDURE — 99213 OFFICE O/P EST LOW 20 MIN: CPT | Performed by: FAMILY MEDICINE

## 2018-05-09 RX ORDER — LOSARTAN POTASSIUM 100 MG/1
100 TABLET ORAL DAILY
Qty: 30 TABLET | Refills: 3 | Status: SHIPPED | OUTPATIENT
Start: 2018-05-09 | End: 2018-09-23

## 2018-05-09 NOTE — MR AVS SNAPSHOT
"              After Visit Summary   5/9/2018    Cale Wagoner    MRN: [id number]           Patient Information     Date Of Birth          5/26/1933        Visit Information        Provider Department      5/9/2018 10:30 AM Gwyn Zheng MD [facility]        Today's Diagnoses     Benign essential hypertension    -  1      Care Instructions    Will see back in July for blood pressure check. Losartan increased to 100 mg daily.          Follow-ups after your visit        Your next 10 appointments already scheduled     May 23, 2018  9:45 AM CDT   Return Visit with Chichi Yang MD   [facility] ([facility])    [address] [place], [place] [id number]  [facility] [address] [phone] OPT 2              Who to contact     If you have questions or need follow up information about today's clinic visit or your schedule please contact [facility] directly at [phone].  Normal or non-critical lab and imaging results will be communicated to you by IceRockethart, letter or phone within 4 business days after the clinic has received the results. If you do not hear from us within 7 days, please contact the clinic through IceRockethart or phone. If you have a critical or abnormal lab result, we will notify you by phone as soon as possible.  Submit refill requests through Sembraire or call your pharmacy and they will forward the refill request to us. Please allow 3 business days for your refill to be completed.          Additional Information About Your Visit        MyChart Information     Sembraire lets you send messages to your doctor, view your test results, renew your prescriptions, schedule appointments and more. To sign up, go to [email or web address] . Click on \"Log in\" on the left side of the screen, which will take you to the Welcome page. Then click on \"Sign up Now\" on the right side of the page.     You will be asked " to enter the access code listed below, as well as some personal information. Please follow the directions to create your username and password.     Your access code is: [email or web address]  Expires: 7/10/2018  3:51 PM     Your access code will  in 90 days. If you need help or a new code, please call your [place] clinic or [phone].        Care EveryWhere ID     This is your Care EveryWhere ID. This could be used by other organizations to access your [place] medical records  [id number]        Your Vitals Were     Pulse Temperature Respirations BMI (Body Mass Index)          66 96  F (35.6  C) (Tympanic) 16 26.87 kg/m2         Blood Pressure from Last 3 Encounters:   18 146/68   18 154/70   18 132/77    Weight from Last 3 Encounters:   18 169 lb (76.7 kg)   18 172 lb (78 kg)   18 170 lb (77.1 kg)              Today, you had the following     No orders found for display         Today's Medication Changes          These changes are accurate as of 18 10:30 AM.  If you have any questions, ask your nurse or doctor.               These medicines have changed or have updated prescriptions.        Dose/Directions    losartan 100 MG tablet   Commonly known as:  COZAAR   This may have changed:    - medication strength  - how much to take   Used for:  Benign essential hypertension   Changed by:  Gwyn Zheng MD        Dose:  100 mg   Take 1 tablet (100 mg) by mouth daily   Quantity:  30 tablet   Refills:  3            Where to get your medicines      These medications were sent to [facility][id number] - [facility] [address]  [address] [address]     Phone:  [phone]     losartan 100 MG tablet                Primary Care Provider Office Phone # Fax #    Gwyn Zheng [phone] [address] [facility] [address]        Equal Access to Services     TAYO SANCHEZ AH: Promise Leslie,  waaxda luqadaha, qaybta kaalmada maren, wilton wardaabrian ah. So [facility] [phone].    ATENCIÓN: Si chelsey dumont, tiene a rene disposición servicios gratuitos de asistencia lingüística. Kaley al [phone].    We comply with applicable federal civil rights laws and [place] laws. We do not discriminate on the basis of race, color, national origin, age, disability, sex, sexual orientation, or gender identity.            Thank you!     Thank you for choosing [facility]  for your care. Our goal is always to provide you with excellent care. Hearing back from our patients is one way we can continue to improve our services. Please take a few minutes to complete the written survey that you may receive in the mail after your visit with us. Thank you!             Your Updated Medication List - Protect others around you: Learn how to safely use, store and throw away your medicines at www.disposemymeds.org.          This list is accurate as of 5/9/18 10:30 AM.  Always use your most recent med list.                   Brand Name Dispense Instructions for use Diagnosis    ASPIRIN EC PO      Take 81 mg by mouth At Bedtime        atorvastatin 40 MG tablet    LIPITOR    90 tablet    Take 1 tablet (40 mg) by mouth daily    Chest pain, unspecified type       clopidogrel 75 MG tablet    PLAVIX    90 tablet    Take 1 tablet (75 mg) by mouth daily    Chest pain, unspecified type       FAMOTIDINE PO      Take 20 mg by mouth 2 times daily        Garlic 1000 MG Caps      Take 1 capsule by mouth daily        losartan 100 MG tablet    COZAAR    30 tablet    Take 1 tablet (100 mg) by mouth daily    Benign essential hypertension       metoprolol tartrate 50 MG tablet    LOPRESSOR    270 tablet    Take 1 tablet (50 mg) by mouth 2 times daily    Chest pain, unspecified type       MULTIVITAMIN & MINERAL PO      Take 1 tablet by mouth daily        nitroGLYcerin 0.4 MG sublingual tablet     NITROSTAT    25 tablet    For chest pain place 1 tablet under the tongue every 5 minutes for 3 doses. If symptoms persist 5 minutes after 1st dose call 911.    Chest pain, unspecified type       OMEGA-3 FISH OIL PO      Take 2 g by mouth daily        pantoprazole 40 MG EC tablet    PROTONIX    90 tablet    Take 1 tablet (40 mg) by mouth daily    Chest pain, unspecified type       psyllium Packet    METAMUCIL/KONSYL     Take 1 packet by mouth daily        TYLENOL PO      Take 500 mg by mouth every 8 hours as needed for mild pain or fever        valACYclovir 1000 mg tablet    VALTREX    21 tablet    Take 1 tablet (1,000 mg) by mouth 3 times daily    Right sided facial pain, Right-sided headache

## 2018-05-09 NOTE — PROGRESS NOTES
SUBJECTIVE:   Cale Wagoner is a 84 year old male who presents to clinic today for the following health issues:      Hypertension Follow-up      Outpatient blood pressures are not being checked.    Low Salt Diet: not monitoring salt      Amount of exercise or physical activity: 2-3 days/week for an average of 30-45 minutes    Problems taking medications regularly: No    Medication side effects: none    Diet: regular (no restrictions)            Problem list and histories reviewed & adjusted, as indicated.  Additional history: as documented    Patient Active Problem List   Diagnosis     Bloating     GERD (gastroesophageal reflux disease)     Health Care Home     Cerebrovascular accident (CVA), unspecified mechanism (H)     Hyperlipidemia     Slow transit constipation     Tingling of left upper extremity     Chronic pain of right knee     Screening for prostate cancer     Atypical chest pain     NSTEMI (non-ST elevated myocardial infarction) (H)     CAD (coronary artery disease)     Benign essential hypertension     PFO (patent foramen ovale)     TIA (transient ischemic attack)     Right sided facial pain     Right-sided headache     Past Surgical History:   Procedure Laterality Date     APPENDECTOMY       CARDIAC CATHERIZATION  09/15/2017     MOSES to mid-distal Cx x2     ENT SURGERY      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     EYE SURGERY      cataracts     HERNIA REPAIR      twice       Social History   Substance Use Topics     Smoking status: Former Smoker     Quit date: 8/19/1968     Smokeless tobacco: Never Used     Alcohol use Yes      Comment: Occ     Family History   Problem Relation Age of Onset     Respiratory Father      pneumonia     Unknown/Adopted Father      Alzheimer Disease Brother      Alzheimer Disease Sister      Respiratory Sister      COPD     Unknown/Adopted Mother      DIABETES No family hx of      Coronary Artery Disease No family hx of      Hypertension No family hx of      Hyperlipidemia No  family hx of      CEREBROVASCULAR DISEASE No family hx of      Breast Cancer No family hx of      Colon Cancer No family hx of      Prostate Cancer No family hx of      Other Cancer No family hx of      Depression No family hx of      Anxiety Disorder No family hx of      MENTAL ILLNESS No family hx of      Substance Abuse No family hx of      Anesthesia Reaction No family hx of      Asthma No family hx of      OSTEOPOROSIS No family hx of      Genetic Disorder No family hx of      Thyroid Disease No family hx of      Obesity No family hx of            Reviewed and updated as needed this visit by clinical staff  Tobacco  Allergies  Meds       Reviewed and updated as needed this visit by Provider  Tobacco         ROS:  Constitutional, HEENT, cardiovascular, pulmonary, gi and gu systems are negative, except as otherwise noted.    OBJECTIVE:                                                    /68  Pulse 66  Temp 96  F (35.6  C) (Tympanic)  Resp 16  Wt 169 lb (76.7 kg)  BMI 26.87 kg/m2  Body mass index is 26.87 kg/(m^2).  GENERAL APPEARANCE: healthy, alert and no distress         ASSESSMENT/PLAN:                                                        ICD-10-CM    1. Benign essential hypertension I10 losartan (COZAAR) 100 MG tablet       Patient Instructions   Will see back in July for blood pressure check. Losartan increased to 100 mg daily.      Gwyn Zheng MD  [facility]

## 2018-05-23 ENCOUNTER — OFFICE VISIT (OUTPATIENT)
Dept: CARDIOLOGY | Facility: CLINIC | Age: 83
End: 2018-05-23
Attending: INTERNAL MEDICINE
Payer: COMMERCIAL

## 2018-05-23 ENCOUNTER — TELEPHONE (OUTPATIENT)
Dept: CARDIOLOGY | Facility: CLINIC | Age: 83
End: 2018-05-23

## 2018-05-23 VITALS
HEIGHT: 67 IN | DIASTOLIC BLOOD PRESSURE: 76 MMHG | BODY MASS INDEX: 26.68 KG/M2 | WEIGHT: 170 LBS | SYSTOLIC BLOOD PRESSURE: 140 MMHG | HEART RATE: 60 BPM

## 2018-05-23 DIAGNOSIS — I21.4 NSTEMI (NON-ST ELEVATED MYOCARDIAL INFARCTION) (H): ICD-10-CM

## 2018-05-23 DIAGNOSIS — R07.9 CHEST PAIN, UNSPECIFIED TYPE: ICD-10-CM

## 2018-05-23 PROCEDURE — 99213 OFFICE O/P EST LOW 20 MIN: CPT | Performed by: INTERNAL MEDICINE

## 2018-05-23 RX ORDER — METOPROLOL TARTRATE 50 MG
TABLET ORAL
Qty: 270 TABLET | Refills: 1 | Status: SHIPPED | OUTPATIENT
Start: 2018-05-23 | End: 2018-05-23

## 2018-05-23 RX ORDER — METOPROLOL TARTRATE 50 MG
TABLET ORAL
Qty: 90 TABLET | Refills: 3 | Status: SHIPPED | OUTPATIENT
Start: 2018-05-23 | End: 2019-08-14

## 2018-05-23 NOTE — PATIENT INSTRUCTIONS
"1. Restart metoprolol (Lopressor) with 1/2 tablet twice daily.       These are 50 mg tablets.    2. Call us next week and let us know how you feel. [phone].    If you don't feel good or \"not right\", please call.  "

## 2018-05-23 NOTE — MR AVS SNAPSHOT
"              After Visit Summary   5/23/2018    Cale Wagoner    MRN: [id number]           Patient Information     Date Of Birth          5/26/1933        Visit Information        Provider Department      5/23/2018 9:45 AM Chichi Yang MD [facility]        Today's Diagnoses     NSTEMI (non-ST elevated myocardial infarction) (H)        Chest pain, unspecified type          Care Instructions    1. Restart metoprolol (Lopressor) with 1/2 tablet twice daily.       These are 50 mg tablets.    2. Call us next week and let us know how you feel. [phone].    If you don't feel good or \"not right\", please call.          Follow-ups after your visit        Additional Services     Follow-Up with Cardiac Advanced Practice Provider                 Your next 10 appointments already scheduled     Jul 16, 2018 10:30 AM CDT   SHORT with Gwyn Zheng MD   [facility] ([facility])    [address] [place] [address] [id number] [phone]              Future tests that were ordered for you today     Open Future Orders        Priority Expected Expires Ordered    Follow-Up with Cardiac Advanced Practice Provider Routine 6/15/2018 5/23/2019 5/23/2018            Who to contact     If you have questions or need follow up information about today's clinic visit or your schedule please contact [facility] directly at [phone].  Normal or non-critical lab and imaging results will be communicated to you by MyChart, letter or phone within 4 business days after the clinic has received the results. If you do not hear from us within 7 days, please contact the clinic through MyChart or phone. If you have a critical or abnormal lab result, we will notify you by phone as soon as possible.  Submit refill requests through [employer] or call your pharmacy and they will " "forward the refill request to us. Please allow 3 business days for your refill to be completed.          Additional Information About Your Visit        Care EveryWhere ID     This is your Care EveryWhere ID. This could be used by other organizations to access your [place] medical records  [id number]        Your Vitals Were     Pulse Height BMI (Body Mass Index)             60 1.689 m (5' 6.5\") 27.03 kg/m2          Blood Pressure from Last 3 Encounters:   05/23/18 140/76   05/09/18 146/68   04/11/18 154/70    Weight from Last 3 Encounters:   05/23/18 77.1 kg (170 lb)   05/09/18 76.7 kg (169 lb)   04/11/18 78 kg (172 lb)              We Performed the Following     Follow-Up with Cardiologist          Today's Medication Changes          These changes are accurate as of 5/23/18 10:23 AM.  If you have any questions, ask your nurse or doctor.               These medicines have changed or have updated prescriptions.        Dose/Directions    metoprolol tartrate 50 MG tablet   Commonly known as:  LOPRESSOR   This may have changed:    - how much to take  - how to take this  - when to take this  - additional instructions   Used for:  Chest pain, unspecified type   Changed by:  Chichi Yang MD        1/2 tablet twice daily   Quantity:  270 tablet   Refills:  1            Where to get your medicines      [id number] [place] [address] [place] [address]  [address] [place] [address]     Phone:  [phone]     metoprolol tartrate 50 MG tablet                Primary Care Provider Office Phone # Fax #    Gwyn Zheng [phone] [address]  [facility] [address]        Equal Access to Services     [facility]: Hadii porter Leslie, waaxda luqadaha, qaybta kaalmada denisada, wilton montoya. So [facility] [phone].    ATENCIÓN: Si habla español, tiene a rene disposición servicios gratuitos de " asistencia lingüística. Kaley al [phone].    We comply with applicable federal civil rights laws and [place] laws. We do not discriminate on the basis of race, color, national origin, age, disability, sex, sexual orientation, or gender identity.            Thank you!     Thank you for choosing [facility]  for your care. Our goal is always to provide you with excellent care. Hearing back from our patients is one way we can continue to improve our services. Please take a few minutes to complete the written survey that you may receive in the mail after your visit with us. Thank you!             Your Updated Medication List - Protect others around you: Learn how to safely use, store and throw away your medicines at www.disposemymeds.org.          This list is accurate as of 5/23/18 10:23 AM.  Always use your most recent med list.                   Brand Name Dispense Instructions for use Diagnosis    ASPIRIN EC PO      Take 81 mg by mouth At Bedtime        atorvastatin 40 MG tablet    LIPITOR    90 tablet    Take 1 tablet (40 mg) by mouth daily    Chest pain, unspecified type       clopidogrel 75 MG tablet    PLAVIX    90 tablet    Take 1 tablet (75 mg) by mouth daily    Chest pain, unspecified type       FAMOTIDINE PO      Take 20 mg by mouth 2 times daily        Garlic 1000 MG Caps      Take 1 capsule by mouth daily        losartan 100 MG tablet    COZAAR    30 tablet    Take 1 tablet (100 mg) by mouth daily    Benign essential hypertension       metoprolol tartrate 50 MG tablet    LOPRESSOR    270 tablet    1/2 tablet twice daily    Chest pain, unspecified type       MULTIVITAMIN & MINERAL PO      Take 1 tablet by mouth daily        nitroGLYcerin 0.4 MG sublingual tablet    NITROSTAT    25 tablet    For chest pain place 1 tablet under the tongue every 5 minutes for 3 doses. If symptoms persist 5 minutes after 1st dose call 911.    Chest pain, unspecified type        OMEGA-3 FISH OIL PO      Take 2 g by mouth daily        pantoprazole 40 MG EC tablet    PROTONIX    90 tablet    Take 1 tablet (40 mg) by mouth daily    Chest pain, unspecified type       psyllium Packet    METAMUCIL/KONSYL     Take 1 packet by mouth daily        TYLENOL PO      Take 500 mg by mouth every 8 hours as needed for mild pain or fever        valACYclovir 1000 mg tablet    VALTREX    21 tablet    Take 1 tablet (1,000 mg) by mouth 3 times daily    Right sided facial pain, Right-sided headache

## 2018-05-23 NOTE — LETTER
"5/23/2018    Gwyn Zheng MD  [address]  [facility] [address]    RE: Cale Wagoner       Dear Colleague,    I had the pleasure of seeing Cale Wagoner in the [facility].    HPI and Plan:     Mr Cale Wagoner is 84 yo and experienced a myocardial infarct about 10 months ago. He is here for follow-up.    He previously underwent coronary intervention with placement of 2 stents to the circumflex coronary distribution.  He had epigastric pain which resulted in his visit to the ED at that time and the diagnosis of coronary ischemia.  He denies any angina and his exertional tolerance has now returned to his baseline.  He denies exertional dyspnea..    He is relatively new to myself but I do see his wife, Dalila. He still exercises 15 minutes on the treadmill but he had struggled.  His metoprolol was decreased by Dr. Zheng. A stress nulear study showed only a basal inferior/inferolateral infarct with overall normal LV function without ischemia.    His left ventricular function has been demonstrated to be normal despite an inferolateral wall motion.  He does have \"heartburn\" but not with activity.  He has used nitroglycerin without benefit.  He does not have myalgias, abnormal bleeding or light-headedness    With his angiogram, he was noted to have single vessel disease and the occluded native circumflex gave rise to two sizeable marginal vessels..    Exam,   Blood pressure 140/76, pulse 60, height 1.689 m (5' 6.5\"), weight 77.1 kg (170 lb).  The chest was clear and on cardiac exam, there was an S1 and an S2 without a murmur.    Assessment/Plan:     Mr. Wagoner is post-PCI with a now baseline exertional tolerance.  His tolerance has returned to that prior to his infarct and coronary intervention. He continues without complication on clopidogrel and ASA.  His intervention was 8 months ago so he can stop the clopidogrel in 4 months.  He is on excellent risk " factor intervention.    He was unaware that he should be using his prescribed metoprolol in addition to losartan.  I have recommended restarting the metoprolol especially since his blood pressure was increased today.  He will call if he has any adverse symptoms.    Follow up has been arranged with the [facility] and in one year, with myself.              Orders Placed This Encounter   Procedures     Follow-Up with Cardiac Advanced Practice Provider     Follow-Up with Cardiologist       Orders Placed This Encounter   Medications     DISCONTD: metoprolol tartrate (LOPRESSOR) 50 MG tablet     Si/2 tablet twice daily     Dispense:  270 tablet     Refill:  1       Medications Discontinued During This Encounter   Medication Reason     metoprolol (LOPRESSOR) 50 MG tablet Reorder         Encounter Diagnoses   Name Primary?     NSTEMI (non-ST elevated myocardial infarction) (H)      Chest pain, unspecified type        CURRENT MEDICATIONS:  Current Outpatient Prescriptions   Medication Sig Dispense Refill     Acetaminophen (TYLENOL PO) Take 500 mg by mouth every 8 hours as needed for mild pain or fever       ASPIRIN EC PO Take 81 mg by mouth At Bedtime       atorvastatin (LIPITOR) 40 MG tablet Take 1 tablet (40 mg) by mouth daily 90 tablet 3     clopidogrel (PLAVIX) 75 MG tablet Take 1 tablet (75 mg) by mouth daily 90 tablet 3     FAMOTIDINE PO Take 20 mg by mouth 2 times daily       Garlic 1000 MG CAPS Take 1 capsule by mouth daily        losartan (COZAAR) 100 MG tablet Take 1 tablet (100 mg) by mouth daily 30 tablet 3     metoprolol tartrate (LOPRESSOR) 50 MG tablet 1/2 tablet twice daily 90 tablet 3     Multiple Vitamins-Minerals (MULTIVITAMIN & MINERAL PO) Take 1 tablet by mouth daily       nitroGLYcerin (NITROSTAT) 0.4 MG sublingual tablet For chest pain place 1 tablet under the tongue every 5 minutes for 3 doses. If symptoms persist 5 minutes after 1st dose call 911. 25 tablet 1     Omega-3 Fatty Acids (OMEGA-3 FISH OIL  PO) Take 2 g by mouth daily        pantoprazole (PROTONIX) 40 MG EC tablet Take 1 tablet (40 mg) by mouth daily 90 tablet 3     psyllium (METAMUCIL/KONSYL) Packet Take 1 packet by mouth daily       valACYclovir (VALTREX) 1000 mg tablet Take 1 tablet (1,000 mg) by mouth 3 times daily 21 tablet 0       ALLERGIES     Allergies   Allergen Reactions     Penicillins        PAST MEDICAL HISTORY:  Past Medical History:   Diagnosis Date     Benign essential hypertension      Bloating      CAD (coronary artery disease)      Hyperlipidemia      NSTEMI (non-ST elevated myocardial infarction) (H)      PFO (patent foramen ovale)      TIA (transient ischemic attack)        PAST SURGICAL HISTORY:  Past Surgical History:   Procedure Laterality Date     APPENDECTOMY       CARDIAC CATHERIZATION  09/15/2017     MOSES to mid-distal Cx x2     ENT SURGERY      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     EYE SURGERY      cataracts     HERNIA REPAIR      twice       FAMILY HISTORY:  Family History   Problem Relation Age of Onset     Respiratory Father      pneumonia     Unknown/Adopted Father      Alzheimer Disease Brother      Alzheimer Disease Sister      Respiratory Sister      COPD     Unknown/Adopted Mother      DIABETES No family hx of      Coronary Artery Disease No family hx of      Hypertension No family hx of      Hyperlipidemia No family hx of      CEREBROVASCULAR DISEASE No family hx of      Breast Cancer No family hx of      Colon Cancer No family hx of      Prostate Cancer No family hx of      Other Cancer No family hx of      Depression No family hx of      Anxiety Disorder No family hx of      MENTAL ILLNESS No family hx of      Substance Abuse No family hx of      Anesthesia Reaction No family hx of      Asthma No family hx of      OSTEOPOROSIS No family hx of      Genetic Disorder No family hx of      Thyroid Disease No family hx of      Obesity No family hx of        SOCIAL HISTORY:  Social History     Social History     Marital  "status:      Spouse name: N/A     Number of children: N/A     Years of education: N/A     Social History Main Topics     Smoking status: Former Smoker     Quit date: 8/19/1968     Smokeless tobacco: Never Used     Alcohol use Yes      Comment: Occ     Drug use: No     Sexual activity: No     Other Topics Concern     Parent/Sibling W/ Cabg, Mi Or Angioplasty Before 65f 55m? No     Social History Narrative       Review of Systems:  Skin:  Negative       Eyes:  Positive for glasses;cataracts both eyes and surgery was 5 years ago.  ENT:  Negative      Respiratory:  Negative       Cardiovascular:    Positive for;exercise intolerance pt states he has regained strength but not stamina.  Gastroenterology: Positive for reflux;heartburn new medication pantoprazole not as effective as prilosec.  Genitourinary:  Positive for decreased urinary stream;urinary frequency    Musculoskeletal:         Neurologic:  Positive for stroke Pt reports  he had ministroke 5 years ago.  Psychiatric:  Negative      Heme/Lymph/Imm:  Negative      Endocrine:  Negative        Physical Exam:  Vitals: /76  Pulse 60  Ht 1.689 m (5' 6.5\")  Wt 77.1 kg (170 lb)  BMI 27.03 kg/m2    Constitutional:  cooperative, alert and oriented, well developed, well nourished, in no acute distress        Skin:  warm and dry to the touch          Head:  normocephalic        Eyes:  sclera white        Lymph:      ENT:           Neck:           Respiratory:  normal breath sounds, clear to auscultation, normal A-P diameter, normal symmetry, normal respiratory excursion, no use of accessory muscles         Cardiac: regular rhythm, normal S1/S2, no S3 or S4, apical impulse not displaced, no murmurs, gallops or rubs                                                         GI:           Extremities and Muscular Skeletal:  no deformities, clubbing, cyanosis, erythema observed;no edema              Neurological:  no gross motor deficits;affect appropriate    "     Psych:  Alert and Oriented x 3;affect appropriate, oriented to time, person and place        Thank you for allowing me to participate in the care of your patient.    Sincerely,     Chichi Yang MD     [facility]

## 2018-05-23 NOTE — LETTER
"5/23/2018    Gwyn Zheng MD  [address]  [facility] [address]    RE: Cale Wagoner       Dear Colleague,    I had the pleasure of seeing Cale Wagoner in the [facility].    HPI and Plan:     Mr Cale Wagoner is 86 yo and experienced a myocardial infarct about 10 months ago. He is here for follow-up.    He previously underwent coronary intervention with placement of 2 stents to the circumflex coronary distribution.  He had epigastric pain which resulted in his visit to the ED at that time and the diagnosis of coronary ischemia.  He denies any angina and his exertional tolerance has now returned to his baseline.  He denies exertional dyspnea..    He is relatively new to myself but I do see his wife, Dalila. He still exercises 15 minutes on the treadmill but he had struggled.  His metoprolol was decreased by Dr. Zheng. A stress nulear study showed only a basal inferior/inferolateral infarct with overall normal LV function without ischemia.    His left ventricular function has been demonstrated to be normal despite an inferolateral wall motion.  He does have \"heartburn\" but not with activity.  He has used nitroglycerin without benefit.  He does not have myalgias, abnormal bleeding or light-headedness    With his angiogram, he was noted to have single vessel disease and the occluded native circumflex gave rise to two sizeable marginal vessels..    Exam,   Blood pressure 140/76, pulse 60, height 1.689 m (5' 6.5\"), weight 77.1 kg (170 lb).  The chest was clear and on cardiac exam, there was an S1 and an S2 without a murmur.    Assessment/Plan:     Mr. Wagoner is post-PCI with a now baseline exertional tolerance.  His tolerance has returned to that prior to his infarct and coronary intervention. He continues without complication on clopidogrel and ASA.  His intervention was 8 months ago so he can stop the clopidogrel in 4 months.  He is on excellent risk " factor intervention.    He was unaware that he should be using his prescribed metoprolol in addition to losartan.  I have recommended restarting the metoprolol especially since his blood pressure was increased today.  He will call if he has any adverse symptoms.    Follow up has been arranged with the [facility] and in one year, with myself.              Orders Placed This Encounter   Procedures     Follow-Up with Cardiac Advanced Practice Provider     Follow-Up with Cardiologist       Orders Placed This Encounter   Medications     DISCONTD: metoprolol tartrate (LOPRESSOR) 50 MG tablet     Si/2 tablet twice daily     Dispense:  270 tablet     Refill:  1       Medications Discontinued During This Encounter   Medication Reason     metoprolol (LOPRESSOR) 50 MG tablet Reorder         Encounter Diagnoses   Name Primary?     NSTEMI (non-ST elevated myocardial infarction) (H)      Chest pain, unspecified type        CURRENT MEDICATIONS:  Current Outpatient Prescriptions   Medication Sig Dispense Refill     Acetaminophen (TYLENOL PO) Take 500 mg by mouth every 8 hours as needed for mild pain or fever       ASPIRIN EC PO Take 81 mg by mouth At Bedtime       atorvastatin (LIPITOR) 40 MG tablet Take 1 tablet (40 mg) by mouth daily 90 tablet 3     clopidogrel (PLAVIX) 75 MG tablet Take 1 tablet (75 mg) by mouth daily 90 tablet 3     FAMOTIDINE PO Take 20 mg by mouth 2 times daily       Garlic 1000 MG CAPS Take 1 capsule by mouth daily        losartan (COZAAR) 100 MG tablet Take 1 tablet (100 mg) by mouth daily 30 tablet 3     metoprolol tartrate (LOPRESSOR) 50 MG tablet 1/2 tablet twice daily 90 tablet 3     Multiple Vitamins-Minerals (MULTIVITAMIN & MINERAL PO) Take 1 tablet by mouth daily       nitroGLYcerin (NITROSTAT) 0.4 MG sublingual tablet For chest pain place 1 tablet under the tongue every 5 minutes for 3 doses. If symptoms persist 5 minutes after 1st dose call 911. 25 tablet 1     Omega-3 Fatty Acids (OMEGA-3 FISH OIL  PO) Take 2 g by mouth daily        pantoprazole (PROTONIX) 40 MG EC tablet Take 1 tablet (40 mg) by mouth daily 90 tablet 3     psyllium (METAMUCIL/KONSYL) Packet Take 1 packet by mouth daily       valACYclovir (VALTREX) 1000 mg tablet Take 1 tablet (1,000 mg) by mouth 3 times daily 21 tablet 0       ALLERGIES     Allergies   Allergen Reactions     Penicillins        PAST MEDICAL HISTORY:  Past Medical History:   Diagnosis Date     Benign essential hypertension      Bloating      CAD (coronary artery disease)      Hyperlipidemia      NSTEMI (non-ST elevated myocardial infarction) (H)      PFO (patent foramen ovale)      TIA (transient ischemic attack)        PAST SURGICAL HISTORY:  Past Surgical History:   Procedure Laterality Date     APPENDECTOMY       CARDIAC CATHERIZATION  09/15/2017     MOSES to mid-distal Cx x2     ENT SURGERY      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     EYE SURGERY      cataracts     HERNIA REPAIR      twice       FAMILY HISTORY:  Family History   Problem Relation Age of Onset     Respiratory Father      pneumonia     Unknown/Adopted Father      Alzheimer Disease Brother      Alzheimer Disease Sister      Respiratory Sister      COPD     Unknown/Adopted Mother      DIABETES No family hx of      Coronary Artery Disease No family hx of      Hypertension No family hx of      Hyperlipidemia No family hx of      CEREBROVASCULAR DISEASE No family hx of      Breast Cancer No family hx of      Colon Cancer No family hx of      Prostate Cancer No family hx of      Other Cancer No family hx of      Depression No family hx of      Anxiety Disorder No family hx of      MENTAL ILLNESS No family hx of      Substance Abuse No family hx of      Anesthesia Reaction No family hx of      Asthma No family hx of      OSTEOPOROSIS No family hx of      Genetic Disorder No family hx of      Thyroid Disease No family hx of      Obesity No family hx of        SOCIAL HISTORY:  Social History     Social History     Marital  "status:      Spouse name: N/A     Number of children: N/A     Years of education: N/A     Social History Main Topics     Smoking status: Former Smoker     Quit date: 8/19/1968     Smokeless tobacco: Never Used     Alcohol use Yes      Comment: Occ     Drug use: No     Sexual activity: No     Other Topics Concern     Parent/Sibling W/ Cabg, Mi Or Angioplasty Before 65f 55m? No     Social History Narrative       Review of Systems:  Skin:  Negative       Eyes:  Positive for glasses;cataracts both eyes and surgery was 5 years ago.  ENT:  Negative      Respiratory:  Negative       Cardiovascular:    Positive for;exercise intolerance pt states he has regained strength but not stamina.  Gastroenterology: Positive for reflux;heartburn new medication pantoprazole not as effective as prilosec.  Genitourinary:  Positive for decreased urinary stream;urinary frequency    Musculoskeletal:         Neurologic:  Positive for stroke Pt reports  he had ministroke 5 years ago.  Psychiatric:  Negative      Heme/Lymph/Imm:  Negative      Endocrine:  Negative        Physical Exam:  Vitals: /76  Pulse 60  Ht 1.689 m (5' 6.5\")  Wt 77.1 kg (170 lb)  BMI 27.03 kg/m2    Constitutional:  cooperative, alert and oriented, well developed, well nourished, in no acute distress        Skin:  warm and dry to the touch          Head:  normocephalic        Eyes:  sclera white        Lymph:      ENT:           Neck:           Respiratory:  normal breath sounds, clear to auscultation, normal A-P diameter, normal symmetry, normal respiratory excursion, no use of accessory muscles         Cardiac: regular rhythm, normal S1/S2, no S3 or S4, apical impulse not displaced, no murmurs, gallops or rubs                                                         GI:           Extremities and Muscular Skeletal:  no deformities, clubbing, cyanosis, erythema observed;no edema              Neurological:  no gross motor deficits;affect appropriate    "     Psych:  Alert and Oriented x 3;affect appropriate, oriented to time, person and place          CC  Chichi Yang MD  [address]  [place] [id number]                    Thank you for allowing me to participate in the care of your patient.      Sincerely,     Chichi Yang MD     [facility]    cc:   Chichi Yang MD  [address]  [place] [id number]

## 2018-05-23 NOTE — TELEPHONE ENCOUNTER
VM from patient's pharmacy stating that they received a refill for metoprolol 50 mg BID with the instructions to take 1/2 tablet BID, and the quantity was 270 tablets. Pharmacy requesting that script be resent for 90 tablets instead of 270. Rx escripted to patient's pharmacy.

## 2018-05-26 NOTE — PROGRESS NOTES
"HPI and Plan:     Mr Cale Wagoner is 84 yo and experienced a myocardial infarct about 10 months ago. He is here for follow-up.    He previously underwent coronary intervention with placement of 2 stents to the circumflex coronary distribution.  He had epigastric pain which resulted in his visit to the ED at that time and the diagnosis of coronary ischemia.  He denies any angina and his exertional tolerance has now returned to his baseline.  He denies exertional dyspnea..    He is relatively new to myself but I do see his wife, Dalila. He still exercises 15 minutes on the treadmill but he had struggled.  His metoprolol was decreased by Dr. Zheng. A stress nulear study showed only a basal inferior/inferolateral infarct with overall normal LV function without ischemia.    His left ventricular function has been demonstrated to be normal despite an inferolateral wall motion.  He does have \"heartburn\" but not with activity.  He has used nitroglycerin without benefit.  He does not have myalgias, abnormal bleeding or light-headedness    With his angiogram, he was noted to have single vessel disease and the occluded native circumflex gave rise to two sizeable marginal vessels..    Exam,   Blood pressure 140/76, pulse 60, height 1.689 m (5' 6.5\"), weight 77.1 kg (170 lb).  The chest was clear and on cardiac exam, there was an S1 and an S2 without a murmur.    Assessment/Plan:     Mr. Wagoner is post-PCI with a now baseline exertional tolerance.  His tolerance has returned to that prior to his infarct and coronary intervention. He continues without complication on clopidogrel and ASA.  His intervention was 8 months ago so he can stop the clopidogrel in 4 months.  He is on excellent risk factor intervention.    He was unaware that he should be using his prescribed metoprolol in addition to losartan.  I have recommended restarting the metoprolol especially since his blood pressure was increased today.  He will call if he " has any adverse symptoms.    Follow up has been arranged with the [facility] and in one year, with myself.              Orders Placed This Encounter   Procedures     Follow-Up with Cardiac Advanced Practice Provider     Follow-Up with Cardiologist       Orders Placed This Encounter   Medications     DISCONTD: metoprolol tartrate (LOPRESSOR) 50 MG tablet     Si/2 tablet twice daily     Dispense:  270 tablet     Refill:  1       Medications Discontinued During This Encounter   Medication Reason     metoprolol (LOPRESSOR) 50 MG tablet Reorder         Encounter Diagnoses   Name Primary?     NSTEMI (non-ST elevated myocardial infarction) (H)      Chest pain, unspecified type        CURRENT MEDICATIONS:  Current Outpatient Prescriptions   Medication Sig Dispense Refill     Acetaminophen (TYLENOL PO) Take 500 mg by mouth every 8 hours as needed for mild pain or fever       ASPIRIN EC PO Take 81 mg by mouth At Bedtime       atorvastatin (LIPITOR) 40 MG tablet Take 1 tablet (40 mg) by mouth daily 90 tablet 3     clopidogrel (PLAVIX) 75 MG tablet Take 1 tablet (75 mg) by mouth daily 90 tablet 3     FAMOTIDINE PO Take 20 mg by mouth 2 times daily       Garlic 1000 MG CAPS Take 1 capsule by mouth daily        losartan (COZAAR) 100 MG tablet Take 1 tablet (100 mg) by mouth daily 30 tablet 3     metoprolol tartrate (LOPRESSOR) 50 MG tablet 1/2 tablet twice daily 90 tablet 3     Multiple Vitamins-Minerals (MULTIVITAMIN & MINERAL PO) Take 1 tablet by mouth daily       nitroGLYcerin (NITROSTAT) 0.4 MG sublingual tablet For chest pain place 1 tablet under the tongue every 5 minutes for 3 doses. If symptoms persist 5 minutes after 1st dose call 911. 25 tablet 1     Omega-3 Fatty Acids (OMEGA-3 FISH OIL PO) Take 2 g by mouth daily        pantoprazole (PROTONIX) 40 MG EC tablet Take 1 tablet (40 mg) by mouth daily 90 tablet 3     psyllium (METAMUCIL/KONSYL) Packet Take 1 packet by mouth daily       valACYclovir (VALTREX) 1000 mg tablet  Take 1 tablet (1,000 mg) by mouth 3 times daily 21 tablet 0       ALLERGIES     Allergies   Allergen Reactions     Penicillins        PAST MEDICAL HISTORY:  Past Medical History:   Diagnosis Date     Benign essential hypertension      Bloating      CAD (coronary artery disease)      Hyperlipidemia      NSTEMI (non-ST elevated myocardial infarction) (H)      PFO (patent foramen ovale)      TIA (transient ischemic attack)        PAST SURGICAL HISTORY:  Past Surgical History:   Procedure Laterality Date     APPENDECTOMY       CARDIAC CATHERIZATION  09/15/2017     MOSES to mid-distal Cx x2     ENT SURGERY      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     EYE SURGERY      cataracts     HERNIA REPAIR      twice       FAMILY HISTORY:  Family History   Problem Relation Age of Onset     Respiratory Father      pneumonia     Unknown/Adopted Father      Alzheimer Disease Brother      Alzheimer Disease Sister      Respiratory Sister      COPD     Unknown/Adopted Mother      DIABETES No family hx of      Coronary Artery Disease No family hx of      Hypertension No family hx of      Hyperlipidemia No family hx of      CEREBROVASCULAR DISEASE No family hx of      Breast Cancer No family hx of      Colon Cancer No family hx of      Prostate Cancer No family hx of      Other Cancer No family hx of      Depression No family hx of      Anxiety Disorder No family hx of      MENTAL ILLNESS No family hx of      Substance Abuse No family hx of      Anesthesia Reaction No family hx of      Asthma No family hx of      OSTEOPOROSIS No family hx of      Genetic Disorder No family hx of      Thyroid Disease No family hx of      Obesity No family hx of        SOCIAL HISTORY:  Social History     Social History     Marital status:      Spouse name: N/A     Number of children: N/A     Years of education: N/A     Social History Main Topics     Smoking status: Former Smoker     Quit date: 8/19/1968     Smokeless tobacco: Never Used     Alcohol use Yes       "Comment: Occ     Drug use: No     Sexual activity: No     Other Topics Concern     Parent/Sibling W/ Cabg, Mi Or Angioplasty Before 65f 55m? No     Social History Narrative       Review of Systems:  Skin:  Negative       Eyes:  Positive for glasses;cataracts both eyes and surgery was 5 years ago.  ENT:  Negative      Respiratory:  Negative       Cardiovascular:    Positive for;exercise intolerance pt states he has regained strength but not stamina.  Gastroenterology: Positive for reflux;heartburn new medication pantoprazole not as effective as prilosec.  Genitourinary:  Positive for decreased urinary stream;urinary frequency    Musculoskeletal:         Neurologic:  Positive for stroke Pt reports  he had ministroke 5 years ago.  Psychiatric:  Negative      Heme/Lymph/Imm:  Negative      Endocrine:  Negative        Physical Exam:  Vitals: /76  Pulse 60  Ht 1.689 m (5' 6.5\")  Wt 77.1 kg (170 lb)  BMI 27.03 kg/m2    Constitutional:  cooperative, alert and oriented, well developed, well nourished, in no acute distress        Skin:  warm and dry to the touch          Head:  normocephalic        Eyes:  sclera white        Lymph:      ENT:           Neck:           Respiratory:  normal breath sounds, clear to auscultation, normal A-P diameter, normal symmetry, normal respiratory excursion, no use of accessory muscles         Cardiac: regular rhythm, normal S1/S2, no S3 or S4, apical impulse not displaced, no murmurs, gallops or rubs                                                         GI:           Extremities and Muscular Skeletal:  no deformities, clubbing, cyanosis, erythema observed;no edema              Neurological:  no gross motor deficits;affect appropriate        Psych:  Alert and Oriented x 3;affect appropriate, oriented to time, person and place          CC  Chichi Yang MD  [address]  [place] [id number]                  "

## 2018-05-29 ENCOUNTER — TELEPHONE (OUTPATIENT)
Dept: CARDIOLOGY | Facility: CLINIC | Age: 83
End: 2018-05-29

## 2018-05-29 NOTE — TELEPHONE ENCOUNTER
Would suggest that he check his BP as too high a BP is a risk for bleeding even without any antiplatelet medication.  Would suggest that he hold his aspirin - not the clopidogrel if he insists on stopping an anti-platelet agent.  Can we have him see an [facility] to check BP and discuss antiplatelet therapy?  Thank you.  CD

## 2018-05-29 NOTE — TELEPHONE ENCOUNTER
"VM from patient, who is calling inquiring about his metoprolol dose. Spoke with patient. Patient states that he saw Dr. Yang in clinic last week and she instructed him to take 25 mg (1/2 tablet of 50 mg) BID of metoprolol tartrate. Patient states that he was instructed to call back this week to have the dose increased. Reviewed Dr. Yang's note from 5/23/18. Patient was instructed to call back this week and report how he is feeling. Patient states that he has been feeling great and does not have any complaints to report. When RN inquired about patient's BP, patient states that he has not taken his BP since restarting the metoprolol. Instructed patient to take his BP each day this week and call back on Friday this week with BP readings from this week. Reviewed with patient that we want to have an idea about how his BP is doing before we increase the metoprolol. Patient verbalized understanding and agreed with plan of care. Patient states that he is going to stop taking his plavix. RN inquired why patient wants to discontinue plavix. Patient states that he had a nose bleed over the weekend and does not \"want to run the risk of bleeding out or having a brain aneurysm explode.\" Reviewed with patient that he is on the plavix to keep his stents patent and that discontinuing his plavix could increase his risk for having another heart attack. Patient states that he understands, but states \"I would rather have the stents plug up again than have a bleeding problem.\" Reviewed with patient that there are risks and benefits with taking medications, and that sometimes the benefits outweigh the risks. Patient is still adamant about stopping the plavix. Strongly encouraged patient to continue taking his plavix and informed patient that RN will message Dr. Yang for review. Patient verbalized understanding and agreed with plan of care. Will message Dr. Yang for review.     "

## 2018-06-04 NOTE — TELEPHONE ENCOUNTER
Spoke with patient, he states he checks his BP about once a week at the drug store and is getting systolic BP of 140-144. Offered patient [facility] visit and he is willing to come back in a couple of weeks for that.  Reviewed issue of nose bleeds with patient, he had been cutting plavix in 1/2. Reviewed with patient that plavix needs to be kept at a full dose and offered Dr. Yang's suggestion to stop aspirin instead. Patient agreed to hold aspirin and use full dose plavix.  Connected patient to scheduling to set up [facility] visit.

## 2018-06-26 ENCOUNTER — OFFICE VISIT (OUTPATIENT)
Dept: CARDIOLOGY | Facility: CLINIC | Age: 83
End: 2018-06-26
Attending: INTERNAL MEDICINE
Payer: COMMERCIAL

## 2018-06-26 VITALS
WEIGHT: 169.5 LBS | SYSTOLIC BLOOD PRESSURE: 150 MMHG | BODY MASS INDEX: 26.6 KG/M2 | HEIGHT: 67 IN | DIASTOLIC BLOOD PRESSURE: 80 MMHG | HEART RATE: 58 BPM

## 2018-06-26 DIAGNOSIS — I25.10 CORONARY ARTERY DISEASE INVOLVING NATIVE CORONARY ARTERY OF NATIVE HEART WITHOUT ANGINA PECTORIS: ICD-10-CM

## 2018-06-26 DIAGNOSIS — E78.5 HYPERLIPIDEMIA LDL GOAL <70: ICD-10-CM

## 2018-06-26 DIAGNOSIS — I10 BENIGN ESSENTIAL HYPERTENSION: Primary | ICD-10-CM

## 2018-06-26 PROCEDURE — 99214 OFFICE O/P EST MOD 30 MIN: CPT | Performed by: PHYSICIAN ASSISTANT

## 2018-06-26 RX ORDER — AMLODIPINE BESYLATE 5 MG/1
5 TABLET ORAL DAILY
Qty: 30 TABLET | Refills: 3 | Status: SHIPPED | OUTPATIENT
Start: 2018-06-26 | End: 2018-09-26

## 2018-06-26 NOTE — PATIENT INSTRUCTIONS
Today's Plan:   1) Continue with Plavix.   2) Start Amlodipine - take one tablet (5 mg) once a day.  3) Come back in 2-3 months.    If you have questions or concerns please call my nurse team at [phone].     Scheduling phone number: [phone]  Reminder: Please bring in all current medications, over the counter supplements and vitamin bottles to your next appointment.    It was a pleasure seeing you today!     Addis Lopez  6/26/2018

## 2018-06-26 NOTE — LETTER
6/26/2018    Gwyn Zheng MD  [address]  [facility] [address]    RE: Cale Wagoner       Dear Colleague,    I had the pleasure of seeing Cale Wagoner in the [facility].    Primary Cardiologist: Dr. Yang    History of Present Illness:   This is a pleasant 85 year old male who presents to cardiology clinic for HTN management as well as discussion of antiplatelet therapy. His past medical history includes CAD (MOSES to mid and distal LCX), HTN, HLD, and hx of TIA in 2014 with discovery of PFO.     His blood pressure has been running high despite him taking his medications and trying to eat healthy. He also thinks that full dose of Plavix is causing nosebleeds and therefore he started taking half tablet Plavix in the morning and in the evening. He stopped taking ASA.     He denies CP, SOB, palpitations, or syncope.    Assessment and Plan:   This is a pleasant 85 year old male who presents to cardiology clinic for HTN management as well as discussion of antiplatelet therapy. His past medical history includes CAD (MOSES to mid and distal LCX), HTN, HLD, and hx of TIA in 2014 with discovery of PFO.     We will add amlodipine for more optimal BP control. I initially recommended changing BB to Carvedilol but he recently refilled his prescription.     In regards to antiplatelet therapy, I encouraged him to take Plavix as one tablet one time daily and not to split it in half. In 3 months, he should be able to resume ASA and stop Plavix. He will try this.     I have asked him to return in 2-3 months for follow up.     Thank you for allowing me to participate in the care of this patient today.       This note was completed in part using Dragon voice recognition software. Although reviewed after completion, some word and grammatical errors may occur.    Orders this Visit:  Orders Placed This Encounter   Procedures     Follow-Up with Cardiac Advanced Practice Provider      Orders Placed This Encounter   Medications     amLODIPine (NORVASC) 5 MG tablet     Sig: Take 1 tablet (5 mg) by mouth daily     Dispense:  30 tablet     Refill:  3     Medications Discontinued During This Encounter   Medication Reason     Garlic 1000 MG CAPS      valACYclovir (VALTREX) 1000 mg tablet          Encounter Diagnoses   Name Primary?     Benign essential hypertension Yes     Hyperlipidemia LDL goal <70      Coronary artery disease involving native coronary artery of native heart without angina pectoris        CURRENT MEDICATIONS:  Current Outpatient Prescriptions   Medication Sig Dispense Refill     Acetaminophen (TYLENOL PO) Take 500 mg by mouth every 8 hours as needed for mild pain or fever       amLODIPine (NORVASC) 5 MG tablet Take 1 tablet (5 mg) by mouth daily 30 tablet 3     atorvastatin (LIPITOR) 40 MG tablet Take 1 tablet (40 mg) by mouth daily 90 tablet 3     clopidogrel (PLAVIX) 75 MG tablet Take 1 tablet (75 mg) by mouth daily 90 tablet 3     FAMOTIDINE PO Take 20 mg by mouth 2 times daily       losartan (COZAAR) 100 MG tablet Take 1 tablet (100 mg) by mouth daily 30 tablet 3     metoprolol tartrate (LOPRESSOR) 50 MG tablet 1/2 tablet twice daily 90 tablet 3     Multiple Vitamins-Minerals (MULTIVITAMIN & MINERAL PO) Take 1 tablet by mouth daily       nitroGLYcerin (NITROSTAT) 0.4 MG sublingual tablet For chest pain place 1 tablet under the tongue every 5 minutes for 3 doses. If symptoms persist 5 minutes after 1st dose call 911. 25 tablet 1     Omega-3 Fatty Acids (OMEGA-3 FISH OIL PO) Take 2 g by mouth daily        pantoprazole (PROTONIX) 40 MG EC tablet Take 1 tablet (40 mg) by mouth daily 90 tablet 3     psyllium (METAMUCIL/KONSYL) Packet Take 1 packet by mouth daily         ALLERGIES     Allergies   Allergen Reactions     Penicillins        PAST MEDICAL HISTORY:  Past Medical History:   Diagnosis Date     Benign essential hypertension      Bloating      CAD (coronary artery disease)       Hyperlipidemia      NSTEMI (non-ST elevated myocardial infarction) (H)      PFO (patent foramen ovale)      TIA (transient ischemic attack)        PAST SURGICAL HISTORY:  Past Surgical History:   Procedure Laterality Date     APPENDECTOMY       CARDIAC CATHERIZATION  09/15/2017     MOSES to mid-distal Cx x2     ENT SURGERY      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     EYE SURGERY      cataracts     HERNIA REPAIR      twice       FAMILY HISTORY:  Family History   Problem Relation Age of Onset     Respiratory Father      pneumonia     Unknown/Adopted Father      Alzheimer Disease Brother      Alzheimer Disease Sister      Respiratory Sister      COPD     Unknown/Adopted Mother      Diabetes No family hx of      Coronary Artery Disease No family hx of      Hypertension No family hx of      Hyperlipidemia No family hx of      Cerebrovascular Disease No family hx of      Breast Cancer No family hx of      Colon Cancer No family hx of      Prostate Cancer No family hx of      Other Cancer No family hx of      Depression No family hx of      Anxiety Disorder No family hx of      Mental Illness No family hx of      Substance Abuse No family hx of      Anesthesia Reaction No family hx of      Asthma No family hx of      Osteoperosis No family hx of      Genetic Disorder No family hx of      Thyroid Disease No family hx of      Obesity No family hx of        SOCIAL HISTORY:  Social History     Social History     Marital status:      Spouse name: N/A     Number of children: N/A     Years of education: N/A     Social History Main Topics     Smoking status: Former Smoker     Quit date: 8/19/1968     Smokeless tobacco: Never Used     Alcohol use Yes      Comment: Occ     Drug use: No     Sexual activity: No     Other Topics Concern     Parent/Sibling W/ Cabg, Mi Or Angioplasty Before 65f 55m? No     Social History Narrative       Review of Systems:  Skin:  Negative     Eyes:  Positive for glasses;cataracts  ENT:  Negative   "  Respiratory:  Negative    Cardiovascular:    Positive for  Gastroenterology: Positive for reflux;heartburn  Genitourinary:  Negative    Musculoskeletal:  Negative    Neurologic:  Positive for stroke  Psychiatric:  Negative    Heme/Lymph/Imm:  Negative    Endocrine:  Negative      Physical Exam:  Vitals: /80  Pulse 58  Ht 1.689 m (5' 6.5\")  Wt 76.9 kg (169 lb 8 oz)  BMI 26.95 kg/m2     GEN:  NAD  NECK: No JVD  C/V:  Regular rate and rhythm, no murmur, rub or gallop.  RESP: Clear to auscultation bilaterally.  GI: Abdomen soft, nontender, nondistended.    EXTREM: No LE edema.   NEURO: Alert and oriented, cooperative. No obvious focal deficits.   PSYCH: Normal affect.  SKIN: Warm and dry.       Recent Lab Results:  LIPID RESULTS:  Lab Results   Component Value Date    CHOL 98 11/03/2017    HDL 40 11/03/2017    LDL 41 11/03/2017    TRIG 84 11/03/2017    CHOLHDLRATIO 4.4 08/31/2015       LIVER ENZYME RESULTS:  Lab Results   Component Value Date    AST 28 09/14/2017    ALT 33 11/03/2017       CBC RESULTS:  Lab Results   Component Value Date    WBC 5.8 04/11/2018    RBC 4.57 04/11/2018    HGB 14.8 04/11/2018    HCT 42.6 04/11/2018    MCV 93 04/11/2018    MCH 32.4 04/11/2018    MCHC 34.7 04/11/2018    RDW 12.5 04/11/2018     (L) 04/11/2018       BMP RESULTS:  Lab Results   Component Value Date     03/08/2018    POTASSIUM 4.4 03/08/2018    CHLORIDE 101 03/08/2018    CO2 29 03/08/2018    ANIONGAP 11.4 03/08/2018    GLC 99 03/08/2018    BUN 13 03/08/2018    CR 1.08 03/08/2018    GFRESTIMATED 65 03/08/2018    GFRESTBLACK 79 03/08/2018    RUSTY 9.0 03/08/2018        A1C RESULTS:  No results found for: A1C    INR RESULTS:  Lab Results   Component Value Date    INR 1.01 09/12/2014         Thank you for allowing me to participate in the care of your patient.    Sincerely,     Addis Lopez PA-C     [facility]    "

## 2018-06-26 NOTE — MR AVS SNAPSHOT
After Visit Summary   6/26/2018    Cale Wagoner    MRN: [id number]           Patient Information     Date Of Birth          5/26/1933        Visit Information        Provider Department      6/26/2018 3:10 PM Addis Lopez PA-C [facility]   [place]        Today's Diagnoses     Benign essential hypertension    -  1    Hyperlipidemia LDL goal <70        Coronary artery disease involving native coronary artery of native heart without angina pectoris          Care Instructions    Today's Plan:   1) Continue with Plavix.   2) Start Amlodipine - take one tablet (5 mg) once a day.  3) Come back in 2-3 months.    If you have questions or concerns please call my nurse team at [phone].     Scheduling phone number: [phone]  Reminder: Please bring in all current medications, over the counter supplements and vitamin bottles to your next appointment.    It was a pleasure seeing you today!     Addis Lopez  6/26/2018              Follow-ups after your visit        Additional Services     Follow-Up with Cardiac Advanced Practice Provider                 Your next 10 appointments already scheduled     Jul 16, 2018 10:30 AM CDT   [facility]    [address] [place] [id number] [phone]              Future tests that were ordered for you today     Open Future Orders        Priority Expected Expires Ordered    Follow-Up with Cardiac Advanced Practice Provider Routine 9/24/2018 6/26/2019 6/26/2018            Who to contact     If you have questions or need follow up information about today's clinic visit or your schedule please contact [facility]   [place] directly at [phone].  Normal or non-critical lab and imaging results will be communicated to you by MyChart, letter or phone  "within 4 business days after the clinic has received the results. If you do not hear from us within 7 days, please contact the clinic through [employer] or phone. If you have a critical or abnormal lab result, we will notify you by phone as soon as possible.  Submit refill requests through [employer] or call your pharmacy and they will forward the refill request to us. Please allow 3 business days for your refill to be completed.          Additional Information About Your Visit        Care EveryWhere ID     This is your Care EveryWhere ID. This could be used by other organizations to access your [facility] medical records  [id number]        Your Vitals Were     Pulse Height BMI (Body Mass Index)             58 1.689 m (5' 6.5\") 26.95 kg/m2          Blood Pressure from Last 3 Encounters:   06/26/18 150/80   05/23/18 140/76   05/09/18 146/68    Weight from Last 3 Encounters:   06/26/18 76.9 kg (169 lb 8 oz)   05/23/18 77.1 kg (170 lb)   05/09/18 76.7 kg (169 lb)              We Performed the Following     Follow-Up with Cardiac Advanced Practice Provider          Today's Medication Changes          These changes are accurate as of 6/26/18  3:42 PM.  If you have any questions, ask your nurse or doctor.               Start taking these medicines.        Dose/Directions    amLODIPine 5 MG tablet   Commonly known as:  NORVASC   Used for:  Benign essential hypertension   Started by:  Addis Lopez PA-C        Dose:  5 mg   Take 1 tablet (5 mg) by mouth daily   Quantity:  30 tablet   Refills:  3         Stop taking these medicines if you haven't already. Please contact your care team if you have questions.     Garlic 1000 MG Caps   Stopped by:  Addis Lopez PA-C           valACYclovir 1000 mg tablet   Commonly known as:  VALTREX   Stopped by:  Addis Lopez PA-C                Where to get your medicines      These medications were sent to [id number] - [place], [place] - [address] "   [address] [facility] [address]     Phone:  [phone]     amLODIPine 5 MG tablet                Primary Care Provider Office Phone # Fax #    Gwyn Zheng [phone] [address]  [facility] [address]        Equal Access to Services     TAYO SANCHEZ : Hadii aad ku hadasho Soomaali, waaxda luqadaha, qaybta kaalmada adeegyada, waxay idiin hayaan adeeg kharash la'aan . So [facility] [phone].    ATENCIÓN: Si habla español, tiene a rene disposición servicios gratuitos de asistencia lingüística. Llame al [phone].    We comply with applicable federal civil rights laws and [place] laws. We do not discriminate on the basis of race, color, national origin, age, disability, sex, sexual orientation, or gender identity.            Thank you!     Thank you for choosing [facility]  for your care. Our goal is always to provide you with excellent care. Hearing back from our patients is one way we can continue to improve our services. Please take a few minutes to complete the written survey that you may receive in the mail after your visit with us. Thank you!             Your Updated Medication List - Protect others around you: Learn how to safely use, store and throw away your medicines at www.disposemymeds.org.          This list is accurate as of 6/26/18  3:42 PM.  Always use your most recent med list.                   Brand Name Dispense Instructions for use Diagnosis    amLODIPine 5 MG tablet    NORVASC    30 tablet    Take 1 tablet (5 mg) by mouth daily    Benign essential hypertension       atorvastatin 40 MG tablet    LIPITOR    90 tablet    Take 1 tablet (40 mg) by mouth daily    Chest pain, unspecified type       clopidogrel 75 MG tablet    PLAVIX    90 tablet    Take 1 tablet (75 mg) by mouth daily    Chest pain, unspecified type       FAMOTIDINE PO      Take 20 mg by mouth 2 times daily        losartan 100 MG tablet    COZAAR     30 tablet    Take 1 tablet (100 mg) by mouth daily    Benign essential hypertension       metoprolol tartrate 50 MG tablet    LOPRESSOR    90 tablet    1/2 tablet twice daily    Chest pain, unspecified type       MULTIVITAMIN & MINERAL PO      Take 1 tablet by mouth daily        nitroGLYcerin 0.4 MG sublingual tablet    NITROSTAT    25 tablet    For chest pain place 1 tablet under the tongue every 5 minutes for 3 doses. If symptoms persist 5 minutes after 1st dose call 911.    Chest pain, unspecified type       OMEGA-3 FISH OIL PO      Take 2 g by mouth daily        pantoprazole 40 MG EC tablet    PROTONIX    90 tablet    Take 1 tablet (40 mg) by mouth daily    Chest pain, unspecified type       psyllium Packet    METAMUCIL/KONSYL     Take 1 packet by mouth daily        TYLENOL PO      Take 500 mg by mouth every 8 hours as needed for mild pain or fever

## 2018-06-26 NOTE — PROGRESS NOTES
Primary Cardiologist: Dr. Yang    History of Present Illness:   This is a pleasant 85 year old male who presents to cardiology clinic for HTN management as well as discussion of antiplatelet therapy. His past medical history includes CAD (MOSES to mid and distal LCX), HTN, HLD, and hx of TIA in 2014 with discovery of PFO.     His blood pressure has been running high despite him taking his medications and trying to eat healthy. He also thinks that full dose of Plavix is causing nosebleeds and therefore he started taking half tablet Plavix in the morning and in the evening. He stopped taking ASA.     He denies CP, SOB, palpitations, or syncope.    Assessment and Plan:   This is a pleasant 85 year old male who presents to cardiology clinic for HTN management as well as discussion of antiplatelet therapy. His past medical history includes CAD (MOSES to mid and distal LCX), HTN, HLD, and hx of TIA in 2014 with discovery of PFO.     We will add amlodipine for more optimal BP control. I initially recommended changing BB to Carvedilol but he recently refilled his prescription.     In regards to antiplatelet therapy, I encouraged him to take Plavix as one tablet one time daily and not to split it in half. In 3 months, he should be able to resume ASA and stop Plavix. He will try this.     I have asked him to return in 2-3 months for follow up.     Thank you for allowing me to participate in the care of this patient today.       This note was completed in part using Dragon voice recognition software. Although reviewed after completion, some word and grammatical errors may occur.    Orders this Visit:  Orders Placed This Encounter   Procedures     Follow-Up with Cardiac Advanced Practice Provider     Orders Placed This Encounter   Medications     amLODIPine (NORVASC) 5 MG tablet     Sig: Take 1 tablet (5 mg) by mouth daily     Dispense:  30 tablet     Refill:  3     Medications Discontinued During This Encounter   Medication Reason      Garlic 1000 MG CAPS      valACYclovir (VALTREX) 1000 mg tablet          Encounter Diagnoses   Name Primary?     Benign essential hypertension Yes     Hyperlipidemia LDL goal <70      Coronary artery disease involving native coronary artery of native heart without angina pectoris        CURRENT MEDICATIONS:  Current Outpatient Prescriptions   Medication Sig Dispense Refill     Acetaminophen (TYLENOL PO) Take 500 mg by mouth every 8 hours as needed for mild pain or fever       amLODIPine (NORVASC) 5 MG tablet Take 1 tablet (5 mg) by mouth daily 30 tablet 3     atorvastatin (LIPITOR) 40 MG tablet Take 1 tablet (40 mg) by mouth daily 90 tablet 3     clopidogrel (PLAVIX) 75 MG tablet Take 1 tablet (75 mg) by mouth daily 90 tablet 3     FAMOTIDINE PO Take 20 mg by mouth 2 times daily       losartan (COZAAR) 100 MG tablet Take 1 tablet (100 mg) by mouth daily 30 tablet 3     metoprolol tartrate (LOPRESSOR) 50 MG tablet 1/2 tablet twice daily 90 tablet 3     Multiple Vitamins-Minerals (MULTIVITAMIN & MINERAL PO) Take 1 tablet by mouth daily       nitroGLYcerin (NITROSTAT) 0.4 MG sublingual tablet For chest pain place 1 tablet under the tongue every 5 minutes for 3 doses. If symptoms persist 5 minutes after 1st dose call 911. 25 tablet 1     Omega-3 Fatty Acids (OMEGA-3 FISH OIL PO) Take 2 g by mouth daily        pantoprazole (PROTONIX) 40 MG EC tablet Take 1 tablet (40 mg) by mouth daily 90 tablet 3     psyllium (METAMUCIL/KONSYL) Packet Take 1 packet by mouth daily         ALLERGIES     Allergies   Allergen Reactions     Penicillins        PAST MEDICAL HISTORY:  Past Medical History:   Diagnosis Date     Benign essential hypertension      Bloating      CAD (coronary artery disease)      Hyperlipidemia      NSTEMI (non-ST elevated myocardial infarction) (H)      PFO (patent foramen ovale)      TIA (transient ischemic attack)        PAST SURGICAL HISTORY:  Past Surgical History:   Procedure Laterality Date      APPENDECTOMY       CARDIAC CATHERIZATION  09/15/2017     MOSES to mid-distal Cx x2     ENT SURGERY      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     EYE SURGERY      cataracts     HERNIA REPAIR      twice       FAMILY HISTORY:  Family History   Problem Relation Age of Onset     Respiratory Father      pneumonia     Unknown/Adopted Father      Alzheimer Disease Brother      Alzheimer Disease Sister      Respiratory Sister      COPD     Unknown/Adopted Mother      Diabetes No family hx of      Coronary Artery Disease No family hx of      Hypertension No family hx of      Hyperlipidemia No family hx of      Cerebrovascular Disease No family hx of      Breast Cancer No family hx of      Colon Cancer No family hx of      Prostate Cancer No family hx of      Other Cancer No family hx of      Depression No family hx of      Anxiety Disorder No family hx of      Mental Illness No family hx of      Substance Abuse No family hx of      Anesthesia Reaction No family hx of      Asthma No family hx of      Osteoperosis No family hx of      Genetic Disorder No family hx of      Thyroid Disease No family hx of      Obesity No family hx of        SOCIAL HISTORY:  Social History     Social History     Marital status:      Spouse name: N/A     Number of children: N/A     Years of education: N/A     Social History Main Topics     Smoking status: Former Smoker     Quit date: 8/19/1968     Smokeless tobacco: Never Used     Alcohol use Yes      Comment: Occ     Drug use: No     Sexual activity: No     Other Topics Concern     Parent/Sibling W/ Cabg, Mi Or Angioplasty Before 65f 55m? No     Social History Narrative       Review of Systems:  Skin:  Negative     Eyes:  Positive for glasses;cataracts  ENT:  Negative    Respiratory:  Negative    Cardiovascular:    Positive for  Gastroenterology: Positive for reflux;heartburn  Genitourinary:  Negative    Musculoskeletal:  Negative    Neurologic:  Positive for stroke  Psychiatric:  Negative   "  Heme/Lymph/Imm:  Negative    Endocrine:  Negative      Physical Exam:  Vitals: /80  Pulse 58  Ht 1.689 m (5' 6.5\")  Wt 76.9 kg (169 lb 8 oz)  BMI 26.95 kg/m2     GEN:  NAD  NECK: No JVD  C/V:  Regular rate and rhythm, no murmur, rub or gallop.  RESP: Clear to auscultation bilaterally.  GI: Abdomen soft, nontender, nondistended.    EXTREM: No LE edema.   NEURO: Alert and oriented, cooperative. No obvious focal deficits.   PSYCH: Normal affect.  SKIN: Warm and dry.       Recent Lab Results:  LIPID RESULTS:  Lab Results   Component Value Date    CHOL 98 11/03/2017    HDL 40 11/03/2017    LDL 41 11/03/2017    TRIG 84 11/03/2017    CHOLHDLRATIO 4.4 08/31/2015       LIVER ENZYME RESULTS:  Lab Results   Component Value Date    AST 28 09/14/2017    ALT 33 11/03/2017       CBC RESULTS:  Lab Results   Component Value Date    WBC 5.8 04/11/2018    RBC 4.57 04/11/2018    HGB 14.8 04/11/2018    HCT 42.6 04/11/2018    MCV 93 04/11/2018    MCH 32.4 04/11/2018    MCHC 34.7 04/11/2018    RDW 12.5 04/11/2018     (L) 04/11/2018       BMP RESULTS:  Lab Results   Component Value Date     03/08/2018    POTASSIUM 4.4 03/08/2018    CHLORIDE 101 03/08/2018    CO2 29 03/08/2018    ANIONGAP 11.4 03/08/2018    GLC 99 03/08/2018    BUN 13 03/08/2018    CR 1.08 03/08/2018    GFRESTIMATED 65 03/08/2018    GFRESTBLACK 79 03/08/2018    RUSTY 9.0 03/08/2018        A1C RESULTS:  No results found for: A1C    INR RESULTS:  Lab Results   Component Value Date    INR 1.01 09/12/2014           Addis Lopez PA-C   June 26, 2018     "

## 2018-07-16 ENCOUNTER — OFFICE VISIT (OUTPATIENT)
Dept: FAMILY MEDICINE | Facility: CLINIC | Age: 83
End: 2018-07-16
Payer: COMMERCIAL

## 2018-07-16 VITALS
BODY MASS INDEX: 26.53 KG/M2 | SYSTOLIC BLOOD PRESSURE: 138 MMHG | HEART RATE: 60 BPM | RESPIRATION RATE: 16 BRPM | HEIGHT: 67 IN | DIASTOLIC BLOOD PRESSURE: 66 MMHG | TEMPERATURE: 98 F | WEIGHT: 169 LBS

## 2018-07-16 DIAGNOSIS — I10 BENIGN ESSENTIAL HYPERTENSION: Primary | ICD-10-CM

## 2018-07-16 DIAGNOSIS — K21.9 GASTROESOPHAGEAL REFLUX DISEASE, ESOPHAGITIS PRESENCE NOT SPECIFIED: ICD-10-CM

## 2018-07-16 DIAGNOSIS — I25.10 CORONARY ARTERY DISEASE INVOLVING NATIVE CORONARY ARTERY OF NATIVE HEART WITHOUT ANGINA PECTORIS: ICD-10-CM

## 2018-07-16 PROCEDURE — 99214 OFFICE O/P EST MOD 30 MIN: CPT | Performed by: FAMILY MEDICINE

## 2018-07-16 NOTE — MR AVS SNAPSHOT
After Visit Summary   7/16/2018    Cale Wagoner    MRN: [id number]           Patient Information     Date Of Birth          5/26/1933        Visit Information        Provider Department      7/16/2018 10:30 AM Gwyn Zheng MD [facility]        Today's Diagnoses     Benign essential hypertension    -  1    Coronary artery disease involving native coronary artery of native heart without angina pectoris        Gastroesophageal reflux disease, esophagitis presence not specified          Care Instructions    We discussed his list of medications.  Other than the fish oil tablets I do not see anything that he should be stopping at present.  He will follow-up with me in about 3 months.  He will need labs at that time.  I refilled his medications as noted.  I also sent a referral to [facility], [facility] for his wife to go get an evaluation done.  We talked about having him contact the [employer] for support.  We also briefly discussed memory care units.  The patient has had a many year history of stomach issues.  These are improved with him on both pantoprazole and famotidine.  We will continue with both.          Follow-ups after your visit        Follow-up notes from your care team     Return in about 3 months (around 10/16/2018) for Routine Visit, Lab Work, BP Recheck.      Your next 10 appointments already scheduled     Sep 17, 2018  9:00 AM CDT   SHORT with Gwyn Zheng MD   [facility]    [address]  [place] [address]   [phone]            Sep 26, 2018  1:50 PM CDT   Return Visit with Addis Lopez PA-C   [facility] ([facility])    [address]  [place] [address]   [phone] OPT 2              Who to contact     If you have questions  "or need follow up information about today's clinic visit or your schedule please contact [facility] directly at [phone].  Normal or non-critical lab and imaging results will be communicated to you by MyChart, letter or phone within 4 business days after the clinic has received the results. If you do not hear from us within 7 days, please contact the clinic through MyChart or phone. If you have a critical or abnormal lab result, we will notify you by phone as soon as possible.  Submit refill requests through [employer] or call your pharmacy and they will forward the refill request to us. Please allow 3 business days for your refill to be completed.          Additional Information About Your Visit        Care EveryWhere ID     This is your Care EveryWhere ID. This could be used by other organizations to access your [place] medical records  [id number]        Your Vitals Were     Pulse Temperature Respirations Height BMI (Body Mass Index)       60 98  F (36.7  C) (Tympanic) 16 5' 6.5\" (1.689 m) 26.87 kg/m2        Blood Pressure from Last 3 Encounters:   07/16/18 138/66   06/26/18 150/80   05/23/18 140/76    Weight from Last 3 Encounters:   07/16/18 169 lb (76.7 kg)   06/26/18 169 lb 8 oz (76.9 kg)   05/23/18 170 lb (77.1 kg)              We Performed the Following     PAF COMPLETED        Primary Care Provider Office Phone # Fax #    Gwyn Bowen Zheng [phone]       [address] [facility] [address]        Equal Access to Services     [facility] : Hadii aad ku hadasho Soomaali, waaxda luqadaha, qaybta kaalmada maren, wilton gaytan . So [facility] [phone].    ATENCIÓN: Si habla español, tiene a rene disposición servicios gratuitos de asistencia lingüística. Llame al [phone].    We comply with applicable federal civil rights laws and [place] laws. We do not discriminate on the basis of race, color, national origin, age, " disability, sex, sexual orientation, or gender identity.            Thank you!     Thank you for choosing [facility]  for your care. Our goal is always to provide you with excellent care. Hearing back from our patients is one way we can continue to improve our services. Please take a few minutes to complete the written survey that you may receive in the mail after your visit with us. Thank you!             Your Updated Medication List - Protect others around you: Learn how to safely use, store and throw away your medicines at www.disposemymeds.org.          This list is accurate as of 7/16/18 12:13 PM.  Always use your most recent med list.                   Brand Name Dispense Instructions for use Diagnosis    amLODIPine 5 MG tablet    NORVASC    30 tablet    Take 1 tablet (5 mg) by mouth daily    Benign essential hypertension       atorvastatin 40 MG tablet    LIPITOR    90 tablet    Take 1 tablet (40 mg) by mouth daily    Chest pain, unspecified type       clopidogrel 75 MG tablet    PLAVIX    90 tablet    Take 1 tablet (75 mg) by mouth daily    Chest pain, unspecified type       FAMOTIDINE PO      Take 20 mg by mouth 2 times daily        losartan 100 MG tablet    COZAAR    30 tablet    Take 1 tablet (100 mg) by mouth daily    Benign essential hypertension       metoprolol tartrate 50 MG tablet    LOPRESSOR    90 tablet    1/2 tablet twice daily    Chest pain, unspecified type       MULTIVITAMIN & MINERAL PO      Take 1 tablet by mouth daily        nitroGLYcerin 0.4 MG sublingual tablet    NITROSTAT    25 tablet    For chest pain place 1 tablet under the tongue every 5 minutes for 3 doses. If symptoms persist 5 minutes after 1st dose call 911.    Chest pain, unspecified type       OMEGA-3 FISH OIL PO      Take 2 g by mouth daily        pantoprazole 40 MG EC tablet    PROTONIX    90 tablet    Take 1 tablet (40 mg) by mouth daily    Chest pain, unspecified type       psyllium Packet     METAMUCIL/KONSYL     Take 1 packet by mouth daily        TYLENOL PO      Take 500 mg by mouth every 8 hours as needed for mild pain or fever

## 2018-07-16 NOTE — PROGRESS NOTES
SUBJECTIVE:   Cale Wagoner is a 85 year old male who presents to clinic today for the following health issues:      Hypertension Follow-up      Outpatient blood pressures are being checked at home.  Results are 140's/75.    Low Salt Diet: not monitoring salt      Amount of exercise or physical activity: 6-7 days/week for an average of 15-30 minutes    Problems taking medications regularly: No    Medication side effects: none    Diet: regular (no restrictions)        Vascular Disease Follow-up:  Coronary Artery Disease (CAD)      Chest pain or pressure, left side neck or arm pain: No    Shortness of breath/increased sweats/nausea with exertion: No    Pain in calves walking 1-2 blocks: No    Worsened or new symptoms since last visit: No    Nitroglycerin use: no    Daily aspirin use: No      Problem list and histories reviewed & adjusted, as indicated.  Additional history: The patient's wife, Dalila, has been having problems with short-term memory loss.  She has not been evaluated by neurology as of yet.  She starting to have hallucinations.    Patient Active Problem List   Diagnosis     Bloating     GERD (gastroesophageal reflux disease)     Health Care Home     Cerebrovascular accident (CVA), unspecified mechanism (H)     Hyperlipidemia     Slow transit constipation     Tingling of left upper extremity     Chronic pain of right knee     Screening for prostate cancer     Atypical chest pain     NSTEMI (non-ST elevated myocardial infarction) (H)     CAD (coronary artery disease)     Benign essential hypertension     PFO (patent foramen ovale)     TIA (transient ischemic attack)     Right sided facial pain     Right-sided headache     Past Surgical History:   Procedure Laterality Date     APPENDECTOMY       CARDIAC CATHERIZATION  09/15/2017     MOSES to mid-distal Cx x2     ENT SURGERY      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     EYE SURGERY      cataracts     HERNIA REPAIR      twice       Social History   Substance Use  "Topics     Smoking status: Former Smoker     Quit date: 8/19/1968     Smokeless tobacco: Never Used     Alcohol use Yes      Comment: Occ     Family History   Problem Relation Age of Onset     Respiratory Father      pneumonia     Unknown/Adopted Father      Alzheimer Disease Brother      Alzheimer Disease Sister      Respiratory Sister      COPD     Unknown/Adopted Mother      Diabetes No family hx of      Coronary Artery Disease No family hx of      Hypertension No family hx of      Hyperlipidemia No family hx of      Cerebrovascular Disease No family hx of      Breast Cancer No family hx of      Colon Cancer No family hx of      Prostate Cancer No family hx of      Other Cancer No family hx of      Depression No family hx of      Anxiety Disorder No family hx of      Mental Illness No family hx of      Substance Abuse No family hx of      Anesthesia Reaction No family hx of      Asthma No family hx of      Osteoperosis No family hx of      Genetic Disorder No family hx of      Thyroid Disease No family hx of      Obesity No family hx of            Reviewed and updated as needed this visit by clinical staff  Tobacco  Allergies  Meds  Med Hx  Surg Hx  Fam Hx  Soc Hx      Reviewed and updated as needed this visit by Provider         ROS:  Constitutional, neuro, ENT, endocrine, pulmonary, cardiac, gastrointestinal, genitourinary, musculoskeletal, integument and psychiatric systems are negative, except as otherwise noted.  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  The dermatologist recommended surgery to remove it because that is where he is having some headaches.  OBJECTIVE:                                                    /66 (Cuff Size: Adult Regular)  Pulse 60  Temp 98  F (36.7  C) (Tympanic)  Resp 16  Ht 5' 6.5\" (1.689 m)  Wt 169 lb (76.7 kg)  BMI 26.87 kg/m2  Body mass index is 26.87 kg/(m^2).  GENERAL APPEARANCE: healthy, " alert and no distress  RESP: lungs clear to auscultation - no rales, rhonchi or wheezes  CV: regular rates and rhythm, normal S1 S2, no S3 or S4 and no murmur, click or rub  SKIN: There is a 1-1/2 cm round red area in the right temporal area in the hairline.         ASSESSMENT/PLAN:                                                        ICD-10-CM    1. Benign essential hypertension I10    2. Coronary artery disease involving native coronary artery of native heart without angina pectoris I25.10    3. Gastroesophageal reflux disease, esophagitis presence not specified K21.9        Patient Instructions   We discussed his list of medications.  Other than the fish oil tablets I do not see anything that he should be stopping at present.  He will follow-up with me in about 3 months.  He will need labs at that time.  I refilled his medications as noted.  I also sent a referral to [facility], [facility] for his wife to go get an evaluation done.  We talked about having him contact the [employer] for support.  We also briefly discussed memory care units.  The patient has had a many year history of stomach issues.  These are improved with him on both pantoprazole and famotidine.  We will continue with both.      Gwyn Zheng MD  [facility]

## 2018-07-16 NOTE — PATIENT INSTRUCTIONS
We discussed his list of medications.  Other than the fish oil tablets I do not see anything that he should be stopping at present.  He will follow-up with me in about 3 months.  He will need labs at that time.  I refilled his medications as noted.  I also sent a referral to [facility], [facility] for his wife to go get an evaluation done.  We talked about having him contact the [employer]'s [employer] for support.  We also briefly discussed memory care units.  The patient has had a many year history of stomach issues.  These are improved with him on both pantoprazole and famotidine.  We will continue with both.

## 2018-09-17 ENCOUNTER — OFFICE VISIT (OUTPATIENT)
Dept: FAMILY MEDICINE | Facility: CLINIC | Age: 83
End: 2018-09-17
Payer: COMMERCIAL

## 2018-09-17 VITALS
TEMPERATURE: 97.7 F | WEIGHT: 169 LBS | SYSTOLIC BLOOD PRESSURE: 132 MMHG | OXYGEN SATURATION: 93 % | HEART RATE: 69 BPM | BODY MASS INDEX: 26.53 KG/M2 | RESPIRATION RATE: 16 BRPM | HEIGHT: 67 IN | DIASTOLIC BLOOD PRESSURE: 80 MMHG

## 2018-09-17 DIAGNOSIS — I10 BENIGN ESSENTIAL HYPERTENSION: Primary | ICD-10-CM

## 2018-09-17 DIAGNOSIS — I25.10 CORONARY ARTERY DISEASE INVOLVING NATIVE CORONARY ARTERY OF NATIVE HEART WITHOUT ANGINA PECTORIS: ICD-10-CM

## 2018-09-17 DIAGNOSIS — R07.9 CHEST PAIN, UNSPECIFIED TYPE: ICD-10-CM

## 2018-09-17 DIAGNOSIS — E78.2 MIXED HYPERLIPIDEMIA: ICD-10-CM

## 2018-09-17 DIAGNOSIS — K21.9 GASTROESOPHAGEAL REFLUX DISEASE, ESOPHAGITIS PRESENCE NOT SPECIFIED: ICD-10-CM

## 2018-09-17 LAB
ALT SERPL W P-5'-P-CCNC: 26 U/L (ref 0–70)
ANION GAP SERPL CALCULATED.3IONS-SCNC: 8 MMOL/L (ref 3–14)
BASOPHILS # BLD AUTO: 0 10E9/L (ref 0–0.2)
BASOPHILS NFR BLD AUTO: 0.9 %
BUN SERPL-MCNC: 11 MG/DL (ref 7–30)
CALCIUM SERPL-MCNC: 8.5 MG/DL (ref 8.5–10.1)
CHLORIDE SERPL-SCNC: 103 MMOL/L (ref 94–109)
CHOLEST SERPL-MCNC: 86 MG/DL
CO2 SERPL-SCNC: 26 MMOL/L (ref 20–32)
CREAT SERPL-MCNC: 0.96 MG/DL (ref 0.66–1.25)
DIFFERENTIAL METHOD BLD: NORMAL
EOSINOPHIL # BLD AUTO: 0.1 10E9/L (ref 0–0.7)
EOSINOPHIL NFR BLD AUTO: 1.3 %
ERYTHROCYTE [DISTWIDTH] IN BLOOD BY AUTOMATED COUNT: 12.5 % (ref 10–15)
GFR SERPL CREATININE-BSD FRML MDRD: 74 ML/MIN/1.7M2
GLUCOSE SERPL-MCNC: 101 MG/DL (ref 70–99)
HCT VFR BLD AUTO: 41.3 % (ref 40–53)
HDLC SERPL-MCNC: 40 MG/DL
HGB BLD-MCNC: 14.4 G/DL (ref 13.3–17.7)
LDLC SERPL CALC-MCNC: 30 MG/DL
LYMPHOCYTES # BLD AUTO: 1.9 10E9/L (ref 0.8–5.3)
LYMPHOCYTES NFR BLD AUTO: 39.9 %
MCH RBC QN AUTO: 32.5 PG (ref 26.5–33)
MCHC RBC AUTO-ENTMCNC: 34.9 G/DL (ref 31.5–36.5)
MCV RBC AUTO: 93 FL (ref 78–100)
MONOCYTES # BLD AUTO: 0.7 10E9/L (ref 0–1.3)
MONOCYTES NFR BLD AUTO: 15.6 %
NEUTROPHILS # BLD AUTO: 2 10E9/L (ref 1.6–8.3)
NEUTROPHILS NFR BLD AUTO: 42.3 %
NONHDLC SERPL-MCNC: 46 MG/DL
PLATELET # BLD AUTO: 158 10E9/L (ref 150–450)
POTASSIUM SERPL-SCNC: 4.2 MMOL/L (ref 3.4–5.3)
RBC # BLD AUTO: 4.43 10E12/L (ref 4.4–5.9)
SODIUM SERPL-SCNC: 137 MMOL/L (ref 133–144)
TRIGL SERPL-MCNC: 78 MG/DL
WBC # BLD AUTO: 4.7 10E9/L (ref 4–11)

## 2018-09-17 PROCEDURE — 85025 COMPLETE CBC W/AUTO DIFF WBC: CPT | Performed by: FAMILY MEDICINE

## 2018-09-17 PROCEDURE — 99214 OFFICE O/P EST MOD 30 MIN: CPT | Performed by: FAMILY MEDICINE

## 2018-09-17 PROCEDURE — 84460 ALANINE AMINO (ALT) (SGPT): CPT | Performed by: FAMILY MEDICINE

## 2018-09-17 PROCEDURE — 36415 COLL VENOUS BLD VENIPUNCTURE: CPT | Performed by: FAMILY MEDICINE

## 2018-09-17 PROCEDURE — 80048 BASIC METABOLIC PNL TOTAL CA: CPT | Performed by: FAMILY MEDICINE

## 2018-09-17 PROCEDURE — 80061 LIPID PANEL: CPT | Performed by: FAMILY MEDICINE

## 2018-09-17 RX ORDER — PANTOPRAZOLE SODIUM 40 MG/1
40 TABLET, DELAYED RELEASE ORAL DAILY
Qty: 90 TABLET | Refills: 3 | Status: SHIPPED | OUTPATIENT
Start: 2018-09-17 | End: 2018-10-15

## 2018-09-17 NOTE — LETTER
September 18, 2018      Cale Wagoner  [address]  [place] [address]        Dear ,    We are writing to inform you of your test results.    Your lab tests are essentially normal.  We should repeat them in 6 months.  I should see you at that time.  If you have any questions feel free to contact me.    Resulted Orders   Lipid Profile   Result Value Ref Range    Cholesterol 86 <200 mg/dL    Triglycerides 78 <150 mg/dL      Comment:      Fasting specimen    HDL Cholesterol 40 >39 mg/dL    LDL Cholesterol Calculated 30 <100 mg/dL      Comment:      Desirable:       <100 mg/dl    Non HDL Cholesterol 46 <130 mg/dL   ALT   Result Value Ref Range    ALT 26 0 - 70 U/L   CBC with platelets differential   Result Value Ref Range    WBC 4.7 4.0 - 11.0 10e9/L    RBC Count 4.43 4.4 - 5.9 10e12/L    Hemoglobin 14.4 13.3 - 17.7 g/dL    Hematocrit 41.3 40.0 - 53.0 %    MCV 93 78 - 100 fl    MCH 32.5 26.5 - 33.0 pg    MCHC 34.9 31.5 - 36.5 g/dL    RDW 12.5 10.0 - 15.0 %    Platelet Count 158 150 - 450 10e9/L    Diff Method Automated Method     % Neutrophils 42.3 %    % Lymphocytes 39.9 %    % Monocytes 15.6 %    % Eosinophils 1.3 %    % Basophils 0.9 %    Absolute Neutrophil 2.0 1.6 - 8.3 10e9/L    Absolute Lymphocytes 1.9 0.8 - 5.3 10e9/L    Absolute Monocytes 0.7 0.0 - 1.3 10e9/L    Absolute Eosinophils 0.1 0.0 - 0.7 10e9/L    Absolute Basophils 0.0 0.0 - 0.2 10e9/L   Basic metabolic panel   Result Value Ref Range    Sodium 137 133 - 144 mmol/L    Potassium 4.2 3.4 - 5.3 mmol/L    Chloride 103 94 - 109 mmol/L    Carbon Dioxide 26 20 - 32 mmol/L    Anion Gap 8 3 - 14 mmol/L    Glucose 101 (H) 70 - 99 mg/dL      Comment:      Fasting specimen    Urea Nitrogen 11 7 - 30 mg/dL    Creatinine 0.96 0.66 - 1.25 mg/dL    GFR Estimate 74 >60 mL/min/1.7m2      Comment:      Non  GFR Calc    GFR Estimate If Black 90 >60 mL/min/1.7m2      Comment:       GFR Calc    Calcium 8.5 8.5 -  10.1 mg/dL       If you have any questions or concerns, please call the clinic at the number listed above.       Sincerely,        Gwyn Zheng MD

## 2018-09-17 NOTE — MR AVS SNAPSHOT
After Visit Summary   9/17/2018    Cale Wagoner    MRN: [id number]           Patient Information     Date Of Birth          5/26/1933        Visit Information        Provider Department      9/17/2018 9:00 AM Gwyn Zhneg MD [facility]        Today's Diagnoses     Benign essential hypertension    -  1    Coronary artery disease involving native coronary artery of native heart without angina pectoris        Mixed hyperlipidemia        Chest pain, unspecified type        Gastroesophageal reflux disease, esophagitis presence not specified          Care Instructions    We will check labs today.  Pantoprazole was refilled.  We will plan on seeing him back in about 3 months.          Follow-ups after your visit        Follow-up notes from your care team     Return in about 3 months (around 12/17/2018) for hypertension, heart disease.      Your next 10 appointments already scheduled     Sep 26, 2018  1:50 PM CDT   Return Visit with Addis Lopez PA-C   [facility]    [address] [place] [address] [phone] OPT 2              Who to contact     If you have questions or need follow up information about today's clinic visit or your schedule please contact [facility] directly at [phone].  Normal or non-critical lab and imaging results will be communicated to you by MyChart, letter or phone within 4 business days after the clinic has received the results. If you do not hear from us within 7 days, please contact the clinic through MyChart or phone. If you have a critical or abnormal lab result, we will notify you by phone as soon as possible.  Submit refill requests through [employer] or call your pharmacy and they will forward the refill request to us. Please allow 3 business days for your refill to be completed.          Additional  "Information About Your Visit        MyChart Information     [employer] lets you send messages to your doctor, view your test results, renew your prescriptions, schedule appointments and more. To sign up, go to [email or web address] . Click on \"Log in\" on the left side of the screen, which will take you to the Welcome page. Then click on \"Sign up Now\" on the right side of the page.     You will be asked to enter the access code listed below, as well as some personal information. Please follow the directions to create your username and password.     Your access code is: [email or web address]  Expires: 2018 10:07 AM     Your access code will  in 90 days. If you need help or a new code, please call your [place] clinic or [phone].        Care EveryWhere ID     This is your Care EveryWhere ID. This could be used by other organizations to access your [place] medical records  [id number]        Your Vitals Were     Pulse Temperature Respirations Height Pulse Oximetry BMI (Body Mass Index)    69 97.7  F (36.5  C) (Tympanic) 16 5' 6.5\" (1.689 m) 93% 26.87 kg/m2       Blood Pressure from Last 3 Encounters:   18 132/80   18 138/66   18 150/80    Weight from Last 3 Encounters:   18 169 lb (76.7 kg)   18 169 lb (76.7 kg)   18 169 lb 8 oz (76.9 kg)              We Performed the Following     ALT     Basic metabolic panel     CBC with platelets differential     Lipid Profile          Where to get your medicines      These medications were sent to [id number] - [place], [place] - [address]  [address] [facility] [address]     Phone:  [phone]     pantoprazole 40 MG EC tablet          Primary Care Provider Office Phone # Fax #    Gwyn Zheng [phone]       [address] [facility] [address]        Equal Access to Services     TAYO SANCHEZ AH: melquiades Minaya wilton marcano " orestes austinwilliam rojas'aan ah. So [facility] [phone].    ATENCIÓN: Si chelsey dumont, tiene a rene disposición servicios gratuitos de asistencia lingüística. Kaley alegria [phone].    We comply with applicable federal civil rights laws and [place] laws. We do not discriminate on the basis of race, color, national origin, age, disability, sex, sexual orientation, or gender identity.            Thank you!     Thank you for choosing [facility]  for your care. Our goal is always to provide you with excellent care. Hearing back from our patients is one way we can continue to improve our services. Please take a few minutes to complete the written survey that you may receive in the mail after your visit with us. Thank you!             Your Updated Medication List - Protect others around you: Learn how to safely use, store and throw away your medicines at www.disposemymeds.org.          This list is accurate as of 9/17/18 10:07 AM.  Always use your most recent med list.                   Brand Name Dispense Instructions for use Diagnosis    amLODIPine 5 MG tablet    NORVASC    30 tablet    Take 1 tablet (5 mg) by mouth daily    Benign essential hypertension       ASPIRIN 81 PO           atorvastatin 40 MG tablet    LIPITOR    90 tablet    Take 1 tablet (40 mg) by mouth daily    Chest pain, unspecified type       clopidogrel 75 MG tablet    PLAVIX    90 tablet    Take 1 tablet (75 mg) by mouth daily    Chest pain, unspecified type       FAMOTIDINE PO      Take 20 mg by mouth 2 times daily        losartan 100 MG tablet    COZAAR    30 tablet    Take 1 tablet (100 mg) by mouth daily    Benign essential hypertension       metoprolol tartrate 50 MG tablet    LOPRESSOR    90 tablet    1/2 tablet twice daily    Chest pain, unspecified type       MULTIVITAMIN & MINERAL PO      Take 1 tablet by mouth daily        nitroGLYcerin 0.4 MG sublingual tablet    NITROSTAT    25 tablet    For chest pain  place 1 tablet under the tongue every 5 minutes for 3 doses. If symptoms persist 5 minutes after 1st dose call 911.    Chest pain, unspecified type       OMEGA-3 FISH OIL PO      Take 2 g by mouth daily        pantoprazole 40 MG EC tablet    PROTONIX    90 tablet    Take 1 tablet (40 mg) by mouth daily    Chest pain, unspecified type       psyllium Packet    METAMUCIL/KONSYL     Take 1 packet by mouth daily        TYLENOL PO      Take 500 mg by mouth every 8 hours as needed for mild pain or fever

## 2018-09-17 NOTE — PROGRESS NOTES
SUBJECTIVE:   Cale Wagoner is a 85 year old male who presents to clinic today for the following health issues:    Hyperlipidemia Follow-Up      Rate your low fat/cholesterol diet?: fair    Taking statin?  Yes, no muscle aches from statin    Other lipid medications/supplements?:  none    Hypertension Follow-up      Outpatient blood pressures are not being checked.    Low Salt Diet: not monitoring salt    Vascular Disease Follow-up:  Coronary Artery Disease (CAD)      Chest pain or pressure, left side neck or arm pain: No    Shortness of breath/increased sweats/nausea with exertion: No    Pain in calves walking 1-2 blocks: No    Worsened or new symptoms since last visit: No    Nitroglycerin use: no    Daily aspirin use: Yes    Cerebrovascular Follow-up      Patient history: TIA    Residual symptoms: None    Worsened or new symptoms since last visit: No    Daily aspirin use: Yes    Hypertension controlled: Yes      Amount of exercise or physical activity: Sometimes    Problems taking medications regularly: No    Medication side effects: none    Diet: regular (no restrictions)            Problem list and histories reviewed & adjusted, as indicated.  Additional history: as documented    Patient Active Problem List   Diagnosis     Bloating     GERD (gastroesophageal reflux disease)     Health Care Home     Cerebrovascular accident (CVA), unspecified mechanism (H)     Hyperlipidemia     Slow transit constipation     Tingling of left upper extremity     Chronic pain of right knee     Screening for prostate cancer     Atypical chest pain     NSTEMI (non-ST elevated myocardial infarction) (H)     CAD (coronary artery disease)     Benign essential hypertension     PFO (patent foramen ovale)     TIA (transient ischemic attack)     Right sided facial pain     Right-sided headache     Past Surgical History:   Procedure Laterality Date     APPENDECTOMY       CARDIAC CATHERIZATION  09/15/2017     MOSES to mid-distal Cx x2  "    ENT SURGERY      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     EYE SURGERY      cataracts     HERNIA REPAIR      twice       Social History   Substance Use Topics     Smoking status: Former Smoker     Quit date: 8/19/1968     Smokeless tobacco: Never Used     Alcohol use Yes      Comment: Occ     Family History   Problem Relation Age of Onset     Respiratory Father      pneumonia     Unknown/Adopted Father      Alzheimer Disease Brother      Alzheimer Disease Sister      Respiratory Sister      COPD     Unknown/Adopted Mother            Reviewed and updated as needed this visit by clinical staff  Tobacco  Allergies  Meds  Med Hx  Surg Hx  Fam Hx  Soc Hx      Reviewed and updated as needed this visit by Provider         ROS:  Constitutional, HEENT, cardiovascular, pulmonary, gi and gu systems are negative, except as otherwise noted.    OBJECTIVE:                                                    /80 (BP Location: Left arm, Patient Position: Sitting, Cuff Size: Adult Regular)  Pulse 69  Temp 97.7  F (36.5  C) (Tympanic)  Resp 16  Ht 5' 6.5\" (1.689 m)  Wt 169 lb (76.7 kg)  SpO2 93%  BMI 26.87 kg/m2  Body mass index is 26.87 kg/(m^2).  GENERAL APPEARANCE: healthy, alert and no distress  RESP: lungs clear to auscultation - no rales, rhonchi or wheezes  CV: regular rates and rhythm, normal S1 S2, no S3 or S4 and no murmur, click or rub         ASSESSMENT/PLAN:                                                        ICD-10-CM    1. Benign essential hypertension I10 Basic metabolic panel   2. Coronary artery disease involving native coronary artery of native heart without angina pectoris I25.10    3. Mixed hyperlipidemia E78.2 Lipid Profile     ALT   4. Chest pain, unspecified type R07.9 pantoprazole (PROTONIX) 40 MG EC tablet   5. Gastroesophageal reflux disease, esophagitis presence not specified K21.9 CBC with platelets differential     Return in about 3 months (around 12/17/2018) for hypertension, heart " disease.  Patient Instructions   We will check labs today.  Pantoprazole was refilled.  We will plan on seeing him back in about 3 months.      Gwyn Zheng MD  [facility]

## 2018-09-17 NOTE — PATIENT INSTRUCTIONS
We will check labs today.  Pantoprazole was refilled.  We will plan on seeing him back in about 3 months.

## 2018-09-18 ASSESSMENT — PATIENT HEALTH QUESTIONNAIRE - PHQ9: SUM OF ALL RESPONSES TO PHQ QUESTIONS 1-9: 1

## 2018-09-23 DIAGNOSIS — I10 BENIGN ESSENTIAL HYPERTENSION: ICD-10-CM

## 2018-09-24 RX ORDER — LOSARTAN POTASSIUM 100 MG/1
TABLET ORAL
Qty: 90 TABLET | Refills: 3 | Status: SHIPPED | OUTPATIENT
Start: 2018-09-24 | End: 2019-09-20

## 2018-09-24 NOTE — TELEPHONE ENCOUNTER
"Requested Prescriptions   Pending Prescriptions Disp Refills     losartan (COZAAR) 100 MG tablet [Pharmacy Med Name: Losartan Potassium Oral Tablet 100 MG]  Last Written Prescription Date:  5/9/18  Last Fill Quantity: 30,  # refills: 3   Last office visit: 9/17/2018 with prescribing provider:  Marce   Future Office Visit:   Next 5 appointments (look out 90 days)     Sep 26, 2018  1:50 PM CDT   Return Visit with Addis Lopez PA-C   [facility] ([facility])    [address] [place] [id number] [phone] OPT 2                  30 tablet 2     Sig: TAKE ONE TABLET (100 mg) BY MOUTH ONE TIME DAILY    Angiotensin-II Receptors Passed    9/23/2018  5:47 PM       Passed - Blood pressure under 140/90 in past 12 months    BP Readings from Last 3 Encounters:   09/17/18 132/80   07/16/18 138/66   06/26/18 150/80                Passed - Recent (12 mo) or future (30 days) visit within the authorizing provider's specialty    Patient had office visit in the last 12 months or has a visit in the next 30 days with authorizing provider or within the authorizing provider's specialty.  See \"Patient Info\" tab in inbasket, or \"Choose Columns\" in Meds & Orders section of the refill encounter.           Passed - Patient is age 18 or older       Passed - Normal serum creatinine on file in past 12 months    Recent Labs   Lab Test  09/17/18   0900   CR  0.96            Passed - Normal serum potassium on file in past 12 months    Recent Labs   Lab Test  09/17/18   0900   POTASSIUM  4.2                      "

## 2018-09-26 ENCOUNTER — OFFICE VISIT (OUTPATIENT)
Dept: CARDIOLOGY | Facility: CLINIC | Age: 83
End: 2018-09-26
Attending: PHYSICIAN ASSISTANT
Payer: COMMERCIAL

## 2018-09-26 VITALS
BODY MASS INDEX: 26.93 KG/M2 | SYSTOLIC BLOOD PRESSURE: 149 MMHG | HEIGHT: 67 IN | WEIGHT: 171.6 LBS | DIASTOLIC BLOOD PRESSURE: 77 MMHG | HEART RATE: 67 BPM

## 2018-09-26 DIAGNOSIS — E78.5 HYPERLIPIDEMIA LDL GOAL <70: ICD-10-CM

## 2018-09-26 DIAGNOSIS — I10 BENIGN ESSENTIAL HYPERTENSION: ICD-10-CM

## 2018-09-26 DIAGNOSIS — I25.10 CORONARY ARTERY DISEASE INVOLVING NATIVE CORONARY ARTERY OF NATIVE HEART WITHOUT ANGINA PECTORIS: Primary | ICD-10-CM

## 2018-09-26 PROCEDURE — 99214 OFFICE O/P EST MOD 30 MIN: CPT | Performed by: PHYSICIAN ASSISTANT

## 2018-09-26 RX ORDER — AMLODIPINE BESYLATE 5 MG/1
5 TABLET ORAL DAILY
Qty: 90 TABLET | Refills: 3 | Status: SHIPPED | OUTPATIENT
Start: 2018-09-26 | End: 2019-10-23

## 2018-09-26 RX ORDER — ATORVASTATIN CALCIUM 20 MG/1
20 TABLET, FILM COATED ORAL DAILY
Qty: 90 TABLET | Refills: 3 | Status: SHIPPED | OUTPATIENT
Start: 2018-09-26 | End: 2019-10-25

## 2018-09-26 NOTE — PATIENT INSTRUCTIONS
Today's Plan:   1) Decrease Atorvastatin- 20 mg.   2) Finish your Plavix prescription.   3) Continue to take Amlodipine.    If you have questions or concerns please call my nurse team at [phone].     Scheduling phone number: [phone]  Reminder: Please bring in all current medications, over the counter supplements and vitamin bottles to your next appointment.    It was a pleasure seeing you today!     Addis Lopez  9/26/2018

## 2018-09-26 NOTE — LETTER
9/26/2018    Gwyn Zheng MD  [address]  [facility] [address]    RE: Cale Wagoner       Dear Colleague,    I had the pleasure of seeing Cale Wagoner in the [facility].    Primary Cardiologist: Dr. Yang    History of Present Illness:   This is a pleasant 85 year old male who presents to cardiology clinic for HTN management as well as discussion of antiplatelet therapy. His past medical history includes CAD (MOSES to mid and distal LCX), HTN, HLD, and hx of TIA in 2014 with discovery of PFO.     During out last visit his blood pressure was noted to be high. We started him on amlodipine. He returns today for re-evaluation and also discussion of completing his Plavix therapy.     Assessment and Plan:   This is a fairly healthy 85 year old male who presents for 3 month follow up after adding amlodipine for BP control. His blood pressure has been better based on recent office visits he has had in other clinics. His blood pressure today however is high but he tells me ran out of his amlodipine 3 days ago. I will send a new prescription for this. His most recent lipid panel also shows markedly low serum lipid levels. We will decrease his atorvastatin to 20 mg. He will finish his current Plavix prescription.     Thank you for allowing me to participate in the care of this patient today.       This note was completed in part using Dragon voice recognition software. Although reviewed after completion, some word and grammatical errors may occur.    Orders this Visit:  No orders of the defined types were placed in this encounter.    Orders Placed This Encounter   Medications     amLODIPine (NORVASC) 5 MG tablet     Sig: Take 1 tablet (5 mg) by mouth daily     Dispense:  90 tablet     Refill:  3     atorvastatin (LIPITOR) 20 MG tablet     Sig: Take 1 tablet (20 mg) by mouth daily     Dispense:  90 tablet     Refill:  3     Medications Discontinued During This Encounter    Medication Reason     amLODIPine (NORVASC) 5 MG tablet Reorder     atorvastatin (LIPITOR) 40 MG tablet      clopidogrel (PLAVIX) 75 MG tablet          Encounter Diagnoses   Name Primary?     Benign essential hypertension      Hyperlipidemia LDL goal <70      Coronary artery disease involving native coronary artery of native heart without angina pectoris Yes       CURRENT MEDICATIONS:  Current Outpatient Prescriptions   Medication Sig Dispense Refill     Acetaminophen (TYLENOL PO) Take 500 mg by mouth every 8 hours as needed for mild pain or fever       amLODIPine (NORVASC) 5 MG tablet Take 1 tablet (5 mg) by mouth daily 90 tablet 3     ASPIRIN 81 PO        atorvastatin (LIPITOR) 20 MG tablet Take 1 tablet (20 mg) by mouth daily 90 tablet 3     FAMOTIDINE PO Take 20 mg by mouth 2 times daily       metoprolol tartrate (LOPRESSOR) 50 MG tablet 1/2 tablet twice daily 90 tablet 3     Multiple Vitamins-Minerals (MULTIVITAMIN & MINERAL PO) Take 1 tablet by mouth daily       Omega-3 Fatty Acids (OMEGA-3 FISH OIL PO) Take 2 g by mouth daily        pantoprazole (PROTONIX) 40 MG EC tablet Take 1 tablet (40 mg) by mouth daily 90 tablet 3     psyllium (METAMUCIL/KONSYL) Packet Take 1 packet by mouth daily       losartan (COZAAR) 100 MG tablet TAKE ONE TABLET (100 mg) BY MOUTH ONE TIME DAILY  90 tablet 3     nitroGLYcerin (NITROSTAT) 0.4 MG sublingual tablet For chest pain place 1 tablet under the tongue every 5 minutes for 3 doses. If symptoms persist 5 minutes after 1st dose call 911. (Patient not taking: Reported on 9/26/2018) 25 tablet 1     [DISCONTINUED] amLODIPine (NORVASC) 5 MG tablet Take 1 tablet (5 mg) by mouth daily (Patient not taking: Reported on 9/26/2018) 30 tablet 3     [DISCONTINUED] atorvastatin (LIPITOR) 40 MG tablet Take 1 tablet (40 mg) by mouth daily 90 tablet 3       ALLERGIES     Allergies   Allergen Reactions     Penicillins        PAST MEDICAL HISTORY:  Past Medical History:   Diagnosis Date      "Benign essential hypertension      Bloating      CAD (coronary artery disease)      Hyperlipidemia      NSTEMI (non-ST elevated myocardial infarction) (H)      PFO (patent foramen ovale)      TIA (transient ischemic attack)        PAST SURGICAL HISTORY:  Past Surgical History:   Procedure Laterality Date     APPENDECTOMY       CARDIAC CATHERIZATION  09/15/2017     MOSES to mid-distal Cx x2     ENT SURGERY      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     EYE SURGERY      cataracts     HERNIA REPAIR      twice       FAMILY HISTORY:  Family History   Problem Relation Age of Onset     Respiratory Father      pneumonia     Unknown/Adopted Father      Alzheimer Disease Brother      Alzheimer Disease Sister      Respiratory Sister      COPD     Unknown/Adopted Mother        SOCIAL HISTORY:  Social History     Social History     Marital status:      Spouse name: N/A     Number of children: N/A     Years of education: N/A     Social History Main Topics     Smoking status: Former Smoker     Quit date: 8/19/1968     Smokeless tobacco: Never Used     Alcohol use Yes      Comment: Occ     Drug use: No     Sexual activity: No     Other Topics Concern     Parent/Sibling W/ Cabg, Mi Or Angioplasty Before 65f 55m? No     Social History Narrative       Review of Systems:  Skin:  Negative     Eyes:  Positive for glasses;cataracts  ENT:  Negative    Respiratory:  Negative    Cardiovascular:  Negative    Gastroenterology: Positive for reflux;heartburn  Genitourinary:  Negative decreased urinary stream;urinary frequency  Musculoskeletal:  Negative    Neurologic:  Positive for stroke  Psychiatric:  Negative    Heme/Lymph/Imm:  Negative    Endocrine:  Negative      Physical Exam:  Vitals: /77  Pulse 67  Ht 1.689 m (5' 6.5\")  Wt 77.8 kg (171 lb 9.6 oz)  BMI 27.28 kg/m2     GEN:  NAD  NECK: No JVD  C/V:  Regular rate and rhythm, no murmur, rub or gallop.  RESP: Clear to auscultation bilaterally without wheezing, rales, or rhonchi.  GI: " Abdomen soft, nontender, nondistended.   EXTREM: No LE edema.   NEURO: Alert and oriented, cooperative. No obvious focal deficits.   PSYCH: Normal affect.  SKIN: Warm and dry.       Recent Lab Results:  LIPID RESULTS:  Lab Results   Component Value Date    CHOL 86 09/17/2018    HDL 40 09/17/2018    LDL 30 09/17/2018    TRIG 78 09/17/2018    CHOLHDLRATIO 4.4 08/31/2015       LIVER ENZYME RESULTS:  Lab Results   Component Value Date    AST 28 09/14/2017    ALT 26 09/17/2018       CBC RESULTS:  Lab Results   Component Value Date    WBC 4.7 09/17/2018    RBC 4.43 09/17/2018    HGB 14.4 09/17/2018    HCT 41.3 09/17/2018    MCV 93 09/17/2018    MCH 32.5 09/17/2018    MCHC 34.9 09/17/2018    RDW 12.5 09/17/2018     09/17/2018       BMP RESULTS:  Lab Results   Component Value Date     09/17/2018    POTASSIUM 4.2 09/17/2018    CHLORIDE 103 09/17/2018    CO2 26 09/17/2018    ANIONGAP 8 09/17/2018     (H) 09/17/2018    BUN 11 09/17/2018    CR 0.96 09/17/2018    GFRESTIMATED 74 09/17/2018    GFRESTBLACK 90 09/17/2018    RUSTY 8.5 09/17/2018        A1C RESULTS:  No results found for: A1C    INR RESULTS:  Lab Results   Component Value Date    INR 1.01 09/12/2014           Addis Lopez PA-C   September 26, 2018       Thank you for allowing me to participate in the care of your patient.      Sincerely,     Addis Lopez PA-C     [facility]    cc:   Addis Lopez PA-C  [address] [place] [address]

## 2018-09-26 NOTE — LETTER
9/26/2018    Gwyn Zheng MD  [address]  [facility] [address]    RE: Cale Wagoner       Dear Colleague,    I had the pleasure of seeing Cale Wagoner in the [facility].    Primary Cardiologist: Dr. Yang    History of Present Illness:   This is a pleasant 85 year old male who presents to cardiology clinic for HTN management as well as discussion of antiplatelet therapy. His past medical history includes CAD (MOSES to mid and distal LCX), HTN, HLD, and hx of TIA in 2014 with discovery of PFO.     During out last visit his blood pressure was noted to be high. We started him on amlodipine. He returns today for re-evaluation and also discussion of completing his Plavix therapy.     Assessment and Plan:   This is a fairly healthy 85 year old male who presents for 3 month follow up after adding amlodipine for BP control. His blood pressure has been better based on recent office visits he has had in other clinics. His blood pressure today however is high but he tells me ran out of his amlodipine 3 days ago. I will send a new prescription for this. His most recent lipid panel also shows markedly low serum lipid levels. We will decrease his atorvastatin to 20 mg. He will finish his current Plavix prescription.     Thank you for allowing me to participate in the care of this patient today.       This note was completed in part using Dragon voice recognition software. Although reviewed after completion, some word and grammatical errors may occur.    Orders this Visit:  No orders of the defined types were placed in this encounter.    Orders Placed This Encounter   Medications     amLODIPine (NORVASC) 5 MG tablet     Sig: Take 1 tablet (5 mg) by mouth daily     Dispense:  90 tablet     Refill:  3     atorvastatin (LIPITOR) 20 MG tablet     Sig: Take 1 tablet (20 mg) by mouth daily     Dispense:  90 tablet     Refill:  3     Medications Discontinued During This Encounter    Medication Reason     amLODIPine (NORVASC) 5 MG tablet Reorder     atorvastatin (LIPITOR) 40 MG tablet      clopidogrel (PLAVIX) 75 MG tablet          Encounter Diagnoses   Name Primary?     Benign essential hypertension      Hyperlipidemia LDL goal <70      Coronary artery disease involving native coronary artery of native heart without angina pectoris Yes       CURRENT MEDICATIONS:  Current Outpatient Prescriptions   Medication Sig Dispense Refill     Acetaminophen (TYLENOL PO) Take 500 mg by mouth every 8 hours as needed for mild pain or fever       amLODIPine (NORVASC) 5 MG tablet Take 1 tablet (5 mg) by mouth daily 90 tablet 3     ASPIRIN 81 PO        atorvastatin (LIPITOR) 20 MG tablet Take 1 tablet (20 mg) by mouth daily 90 tablet 3     FAMOTIDINE PO Take 20 mg by mouth 2 times daily       metoprolol tartrate (LOPRESSOR) 50 MG tablet 1/2 tablet twice daily 90 tablet 3     Multiple Vitamins-Minerals (MULTIVITAMIN & MINERAL PO) Take 1 tablet by mouth daily       Omega-3 Fatty Acids (OMEGA-3 FISH OIL PO) Take 2 g by mouth daily        pantoprazole (PROTONIX) 40 MG EC tablet Take 1 tablet (40 mg) by mouth daily 90 tablet 3     psyllium (METAMUCIL/KONSYL) Packet Take 1 packet by mouth daily       losartan (COZAAR) 100 MG tablet TAKE ONE TABLET (100 mg) BY MOUTH ONE TIME DAILY  90 tablet 3     nitroGLYcerin (NITROSTAT) 0.4 MG sublingual tablet For chest pain place 1 tablet under the tongue every 5 minutes for 3 doses. If symptoms persist 5 minutes after 1st dose call 911. (Patient not taking: Reported on 9/26/2018) 25 tablet 1     [DISCONTINUED] amLODIPine (NORVASC) 5 MG tablet Take 1 tablet (5 mg) by mouth daily (Patient not taking: Reported on 9/26/2018) 30 tablet 3     [DISCONTINUED] atorvastatin (LIPITOR) 40 MG tablet Take 1 tablet (40 mg) by mouth daily 90 tablet 3       ALLERGIES     Allergies   Allergen Reactions     Penicillins        PAST MEDICAL HISTORY:  Past Medical History:   Diagnosis Date      "Benign essential hypertension      Bloating      CAD (coronary artery disease)      Hyperlipidemia      NSTEMI (non-ST elevated myocardial infarction) (H)      PFO (patent foramen ovale)      TIA (transient ischemic attack)        PAST SURGICAL HISTORY:  Past Surgical History:   Procedure Laterality Date     APPENDECTOMY       CARDIAC CATHERIZATION  09/15/2017     MOSES to mid-distal Cx x2     ENT SURGERY      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     EYE SURGERY      cataracts     HERNIA REPAIR      twice       FAMILY HISTORY:  Family History   Problem Relation Age of Onset     Respiratory Father      pneumonia     Unknown/Adopted Father      Alzheimer Disease Brother      Alzheimer Disease Sister      Respiratory Sister      COPD     Unknown/Adopted Mother        SOCIAL HISTORY:  Social History     Social History     Marital status:      Spouse name: N/A     Number of children: N/A     Years of education: N/A     Social History Main Topics     Smoking status: Former Smoker     Quit date: 8/19/1968     Smokeless tobacco: Never Used     Alcohol use Yes      Comment: Occ     Drug use: No     Sexual activity: No     Other Topics Concern     Parent/Sibling W/ Cabg, Mi Or Angioplasty Before 65f 55m? No     Social History Narrative       Review of Systems:  Skin:  Negative     Eyes:  Positive for glasses;cataracts  ENT:  Negative    Respiratory:  Negative    Cardiovascular:  Negative    Gastroenterology: Positive for reflux;heartburn  Genitourinary:  Negative decreased urinary stream;urinary frequency  Musculoskeletal:  Negative    Neurologic:  Positive for stroke  Psychiatric:  Negative    Heme/Lymph/Imm:  Negative    Endocrine:  Negative      Physical Exam:  Vitals: /77  Pulse 67  Ht 1.689 m (5' 6.5\")  Wt 77.8 kg (171 lb 9.6 oz)  BMI 27.28 kg/m2     GEN:  NAD  NECK: No JVD  C/V:  Regular rate and rhythm, no murmur, rub or gallop.  RESP: Clear to auscultation bilaterally without wheezing, rales, or rhonchi.  GI: " Abdomen soft, nontender, nondistended.   EXTREM: No LE edema.   NEURO: Alert and oriented, cooperative. No obvious focal deficits.   PSYCH: Normal affect.  SKIN: Warm and dry.       Recent Lab Results:  LIPID RESULTS:  Lab Results   Component Value Date    CHOL 86 09/17/2018    HDL 40 09/17/2018    LDL 30 09/17/2018    TRIG 78 09/17/2018    CHOLHDLRATIO 4.4 08/31/2015       LIVER ENZYME RESULTS:  Lab Results   Component Value Date    AST 28 09/14/2017    ALT 26 09/17/2018       CBC RESULTS:  Lab Results   Component Value Date    WBC 4.7 09/17/2018    RBC 4.43 09/17/2018    HGB 14.4 09/17/2018    HCT 41.3 09/17/2018    MCV 93 09/17/2018    MCH 32.5 09/17/2018    MCHC 34.9 09/17/2018    RDW 12.5 09/17/2018     09/17/2018       BMP RESULTS:  Lab Results   Component Value Date     09/17/2018    POTASSIUM 4.2 09/17/2018    CHLORIDE 103 09/17/2018    CO2 26 09/17/2018    ANIONGAP 8 09/17/2018     (H) 09/17/2018    BUN 11 09/17/2018    CR 0.96 09/17/2018    GFRESTIMATED 74 09/17/2018    GFRESTBLACK 90 09/17/2018    RUSTY 8.5 09/17/2018        A1C RESULTS:  No results found for: A1C    INR RESULTS:  Lab Results   Component Value Date    INR 1.01 09/12/2014           Addis Lopez PA-C   September 26, 2018       Thank you for allowing me to participate in the care of your patient.      Sincerely,     Addis Lopez PA-C     [facility]

## 2018-09-26 NOTE — MR AVS SNAPSHOT
After Visit Summary   9/26/2018    Cale Wagoner    MRN: [id number]           Patient Information     Date Of Birth          5/26/1933        Visit Information        Provider Department      9/26/2018 1:50 PM Addis Lopez PA-C [facility]        Today's Diagnoses     Coronary artery disease involving native coronary artery of native heart without angina pectoris    -  1    Benign essential hypertension        Hyperlipidemia LDL goal <70          Care Instructions    Today's Plan:   1) Decrease Atorvastatin- 20 mg.   2) Finish your Plavix prescription.   3) Continue to take Amlodipine.    If you have questions or concerns please call my nurse team at [phone].     Scheduling phone number: [phone]  Reminder: Please bring in all current medications, over the counter supplements and vitamin bottles to your next appointment.    It was a pleasure seeing you today!     Addis Lopez  9/26/2018              Follow-ups after your visit        Who to contact     If you have questions or need follow up information about today's clinic visit or your schedule please contact [facility] directly at [phone].  Normal or non-critical lab and imaging results will be communicated to you by MyChart, letter or phone within 4 business days after the clinic has received the results. If you do not hear from us within 7 days, please contact the clinic through [facility] or phone. If you have a critical or abnormal lab result, we will notify you by phone as soon as possible.  Submit refill requests through [facility] or call your pharmacy and they will forward the refill request to us. Please allow 3 business days for your refill to be completed.          Additional Information About Your Visit        MyChart Information     [facility] lets you send messages to your doctor, view your test results, renew your prescriptions,  "schedule appointments and more. To sign up, go to [email or web address] . Click on \"Log in\" on the left side of the screen, which will take you to the Welcome page. Then click on \"Sign up Now\" on the right side of the page.     You will be asked to enter the access code listed below, as well as some personal information. Please follow the directions to create your username and password.     Your access code is: [email or web address]  Expires: 2018 10:07 AM     Your access code will  in 90 days. If you need help or a new code, please call your [place] clinic or [phone].        Care EveryWhere ID     This is your Care EveryWhere ID. This could be used by other organizations to access your [facility] medical records  [id number]        Your Vitals Were     Pulse Height BMI (Body Mass Index)             67 1.689 m (5' 6.5\") 27.28 kg/m2          Blood Pressure from Last 3 Encounters:   18 149/77   18 132/80   18 138/66    Weight from Last 3 Encounters:   18 77.8 kg (171 lb 9.6 oz)   18 76.7 kg (169 lb)   18 76.7 kg (169 lb)              We Performed the Following     Follow-Up with Cardiac Advanced Practice Provider          Today's Medication Changes          These changes are accurate as of 18  1:54 PM.  If you have any questions, ask your nurse or doctor.               These medicines have changed or have updated prescriptions.        Dose/Directions    atorvastatin 20 MG tablet   Commonly known as:  LIPITOR   This may have changed:    - medication strength  - how much to take   Used for:  Hyperlipidemia LDL goal <70   Changed by:  Addis Lopez PA-C        Dose:  20 mg   Take 1 tablet (20 mg) by mouth daily   Quantity:  90 tablet   Refills:  3         Stop taking these medicines if you haven't already. Please contact your care team if you have questions.     clopidogrel 75 MG tablet   Commonly known as:  PLAVIX   Stopped by:  Addis Lopez PA-C     "            Where to get your medicines      These medications were sent to [id number] - [place], [place] - [address]  [address], [facility] [address]     Phone:  [phone]     amLODIPine 5 MG tablet    atorvastatin 20 MG tablet                Primary Care Provider Office Phone # Fax #    Gwyn Zheng [phone] [address]  [facility] [address]        Equal Access to Services     TAYO SANCHEZ : Hadii aad ku hadasho Soomaali, waaxda luqadaha, qaybta kaalmada adeegyada, waxay idiin hayaan adeeg kharash la'aan . So [facility] [phone].    ATENCIÓN: Si habla español, tiene a rene disposición servicios gratuitos de asistencia lingüística. [facility] [phone].    We comply with applicable federal civil rights laws and [place] laws. We do not discriminate on the basis of race, color, national origin, age, disability, sex, sexual orientation, or gender identity.            Thank you!     Thank you for choosing [facility]  for your care. Our goal is always to provide you with excellent care. Hearing back from our patients is one way we can continue to improve our services. Please take a few minutes to complete the written survey that you may receive in the mail after your visit with us. Thank you!             Your Updated Medication List - Protect others around you: Learn how to safely use, store and throw away your medicines at www.disposemymeds.org.          This list is accurate as of 9/26/18  1:54 PM.  Always use your most recent med list.                   Brand Name Dispense Instructions for use Diagnosis    amLODIPine 5 MG tablet    NORVASC    90 tablet    Take 1 tablet (5 mg) by mouth daily    Benign essential hypertension       ASPIRIN 81 PO           atorvastatin 20 MG tablet    LIPITOR    90 tablet    Take 1 tablet (20 mg) by mouth daily    Hyperlipidemia LDL goal <70       FAMOTIDINE PO      Take 20 mg  by mouth 2 times daily        losartan 100 MG tablet    COZAAR    90 tablet    TAKE ONE TABLET (100 mg) BY MOUTH ONE TIME DAILY    Benign essential hypertension       metoprolol tartrate 50 MG tablet    LOPRESSOR    90 tablet    1/2 tablet twice daily    Chest pain, unspecified type       MULTIVITAMIN & MINERAL PO      Take 1 tablet by mouth daily        nitroGLYcerin 0.4 MG sublingual tablet    NITROSTAT    25 tablet    For chest pain place 1 tablet under the tongue every 5 minutes for 3 doses. If symptoms persist 5 minutes after 1st dose call 911.    Chest pain, unspecified type       OMEGA-3 FISH OIL PO      Take 2 g by mouth daily        pantoprazole 40 MG EC tablet    PROTONIX    90 tablet    Take 1 tablet (40 mg) by mouth daily    Chest pain, unspecified type       psyllium Packet    METAMUCIL/KONSYL     Take 1 packet by mouth daily        TYLENOL PO      Take 500 mg by mouth every 8 hours as needed for mild pain or fever

## 2018-09-26 NOTE — PROGRESS NOTES
Primary Cardiologist: Dr. Yang    History of Present Illness:   This is a pleasant 85 year old male who presents to cardiology clinic for HTN management as well as discussion of antiplatelet therapy. His past medical history includes CAD (MOSES to mid and distal LCX), HTN, HLD, and hx of TIA in 2014 with discovery of PFO.     During out last visit his blood pressure was noted to be high. We started him on amlodipine. He returns today for re-evaluation and also discussion of completing his Plavix therapy.     Assessment and Plan:   This is a fairly healthy 85 year old male who presents for 3 month follow up after adding amlodipine for BP control. His blood pressure has been better based on recent office visits he has had in other clinics. His blood pressure today however is high but he tells me ran out of his amlodipine 3 days ago. I will send a new prescription for this. His most recent lipid panel also shows markedly low serum lipid levels. We will decrease his atorvastatin to 20 mg. He will finish his current Plavix prescription.     Thank you for allowing me to participate in the care of this patient today.       This note was completed in part using Dragon voice recognition software. Although reviewed after completion, some word and grammatical errors may occur.    Orders this Visit:  No orders of the defined types were placed in this encounter.    Orders Placed This Encounter   Medications     amLODIPine (NORVASC) 5 MG tablet     Sig: Take 1 tablet (5 mg) by mouth daily     Dispense:  90 tablet     Refill:  3     atorvastatin (LIPITOR) 20 MG tablet     Sig: Take 1 tablet (20 mg) by mouth daily     Dispense:  90 tablet     Refill:  3     Medications Discontinued During This Encounter   Medication Reason     amLODIPine (NORVASC) 5 MG tablet Reorder     atorvastatin (LIPITOR) 40 MG tablet      clopidogrel (PLAVIX) 75 MG tablet          Encounter Diagnoses   Name Primary?     Benign essential hypertension       Hyperlipidemia LDL goal <70      Coronary artery disease involving native coronary artery of native heart without angina pectoris Yes       CURRENT MEDICATIONS:  Current Outpatient Prescriptions   Medication Sig Dispense Refill     Acetaminophen (TYLENOL PO) Take 500 mg by mouth every 8 hours as needed for mild pain or fever       amLODIPine (NORVASC) 5 MG tablet Take 1 tablet (5 mg) by mouth daily 90 tablet 3     ASPIRIN 81 PO        atorvastatin (LIPITOR) 20 MG tablet Take 1 tablet (20 mg) by mouth daily 90 tablet 3     FAMOTIDINE PO Take 20 mg by mouth 2 times daily       metoprolol tartrate (LOPRESSOR) 50 MG tablet 1/2 tablet twice daily 90 tablet 3     Multiple Vitamins-Minerals (MULTIVITAMIN & MINERAL PO) Take 1 tablet by mouth daily       Omega-3 Fatty Acids (OMEGA-3 FISH OIL PO) Take 2 g by mouth daily        pantoprazole (PROTONIX) 40 MG EC tablet Take 1 tablet (40 mg) by mouth daily 90 tablet 3     psyllium (METAMUCIL/KONSYL) Packet Take 1 packet by mouth daily       losartan (COZAAR) 100 MG tablet TAKE ONE TABLET (100 mg) BY MOUTH ONE TIME DAILY  90 tablet 3     nitroGLYcerin (NITROSTAT) 0.4 MG sublingual tablet For chest pain place 1 tablet under the tongue every 5 minutes for 3 doses. If symptoms persist 5 minutes after 1st dose call 911. (Patient not taking: Reported on 9/26/2018) 25 tablet 1     [DISCONTINUED] amLODIPine (NORVASC) 5 MG tablet Take 1 tablet (5 mg) by mouth daily (Patient not taking: Reported on 9/26/2018) 30 tablet 3     [DISCONTINUED] atorvastatin (LIPITOR) 40 MG tablet Take 1 tablet (40 mg) by mouth daily 90 tablet 3       ALLERGIES     Allergies   Allergen Reactions     Penicillins        PAST MEDICAL HISTORY:  Past Medical History:   Diagnosis Date     Benign essential hypertension      Bloating      CAD (coronary artery disease)      Hyperlipidemia      NSTEMI (non-ST elevated myocardial infarction) (H)      PFO (patent foramen ovale)      TIA (transient ischemic attack)   "      PAST SURGICAL HISTORY:  Past Surgical History:   Procedure Laterality Date     APPENDECTOMY       CARDIAC CATHERIZATION  09/15/2017     MOSES to mid-distal Cx x2     ENT SURGERY      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     EYE SURGERY      cataracts     HERNIA REPAIR      twice       FAMILY HISTORY:  Family History   Problem Relation Age of Onset     Respiratory Father      pneumonia     Unknown/Adopted Father      Alzheimer Disease Brother      Alzheimer Disease Sister      Respiratory Sister      COPD     Unknown/Adopted Mother        SOCIAL HISTORY:  Social History     Social History     Marital status:      Spouse name: N/A     Number of children: N/A     Years of education: N/A     Social History Main Topics     Smoking status: Former Smoker     Quit date: 8/19/1968     Smokeless tobacco: Never Used     Alcohol use Yes      Comment: Occ     Drug use: No     Sexual activity: No     Other Topics Concern     Parent/Sibling W/ Cabg, Mi Or Angioplasty Before 65f 55m? No     Social History Narrative       Review of Systems:  Skin:  Negative     Eyes:  Positive for glasses;cataracts  ENT:  Negative    Respiratory:  Negative    Cardiovascular:  Negative    Gastroenterology: Positive for reflux;heartburn  Genitourinary:  Negative decreased urinary stream;urinary frequency  Musculoskeletal:  Negative    Neurologic:  Positive for stroke  Psychiatric:  Negative    Heme/Lymph/Imm:  Negative    Endocrine:  Negative      Physical Exam:  Vitals: /77  Pulse 67  Ht 1.689 m (5' 6.5\")  Wt 77.8 kg (171 lb 9.6 oz)  BMI 27.28 kg/m2     GEN:  NAD  NECK: No JVD  C/V:  Regular rate and rhythm, no murmur, rub or gallop.  RESP: Clear to auscultation bilaterally without wheezing, rales, or rhonchi.  GI: Abdomen soft, nontender, nondistended.   EXTREM: No LE edema.   NEURO: Alert and oriented, cooperative. No obvious focal deficits.   PSYCH: Normal affect.  SKIN: Warm and dry.       Recent Lab Results:  LIPID RESULTS:  Lab " Results   Component Value Date    CHOL 86 09/17/2018    HDL 40 09/17/2018    LDL 30 09/17/2018    TRIG 78 09/17/2018    CHOLHDLRATIO 4.4 08/31/2015       LIVER ENZYME RESULTS:  Lab Results   Component Value Date    AST 28 09/14/2017    ALT 26 09/17/2018       CBC RESULTS:  Lab Results   Component Value Date    WBC 4.7 09/17/2018    RBC 4.43 09/17/2018    HGB 14.4 09/17/2018    HCT 41.3 09/17/2018    MCV 93 09/17/2018    MCH 32.5 09/17/2018    MCHC 34.9 09/17/2018    RDW 12.5 09/17/2018     09/17/2018       BMP RESULTS:  Lab Results   Component Value Date     09/17/2018    POTASSIUM 4.2 09/17/2018    CHLORIDE 103 09/17/2018    CO2 26 09/17/2018    ANIONGAP 8 09/17/2018     (H) 09/17/2018    BUN 11 09/17/2018    CR 0.96 09/17/2018    GFRESTIMATED 74 09/17/2018    GFRESTBLACK 90 09/17/2018    RUSTY 8.5 09/17/2018        A1C RESULTS:  No results found for: A1C    INR RESULTS:  Lab Results   Component Value Date    INR 1.01 09/12/2014           Addis Lopez PA-C   September 26, 2018

## 2018-10-12 ENCOUNTER — TELEPHONE (OUTPATIENT)
Dept: FAMILY MEDICINE | Facility: CLINIC | Age: 83
End: 2018-10-12

## 2018-10-12 DIAGNOSIS — R07.9 CHEST PAIN, UNSPECIFIED TYPE: ICD-10-CM

## 2018-10-12 NOTE — TELEPHONE ENCOUNTER
pantoprazole (PROTONIX) 40 MG EC tablet   Patient states he is taking this med BID, please send an rx if appropriate

## 2018-10-15 RX ORDER — PANTOPRAZOLE SODIUM 40 MG/1
40 TABLET, DELAYED RELEASE ORAL
Qty: 180 TABLET | Refills: 3 | Status: SHIPPED | OUTPATIENT
Start: 2018-10-15 | End: 2019-10-18

## 2018-11-26 ENCOUNTER — OFFICE VISIT (OUTPATIENT)
Dept: FAMILY MEDICINE | Facility: CLINIC | Age: 83
End: 2018-11-26
Payer: COMMERCIAL

## 2018-11-26 VITALS
WEIGHT: 171 LBS | DIASTOLIC BLOOD PRESSURE: 70 MMHG | BODY MASS INDEX: 26.84 KG/M2 | HEIGHT: 67 IN | RESPIRATION RATE: 16 BRPM | SYSTOLIC BLOOD PRESSURE: 134 MMHG | TEMPERATURE: 97.8 F | OXYGEN SATURATION: 95 % | HEART RATE: 80 BPM

## 2018-11-26 DIAGNOSIS — Z00.00 ROUTINE MEDICAL EXAM: Primary | ICD-10-CM

## 2018-11-26 DIAGNOSIS — I21.4 NSTEMI (NON-ST ELEVATED MYOCARDIAL INFARCTION) (H): ICD-10-CM

## 2018-11-26 DIAGNOSIS — K21.9 GASTROESOPHAGEAL REFLUX DISEASE, ESOPHAGITIS PRESENCE NOT SPECIFIED: ICD-10-CM

## 2018-11-26 DIAGNOSIS — Z87.442 HISTORY OF KIDNEY STONES: ICD-10-CM

## 2018-11-26 DIAGNOSIS — E78.2 MIXED HYPERLIPIDEMIA: ICD-10-CM

## 2018-11-26 DIAGNOSIS — I10 BENIGN ESSENTIAL HYPERTENSION: ICD-10-CM

## 2018-11-26 DIAGNOSIS — Z12.5 SCREENING FOR PROSTATE CANCER: ICD-10-CM

## 2018-11-26 LAB
ALBUMIN UR-MCNC: NEGATIVE MG/DL
APPEARANCE UR: ABNORMAL
BACTERIA #/AREA URNS HPF: ABNORMAL /HPF
BILIRUB UR QL STRIP: NEGATIVE
COLOR UR AUTO: YELLOW
GLUCOSE UR STRIP-MCNC: NEGATIVE MG/DL
HGB UR QL STRIP: ABNORMAL
KETONES UR STRIP-MCNC: NEGATIVE MG/DL
LEUKOCYTE ESTERASE UR QL STRIP: ABNORMAL
NITRATE UR QL: POSITIVE
PH UR STRIP: 8 PH (ref 5–7)
PSA SERPL-ACNC: 4.26 UG/L (ref 0–4)
RBC #/AREA URNS AUTO: ABNORMAL /HPF
SOURCE: ABNORMAL
SP GR UR STRIP: 1.01 (ref 1–1.03)
UROBILINOGEN UR STRIP-ACNC: 0.2 EU/DL (ref 0.2–1)
WBC #/AREA URNS AUTO: >100 /HPF

## 2018-11-26 PROCEDURE — G0439 PPPS, SUBSEQ VISIT: HCPCS | Performed by: FAMILY MEDICINE

## 2018-11-26 PROCEDURE — 81001 URINALYSIS AUTO W/SCOPE: CPT | Performed by: FAMILY MEDICINE

## 2018-11-26 PROCEDURE — 87086 URINE CULTURE/COLONY COUNT: CPT | Performed by: FAMILY MEDICINE

## 2018-11-26 PROCEDURE — G0103 PSA SCREENING: HCPCS | Performed by: FAMILY MEDICINE

## 2018-11-26 PROCEDURE — 87088 URINE BACTERIA CULTURE: CPT | Performed by: FAMILY MEDICINE

## 2018-11-26 PROCEDURE — 87186 SC STD MICRODIL/AGAR DIL: CPT | Performed by: FAMILY MEDICINE

## 2018-11-26 ASSESSMENT — ACTIVITIES OF DAILY LIVING (ADL): CURRENT_FUNCTION: NO ASSISTANCE NEEDED

## 2018-11-26 ASSESSMENT — PATIENT HEALTH QUESTIONNAIRE - PHQ9: SUM OF ALL RESPONSES TO PHQ QUESTIONS 1-9: 0

## 2018-11-26 NOTE — PATIENT INSTRUCTIONS
Preventive Health Recommendations:     See your health care provider every year to    Review health changes.     Discuss preventive care.      Review your medicines if your doctor has prescribed any.    Talk with your health care provider about whether you should have a test to screen for prostate cancer (PSA).    Every 3 years, have a diabetes test (fasting glucose). If you are at risk for diabetes, you should have this test more often.    Every 5 years, have a cholesterol test. Have this test more often if you are at risk for high cholesterol or heart disease.     Every 10 years, have a colonoscopy. Or, have a yearly FIT test (stool test). These exams will check for colon cancer.    Talk to with your health care provider about screening for Abdominal Aortic Aneurysm if you have a family history of AAA or have a history of smoking.  Shots:     Get a flu shot each year.     Get a tetanus shot every 10 years.     Talk to your doctor about your pneumonia vaccines. There are now two you should receive - Pneumovax (PPSV 23) and Prevnar (PCV 13).    Talk to your pharmacist about a shingles vaccine.     Talk to your doctor about the hepatitis B vaccine.  Nutrition:     Eat at least 5 servings of fruits and vegetables each day.     Eat whole-grain bread, whole-wheat pasta and brown rice instead of white grains and rice.     Get adequate Calcium and Vitamin D.   Lifestyle    Exercise for at least 150 minutes a week (30 minutes a day, 5 days a week). This will help you control your weight and prevent disease.     Limit alcohol to one drink per day.     No smoking.     Wear sunscreen to prevent skin cancer.     See your dentist every six months for an exam and cleaning.     See your eye doctor every 1 to 2 years to screen for conditions such as glaucoma, macular degeneration and cataracts.    Personalized Prevention Plan  You are due for the preventive services outlined below.  Your care team is available to assist you in  scheduling these services.  If you have already completed any of these items, please share that information with your care team to update in your medical record.    Health Maintenance Due   Topic Date Due     JULIETA QUESTIONNAIRE 1 YEAR  09/27/2018     FALL RISK ASSESSMENT  11/28/2018

## 2018-11-26 NOTE — PROGRESS NOTES
"SUBJECTIVE:   Cale Wagoner is a 85 year old male who presents for Preventive Visit.      Are you in the first 12 months of your Medicare coverage?  No    Annual Wellness Visit     In general, how would you rate your overall health?  Good    Frequency of exercise:  1 day/week    Duration of exercise:  15-30 minutes    Do you usually eat at least 4 servings of fruit and vegetables a day, include whole grains    & fiber and avoid regularly eating high fat or \"junk\" foods?  No    Taking medications regularly:  Yes    Medication side effects:  None    Ability to successfully perform activities of daily living:  No assistance needed    Home Safety:  No safety concerns identified    Hearing Impairment:  Difficulty following a conversation in a noisy restaurant or crowded room    In the past 6 months, have you been bothered by leaking of urine?  No    In general, how would you rate your overall mental or emotional health?  Good    PHQ-2 Total Score: 0    Additional concerns today:  No        Fall risk:  Fallen 2 or more times in the past year?: No  Any fall with injury in the past year?: No    COGNITIVE SCREEN  1) Repeat 3 items (Leader, Season, Table)    2) Clock draw: NORMAL  3) 3 item recall: Recalls NO objects   Results: normal clock but did not remember any words    Mini-CogTM Copyright WILLIE Fontaine. Licensed by the author for use in [facility]; reprinted with permission (soob@[email or web address]). All rights reserved.        Reviewed and updated as needed this visit by clinical staff  Tobacco  Allergies  Meds  Med Hx  Surg Hx  Fam Hx  Soc Hx        Reviewed and updated as needed this visit by Provider        Social History   Substance Use Topics     Smoking status: Former Smoker     Quit date: 8/19/1968     Smokeless tobacco: Never Used     Alcohol use Yes      Comment: Occ       Alcohol Use 11/26/2018   If you drink alcohol do you typically have greater than 3 drinks per day OR greater than 7 drinks per " week? Not Applicable   No flowsheet data found.            Do you feel safe in your environment - Yes    Do you have a Health Care Directive?: No: Advance care planning was reviewed with patient; patient declined at this time.    Current providers sharing in care for this patient include:   Patient Care Team:  Gwyn Zheng MD as PCP - General (Family Practice)    The following health maintenance items are reviewed in Epic and correct as of today:  Health Maintenance   Topic Date Due     JULIETA QUESTIONNAIRE 1 YEAR  09/27/2018     FALL RISK ASSESSMENT  11/28/2018     PHQ-9 Q1YR  09/17/2019     ADVANCE DIRECTIVE PLANNING Q5 YRS  02/04/2020     TETANUS IMMUNIZATION (SYSTEM ASSIGNED)  08/12/2020     PNEUMOCOCCAL  Completed     INFLUENZA VACCINE  Addressed     Patient Active Problem List   Diagnosis     Bloating     GERD (gastroesophageal reflux disease)     Health Care Home     Cerebrovascular accident (CVA), unspecified mechanism (H)     Mixed hyperlipidemia     Slow transit constipation     Tingling of left upper extremity     Chronic pain of right knee     Screening for prostate cancer     Atypical chest pain     NSTEMI (non-ST elevated myocardial infarction) (H)     Coronary artery disease involving native coronary artery of native heart without angina pectoris     Benign essential hypertension     PFO (patent foramen ovale)     TIA (transient ischemic attack)     Right sided facial pain     Right-sided headache     History of kidney stones     Past Surgical History:   Procedure Laterality Date     APPENDECTOMY       CARDIAC CATHERIZATION  09/15/2017     MOSES to mid-distal Cx x2     ENT SURGERY      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     EYE SURGERY      cataracts     HERNIA REPAIR      twice     SOFT TISSUE SURGERY      skin cancer right temple       Social History   Substance Use Topics     Smoking status: Former Smoker     Quit date: 8/19/1968     Smokeless tobacco: Never Used     Alcohol use Yes      Comment:  "Occ     Family History   Problem Relation Age of Onset     Respiratory Father      pneumonia     Unknown/Adopted Father      Alzheimer Disease Brother      Alzheimer Disease Sister      Emphysema Sister      Unknown/Adopted Mother                Review of Systems  CONSTITUTIONAL: NEGATIVE for fever, chills, change in weight  INTEGUMENTARY/SKIN: NEGATIVE for worrisome rashes, moles or lesions  EYES: NEGATIVE for vision changes or irritation  ENT/MOUTH: NEGATIVE for ear, mouth and throat problems  RESP: NEGATIVE for significant cough or SOB  BREAST: NEGATIVE for masses, tenderness or discharge  CV: NEGATIVE for chest pain, palpitations or peripheral edema  GI: NEGATIVE for nausea, abdominal pain, heartburn, or change in bowel habits   male :positive for, hematuria and Hx kidney stone  MUSCULOSKELETAL: NEGATIVE for significant arthralgias or myalgia  NEURO: NEGATIVE for weakness, dizziness or paresthesias  ENDOCRINE: NEGATIVE for temperature intolerance, skin/hair changes  HEME: NEGATIVE for bleeding problems  PSYCHIATRIC: NEGATIVE for changes in mood or affect    OBJECTIVE:   /70  Pulse 80  Temp 97.8  F (36.6  C) (Tympanic)  Resp 16  Ht 5' 6.5\" (1.689 m)  Wt 171 lb (77.6 kg)  SpO2 95%  BMI 27.19 kg/m2 Estimated body mass index is 27.19 kg/(m^2) as calculated from the following:    Height as of this encounter: 5' 6.5\" (1.689 m).    Weight as of this encounter: 171 lb (77.6 kg).  Physical Exam  GENERAL: healthy, alert and no distress  EYES: Eyes grossly normal to inspection, PERRL and conjunctivae and sclerae normal  HENT: ear canals and TM's normal, nose and mouth without ulcers or lesions  NECK: no adenopathy, no asymmetry, masses, or scars and thyroid normal to palpation  RESP: lungs clear to auscultation - no rales, rhonchi or wheezes  CV: regular rate and rhythm, normal S1 S2, no S3 or S4, no murmur, click or rub, no peripheral edema and peripheral pulses strong  ABDOMEN: soft, nontender, no " "hepatosplenomegaly, no masses and bowel sounds normal   (male): normal male genitalia without lesions or urethral discharge, no hernia  MS: no gross musculoskeletal defects noted, no edema  SKIN: no suspicious lesions or rashes  NEURO: Normal strength and tone, mentation intact and speech normal  PSYCH: mentation appears normal, affect normal/bright        ASSESSMENT / PLAN:       ICD-10-CM    1. Routine medical exam Z00.00    2. Gastroesophageal reflux disease, esophagitis presence not specified K21.9    3. Mixed hyperlipidemia E78.2    4. Screening for prostate cancer Z12.5 Prostate spec antigen screen   5. NSTEMI (non-ST elevated myocardial infarction) (H) I21.4    6. Benign essential hypertension I10 UA with Microscopic   7. History of kidney stones Z87.442 UA with Microscopic       End of Life Planning:  Patient currently has an advanced directive: No.  I have verified the patient's ablity to prepare an advanced directive/make health care decisions.  Literature was provided to assist patient in preparing an advanced directive.    COUNSELING:  Reviewed preventive health counseling, as reflected in patient instructions       Healthy diet/nutrition       Vision screening       Prostate cancer screening  Patient declined prostate exam today.  BP Readings from Last 1 Encounters:   11/26/18 134/70     Estimated body mass index is 27.19 kg/(m^2) as calculated from the following:    Height as of this encounter: 5' 6.5\" (1.689 m).    Weight as of this encounter: 171 lb (77.6 kg).           reports that he quit smoking about 50 years ago. He has never used smokeless tobacco.      Appropriate preventive services were discussed with this patient, including applicable screening as appropriate for cardiovascular disease, diabetes, osteopenia/osteoporosis, and glaucoma.  As appropriate for age/gender, discussed screening for colorectal cancer, prostate cancer, breast cancer, and cervical cancer. Checklist reviewing " preventive services available has been given to the patient.    Reviewed patients plan of care and provided an AVS. The Basic Care Plan (routine screening as documented in Health Maintenance) for Cale meets the Care Plan requirement. This Care Plan has been established and reviewed with the Patient.    Counseling Resources:  ATP IV Guidelines  Pooled Cohorts Equation Calculator  Breast Cancer Risk Calculator  FRAX Risk Assessment  ICSI Preventive Guidelines  Dietary Guidelines for Americans, 2010  USDA's MyPlate  ASA Prophylaxis  Lung CA Screening    Gwyn Zheng MD  [facility]

## 2018-11-26 NOTE — MR AVS SNAPSHOT
After Visit Summary   11/26/2018    Cale Wagoner    MRN: [id number]           Patient Information     Date Of Birth          5/26/1933        Visit Information        Provider Department      11/26/2018 7:30 AM Gwyn Zheng MD [facility]        Today's Diagnoses     Routine medical exam    -  1    Gastroesophageal reflux disease, esophagitis presence not specified        Mixed hyperlipidemia        Screening for prostate cancer        NSTEMI (non-ST elevated myocardial infarction) (H)        Benign essential hypertension        History of kidney stones          Care Instructions      Preventive Health Recommendations:     See your health care provider every year to    Review health changes.     Discuss preventive care.      Review your medicines if your doctor has prescribed any.    Talk with your health care provider about whether you should have a test to screen for prostate cancer (PSA).    Every 3 years, have a diabetes test (fasting glucose). If you are at risk for diabetes, you should have this test more often.    Every 5 years, have a cholesterol test. Have this test more often if you are at risk for high cholesterol or heart disease.     Every 10 years, have a colonoscopy. Or, have a yearly FIT test (stool test). These exams will check for colon cancer.    Talk to with your health care provider about screening for Abdominal Aortic Aneurysm if you have a family history of AAA or have a history of smoking.  Shots:     Get a flu shot each year.     Get a tetanus shot every 10 years.     Talk to your doctor about your pneumonia vaccines. There are now two you should receive - Pneumovax (PPSV 23) and Prevnar (PCV 13).    Talk to your pharmacist about a shingles vaccine.     Talk to your doctor about the hepatitis B vaccine.  Nutrition:     Eat at least 5 servings of fruits and vegetables each day.     Eat whole-grain bread, whole-wheat pasta and brown  rice instead of white grains and rice.     Get adequate Calcium and Vitamin D.   Lifestyle    Exercise for at least 150 minutes a week (30 minutes a day, 5 days a week). This will help you control your weight and prevent disease.     Limit alcohol to one drink per day.     No smoking.     Wear sunscreen to prevent skin cancer.     See your dentist every six months for an exam and cleaning.     See your eye doctor every 1 to 2 years to screen for conditions such as glaucoma, macular degeneration and cataracts.    Personalized Prevention Plan  You are due for the preventive services outlined below.  Your care team is available to assist you in scheduling these services.  If you have already completed any of these items, please share that information with your care team to update in your medical record.    Health Maintenance Due   Topic Date Due     JULIETA QUESTIONNAIRE 1 YEAR  09/27/2018     FALL RISK ASSESSMENT  11/28/2018             Follow-ups after your visit        Follow-up notes from your care team     Return in about 6 months (around 5/26/2019) for dyslipidemia, hypertension, Physical Exam.      Who to contact     If you have questions or need follow up information about today's clinic visit or your schedule please contact [facility] directly at [phone].  Normal or non-critical lab and imaging results will be communicated to you by MyChart, letter or phone within 4 business days after the clinic has received the results. If you do not hear from us within 7 days, please contact the clinic through MyChart or phone. If you have a critical or abnormal lab result, we will notify you by phone as soon as possible.  Submit refill requests through [employer] or call your pharmacy and they will forward the refill request to us. Please allow 3 business days for your refill to be completed.          Additional Information About Your Visit        Care EveryWhere ID     This is your Care  "EveryWhere ID. This could be used by other organizations to access your [place] medical records  [id number]        Your Vitals Were     Pulse Temperature Respirations Height Pulse Oximetry BMI (Body Mass Index)    80 97.8  F (36.6  C) (Tympanic) 16 5' 6.5\" (1.689 m) 95% 27.19 kg/m2       Blood Pressure from Last 3 Encounters:   11/26/18 134/70   09/26/18 149/77   09/17/18 132/80    Weight from Last 3 Encounters:   11/26/18 171 lb (77.6 kg)   09/26/18 171 lb 9.6 oz (77.8 kg)   09/17/18 169 lb (76.7 kg)              We Performed the Following     Prostate spec antigen screen     UA with Microscopic        Primary Care Provider Office Phone # Fax #    Gwyn Zheng [phone]       [address] [facility] [address]        Equal Access to Services     ERNIE SANCHEZ : Hadii aad ku hadasho Soomaali, waaxda luqadaha, qaybta kaalmada adeegyada, waxay idiin haysandran alea gaytan . So [facility] [phone].    ATENCIÓN: Si habla español, tiene a rene disposición servicios gratuitos de asistencia lingüística. Llame al [phone].    We comply with applicable federal civil rights laws and [place] laws. We do not discriminate on the basis of race, color, national origin, age, disability, sex, sexual orientation, or gender identity.            Thank you!     Thank you for choosing [facility]  for your care. Our goal is always to provide you with excellent care. Hearing back from our patients is one way we can continue to improve our services. Please take a few minutes to complete the written survey that you may receive in the mail after your visit with us. Thank you!             Your Updated Medication List - Protect others around you: Learn how to safely use, store and throw away your medicines at www.disposemymeds.org.          This list is accurate as of 11/26/18  8:45 AM.  Always use your most recent med list.                   Brand Name Dispense " Instructions for use Diagnosis    amLODIPine 5 MG tablet    NORVASC    90 tablet    Take 1 tablet (5 mg) by mouth daily    Benign essential hypertension       ASPIRIN 81 PO           atorvastatin 20 MG tablet    LIPITOR    90 tablet    Take 1 tablet (20 mg) by mouth daily    Hyperlipidemia LDL goal <70       FAMOTIDINE PO      Take 20 mg by mouth 2 times daily        losartan 100 MG tablet    COZAAR    90 tablet    TAKE ONE TABLET (100 mg) BY MOUTH ONE TIME DAILY    Benign essential hypertension       metoprolol tartrate 50 MG tablet    LOPRESSOR    90 tablet    1/2 tablet twice daily    Chest pain, unspecified type       MULTIVITAMIN & MINERAL PO      Take 1 tablet by mouth daily        nitroGLYcerin 0.4 MG sublingual tablet    NITROSTAT    25 tablet    For chest pain place 1 tablet under the tongue every 5 minutes for 3 doses. If symptoms persist 5 minutes after 1st dose call 911.    Chest pain, unspecified type       OMEGA-3 FISH OIL PO      Take 2 g by mouth daily        pantoprazole 40 MG EC tablet    PROTONIX    180 tablet    Take 1 tablet (40 mg) by mouth 2 times daily    Chest pain, unspecified type       psyllium Packet    METAMUCIL/KONSYL     Take 1 packet by mouth daily        TYLENOL PO      Take 500 mg by mouth every 8 hours as needed for mild pain or fever

## 2018-11-29 DIAGNOSIS — N30.00 ACUTE CYSTITIS WITHOUT HEMATURIA: Primary | ICD-10-CM

## 2018-11-29 LAB
BACTERIA SPEC CULT: ABNORMAL
SPECIMEN SOURCE: ABNORMAL

## 2018-11-29 RX ORDER — SULFAMETHOXAZOLE/TRIMETHOPRIM 800-160 MG
1 TABLET ORAL 2 TIMES DAILY
Qty: 20 TABLET | Refills: 0 | Status: SHIPPED | OUTPATIENT
Start: 2018-11-29 | End: 2018-12-12

## 2018-12-03 ENCOUNTER — TELEPHONE (OUTPATIENT)
Dept: CARDIOLOGY | Facility: CLINIC | Age: 83
End: 2018-12-03

## 2018-12-03 NOTE — TELEPHONE ENCOUNTER
Pt left message with concern regarding information that losartan causes cancer.     Returned patient's call, informed him that the recall is in relation to losartan in combination medications and to contact his pharmacy for further information as to whether or not his prescription is being recalled. Pt verbalized understanding.

## 2018-12-12 ENCOUNTER — OFFICE VISIT (OUTPATIENT)
Dept: FAMILY MEDICINE | Facility: CLINIC | Age: 83
End: 2018-12-12
Payer: COMMERCIAL

## 2018-12-12 VITALS
HEART RATE: 91 BPM | DIASTOLIC BLOOD PRESSURE: 60 MMHG | BODY MASS INDEX: 26.68 KG/M2 | HEIGHT: 67 IN | SYSTOLIC BLOOD PRESSURE: 130 MMHG | TEMPERATURE: 97.1 F | RESPIRATION RATE: 14 BRPM | OXYGEN SATURATION: 95 % | WEIGHT: 170 LBS

## 2018-12-12 DIAGNOSIS — N39.0 URINARY TRACT INFECTION WITHOUT HEMATURIA, SITE UNSPECIFIED: Primary | ICD-10-CM

## 2018-12-12 LAB
ALBUMIN UR-MCNC: NEGATIVE MG/DL
APPEARANCE UR: CLEAR
BILIRUB UR QL STRIP: NEGATIVE
COLOR UR AUTO: YELLOW
GLUCOSE UR STRIP-MCNC: NEGATIVE MG/DL
HGB UR QL STRIP: NEGATIVE
KETONES UR STRIP-MCNC: NEGATIVE MG/DL
LEUKOCYTE ESTERASE UR QL STRIP: NEGATIVE
NITRATE UR QL: NEGATIVE
PH UR STRIP: 6.5 PH (ref 5–7)
RBC #/AREA URNS AUTO: NORMAL /HPF
SOURCE: NORMAL
SP GR UR STRIP: 1.01 (ref 1–1.03)
UROBILINOGEN UR STRIP-ACNC: 0.2 EU/DL (ref 0.2–1)
WBC #/AREA URNS AUTO: NORMAL /HPF

## 2018-12-12 PROCEDURE — 99213 OFFICE O/P EST LOW 20 MIN: CPT | Performed by: FAMILY MEDICINE

## 2018-12-12 PROCEDURE — 81001 URINALYSIS AUTO W/SCOPE: CPT | Performed by: FAMILY MEDICINE

## 2018-12-12 ASSESSMENT — MIFFLIN-ST. JEOR: SCORE: 1406.8

## 2018-12-12 NOTE — PATIENT INSTRUCTIONS
We will check urinalysis today.  Patient was asymptomatic with his last bladder infection.  His stomach is markedly improved after his course of antibiotics.  Follow-up at a minimum will be in 6 months to follow-up on his blood pressure and heart disease and on his abdominal discomfort.  We will see him sooner as needed if symptoms arise.

## 2018-12-12 NOTE — NURSING NOTE
"Chief Complaint   Patient presents with     RECHECK     /60   Pulse 91   Temp 97.1  F (36.2  C) (Tympanic)   Resp 14   Ht 1.689 m (5' 6.5\")   Wt 77.1 kg (170 lb)   SpO2 95%   BMI 27.03 kg/m   Estimated body mass index is 27.03 kg/m  as calculated from the following:    Height as of this encounter: 1.689 m (5' 6.5\").    Weight as of this encounter: 77.1 kg (170 lb).  BP completed using cuff size: regular   Donna Chirinos CMA    Health Maintenance Due   Topic Date Due     ZOSTER IMMUNIZATION (1 of 2) 05/26/1983     DTAP/TDAP/TD IMMUNIZATION (3 - Td) 02/12/2011     JULIETA QUESTIONNAIRE 1 YEAR  09/27/2018     Health Maintenance reviewed at today's visit patient asked to schedule/complete:   Depression:  Patient agrees to schedule  Immunizations:  Patient agrees to schedule    "

## 2018-12-12 NOTE — PROGRESS NOTES
SUBJECTIVE:   Cale Wagoner is a 85 year old male who presents to clinic today for the following health issues:    Genitourinary symptoms      Duration: >1week    Description:  follow-up UTI    Intensity:  No symptoms    Accompanying signs and symptoms (fever/discharge/nausea/vomiting/back or abdominal pain):  None    History (frequent UTI's/kidney stones/prostate problems): No  Sexually active: YES    Precipitating or alleviating factors: None    Therapies tried and outcome: course of antibiotics - Bactrim   Outcome: Relief        Problem list and histories reviewed & adjusted, as indicated.  Additional history: as documented    Patient Active Problem List   Diagnosis     Bloating     GERD (gastroesophageal reflux disease)     Health Care Home     Cerebrovascular accident (CVA), unspecified mechanism (H)     Mixed hyperlipidemia     Slow transit constipation     Tingling of left upper extremity     Chronic pain of right knee     Screening for prostate cancer     Atypical chest pain     NSTEMI (non-ST elevated myocardial infarction) (H)     Coronary artery disease involving native coronary artery of native heart without angina pectoris     Benign essential hypertension     PFO (patent foramen ovale)     TIA (transient ischemic attack)     Right sided facial pain     Right-sided headache     History of kidney stones     Past Surgical History:   Procedure Laterality Date     APPENDECTOMY       CARDIAC CATHERIZATION  09/15/2017     MOSES to mid-distal Cx x2     ENT SURGERY      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     EYE SURGERY      cataracts     HERNIA REPAIR      twice     SOFT TISSUE SURGERY      skin cancer right temple       Social History     Tobacco Use     Smoking status: Former Smoker     Last attempt to quit: 1968     Years since quittin.3     Smokeless tobacco: Never Used   Substance Use Topics     Alcohol use: Yes     Comment: Occ     Family History   Problem Relation Age of Onset     Respiratory  "Father         pneumonia     Unknown/Adopted Father      Alzheimer Disease Brother      Alzheimer Disease Sister      Emphysema Sister      Unknown/Adopted Mother            Reviewed and updated as needed this visit by clinical staff  Tobacco  Allergies  Meds  Problems  Med Hx  Surg Hx  Fam Hx  Soc Hx        Reviewed and updated as needed this visit by Provider         ROS:  Constitutional, HEENT, cardiovascular, pulmonary, gi and gu systems are negative, except as otherwise noted.    OBJECTIVE:                                                    /60   Pulse 91   Temp 97.1  F (36.2  C) (Tympanic)   Resp 14   Ht 1.689 m (5' 6.5\")   Wt 77.1 kg (170 lb)   SpO2 95%   BMI 27.03 kg/m    Body mass index is 27.03 kg/m .  GENERAL APPEARANCE: healthy, alert and no distress         ASSESSMENT/PLAN:                                                        ICD-10-CM    1. Urinary tract infection without hematuria, site unspecified N39.0 UA with Microscopic reflex to Culture     Return in about 6 months (around 6/12/2019) for hypertension, heart disease.  Patient Instructions   We will check urinalysis today.  Patient was asymptomatic with his last bladder infection.  His stomach is markedly improved after his course of antibiotics.  Follow-up at a minimum will be in 6 months to follow-up on his blood pressure and heart disease and on his abdominal discomfort.  We will see him sooner as needed if symptoms arise.      Gwyn Zheng MD  [facility]    "

## 2019-07-23 ENCOUNTER — TELEPHONE (OUTPATIENT)
Dept: CARDIOLOGY | Facility: CLINIC | Age: 84
End: 2019-07-23

## 2019-07-23 DIAGNOSIS — E78.2 MIXED HYPERLIPIDEMIA: ICD-10-CM

## 2019-07-23 DIAGNOSIS — I10 BENIGN ESSENTIAL HYPERTENSION: Primary | ICD-10-CM

## 2019-07-23 NOTE — TELEPHONE ENCOUNTER
Chart prepping noted no recent BMP or FLP's.   Spoke with Patient who would like to have them drawn same day as Dr. Yang OV.  Orders entered.

## 2019-08-14 ENCOUNTER — OFFICE VISIT (OUTPATIENT)
Dept: CARDIOLOGY | Facility: CLINIC | Age: 84
End: 2019-08-14
Payer: COMMERCIAL

## 2019-08-14 DIAGNOSIS — I10 BENIGN ESSENTIAL HYPERTENSION: ICD-10-CM

## 2019-08-14 DIAGNOSIS — I25.10 CORONARY ARTERY DISEASE INVOLVING NATIVE CORONARY ARTERY OF NATIVE HEART WITHOUT ANGINA PECTORIS: Primary | ICD-10-CM

## 2019-08-14 DIAGNOSIS — E78.2 MIXED HYPERLIPIDEMIA: ICD-10-CM

## 2019-08-14 LAB
ALT SERPL W P-5'-P-CCNC: 6 U/L (ref 5–30)
ANION GAP SERPL CALCULATED.3IONS-SCNC: 11 MMOL/L (ref 6–17)
BUN SERPL-MCNC: 17 MG/DL (ref 7–30)
CALCIUM SERPL-MCNC: 9.2 MG/DL (ref 8.5–10.5)
CHLORIDE SERPL-SCNC: 104 MMOL/L (ref 98–107)
CHOLEST SERPL-MCNC: 122 MG/DL
CO2 SERPL-SCNC: 26 MMOL/L (ref 23–29)
CREAT SERPL-MCNC: 1.05 MG/DL (ref 0.7–1.3)
GFR SERPL CREATININE-BSD FRML MDRD: 67 ML/MIN/{1.73_M2}
GLUCOSE SERPL-MCNC: 108 MG/DL (ref 70–105)
HDLC SERPL-MCNC: 46 MG/DL
LDLC SERPL CALC-MCNC: 59 MG/DL
NONHDLC SERPL-MCNC: 76 MG/DL
POTASSIUM SERPL-SCNC: 4 MMOL/L (ref 3.5–5.1)
SODIUM SERPL-SCNC: 137 MMOL/L (ref 136–145)
TRIGL SERPL-MCNC: 85 MG/DL

## 2019-08-14 PROCEDURE — 99214 OFFICE O/P EST MOD 30 MIN: CPT | Performed by: INTERNAL MEDICINE

## 2019-08-14 PROCEDURE — 80048 BASIC METABOLIC PNL TOTAL CA: CPT | Performed by: INTERNAL MEDICINE

## 2019-08-14 PROCEDURE — 36415 COLL VENOUS BLD VENIPUNCTURE: CPT | Performed by: INTERNAL MEDICINE

## 2019-08-14 PROCEDURE — 80061 LIPID PANEL: CPT | Performed by: INTERNAL MEDICINE

## 2019-08-14 PROCEDURE — 84460 ALANINE AMINO (ALT) (SGPT): CPT | Performed by: INTERNAL MEDICINE

## 2019-08-14 RX ORDER — METOPROLOL SUCCINATE 25 MG/1
25 TABLET, EXTENDED RELEASE ORAL DAILY
Status: DISCONTINUED | OUTPATIENT
Start: 2019-08-15 | End: 2019-09-09 | Stop reason: CLARIF

## 2019-08-14 ASSESSMENT — MIFFLIN-ST. JEOR: SCORE: 1406.34

## 2019-08-14 NOTE — PROGRESS NOTES
HPI and Plan:   See dictation [id number]    No orders of the defined types were placed in this encounter.      No orders of the defined types were placed in this encounter.      Medications Discontinued During This Encounter   Medication Reason     metoprolol tartrate (LOPRESSOR) 50 MG tablet Erroneous Entry         No diagnosis found.    CURRENT MEDICATIONS:  Current Outpatient Medications   Medication Sig Dispense Refill     Acetaminophen (TYLENOL PO) Take 500 mg by mouth every 8 hours as needed for mild pain or fever       amLODIPine (NORVASC) 5 MG tablet Take 1 tablet (5 mg) by mouth daily 90 tablet 3     ASPIRIN 81 PO        atorvastatin (LIPITOR) 20 MG tablet Take 1 tablet (20 mg) by mouth daily 90 tablet 3     FAMOTIDINE PO Take 20 mg by mouth 2 times daily       losartan (COZAAR) 100 MG tablet TAKE ONE TABLET (100 mg) BY MOUTH ONE TIME DAILY  90 tablet 3     Multiple Vitamins-Minerals (MULTIVITAMIN & MINERAL PO) Take 1 tablet by mouth daily       nitroGLYcerin (NITROSTAT) 0.4 MG sublingual tablet For chest pain place 1 tablet under the tongue every 5 minutes for 3 doses. If symptoms persist 5 minutes after 1st dose call 911. 25 tablet 1     Omega-3 Fatty Acids (OMEGA-3 FISH OIL PO) Take 2 g by mouth daily        pantoprazole (PROTONIX) 40 MG EC tablet Take 1 tablet (40 mg) by mouth 2 times daily 180 tablet 3     psyllium (METAMUCIL/KONSYL) Packet Take 1 packet by mouth daily         ALLERGIES     Allergies   Allergen Reactions     Penicillins        PAST MEDICAL HISTORY:  Past Medical History:   Diagnosis Date     Benign essential hypertension      Bloating      CAD (coronary artery disease)      Hyperlipidemia      Kidney stones      NSTEMI (non-ST elevated myocardial infarction) (H)      PFO (patent foramen ovale)      TIA (transient ischemic attack)        PAST SURGICAL HISTORY:  Past Surgical History:   Procedure Laterality Date     APPENDECTOMY       CARDIAC CATHERIZATION  09/15/2017     MOSES to mid-distal  Cx x2     ENT SURGERY      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     EYE SURGERY      cataracts     HERNIA REPAIR      twice     SOFT TISSUE SURGERY      skin cancer right temple       FAMILY HISTORY:  Family History   Problem Relation Age of Onset     Respiratory Father         pneumonia     Unknown/Adopted Father      Alzheimer Disease Brother      Alzheimer Disease Sister      Emphysema Sister      Unknown/Adopted Mother        SOCIAL HISTORY:  Social History     Socioeconomic History     Marital status:      Spouse name: None     Number of children: None     Years of education: None     Highest education level: None   Occupational History     None   Social Needs     Financial resource strain: None     Food insecurity:     Worry: None     Inability: None     Transportation needs:     Medical: None     Non-medical: None   Tobacco Use     Smoking status: Former Smoker     Last attempt to quit: 1968     Years since quittin.0     Smokeless tobacco: Never Used   Substance and Sexual Activity     Alcohol use: Yes     Comment: Occ     Drug use: No     Sexual activity: Never     Partners: Female     Birth control/protection: None   Lifestyle     Physical activity:     Days per week: None     Minutes per session: None     Stress: None   Relationships     Social connections:     Talks on phone: None     Gets together: None     Attends Yarsanism service: None     Active member of club or organization: None     Attends meetings of clubs or organizations: None     Relationship status: None     Intimate partner violence:     Fear of current or ex partner: None     Emotionally abused: None     Physically abused: None     Forced sexual activity: None   Other Topics Concern     Parent/sibling w/ CABG, MI or angioplasty before 65F 55M? No   Social History Narrative     None       Review of Systems:  Skin:  Negative       Eyes:  Positive for glasses;cataracts both eyes and surgery was 5 years ago.  ENT:  Negative     "  Respiratory:  Negative       Cardiovascular:  Negative      Gastroenterology: Positive for reflux;heartburn new medication pantoprazole not as effective as prilosec.  Genitourinary:  Negative decreased urinary stream;urinary frequency    Musculoskeletal:  Negative      Neurologic:  Positive for stroke Pt reports  he had ministroke 5 years ago.  Psychiatric:  Negative      Heme/Lymph/Imm:  Negative      Endocrine:  Negative        Physical Exam:  Vitals: BP (!) 167/90   Pulse 79   Ht 1.689 m (5' 6.5\")   Wt 77.6 kg (171 lb)   BMI 27.19 kg/m      Constitutional:  cooperative, alert and oriented, well developed, well nourished, in no acute distress        Skin:  warm and dry to the touch          Head:  normocephalic        Eyes:  sclera white        Lymph:No thyromegaly     ENT:           Neck:           Respiratory:  normal breath sounds, clear to auscultation, normal A-P diameter, normal symmetry, normal respiratory excursion, no use of accessory muscles         Cardiac: regular rhythm, normal S1/S2, no S3 or S4, apical impulse not displaced, no murmurs, gallops or rubs                                                         GI:           Extremities and Muscular Skeletal:  no deformities, clubbing, cyanosis, erythema observed;no edema              Neurological:  no gross motor deficits;affect appropriate        Psych:  Alert and Oriented x 3;affect appropriate, oriented to time, person and place          CC  Gwyn Zheng MD  [address] [place] [address]                  "

## 2019-08-14 NOTE — LETTER
8/14/2019      Gwyn Zheng MD  [address]  [facility] [address]      RE: Cale Wagoner       Dear Colleague,    I had the pleasure of seeing Cale Wagoner in the [facility].    Service Date: 08/18/2019      PATIENT HISTORY:  Mr. Wagoner returned to [facility] in [place].  He is now 86 years old and is followed for a history of a myocardial infarct, coronary artery disease and prior coronary artery intervention.  He is accompanied by his wife, Dalila, whom I have seen for a number of years and who has brought him to clinic.      Mr. Wagoner experienced a myocardial infarct during 2017.  During 09/2017, he presented with central chest discomfort and underwent coronary angiography.  Two drug-eluting stents were placed to the mid and distal left circumflex coronary artery with resolution of his symptoms.  For 1 year following the interventional procedure, he was on dual antiplatelet therapy and earlier this year, clopidogrel was discontinued.  He continues on aspirin daily.      He denies any chest, neck, arm or back discomfort.  He states that he is feeling well and is active without exertional dyspnea or angina.  He further denies any palpitations.  At the time of his coronary angiogram, there was demonstrated to be a 40%-50% eccentric distal left main coronary stenosis and there was scattered 305-40% diffuse disease throughout the LAD system with the ostium of the first diagonal branch having a 50%-60% stenosis.  The right coronary artery was notable for mild disease until the proximal portion of the PDA where there was a bifurcation stenosis of about 60% in the 2 limbs of that vessel.        Mr. Wagoner underwent echocardiography during that visit and he had normal left ventricular systolic performance with inferolateral hypokinesis.  There was no clinically significant valvular disease.  A stress nuclear scan with Lexiscan Myoview stress was  performed during 03/2018.  At that time, there was a small to medium-sized perfusion defect of moderate severity in the mid to basal inferolateral wall and the basal inferior wall which was essentially fixed.  There was no evidence of ischemia and the finding was consistent with the patient's known history.      At this time, he is doing well and his lipids indicated an LDL fraction of 59 and HDL 46 mg/dL with normal triglycerides.  He continues on low-dose aspirin, atorvastatin, losartan and amlodipine.  He was not taking metoprolol because the prescription had run out and he did not realize that the metoprolol was intended to be continued.  He had been on metoprolol succinate at 25 mg daily.      PHYSICAL EXAMINATION:   GENERAL:  This is a man in no apparent distress who appears younger than his stated age.   VITAL SIGNS:  Blood pressure was high on initial evaluation and on recheck was 122/62 mmHg.  Heart rate was 79 beats per minute and regular, and the respiratory rate was 14-18 per minute.   CHEST:  Clear to auscultation.   CARDIAC:  On cardiac auscultation, there was an S1 and S2 without extra sounds or a murmur.  The rhythm was regular.      ASSESSMENT AND RECOMMENDATIONS:  This is an individual with coronary artery disease who is doing well despite his history of an inferolateral myocardial infarct 2 years ago.  He is asymptomatic and is on appropriate risk factor intervention.  His risk factors are under good control, including his blood pressure and his lipids.  He has overall normal left ventricular systolic performance.  I have recommended reinstitution of the metoprolol XL and sent in a prescription to his pharmacy.      He remains asymptomatic and will return in 1 year unless he develops earlier symptoms.  He will contact us in the interim if that should occur.         MARTIN BENOIT MD, FACC             D: 08/18/2019   T: 08/19/2019   MT: ELINA      Name:     EDMAR LEMUS   MRN:      [id number]         Account:      [id number]   :      1933           Service Date: 2019      Document: [id number]         Outpatient Encounter Medications as of 2019   Medication Sig Dispense Refill     Acetaminophen (TYLENOL PO) Take 500 mg by mouth every 8 hours as needed for mild pain or fever       amLODIPine (NORVASC) 5 MG tablet Take 1 tablet (5 mg) by mouth daily 90 tablet 3     ASPIRIN 81 PO        atorvastatin (LIPITOR) 20 MG tablet Take 1 tablet (20 mg) by mouth daily 90 tablet 3     FAMOTIDINE PO Take 20 mg by mouth 2 times daily       losartan (COZAAR) 100 MG tablet TAKE ONE TABLET (100 mg) BY MOUTH ONE TIME DAILY  90 tablet 3     Multiple Vitamins-Minerals (MULTIVITAMIN & MINERAL PO) Take 1 tablet by mouth daily       nitroGLYcerin (NITROSTAT) 0.4 MG sublingual tablet For chest pain place 1 tablet under the tongue every 5 minutes for 3 doses. If symptoms persist 5 minutes after 1st dose call 911. 25 tablet 1     Omega-3 Fatty Acids (OMEGA-3 FISH OIL PO) Take 2 g by mouth daily        pantoprazole (PROTONIX) 40 MG EC tablet Take 1 tablet (40 mg) by mouth 2 times daily 180 tablet 3     psyllium (METAMUCIL/KONSYL) Packet Take 1 packet by mouth daily       [DISCONTINUED] metoprolol tartrate (LOPRESSOR) 50 MG tablet 1/2 tablet twice daily (Patient not taking: Reported on 2019) 90 tablet 3     Facility-Administered Encounter Medications as of 2019   Medication Dose Route Frequency Provider Last Rate Last Dose     metoprolol succinate ER (TOPROL-XL) 24 hr tablet 25 mg  25 mg Oral Daily Chichi Yang MD           Again, thank you for allowing me to participate in the care of your patient.      Sincerely,    Chichi Yang MD     [facility]

## 2019-08-14 NOTE — LETTER
8/14/2019    Gwyn Zheng MD  [address]  [facility] [address]    RE: Cale Wagoner       Dear Colleague,    I had the pleasure of seeing Cale Wagoner in the [facility].    HPI and Plan:   See dictation [id number]    No orders of the defined types were placed in this encounter.      No orders of the defined types were placed in this encounter.      Medications Discontinued During This Encounter   Medication Reason     metoprolol tartrate (LOPRESSOR) 50 MG tablet Erroneous Entry         No diagnosis found.    CURRENT MEDICATIONS:  Current Outpatient Medications   Medication Sig Dispense Refill     Acetaminophen (TYLENOL PO) Take 500 mg by mouth every 8 hours as needed for mild pain or fever       amLODIPine (NORVASC) 5 MG tablet Take 1 tablet (5 mg) by mouth daily 90 tablet 3     ASPIRIN 81 PO        atorvastatin (LIPITOR) 20 MG tablet Take 1 tablet (20 mg) by mouth daily 90 tablet 3     FAMOTIDINE PO Take 20 mg by mouth 2 times daily       losartan (COZAAR) 100 MG tablet TAKE ONE TABLET (100 mg) BY MOUTH ONE TIME DAILY  90 tablet 3     Multiple Vitamins-Minerals (MULTIVITAMIN & MINERAL PO) Take 1 tablet by mouth daily       nitroGLYcerin (NITROSTAT) 0.4 MG sublingual tablet For chest pain place 1 tablet under the tongue every 5 minutes for 3 doses. If symptoms persist 5 minutes after 1st dose call 911. 25 tablet 1     Omega-3 Fatty Acids (OMEGA-3 FISH OIL PO) Take 2 g by mouth daily        pantoprazole (PROTONIX) 40 MG EC tablet Take 1 tablet (40 mg) by mouth 2 times daily 180 tablet 3     psyllium (METAMUCIL/KONSYL) Packet Take 1 packet by mouth daily         ALLERGIES     Allergies   Allergen Reactions     Penicillins        PAST MEDICAL HISTORY:  Past Medical History:   Diagnosis Date     Benign essential hypertension      Bloating      CAD (coronary artery disease)      Hyperlipidemia      Kidney stones      NSTEMI (non-ST elevated myocardial infarction)  (H)      PFO (patent foramen ovale)      TIA (transient ischemic attack)        PAST SURGICAL HISTORY:  Past Surgical History:   Procedure Laterality Date     APPENDECTOMY       CARDIAC CATHERIZATION  09/15/2017     MOSES to mid-distal Cx x2     ENT SURGERY      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     EYE SURGERY      cataracts     HERNIA REPAIR      twice     SOFT TISSUE SURGERY      skin cancer right temple       FAMILY HISTORY:  Family History   Problem Relation Age of Onset     Respiratory Father         pneumonia     Unknown/Adopted Father      Alzheimer Disease Brother      Alzheimer Disease Sister      Emphysema Sister      Unknown/Adopted Mother        SOCIAL HISTORY:  Social History     Socioeconomic History     Marital status:      Spouse name: None     Number of children: None     Years of education: None     Highest education level: None   Occupational History     None   Social Needs     Financial resource strain: None     Food insecurity:     Worry: None     Inability: None     Transportation needs:     Medical: None     Non-medical: None   Tobacco Use     Smoking status: Former Smoker     Last attempt to quit: 1968     Years since quittin.0     Smokeless tobacco: Never Used   Substance and Sexual Activity     Alcohol use: Yes     Comment: Occ     Drug use: No     Sexual activity: Never     Partners: Female     Birth control/protection: None   Lifestyle     Physical activity:     Days per week: None     Minutes per session: None     Stress: None   Relationships     Social connections:     Talks on phone: None     Gets together: None     Attends Adventism service: None     Active member of club or organization: None     Attends meetings of clubs or organizations: None     Relationship status: None     Intimate partner violence:     Fear of current or ex partner: None     Emotionally abused: None     Physically abused: None     Forced sexual activity: None   Other Topics Concern     Parent/sibling  "w/ CABG, MI or angioplasty before 65F 55M? No   Social History Narrative     None       Review of Systems:  Skin:  Negative       Eyes:  Positive for glasses;cataracts both eyes and surgery was 5 years ago.  ENT:  Negative      Respiratory:  Negative       Cardiovascular:  Negative      Gastroenterology: Positive for reflux;heartburn new medication pantoprazole not as effective as prilosec.  Genitourinary:  Negative decreased urinary stream;urinary frequency    Musculoskeletal:  Negative      Neurologic:  Positive for stroke Pt reports  he had ministroke 5 years ago.  Psychiatric:  Negative      Heme/Lymph/Imm:  Negative      Endocrine:  Negative        Physical Exam:  Vitals: BP (!) 167/90   Pulse 79   Ht 1.689 m (5' 6.5\")   Wt 77.6 kg (171 lb)   BMI 27.19 kg/m       Constitutional:  cooperative, alert and oriented, well developed, well nourished, in no acute distress        Skin:  warm and dry to the touch          Head:  normocephalic        Eyes:  sclera white        Lymph:No thyromegaly     ENT:           Neck:           Respiratory:  normal breath sounds, clear to auscultation, normal A-P diameter, normal symmetry, normal respiratory excursion, no use of accessory muscles         Cardiac: regular rhythm, normal S1/S2, no S3 or S4, apical impulse not displaced, no murmurs, gallops or rubs                                                         GI:           Extremities and Muscular Skeletal:  no deformities, clubbing, cyanosis, erythema observed;no edema              Neurological:  no gross motor deficits;affect appropriate        Psych:  Alert and Oriented x 3;affect appropriate, oriented to time, person and place          CC  Gwyn Zheng MD  [address] [place] [address]                    Thank you for allowing me to participate in the care of your patient.      Sincerely,     Chichi Yang MD     [facility] [place]    cc:   Gwyn Zheng MD  [address] [place] [address]

## 2019-08-18 VITALS
DIASTOLIC BLOOD PRESSURE: 62 MMHG | WEIGHT: 171 LBS | HEIGHT: 67 IN | HEART RATE: 79 BPM | BODY MASS INDEX: 26.84 KG/M2 | SYSTOLIC BLOOD PRESSURE: 122 MMHG

## 2019-08-19 NOTE — PROGRESS NOTES
Service Date: 08/18/2019      PATIENT HISTORY:  Mr. Wagoner returned to [facility] in [place].  He is now 86 years old and is followed for a history of a myocardial infarct, coronary artery disease and prior coronary artery intervention.  He is accompanied by his wife, Dalila, whom I have seen for a number of years and who has brought him to clinic.      Mr. Wagoner experienced a myocardial infarct during 2017.  During 09/2017, he presented with central chest discomfort and underwent coronary angiography.  Two drug-eluting stents were placed to the mid and distal left circumflex coronary artery with resolution of his symptoms.  For 1 year following the interventional procedure, he was on dual antiplatelet therapy and earlier this year, clopidogrel was discontinued.  He continues on aspirin daily.      He denies any chest, neck, arm or back discomfort.  He states that he is feeling well and is active without exertional dyspnea or angina.  He further denies any palpitations.  At the time of his coronary angiogram, there was demonstrated to be a 40%-50% eccentric distal left main coronary stenosis and there was scattered 305-40% diffuse disease throughout the LAD system with the ostium of the first diagonal branch having a 50%-60% stenosis.  The right coronary artery was notable for mild disease until the proximal portion of the PDA where there was a bifurcation stenosis of about 60% in the 2 limbs of that vessel.        Mr. Wagoner underwent echocardiography during that visit and he had normal left ventricular systolic performance with inferolateral hypokinesis.  There was no clinically significant valvular disease.  A stress nuclear scan with Lexiscan Myoview stress was performed during 03/2018.  At that time, there was a small to medium-sized perfusion defect of moderate severity in the mid to basal inferolateral wall and the basal inferior wall which was essentially fixed.  There was no evidence of  ischemia and the finding was consistent with the patient's known history.      At this time, he is doing well and his lipids indicated an LDL fraction of 59 and HDL 46 mg/dL with normal triglycerides.  He continues on low-dose aspirin, atorvastatin, losartan and amlodipine.  He was not taking metoprolol because the prescription had run out and he did not realize that the metoprolol was intended to be continued.  He had been on metoprolol succinate at 25 mg daily.      PHYSICAL EXAMINATION:   GENERAL:  This is a man in no apparent distress who appears younger than his stated age.   VITAL SIGNS:  Blood pressure was high on initial evaluation and on recheck was 122/62 mmHg.  Heart rate was 79 beats per minute and regular, and the respiratory rate was 14-18 per minute.   CHEST:  Clear to auscultation.   CARDIAC:  On cardiac auscultation, there was an S1 and S2 without extra sounds or a murmur.  The rhythm was regular.      ASSESSMENT AND RECOMMENDATIONS:  This is an individual with coronary artery disease who is doing well despite his history of an inferolateral myocardial infarct 2 years ago.  He is asymptomatic and is on appropriate risk factor intervention.  His risk factors are under good control, including his blood pressure and his lipids.  He has overall normal left ventricular systolic performance.  I have recommended reinstitution of the metoprolol XL and sent in a prescription to his pharmacy.      He remains asymptomatic and will return in 1 year unless he develops earlier symptoms.  He will contact us in the interim if that should occur.         MARTIN BENOIT MD, [facility]             D: 2019   T: 2019   MT: ELINA      Name:     EDMAR LEMUS   MRN:      [id number]        Account:      [id number]   :      1933           Service Date: 2019      Document: [id number]

## 2019-09-09 DIAGNOSIS — I25.10 CORONARY ARTERY DISEASE INVOLVING NATIVE CORONARY ARTERY OF NATIVE HEART WITHOUT ANGINA PECTORIS: Primary | ICD-10-CM

## 2019-09-09 RX ORDER — METOPROLOL SUCCINATE 25 MG/1
25 TABLET, EXTENDED RELEASE ORAL DAILY
Qty: 90 TABLET | Refills: 3 | Status: SHIPPED | OUTPATIENT
Start: 2019-09-09 | End: 2019-09-18

## 2019-09-18 ENCOUNTER — OFFICE VISIT (OUTPATIENT)
Dept: FAMILY MEDICINE | Facility: CLINIC | Age: 84
End: 2019-09-18
Payer: COMMERCIAL

## 2019-09-18 VITALS
OXYGEN SATURATION: 93 % | BODY MASS INDEX: 26.84 KG/M2 | HEART RATE: 85 BPM | TEMPERATURE: 98.6 F | DIASTOLIC BLOOD PRESSURE: 80 MMHG | WEIGHT: 171 LBS | SYSTOLIC BLOOD PRESSURE: 138 MMHG | HEIGHT: 67 IN | RESPIRATION RATE: 12 BRPM

## 2019-09-18 DIAGNOSIS — I10 BENIGN ESSENTIAL HYPERTENSION: ICD-10-CM

## 2019-09-18 DIAGNOSIS — R41.3 POOR SHORT TERM MEMORY: Primary | ICD-10-CM

## 2019-09-18 DIAGNOSIS — Z23 NEED FOR PROPHYLACTIC VACCINATION AND INOCULATION AGAINST INFLUENZA: ICD-10-CM

## 2019-09-18 DIAGNOSIS — I25.10 CORONARY ARTERY DISEASE INVOLVING NATIVE CORONARY ARTERY OF NATIVE HEART WITHOUT ANGINA PECTORIS: ICD-10-CM

## 2019-09-18 PROCEDURE — 90662 IIV NO PRSV INCREASED AG IM: CPT | Performed by: FAMILY MEDICINE

## 2019-09-18 PROCEDURE — 99214 OFFICE O/P EST MOD 30 MIN: CPT | Mod: 25 | Performed by: FAMILY MEDICINE

## 2019-09-18 PROCEDURE — G0008 ADMIN INFLUENZA VIRUS VAC: HCPCS | Performed by: FAMILY MEDICINE

## 2019-09-18 RX ORDER — DONEPEZIL HYDROCHLORIDE 5 MG/1
5 TABLET, FILM COATED ORAL AT BEDTIME
Qty: 30 TABLET | Refills: 3 | Status: SHIPPED | OUTPATIENT
Start: 2019-09-18 | End: 2020-01-10

## 2019-09-18 RX ORDER — METOPROLOL SUCCINATE 25 MG/1
25 TABLET, EXTENDED RELEASE ORAL DAILY
Qty: 90 TABLET | Refills: 3 | Status: SHIPPED | OUTPATIENT
Start: 2019-09-18 | End: 2020-09-18

## 2019-09-18 ASSESSMENT — MIFFLIN-ST. JEOR: SCORE: 1406.34

## 2019-09-18 NOTE — PROGRESS NOTES
Subjective     Cale Wagoner is a 86 year old male who presents to clinic today for the following health issues:    HPI     Medication Followup of Metoprolol      Taking Medication as prescribed: NO    Side Effects:  None    Medication Helping Symptoms:  not applicable     Memory issues      Duration: Past couple of months    Description (location/character/radiation): Forgets things from time to time and would like to be on a trial of Aricept.    Intensity:  mild    Accompanying signs and symptoms: None    History (similar episodes/previous evaluation): None    Precipitating or alleviating factors: None    Therapies tried and outcome: None     Hypertension Follow-up      Do you check your blood pressure regularly outside of the clinic? Yes and specialty appointments    Are you following a low salt diet? Yes    Are your blood pressures ever more than 140 on the top number (systolic) OR more   than 90 on the bottom number (diastolic), for example 140/90? No    Patient Active Problem List   Diagnosis     Bloating     GERD (gastroesophageal reflux disease)     Health Care Home     Cerebrovascular accident (CVA), unspecified mechanism (H)     Mixed hyperlipidemia     Slow transit constipation     Tingling of left upper extremity     Chronic pain of right knee     Screening for prostate cancer     Atypical chest pain     NSTEMI (non-ST elevated myocardial infarction) (H)     Coronary artery disease involving native coronary artery of native heart without angina pectoris     Benign essential hypertension     PFO (patent foramen ovale)     TIA (transient ischemic attack)     Right sided facial pain     Right-sided headache     History of kidney stones     Past Surgical History:   Procedure Laterality Date     APPENDECTOMY       CARDIAC CATHERIZATION  09/15/2017     MOSES to mid-distal Cx x2     ENT SURGERY      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     EYE SURGERY      cataracts     HERNIA REPAIR      twice     SOFT TISSUE  "SURGERY      skin cancer right temple       Social History     Tobacco Use     Smoking status: Former Smoker     Last attempt to quit: 1968     Years since quittin.1     Smokeless tobacco: Former User   Substance Use Topics     Alcohol use: Yes     Comment: Occ     Family History   Problem Relation Age of Onset     Respiratory Father         pneumonia     Unknown/Adopted Father      Alzheimer Disease Brother      Alzheimer Disease Sister      Emphysema Sister      Unknown/Adopted Mother              Reviewed and updated as needed this visit by Provider         Review of Systems   ROS COMP: Constitutional, HEENT, cardiovascular, pulmonary, gi and gu systems are negative, except as otherwise noted.      Objective    /80   Pulse 85   Temp 98.6  F (37  C) (Tympanic)   Resp 12   Ht 1.689 m (5' 6.5\")   Wt 77.6 kg (171 lb)   SpO2 93%   BMI 27.19 kg/m    Body mass index is 27.19 kg/m .  Physical Exam   GENERAL APPEARANCE: healthy, alert and no distress  NEURO: Normal strength and tone, mentation intact, speech normal and gait is normal            Assessment & Plan       ICD-10-CM    1. Poor short term memory R41.3 donepezil (ARICEPT) 5 MG tablet   2. Coronary artery disease involving native coronary artery of native heart without angina pectoris I25.10 metoprolol succinate ER (TOPROL-XL) 25 MG 24 hr tablet   3. Benign essential hypertension I10         BMI:   Estimated body mass index is 27.19 kg/m  as calculated from the following:    Height as of this encounter: 1.689 m (5' 6.5\").    Weight as of this encounter: 77.6 kg (171 lb).           Patient Instructions   I sent the patient's prescription for metoprolol to [facility] for him.  That had been sent to the pharmacy that is now inactive at [facility].  Patient is interested in starting Aricept which his wife is on for memory issues.  They have 5 children all of them local and all of them very willing to help with issues.  I placed him on Aricept 5 mg " daily.  Follow-up will be in 2 months to follow-up both on his memory issues and his blood pressure.      Return in about 2 months (around 11/18/2019) for memory follow up, hypertension.    Gwyn Zheng MD  [facility]

## 2019-09-18 NOTE — PATIENT INSTRUCTIONS
I sent the patient's prescription for metoprolol to [facility] for him.  That had been sent to the pharmacy that is now inactive at [facility].  Patient is interested in starting Aricept which his wife is on for memory issues.  They have 5 children all of them local and all of them very willing to help with issues.  I placed him on Aricept 5 mg daily.  Follow-up will be in 2 months to follow-up both on his memory issues and his blood pressure.

## 2019-09-18 NOTE — PROGRESS NOTES
"Subjective     Caleparas Wagoner is a 86 year old male who presents to clinic today for the following health issues:    HPI   STOMACH ISSUES      Duration: ***    Description (location/character/radiation): ***    Intensity:  {mild,moderate,severe:859154}    Accompanying signs and symptoms: ***    History (similar episodes/previous evaluation): {.:100862::\"None\"}    Precipitating or alleviating factors: {.:455527::\"None\"}    Therapies tried and outcome: {.:361835::\"None\"}     {additonal problems for provider to add (Optional):678630}    {HIST REVIEW/ LINKS 2 (Optional):463962}    Reviewed and updated as needed this visit by Provider         Review of Systems   {ROS COMP (Optional):356657}      Objective    There were no vitals taken for this visit.  There is no height or weight on file to calculate BMI.  Physical Exam   {Exam List (Optional):034253}    {Diagnostic Test Results (Optional):381829::\"Diagnostic Test Results:\",\"Labs reviewed in Epic\"}        {PROVIDER CHARTING PREFERENCE:748349}    "

## 2019-09-18 NOTE — NURSING NOTE
"Chief Complaint   Patient presents with     Recheck Medication     Imm/Inj     Flu Shot     /80   Pulse 85   Temp 98.6  F (37  C) (Tympanic)   Resp 12   Ht 1.689 m (5' 6.5\")   Wt 77.6 kg (171 lb)   SpO2 93%   BMI 27.19 kg/m   Estimated body mass index is 27.19 kg/m  as calculated from the following:    Height as of this encounter: 1.689 m (5' 6.5\").    Weight as of this encounter: 77.6 kg (171 lb).  BP completed using cuff size: regular   Donna Chirinos CMA    Health Maintenance Due   Topic Date Due     ZOSTER IMMUNIZATION (1 of 2) 05/26/1983     INFLUENZA VACCINE (1) 09/01/2019     Health Maintenance reviewed at today's visit patient asked to schedule/complete:   Depression:  Patient agrees to schedule  Immunizations:  Patient agrees to schedule    "

## 2019-09-20 ENCOUNTER — TELEPHONE (OUTPATIENT)
Dept: CARDIOLOGY | Facility: CLINIC | Age: 84
End: 2019-09-20

## 2019-09-20 DIAGNOSIS — I10 BENIGN ESSENTIAL HYPERTENSION: ICD-10-CM

## 2019-09-20 RX ORDER — LOSARTAN POTASSIUM 100 MG/1
TABLET ORAL
Qty: 90 TABLET | Refills: 3 | Status: SHIPPED | OUTPATIENT
Start: 2019-09-20 | End: 2020-09-15

## 2019-09-20 NOTE — TELEPHONE ENCOUNTER
"Voicemail received from patient wondering if he is to continue both losartan and metoprolol. Pt states his prescription for losartan is  and he is wondering if he still needs it.     Last OV 19 w/ Dr aYng: \"At this time, he is doing well and his lipids indicated an LDL fraction of 59 and HDL 46 mg/dL with normal triglycerides.  He continues on low-dose aspirin, atorvastatin, losartan and amlodipine.  He was not taking metoprolol because the prescription had run out and he did not realize that the metoprolol was intended to be continued.  He had been on metoprolol succinate at 25 mg daily...This is an individual with coronary artery disease who is doing well despite his history of an inferolateral myocardial infarct 2 years ago.  He is asymptomatic and is on appropriate risk factor intervention.  His risk factors are under good control, including his blood pressure and his lipids.  He has overall normal left ventricular systolic performance.  I have recommended reinstitution of the metoprolol XL and sent in a prescription to his pharmacy.\"     Called patient to discuss, left message to call back. Refill sent for losartan 100mg daily to [facility] in [place], last refilled for 1yr on 18.     1428: Pt returned call. Confirmed he should continue both losartan and metoprolol per Dr Yang. Pt verbalized understanding. Notified pt that refill was sent to pharmacy this morning for losartan. Pt declines need for metoprolol refills at this time.   "

## 2019-10-16 DIAGNOSIS — R07.9 CHEST PAIN, UNSPECIFIED TYPE: ICD-10-CM

## 2019-10-16 NOTE — TELEPHONE ENCOUNTER
"Requested Prescriptions   Pending Prescriptions Disp Refills     pantoprazole (PROTONIX) 40 MG EC tablet  Last Written Prescription Date:  10-15-18  Last Fill Quantity: 180tab,  # refills: 3   Last office visit: 9/18/2019 with prescribing provider:  bunny   Future Office Visit:   Next 5 appointments (look out 90 days)    Nov 20, 2019  9:45 AM CST  SHORT with Gwyn Zheng MD  [facility] [address] [place] [id number]  [phone]          180 tablet 3     Sig: Take 1 tablet (40 mg) by mouth 2 times daily       PPI Protocol Passed - 10/16/2019  2:48 PM        Passed - Not on Clopidogrel (unless Pantoprazole ordered)        Passed - No diagnosis of osteoporosis on record        Passed - Recent (12 mo) or future (30 days) visit within the authorizing provider's specialty     Patient has had an office visit with the authorizing provider or a provider within the authorizing providers department within the previous 12 mos or has a future within next 30 days. See \"Patient Info\" tab in inbasket, or \"Choose Columns\" in Meds & Orders section of the refill encounter.              Passed - Medication is active on med list        Passed - Patient is age 18 or older          "

## 2019-10-18 RX ORDER — PANTOPRAZOLE SODIUM 40 MG/1
40 TABLET, DELAYED RELEASE ORAL
Qty: 180 TABLET | Refills: 2 | Status: ON HOLD | OUTPATIENT
Start: 2019-10-18 | End: 2019-10-24

## 2019-10-23 ENCOUNTER — APPOINTMENT (OUTPATIENT)
Dept: CT IMAGING | Facility: CLINIC | Age: 84
End: 2019-10-23
Attending: EMERGENCY MEDICINE
Payer: COMMERCIAL

## 2019-10-23 ENCOUNTER — HOSPITAL ENCOUNTER (OUTPATIENT)
Facility: CLINIC | Age: 84
Setting detail: OBSERVATION
Discharge: HOME | End: 2019-10-24
Attending: EMERGENCY MEDICINE | Admitting: INTERNAL MEDICINE
Payer: COMMERCIAL

## 2019-10-23 ENCOUNTER — APPOINTMENT (OUTPATIENT)
Dept: MRI IMAGING | Facility: CLINIC | Age: 84
End: 2019-10-23
Attending: PHYSICIAN ASSISTANT
Payer: COMMERCIAL

## 2019-10-23 DIAGNOSIS — H81.12 BENIGN PAROXYSMAL POSITIONAL VERTIGO OF LEFT EAR: Primary | ICD-10-CM

## 2019-10-23 DIAGNOSIS — R11.0 NAUSEA: ICD-10-CM

## 2019-10-23 DIAGNOSIS — K21.9 GASTROESOPHAGEAL REFLUX DISEASE, ESOPHAGITIS PRESENCE NOT SPECIFIED: ICD-10-CM

## 2019-10-23 DIAGNOSIS — R42 DIZZINESS: ICD-10-CM

## 2019-10-23 LAB
ALBUMIN SERPL-MCNC: 3.4 G/DL (ref 3.4–5)
ALP SERPL-CCNC: 58 U/L (ref 40–150)
ALT SERPL W P-5'-P-CCNC: 34 U/L (ref 0–70)
ANION GAP SERPL CALCULATED.3IONS-SCNC: 4 MMOL/L (ref 3–14)
APTT PPP: 32 SEC (ref 22–37)
AST SERPL W P-5'-P-CCNC: 29 U/L (ref 0–45)
BASOPHILS # BLD AUTO: 0 10E9/L (ref 0–0.2)
BASOPHILS NFR BLD AUTO: 0.8 %
BILIRUB DIRECT SERPL-MCNC: 0.2 MG/DL (ref 0–0.2)
BILIRUB SERPL-MCNC: 0.6 MG/DL (ref 0.2–1.3)
BUN SERPL-MCNC: 18 MG/DL (ref 7–30)
CALCIUM SERPL-MCNC: 8.7 MG/DL (ref 8.5–10.1)
CHLORIDE SERPL-SCNC: 103 MMOL/L (ref 94–109)
CO2 SERPL-SCNC: 28 MMOL/L (ref 20–32)
CREAT SERPL-MCNC: 0.91 MG/DL (ref 0.66–1.25)
DIFFERENTIAL METHOD BLD: NORMAL
EOSINOPHIL # BLD AUTO: 0.1 10E9/L (ref 0–0.7)
EOSINOPHIL NFR BLD AUTO: 1.4 %
ERYTHROCYTE [DISTWIDTH] IN BLOOD BY AUTOMATED COUNT: 12.7 % (ref 10–15)
GFR SERPL CREATININE-BSD FRML MDRD: 76 ML/MIN/{1.73_M2}
GLUCOSE BLDC GLUCOMTR-MCNC: 121 MG/DL (ref 70–99)
GLUCOSE BLDC GLUCOMTR-MCNC: 153 MG/DL (ref 70–99)
GLUCOSE BLDC GLUCOMTR-MCNC: 77 MG/DL (ref 70–99)
GLUCOSE SERPL-MCNC: 109 MG/DL (ref 70–99)
HCT VFR BLD AUTO: 42.5 % (ref 40–53)
HGB BLD-MCNC: 15 G/DL (ref 13.3–17.7)
IMM GRANULOCYTES # BLD: 0 10E9/L (ref 0–0.4)
IMM GRANULOCYTES NFR BLD: 0.2 %
INR PPP: 1.06 (ref 0.86–1.14)
LYMPHOCYTES # BLD AUTO: 2 10E9/L (ref 0.8–5.3)
LYMPHOCYTES NFR BLD AUTO: 39.6 %
MCH RBC QN AUTO: 32.3 PG (ref 26.5–33)
MCHC RBC AUTO-ENTMCNC: 35.3 G/DL (ref 31.5–36.5)
MCV RBC AUTO: 92 FL (ref 78–100)
MONOCYTES # BLD AUTO: 0.6 10E9/L (ref 0–1.3)
MONOCYTES NFR BLD AUTO: 11.4 %
NEUTROPHILS # BLD AUTO: 2.4 10E9/L (ref 1.6–8.3)
NEUTROPHILS NFR BLD AUTO: 46.6 %
NRBC # BLD AUTO: 0 10*3/UL
NRBC BLD AUTO-RTO: 0 /100
PLATELET # BLD AUTO: 154 10E9/L (ref 150–450)
POTASSIUM SERPL-SCNC: 3.9 MMOL/L (ref 3.4–5.3)
PROT SERPL-MCNC: 7.3 G/DL (ref 6.8–8.8)
RBC # BLD AUTO: 4.64 10E12/L (ref 4.4–5.9)
SODIUM SERPL-SCNC: 135 MMOL/L (ref 133–144)
TROPONIN I SERPL-MCNC: <0.015 UG/L (ref 0–0.04)
WBC # BLD AUTO: 5.1 10E9/L (ref 4–11)

## 2019-10-23 PROCEDURE — 70450 CT HEAD/BRAIN W/O DYE: CPT | Mod: 59

## 2019-10-23 PROCEDURE — 99285 EMERGENCY DEPT VISIT HI MDM: CPT | Mod: 25

## 2019-10-23 PROCEDURE — 25800030 ZZH RX IP 258 OP 636: Performed by: PHYSICIAN ASSISTANT

## 2019-10-23 PROCEDURE — 93005 ELECTROCARDIOGRAM TRACING: CPT

## 2019-10-23 PROCEDURE — 84484 ASSAY OF TROPONIN QUANT: CPT | Performed by: PHYSICIAN ASSISTANT

## 2019-10-23 PROCEDURE — 99220 ZZC INITIAL OBSERVATION CARE,LEVL III: CPT | Performed by: PHYSICIAN ASSISTANT

## 2019-10-23 PROCEDURE — 84484 ASSAY OF TROPONIN QUANT: CPT | Performed by: EMERGENCY MEDICINE

## 2019-10-23 PROCEDURE — 25500064 ZZH RX 255 OP 636: Performed by: INTERNAL MEDICINE

## 2019-10-23 PROCEDURE — 25000132 ZZH RX MED GY IP 250 OP 250 PS 637: Performed by: PHYSICIAN ASSISTANT

## 2019-10-23 PROCEDURE — G0378 HOSPITAL OBSERVATION PER HR: HCPCS

## 2019-10-23 PROCEDURE — 25000128 H RX IP 250 OP 636: Performed by: EMERGENCY MEDICINE

## 2019-10-23 PROCEDURE — 25000128 H RX IP 250 OP 636

## 2019-10-23 PROCEDURE — 96374 THER/PROPH/DIAG INJ IV PUSH: CPT | Mod: 59

## 2019-10-23 PROCEDURE — 0042T CT HEAD PERFUSION WITH CONTRAST: CPT

## 2019-10-23 PROCEDURE — 96375 TX/PRO/DX INJ NEW DRUG ADDON: CPT | Mod: 59

## 2019-10-23 PROCEDURE — 70553 MRI BRAIN STEM W/O & W/DYE: CPT

## 2019-10-23 PROCEDURE — 80076 HEPATIC FUNCTION PANEL: CPT | Performed by: EMERGENCY MEDICINE

## 2019-10-23 PROCEDURE — 36415 COLL VENOUS BLD VENIPUNCTURE: CPT | Performed by: PHYSICIAN ASSISTANT

## 2019-10-23 PROCEDURE — 00000146 ZZHCL STATISTIC GLUCOSE BY METER IP

## 2019-10-23 PROCEDURE — 85730 THROMBOPLASTIN TIME PARTIAL: CPT | Performed by: EMERGENCY MEDICINE

## 2019-10-23 PROCEDURE — 70498 CT ANGIOGRAPHY NECK: CPT

## 2019-10-23 PROCEDURE — 80048 BASIC METABOLIC PNL TOTAL CA: CPT | Performed by: EMERGENCY MEDICINE

## 2019-10-23 PROCEDURE — 85025 COMPLETE CBC W/AUTO DIFF WBC: CPT | Performed by: EMERGENCY MEDICINE

## 2019-10-23 PROCEDURE — A9585 GADOBUTROL INJECTION: HCPCS | Performed by: INTERNAL MEDICINE

## 2019-10-23 PROCEDURE — 99205 OFFICE O/P NEW HI 60 MIN: CPT | Mod: GC | Performed by: PSYCHIATRY & NEUROLOGY

## 2019-10-23 PROCEDURE — 85610 PROTHROMBIN TIME: CPT | Performed by: EMERGENCY MEDICINE

## 2019-10-23 RX ORDER — ACETAMINOPHEN 650 MG/1
650 SUPPOSITORY RECTAL EVERY 4 HOURS PRN
Status: DISCONTINUED | OUTPATIENT
Start: 2019-10-23 | End: 2019-10-23

## 2019-10-23 RX ORDER — ATORVASTATIN CALCIUM 10 MG/1
20 TABLET, FILM COATED ORAL EVERY EVENING
Status: DISCONTINUED | OUTPATIENT
Start: 2019-10-23 | End: 2019-10-24 | Stop reason: HOSPADM

## 2019-10-23 RX ORDER — ONDANSETRON 4 MG/1
4 TABLET, ORALLY DISINTEGRATING ORAL EVERY 6 HOURS PRN
Status: DISCONTINUED | OUTPATIENT
Start: 2019-10-23 | End: 2019-10-24 | Stop reason: HOSPADM

## 2019-10-23 RX ORDER — ASPIRIN 81 MG/1
81 TABLET ORAL AT BEDTIME
COMMUNITY

## 2019-10-23 RX ORDER — ACETAMINOPHEN 325 MG/1
650 TABLET ORAL EVERY 4 HOURS PRN
Status: DISCONTINUED | OUTPATIENT
Start: 2019-10-23 | End: 2019-10-23

## 2019-10-23 RX ORDER — ONDANSETRON 2 MG/ML
INJECTION INTRAMUSCULAR; INTRAVENOUS
Status: COMPLETED
Start: 2019-10-23 | End: 2019-10-23

## 2019-10-23 RX ORDER — ONDANSETRON 2 MG/ML
4 INJECTION INTRAMUSCULAR; INTRAVENOUS EVERY 6 HOURS PRN
Status: DISCONTINUED | OUTPATIENT
Start: 2019-10-23 | End: 2019-10-24 | Stop reason: HOSPADM

## 2019-10-23 RX ORDER — PROCHLORPERAZINE MALEATE 5 MG
5 TABLET ORAL EVERY 6 HOURS PRN
Status: DISCONTINUED | OUTPATIENT
Start: 2019-10-23 | End: 2019-10-24 | Stop reason: HOSPADM

## 2019-10-23 RX ORDER — GADOBUTROL 604.72 MG/ML
8 INJECTION INTRAVENOUS ONCE
Status: COMPLETED | OUTPATIENT
Start: 2019-10-23 | End: 2019-10-23

## 2019-10-23 RX ORDER — IOPAMIDOL 755 MG/ML
120 INJECTION, SOLUTION INTRAVASCULAR ONCE
Status: COMPLETED | OUTPATIENT
Start: 2019-10-23 | End: 2019-10-23

## 2019-10-23 RX ORDER — ASPIRIN 81 MG/1
81 TABLET ORAL AT BEDTIME
Status: DISCONTINUED | OUTPATIENT
Start: 2019-10-23 | End: 2019-10-24 | Stop reason: HOSPADM

## 2019-10-23 RX ORDER — ACETAMINOPHEN 650 MG/1
650 SUPPOSITORY RECTAL EVERY 4 HOURS PRN
Status: DISCONTINUED | OUTPATIENT
Start: 2019-10-23 | End: 2019-10-24 | Stop reason: HOSPADM

## 2019-10-23 RX ORDER — FAMOTIDINE 20 MG/1
20 TABLET, FILM COATED ORAL 2 TIMES DAILY
Status: DISCONTINUED | OUTPATIENT
Start: 2019-10-23 | End: 2019-10-24 | Stop reason: HOSPADM

## 2019-10-23 RX ORDER — SODIUM CHLORIDE 9 MG/ML
INJECTION, SOLUTION INTRAVENOUS CONTINUOUS
Status: DISCONTINUED | OUTPATIENT
Start: 2019-10-23 | End: 2019-10-24

## 2019-10-23 RX ORDER — DIPHENHYDRAMINE HYDROCHLORIDE 50 MG/ML
25 INJECTION INTRAMUSCULAR; INTRAVENOUS ONCE
Status: COMPLETED | OUTPATIENT
Start: 2019-10-23 | End: 2019-10-23

## 2019-10-23 RX ORDER — ACETAMINOPHEN 325 MG/1
650 TABLET ORAL EVERY 4 HOURS PRN
Status: DISCONTINUED | OUTPATIENT
Start: 2019-10-23 | End: 2019-10-24 | Stop reason: HOSPADM

## 2019-10-23 RX ORDER — PROCHLORPERAZINE 25 MG
12.5 SUPPOSITORY, RECTAL RECTAL EVERY 12 HOURS PRN
Status: DISCONTINUED | OUTPATIENT
Start: 2019-10-23 | End: 2019-10-24 | Stop reason: HOSPADM

## 2019-10-23 RX ORDER — DONEPEZIL HYDROCHLORIDE 5 MG/1
5 TABLET, FILM COATED ORAL AT BEDTIME
Status: DISCONTINUED | OUTPATIENT
Start: 2019-10-23 | End: 2019-10-24 | Stop reason: HOSPADM

## 2019-10-23 RX ORDER — NALOXONE HYDROCHLORIDE 0.4 MG/ML
.1-.4 INJECTION, SOLUTION INTRAMUSCULAR; INTRAVENOUS; SUBCUTANEOUS
Status: DISCONTINUED | OUTPATIENT
Start: 2019-10-23 | End: 2019-10-24 | Stop reason: HOSPADM

## 2019-10-23 RX ADMIN — DIPHENHYDRAMINE HYDROCHLORIDE 25 MG: 50 INJECTION, SOLUTION INTRAMUSCULAR; INTRAVENOUS at 09:21

## 2019-10-23 RX ADMIN — FAMOTIDINE 20 MG: 20 TABLET, FILM COATED ORAL at 20:03

## 2019-10-23 RX ADMIN — ATORVASTATIN CALCIUM 20 MG: 10 TABLET, FILM COATED ORAL at 20:03

## 2019-10-23 RX ADMIN — SODIUM CHLORIDE: 9 INJECTION, SOLUTION INTRAVENOUS at 13:20

## 2019-10-23 RX ADMIN — IOPAMIDOL 120 ML: 755 INJECTION, SOLUTION INTRAVENOUS at 09:16

## 2019-10-23 RX ADMIN — ONDANSETRON 4 MG: 2 INJECTION INTRAMUSCULAR; INTRAVENOUS at 08:46

## 2019-10-23 RX ADMIN — ASPIRIN 81 MG: 81 TABLET, DELAYED RELEASE ORAL at 22:31

## 2019-10-23 RX ADMIN — ONDANSETRON 4 MG: 2 INJECTION INTRAMUSCULAR; INTRAVENOUS at 09:21

## 2019-10-23 RX ADMIN — PROCHLORPERAZINE EDISYLATE 5 MG: 5 INJECTION, SOLUTION INTRAMUSCULAR; INTRAVENOUS at 09:20

## 2019-10-23 RX ADMIN — DONEPEZIL HYDROCHLORIDE 5 MG: 5 TABLET ORAL at 22:31

## 2019-10-23 RX ADMIN — GADOBUTROL 8 ML: 604.72 INJECTION INTRAVENOUS at 20:20

## 2019-10-23 ASSESSMENT — ENCOUNTER SYMPTOMS
SPEECH DIFFICULTY: 0
DIZZINESS: 1
PALPITATIONS: 0
SHORTNESS OF BREATH: 0
NUMBNESS: 0
HEADACHES: 0
NAUSEA: 1
VOMITING: 0

## 2019-10-23 NOTE — PLAN OF CARE
Pt A&O, VSS. On RA. Denies pain. Mild dizziness/unsteadiness with movements and ambulation. Tele NSR. NIH wnl. Neuros q 4. Pcd's on. Passed swallow screen. . IV infusing. Plans for MRI, neuro consult, PT/OT. Wife and daughter at bedside. Will cont to monitor.

## 2019-10-23 NOTE — PHARMACY-ADMISSION MEDICATION HISTORY
Pharmacy Medication History  Admission medication history interview status for the 10/23/2019  admission is complete. See EPIC admission navigator for prior to admission medications     Medication history sources: Patient and Patient's family/friend (daughter and spouse)  Medication history source reliability: Good  Adherence assessment: Good    Significant changes made to the medication list:  Pt states he is no longer taking Amlodipine, stopped Protonix a week ago due to muscle cramping       Additional medication history information:       Medication reconciliation completed by provider prior to medication history? No    Time spent in this activity: 25 min      Prior to Admission medications    Medication Sig Last Dose Taking? Auth Provider   Acetaminophen (TYLENOL PO) Take 500 mg by mouth every 8 hours as needed for mild pain or fever Past Month at Unknown time Yes Unknown, Entered By History   aspirin 81 MG EC tablet Take 81 mg by mouth At Bedtime 10/22/2019 at pm Yes Unknown, Entered By History   atorvastatin (LIPITOR) 20 MG tablet Take 1 tablet (20 mg) by mouth daily 10/22/2019 at hs Yes Addis Lopez PA-C   donepezil (ARICEPT) 5 MG tablet Take 1 tablet (5 mg) by mouth At Bedtime 10/22/2019 at hs Yes Gwyn Zheng MD   FAMOTIDINE PO Take 20 mg by mouth 2 times daily 10/22/2019 at Unknown time Yes Reported, Patient   losartan (COZAAR) 100 MG tablet TAKE ONE TABLET (100 mg) BY MOUTH ONE TIME DAILY 10/22/2019 at pm Yes Chichi Yang MD   metoprolol succinate ER (TOPROL-XL) 25 MG 24 hr tablet Take 1 tablet (25 mg) by mouth daily 10/22/2019 at am Yes Gwyn Zheng MD   Multiple Vitamins-Minerals (MULTIVITAMIN & MINERAL PO) Take 1 tablet by mouth daily 10/22/2019 at am Yes Reported, Patient   nitroGLYcerin (NITROSTAT) 0.4 MG sublingual tablet For chest pain place 1 tablet under the tongue every 5 minutes for 3 doses. If symptoms persist 5 minutes after 1st dose call 911. prn Yes  Juan Grubbs MD   Omega-3 Fatty Acids (OMEGA-3 FISH OIL PO) Take 2 g by mouth daily  10/22/2019 at am Yes Unknown, Entered By History   pantoprazole (PROTONIX) 40 MG EC tablet Take 1 tablet (40 mg) by mouth 2 times daily A week ago Yes Gwyn Zheng MD   psyllium (METAMUCIL/KONSYL) Packet Take 1 packet by mouth daily 10/22/2019 at Unknown time Yes Unknown, Entered By History

## 2019-10-23 NOTE — PROGRESS NOTES
RECEIVING UNIT ED HANDOFF REVIEW    ED Nurse Handoff Report was reviewed by: Naomi Pressley RN on October 23, 2019 at 11:08 AM

## 2019-10-23 NOTE — H&P
Admitted:     10/23/2019      CHIEF COMPLAINT:  Dizziness.      HISTORY OBTAINED:  History obtained from the patient and his wife and daughter present at bedside.      HISTORY OF PRESENT ILLNESS:  Cale Wagoner is an 86-year-old gentleman with a history of TIA, PFO, coronary artery disease, status post stent placement in 2017, hypertension, hyperlipidemia, kidney stones, mild cognitive impairment who presented to the Emergency Department via EMS today for evaluation of dizziness.  The patient reports he has been in his usual state of health and was feeling normal when he woke up this morning.  He went to take a shower and while showering experienced abrupt onset dizziness.  He has difficulty describing this, but states at times it felt like the room was spinning.  He reports the next thing he knew he was sitting in his rec room and does not recall the events between the dizziness and getting to the other room.  His wife reports she found him sitting there and that he seemed his normal self in terms of orientation, speech, etc.  He told her that he was very dizzy and felt he needed to go to the ER.  The patient does not recall having any associated palpitations, chest pain, shortness of breath.  He denies any associated focal weakness, changes in sensation, speech difficulties, difficulty swallowing.  He has some baseline tinnitus, which is unchanged.  Again, he denies any recent illnesses preceding the event.  He was brought to the Emergency Department, where a code stroke was initially called.  CTA and CT perfusion were obtained showing some small aneurysms of the carotid arteries as well as some mild stenosis without any evidence of large vessel occlusion or perfusion defect.  He had significant nausea while in the Emergency Department, particularly when lying flat for imaging, for which he was given Benadryl, Zofran and Compazine with improvement.  He was seen by Neurology, who recommended obtaining MRI of the  brain after admission.  He is presently evaluated in the Emergency Department by me.  He reports resolution of his symptoms at the time of my interview.  Denies any dizziness or nausea at present.  He is not having any chest pain, shortness of breath, visual changes.  He denies any abdominal pain or recent vomiting or diarrhea.  He reports he has not been on his Protonix for a week due to concerns for muscle cramping.  He denies fevers, chills.  Denies any recent anginal symptoms.      REVIEW OF SYSTEMS:  A 10-point review of systems was conducted and is negative aside from the information in the HPI.      PAST MEDICAL HISTORY:   1.  History of PFO seen during admission for TIA in 2014 on echo.   2.  History of TIA.   3.  Coronary artery disease, status post NSTEMI in 2017, status post drug-eluting stent to circumflex x2.  He follows with Dr. Yang of Cardiology and last saw her in 08/2019.   4.  Hypertension.   5.  Hyperlipidemia.   6.  Kidney stones.   7.  Mild cognitive impairment.  Recently started Aricept 09/2019.      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     EYE SURGERY      cataracts     HERNIA REPAIR      twice     SOFT TISSUE SURGERY      skin cancer right temple            PRIOR TO ADMISSION MEDICATIONS:    Medications Prior to Admission   Medication Sig Dispense Refill Last Dose     Acetaminophen (TYLENOL PO) Take 500 mg by mouth every 8 hours as needed for mild pain or fever   Past Month at Unknown time     aspirin 81 MG EC tablet Take 81 mg by mouth At Bedtime   10/22/2019 at pm     atorvastatin (LIPITOR) 20 MG tablet Take 1 tablet (20 mg) by mouth daily 90 tablet 3 10/22/2019 at hs     donepezil (ARICEPT) 5 MG tablet Take 1 tablet (5 mg) by mouth At Bedtime 30 tablet 3 10/22/2019 at hs     FAMOTIDINE PO Take 20 mg by mouth 2 times  daily   10/22/2019 at Unknown time     losartan (COZAAR) 100 MG tablet TAKE ONE TABLET (100 mg) BY MOUTH ONE TIME DAILY 90 tablet 3 10/22/2019 at pm     metoprolol succinate ER (TOPROL-XL) 25 MG 24 hr tablet Take 1 tablet (25 mg) by mouth daily 90 tablet 3 10/22/2019 at am     Multiple Vitamins-Minerals (MULTIVITAMIN & MINERAL PO) Take 1 tablet by mouth daily   10/22/2019 at am     nitroGLYcerin (NITROSTAT) 0.4 MG sublingual tablet For chest pain place 1 tablet under the tongue every 5 minutes for 3 doses. If symptoms persist 5 minutes after 1st dose call 911. 25 tablet 1 prn     Omega-3 Fatty Acids (OMEGA-3 FISH OIL PO) Take 2 g by mouth daily    10/22/2019 at am     pantoprazole (PROTONIX) 40 MG EC tablet Take 1 tablet (40 mg) by mouth 2 times daily 180 tablet 2 Past Week at pm     psyllium (METAMUCIL/KONSYL) Packet Take 1 packet by mouth daily   10/22/2019 at Unknown time          ALLERGIES:  PENICILLIN.      SOCIAL HISTORY:  The patient reports occasional alcohol use.  Denies drug use.  He is a former smoker who smoked for 25 years.  He lives with his wife and does not require any assistive device to ambulate.      FAMILY HISTORY:  Reviewed and noncontributory.      LABORATORY EVALUATION:  BMP is within normal limits, with creatinine of 0.91, white blood cell count 5.1, hemoglobin 15, hematocrit 42.5, platelet count is 150.  Troponin is less than 0.015.      CT of the head without contrast without acute findings, without large vessel occlusion.  He does have 2 areas of mild tandem stenosis in the M1 segment of his right middle cerebral artery, likely due to atherosclerosis, as well as 2 focal outpouching from lying in the left internal carotid, concerning for small aneurysms and atherosclerotic disease at the left carotid bifurcation, approximately 55% to 60% stenosis.  CT perfusion of the head is negative.      PHYSICAL EXAMINATION:   VITAL SIGNS:  Temperature is not documented, heart rate 69, blood pressure  106/51, respiratory rate 28, oxygen saturation is 94% on room air.   GENERAL:  An alert and oriented elderly male, oriented x4.  Speech is a little slow, but this is baseline per patient and family.  He appears comfortable and is appropriately conversant.   HEENT:  Eyes:  Pupils are equal and reactive to light, EOMI, no nystagmus is noted.  Mucous membranes are moist.   CARDIOVASCULAR:  Regular rate and rhythm.  No murmurs appreciated.   RESPIRATORY:  Lungs are clear to auscultation bilaterally.  No increased work of breathing or wheezing.   GASTROINTESTINAL:  Positive bowel sounds.     ABDOMEN:  Soft and nontender to palpation.   SKIN:  Warm and dry.   MUSCULOSKELETAL:  Strength is +5/5 and equal in upper and lower extremities bilaterally.   NEUROLOGIC:  Cranial nerves II-XII are grossly intact.  No focal deficits.  Speech is clear.  Face symmetric.  Again, speech is a bit slow baseline.  Finger-to-nose is intact.      ASSESSMENT AND PLAN:  Cale Wagoner is an 86-year-old gentleman with a history of transient ischemic attack, patent foramen ovale, coronary artery disease, hypertension, hyperlipidemia, kidney stones and mild cognitive impairment who presents to the Emergency Department today with family via EMS for evaluation of acute onset dizziness.  He is seen under observation status as I anticipate less than a 2-midnight stay.      1.  Acute onset dizziness, now resolved.  The patient reports being in his usual state of health and while in the shower experienced abrupt onset of dizziness.  Denied any associated weakness, sensation changes, chest pain, palpitations, shortness of breath.  Episode persisted on arrival to the ED and patient received multiple doses of antiemetics for nausea.  A code stroke was called in the ED and no intervention pursued, as patient improved and symptoms thought to be more consistent with vertigo.  CTA does show small carotid aneurysm and some atherosclerotic disease.  He is back  to baseline presently.  Differentials include TIA, arrhythmia, vertigo.  No evidence of infection or electrolyte derangements.     -- Neurology following, appreciate assistance.  The patient does have known history of PFO per echo in .  We will defer need to repeat this to Neurology Service.   -- Continue daily aspirin.   -- MRI stroke limited per Neurology recommendation.   -- PT, OT, speech.   -- Telemetry, consider outpatient cardiac monitoring at discharge.   -- We will allow for permissive hypertension today and hydrate gently.   -- Could consider PT vertigo evaluation, though symptoms have resolved at this time.   2.  Coronary artery disease, status post NSTEMI in 2017 with MOSES to circumflex x2.  Denies recent or current symptoms of angina.  EKG is nonischemic and troponin negative.   -- We will hold antihypertensives to allow for permissive hypertension today, can resume if MRI negative.     -- We will continue to trend troponins.   -- Continue aspirin, statin.   3.  GERD.  Continue prior to admission Protonix and Coumadin when verified.   4.  Cognitive impairment.  Continue PTA Aricept.      CODE STATUS:  The patient is full code.  This was discussed on admission.      DISPOSITION:  Anticipate discharge home with family tomorrow if remains asymptomatic.      The patient was seen and examined with Dr. Radha Brown, who agrees with the above plan.         RADHA BROWN DO       As dictated by GAVIN KOCH PA-C            D: 10/23/2019   T: 10/23/2019   MT: [place]      Name:     EDMAR LEMUS   MRN:      [id number]        Account:      [id number]   :      1933        Admitted:     10/23/2019                   Document: [id number]       cc: Gwyn Zheng MD

## 2019-10-23 NOTE — ED TRIAGE NOTES
Sudden onset of dizziness at 0730.  Hx TIA/MI.  Denies fall.  States feels unsteady on feet but had ambulated prior to coming in.

## 2019-10-23 NOTE — ED PROVIDER NOTES
History     Chief Complaint:  Dizziness    HPI   Cale Wagoner is an 86 year old male with a history of TIA and an NSTEMI who is on aspirin 81 mg who presents with dizziness. The patient reports that he was awake and at 0730, he had sudden onset dizziness and nausea while he was in the shower at 0730. He states he does not remember how he got out of the shower to the living room. He states he became very nauseated. He notes he was able to ambulate but needed assistance due to his dizziness. He denies headache, vomiting, numbness, tingling, vision changes, difficulty speaking, difficulty thinking, chest pain, shortness of breath, and heart palpitations. The patient's daughter reports he stopped taking his prescribed blood thinners, possibly as directed by his physician.     Allergies:  Penicillins    Medications:    Protonix  Cozaar  Ariceot  Toprol  Norvasc  Lipitor  Nitrostat  Asprin 81 mg    Past Medical History:    Hypertension  Coronary artery disease  Hyperlipidemia  NSTEMI  TIA    Past Surgical History:    Appendectomy  Cardiac catherization  Malignant tumor on nose  Cataracts  Hernia repair (x2)  Skin cancer right temple    Family History:    Pneumonia  Alzheimer disease  Emphysema    Social History:  Smoking status: Former, quit 8/19/1968  Alcohol use: Yes, occasionally  Drug use: No  PCP: Gwyn Zheng  Presents to the ED with wife, daughter and son  Marital Status:   [2]    Review of Systems   Eyes: Negative for visual disturbance.   Respiratory: Negative for shortness of breath.    Cardiovascular: Negative for chest pain and palpitations.   Gastrointestinal: Positive for nausea. Negative for vomiting.   Neurological: Positive for dizziness. Negative for speech difficulty, numbness and headaches.   All other systems reviewed and are negative.        Physical Exam     Patient Vitals for the past 24 hrs:   BP Pulse Heart Rate Resp SpO2 Weight   10/23/19 0921 -- -- 70 14 94 % --    10/23/19 0919 121/61 72 -- -- -- --   10/23/19 0847 (!) 146/96 71 -- 14 98 % 77.6 kg (171 lb)     Physical Exam  Nursing note and vitals reviewed.  Constitutional:  Oriented to person, place, and time. Vital signs are normal. Cooperative.  Appears uncomfortable.  HENT:   Mouth/Throat:   Oropharynx is clear and moist and mucous membranes are normal.   Eyes:    Conjunctivae are normal.      Pupils are equal, round, and reactive to light.   Neck:    Trachea normal and normal range of motion.   Cardiovascular:  Normal rate, regular rhythm and normal heart sounds.    Pulses:   Radial pulses are 2+ bilaterally. Dorsalis pedis pulses are 2+ bilaterally.   Pulmonary/Chest:  Effort normal.   Abdominal:   Soft. Normal appearance and bowel sounds are normal.      Exhibits no distension. There is no tenderness.      There is no rebound and no CVA tenderness.   Musculoskeletal:  Extremities atraumatic x 4.   Lymphadenopathy:  No cervical adenopathy.   Neurological:   Alert and oriented to person, place, and time. Normal strength and normal reflexes. Not disoriented. No cranial nerve deficit or sensory deficit. Coordination normal. GCS eye subscore is 4. GCS verbal subscore is 5. GCS motor subscore is 6. Visual fields intact. No pronator drift. Normal Finger-to-Nose and Rapid Alternating Movement.  No nystagmus present.  Skin:    Skin is warm, dry and intact.      No rash noted.   Psychiatric:   Normal mood and affect. Speech is normal and behavior is normal. Judgment normal. Cognition and memory are normal.    Emergency Department Course   ECG (9:21:50):  Rate 72 bpm. RI interval 204. QRS duration 92. QT/QTc 394/431. P-R-T axes 46 3 87. Normal sinus rhythm. Nonspecific T wave abnormality. Abnormal ECG. Interpreted at 0924 by Orestes Cruz MD.    Imaging:  Radiographic findings were communicated with the patient who voiced understanding of the findings.    CT Head Perfusion w Contrast  No gross focal or regional perfusion  asymmetries.  As read by Radiology.    CTA Head Neck w Contrast  1. No large vessel occlusion or dissection involving the major  craniocervical arterial vasculature.  2.Two areas of mild tandem stenosis involving the M1 segment of the  right middle cerebral artery likely due to atherosclerotic disease.  3. Two focal outpouchings arising from the supraclinoid segments of  the right and left internal carotid arteries measuring up to  approximately 4 mm, concerning for small aneurysms.  4. Mixed atherosclerotic disease at the left carotid bifurcation  resulting in approximately 55-60% stenosis. Milder degree of  atherosclerotic disease of the right carotid bifurcation with less  than 50% stenosis.  As read by Radiology.    CT Head w/o Contrast  No CT findings of acute extended infarct or intracranial  hemorrhage. ASPECTS score = 10.  As read by Radiology.    Laboratory:  CBC: WNL (WBC 5.1, HGB 15.0, )  BMP: Glucose 109 (H) o/w WNL (Creatinine 0.91)  INR: 1.06  PTT: 32  Troponin I (Collected 0845): <0.015    Interventions:  0846: Zofran 4 mg IV  0920: Compazine 5 mg IV  0921: Benadryl 25 mg IV  0921: Zofran 4 mg IV    Emergency Department Course:  Past medical records, nursing notes, and vitals reviewed.  0846: I performed an exam of the patient and obtained history, as documented above.  EKG performed, results above.  The patient was sent for a head CT, head perfusion CT, and a head neck CTA while in the emergency department, findings above.  IV inserted and blood drawn.    0906: I spoke with Dr. Hamilton of stroke neurology.    0920: I spoke with neuroradiology.    Findings and plan explained to the Patient and spouse, son and daughter who consents to admission.     0944: Discussed the patient with Dr. Radha Hernandez, who will admit the patient to an observation unit bed for further monitoring, evaluation, and treatment.     Impression & Plan    Medical Decision Making:  This is an 86-year-old male brought in by  his family for further evaluation of dizziness and nausea.  A code stroke was called by the nurse upon his arrival.  The stroke neurology team came to see the patient immediately as well.  He then went for the above CT scans to evaluate for an acute stroke.  His symptoms and exam seem more consistent with a peripheral vertigo rather than a central process such as acute stroke or posterior circulation issue.  I doubt this is cardiac in origin as well given the lack of other symptoms such as chest pain or abdominal pain.  However I think it is best that he be admitted to the hospital for observation and further evaluation and management as well as MRI imaging.  I subsequently spoke with Dr. Radha Hernandez, who will be taking care of the patient.    Diagnosis:    ICD-10-CM   1. Dizziness R42   2. Nausea R11.0     Disposition:  Admitted to observation unit    Bert Allison  10/23/2019    EMERGENCY DEPARTMENT  IBert, am serving as a scribe at 8:46 AM on 10/23/2019 to document services personally performed by Orestes Cruz MD based on my observations and the provider's statements to me.      Orestes Cruz MD  10/23/19 1104

## 2019-10-23 NOTE — ED NOTES
Bed: ED01  Expected date:   Expected time:   Means of arrival:   Comments:  514  86 M vertigo/vomiting  0835

## 2019-10-23 NOTE — PLAN OF CARE
Neurology consult complete: Met.  MRI complete: Not met  Dizziness resolved: Met.    Nurse to notify provider when observation goals have been met and patient is ready for discharge.      A&O x4. VSS on RA. Denies pain. Neuro intact-mild dizziness with amb. Up SBA. Regular diet Tolerating well. BG 77. LS clear. Tele NSR. Trops negative x2. BS active, + Flatus. Voiding adequately in BR. Progressing toward plan of care. Pt to have MRI done.

## 2019-10-23 NOTE — CONSULTS
"  [facility]    Stroke Consult Note    Reason for Consult:room spinning sensation     Chief Complaint: Dizziness      HPI  Cale Ernesto Wagoner is a 86 year old male PMHx of TIA, NSTEMI, CAD here for sudden onset dizziness.  Patient reportedly was normal at baseline, and this morning he was in the shower and had sudden onset \"room spinning\" sensation, sudden onset of nausea and vomiting. Denied any focal weakness or word finding issues.  Takes ASA 81 mg qday, no other AC.  Denies any recent infections, denies any double vision.  Patient given zofran, compazine and benadryl for nausea and vomiting. Pt complaining of some tinnitus  NIHSS of 0          TPA Treatment   Not likely stroke    Endovascular Treatment  No large vessel occlusion     Impression  Likely peripheral vertigo presentation, CTH/CTA h and neck and CTP was unrevealing. No bleed or large vessel occlusion or perfusion deficit. Unlikely vascular etiology of presentation however we can obtain MRI brain once admitted to observation.    Recommendations:  [] MRI brain stroke limited  [] ok to restart home medications including home ASA 81  [] prn meclizine for vertigo  [] vestibular therapy/epleys maneuver    Mickie Post MD  Vascular Neurology Fellow  10/23/2019 9:59 AM        ______________________________________________________    Past Medical History   Past Medical History:   Diagnosis Date     Benign essential hypertension      Bloating      CAD (coronary artery disease)      Hyperlipidemia      Kidney stones      NSTEMI (non-ST elevated myocardial infarction) (H)      PFO (patent foramen ovale)      TIA (transient ischemic attack)      Past Surgical History   Past Surgical History:   Procedure Laterality Date     APPENDECTOMY       CARDIAC CATHERIZATION  09/15/2017     MOSES to mid-distal Cx x2     ENT SURGERY      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 Authorizing Provider   Acetaminophen (TYLENOL PO) Take 500 mg by mouth every 8 hours as needed for mild pain or fever    Unknown, Entered By History   amLODIPine (NORVASC) 5 MG tablet Take 1 tablet (5 mg) by mouth daily 9/26/18   Addis Lopez PA-C   ASPIRIN 81 PO     Reported, Patient   atorvastatin (LIPITOR) 20 MG tablet Take 1 tablet (20 mg) by mouth daily 9/26/18   Addis Lopez PA-C   donepezil (ARICEPT) 5 MG tablet Take 1 tablet (5 mg) by mouth At Bedtime 9/18/19   Gwyn Zheng MD   FAMOTIDINE PO Take 20 mg by mouth 2 times daily    Reported, Patient   losartan (COZAAR) 100 MG tablet TAKE ONE TABLET (100 mg) BY MOUTH ONE TIME DAILY 9/20/19   Chichi Yang MD   metoprolol succinate ER (TOPROL-XL) 25 MG 24 hr tablet Take 1 tablet (25 mg) by mouth daily 9/18/19   Gwyn Zheng MD   Multiple Vitamins-Minerals (MULTIVITAMIN & MINERAL PO) Take 1 tablet by mouth daily    Reported, Patient   nitroGLYcerin (NITROSTAT) 0.4 MG sublingual tablet For chest pain place 1 tablet under the tongue every 5 minutes for 3 doses. If symptoms persist 5 minutes after 1st dose call 911. 9/16/17   Juan Grubbs MD   Omega-3 Fatty Acids (OMEGA-3 FISH OIL PO) Take 2 g by mouth daily     Unknown, Entered By History   pantoprazole (PROTONIX) 40 MG EC tablet Take 1 tablet (40 mg) by mouth 2 times daily 10/18/19   Gwyn Zheng MD   psyllium (METAMUCIL/KONSYL) Packet Take 1 packet by mouth daily    Unknown, Entered By History       Scheduled Meds    sodium chloride 0.9 %  100 mL Intravenous Once     iopamidol  120 mL Intravenous Once     ondansetron           Infusion Meds      PRN Meds      Allergies   Allergies   Allergen Reactions     Penicillins      Family History   Family History   Problem Relation Age of Onset     Respiratory Father         pneumonia      Unknown/Adopted Father      Alzheimer Disease Brother      Alzheimer Disease Sister      Emphysema Sister      Unknown/Adopted Mother      Social History   Social History     Tobacco Use     Smoking status: Former Smoker     Last attempt to quit: 1968     Years since quittin.2     Smokeless tobacco: Former User   Substance Use Topics     Alcohol use: Yes     Comment: Occ     Drug use: No       Review of Systems   The 10 point Review of Systems is negative other than noted in the HPI or here.        PHYSICAL EXAMINATION  Pulse:  [71] 71  Resp:  [14] 14  BP: (146)/(96) 146/96  SpO2:  [98 %] 98 %       General:  patient lying in bed with nausea and vomiting  HEENT:  normocephalic/atraumatic  Cardio:  RRR  Pulmonary:  no respiratory distress  Abdomen: soft non tender  Extremities: pulses intact  Skin:  no lesions     Neurologic  Mental Status:  alert, oriented x 3, follows commands, speech clear and fluent, naming and repetition normal  Cranial Nerves:  visual fields intact, EOMI with normal smooth pursuit, hearing not formally tested but intact to conversation, no dysarthria, shoulder shrug equal bilaterally, tongue protrusion midline, no facial droop. Sensory equal bilaterally in v/1/2/3 distribution  Motor:  no abnormal movements, able to move all limbs antigravity spontaneously with no signs of hemiparesis observed, no pronator drift  Reflexes:  deferred  Sensory: sensation intact to light touch in all 4 ext   Coordination:  normal finger-to-nose   Station/Gait:  deferred     Dysphagia Screen  Per Nursing       Stroke Scales    NIHSS  Interval baseline (10/23/19 0856)   Interval Comments     1a. Level of Consciousness 0-->Alert, keenly responsive   1b. LOC Questions 0-->Answers both questions correctly   1c. LOC Commands 0-->Performs both tasks correctly   2.   Best Gaze 0-->Normal   3.   Visual 0-->No visual loss   4.   Facial Palsy 0-->Normal symmetrical movements   5a. Motor Arm, Left 0-->No drift, limb  holds 90 (or 45) degrees for full 10 secs   5b. Motor Arm, Right 0-->No drift, limb holds 90 (or 45) degrees for full 10 secs   6a. Motor Leg, Left 0-->No drift, leg holds 30 degree position for full 5 secs   6b. Motor Leg, right 0-->No drift, leg holds 30 degree position for full 5 secs   7.   Limb Ataxia 0-->Absent   8.   Sensory 0-->Normal, no sensory loss   9.   Best Language 0-->No aphasia, normal   10. Dysarthria 0-->Normal   11. Extinction and Inattention  0-->No abnormality   Total 0 (10/23/19 0856)       Imaging  I personally reviewed all imaging; relevant findings per HPI.     Lab Results Data   CBC  Recent Labs   Lab 10/23/19  0845   WBC 5.1   RBC 4.64   HGB 15.0   HCT 42.5        Basic Metabolic Panel    No results for input(s): NA, POTASSIUM, CHLORIDE, CO2, BUN, CR, GLC, RUSTY in the last 168 hours.  Liver Panel  Recent Labs   Lab Test 08/14/19  0950 09/17/18  0900 11/03/17  0928 09/14/17  1235 09/13/17  1020 08/31/15  1303   PROTTOTAL  --   --   --  8.2 7.6 7.7   ALBUMIN  --   --   --  3.8 3.8 3.9   BILITOTAL  --   --   --  0.6 0.9 0.6   ALKPHOS  --   --   --  73 57 65   AST  --   --   --  28 25 25   ALT 6 26 33 39 34 37     INR  Recent Labs   Lab Test 09/12/14  1510   INR 1.01      Lipid Profile  Recent Labs   Lab Test 08/14/19  0950 09/17/18  0900 11/03/17  0928  08/31/15  1303 04/15/15  0740 02/03/15  0846   CHOL 122 86 98   < > 175 219* 208*   HDL 46 40 40   < > 40* 42 40*   LDL 59 30 41   < > 98 155* 144*   TRIG 85 78 84   < > 184* 109 122   CHOLHDLRATIO  --   --   --   --  4.4 5.2* 5.2*    < > = values in this interval not displayed.     A1CNo lab results found.  Troponin Estrella results for input(s): TROPI in the last 168 hours.       Stroke Code / Stroke Consult Data Data   Stroke Code Data  (for stroke code without tele)  Stroke code activated 10/23/19   0825   First stroke provider response 10/23/19   0856   Last known normal 10/23/19   0700   Time of discovery   (or onset of symptoms)  10/23/19   0700   Head CT read by me      yes   Was stroke code de-escalated?           yes

## 2019-10-23 NOTE — PLAN OF CARE
Discharge Planner SLP  Cale Wagoner is an 86-year-old gentleman with a history of TIA, PFO, coronary artery disease, status post stent placement in 2017, hypertension, hyperlipidemia, kidney stones, mild cognitive impairment who presented to the Emergency Department via EMS today for evaluation of dizziness.   Patient plan for discharge: Not addressed.  Current status: Patient passed the swallow screen and a regular diet and thin liquids ordered. Per his nurse there are no speech/language concerns. Has mild cognitive deficits at baseline. No skilled intervention is indicated at this time, will complete the orders.   Barriers to return to prior living situation: None per speech.   Recommendations for discharge: No ST needs.   Rationale for recommendations: No ST needs at this time.        Entered by: Sanjuanita Ahumada 10/23/2019 3:25 PM

## 2019-10-24 ENCOUNTER — APPOINTMENT (OUTPATIENT)
Dept: PHYSICAL THERAPY | Facility: CLINIC | Age: 84
End: 2019-10-24
Attending: PHYSICIAN ASSISTANT
Payer: COMMERCIAL

## 2019-10-24 VITALS
DIASTOLIC BLOOD PRESSURE: 62 MMHG | BODY MASS INDEX: 27.19 KG/M2 | TEMPERATURE: 95.5 F | HEART RATE: 70 BPM | SYSTOLIC BLOOD PRESSURE: 135 MMHG | OXYGEN SATURATION: 93 % | WEIGHT: 171 LBS | RESPIRATION RATE: 18 BRPM

## 2019-10-24 LAB — INTERPRETATION ECG - MUSE: NORMAL

## 2019-10-24 PROCEDURE — 97112 NEUROMUSCULAR REEDUCATION: CPT | Mod: GP

## 2019-10-24 PROCEDURE — 25000132 ZZH RX MED GY IP 250 OP 250 PS 637: Performed by: PHYSICIAN ASSISTANT

## 2019-10-24 PROCEDURE — 25800030 ZZH RX IP 258 OP 636: Performed by: PHYSICIAN ASSISTANT

## 2019-10-24 PROCEDURE — 97116 GAIT TRAINING THERAPY: CPT | Mod: GP

## 2019-10-24 PROCEDURE — 99217 ZZC OBSERVATION CARE DISCHARGE: CPT | Performed by: PHYSICIAN ASSISTANT

## 2019-10-24 PROCEDURE — 25000128 H RX IP 250 OP 636: Performed by: PHYSICIAN ASSISTANT

## 2019-10-24 PROCEDURE — 97161 PT EVAL LOW COMPLEX 20 MIN: CPT | Mod: GP

## 2019-10-24 PROCEDURE — 99214 OFFICE O/P EST MOD 30 MIN: CPT | Mod: GC | Performed by: PSYCHIATRY & NEUROLOGY

## 2019-10-24 PROCEDURE — G0378 HOSPITAL OBSERVATION PER HR: HCPCS

## 2019-10-24 RX ORDER — MECLIZINE HYDROCHLORIDE 25 MG/1
25 TABLET ORAL EVERY 6 HOURS PRN
Status: DISCONTINUED | OUTPATIENT
Start: 2019-10-24 | End: 2019-10-24 | Stop reason: HOSPADM

## 2019-10-24 RX ORDER — OMEPRAZOLE 40 MG/1
40 CAPSULE, DELAYED RELEASE ORAL DAILY
Qty: 30 CAPSULE | Refills: 0 | Status: SHIPPED | OUTPATIENT
Start: 2019-10-24 | End: 2020-10-19

## 2019-10-24 RX ORDER — LOSARTAN POTASSIUM 100 MG/1
100 TABLET ORAL DAILY
Status: DISCONTINUED | OUTPATIENT
Start: 2019-10-24 | End: 2019-10-24 | Stop reason: HOSPADM

## 2019-10-24 RX ORDER — ONDANSETRON 4 MG/1
4 TABLET, ORALLY DISINTEGRATING ORAL EVERY 6 HOURS PRN
Qty: 15 TABLET | Refills: 0 | Status: ON HOLD | OUTPATIENT
Start: 2019-10-24 | End: 2021-04-14

## 2019-10-24 RX ORDER — MECLIZINE HYDROCHLORIDE 25 MG/1
25 TABLET ORAL EVERY 6 HOURS PRN
Qty: 20 TABLET | Refills: 0 | Status: ON HOLD | OUTPATIENT
Start: 2019-10-24 | End: 2021-04-14

## 2019-10-24 RX ORDER — METOPROLOL SUCCINATE 25 MG/1
25 TABLET, EXTENDED RELEASE ORAL DAILY
Status: DISCONTINUED | OUTPATIENT
Start: 2019-10-24 | End: 2019-10-24 | Stop reason: HOSPADM

## 2019-10-24 RX ADMIN — ONDANSETRON 4 MG: 4 TABLET, ORALLY DISINTEGRATING ORAL at 10:15

## 2019-10-24 RX ADMIN — SODIUM CHLORIDE: 9 INJECTION, SOLUTION INTRAVENOUS at 03:33

## 2019-10-24 RX ADMIN — LOSARTAN POTASSIUM 100 MG: 100 TABLET, FILM COATED ORAL at 08:44

## 2019-10-24 RX ADMIN — FAMOTIDINE 20 MG: 20 TABLET, FILM COATED ORAL at 07:46

## 2019-10-24 RX ADMIN — METOPROLOL SUCCINATE 25 MG: 25 TABLET, EXTENDED RELEASE ORAL at 08:44

## 2019-10-24 NOTE — PROGRESS NOTES
10/24/19 0931   Quick Adds   Quick Adds Certification;Vestibular Eval   Type of Visit Initial PT Evaluation   Living Environment   Lives With spouse   Living Arrangements house  (2 story townhouse)   Home Accessibility stairs within home   Number of Stairs, Within Home, Primary 10   Stair Railings, Within Home, Primary railing on right side (ascending)   Self-Care   Usual Activity Tolerance good   Current Activity Tolerance fair   Regular Exercise Yes   Activity/Exercise Type walking   Equipment Currently Used at Home none   Functional Level Prior   Ambulation 0-->independent   Transferring 0-->independent   Fall history within last six months no   General Information   Onset of Illness/Injury or Date of Surgery - Date 10/23/19   Referring Physician Ekta Keen PA-C   Patient/Family Goals Statement Return home   Pertinent History of Current Problem (include personal factors and/or comorbidities that impact the POC) Pt admitted under observation status with dizziness. Describes it as room spinning with movement only especially head turns. Denies nausea at the moment and states no dizziness at rest. Denies hearing loss, headache, tinnitus, recent colds or GI bugs.  PMH:  TIA, PFO, CAD, HTN, mild cognitive impairment.   Precautions/Limitations fall precautions   Weight-Bearing Status - LLE full weight-bearing   Weight-Bearing Status - RLE full weight-bearing   General Observations Pt up in the chair, family present   Cognitive Status Examination   Orientation orientation to person, place and time   Level of Consciousness alert   Follows Commands and Answers Questions 100% of the time;able to follow multistep instructions   Personal Safety and Judgment intact   Pain Assessment   Patient Currently in Pain No   Posture    Posture Forward head position;Protracted shoulders   Range of Motion (ROM)   ROM Quick Adds No deficits were identified   Strength   Strength Comments B LEs 4+/5 globally   Bed Mobility   Bed  Mobility Comments Independent, complains of dizziness upon sitting up   Transfer Skills   Transfer Comments Sit to stand with SBA, denies dizziness upon standing   Gait   Gait Comments Pt amb 10 ft with no AD and CGA, mild dec in balance but overall no LOB noted, states mild dizziness present but did not increase   Balance   Balance Comments Balance mildly dec with mobility but no LOB noted, overall is steady   Sensory Examination   Sensory Perception Comments Denies numbness or tingling   Oculomotor Exam   Smooth Pursuit Normal   Saccades Normal   VOR Normal   Rapid Head Thrust Normal   Infrared Goggle Exam or Frenzel Lense Exam   Exam completed with Room Light   Spontaneous Nystagmus Negative   Gaze Evoked Nystagmus Horizontal R   Head Shake Horizontal Nystagmus Negative   Positional Testing Comments Had much difficult testing as pt would become nauseated and start heaving when laying flat very long, had to do it repeatedly   Jeffrey-Hallpike (Right) Negative   Baldwin-Hallpike (Left) Downbeating L torsional   General Therapy Interventions   Planned Therapy Interventions gait training;neuromuscular re-education;transfer training   Clinical Impression   Criteria for Skilled Therapeutic Intervention yes, treatment indicated   PT Diagnosis Dizziness   Influenced by the following impairments Dizziness, dec balance, activity tolerance   Functional limitations due to impairments Difficulty ambualting and transferring   Clinical Presentation Stable/Uncomplicated   Clinical Presentation Rationale medically improved   Clinical Decision Making (Complexity) Low complexity   Therapy Frequency Daily   Predicted Duration of Therapy Intervention (days/wks) 1 day   Anticipated Discharge Disposition Home with Outpatient Therapy   Risk & Benefits of therapy have been explained Yes   Patient, Family & other staff in agreement with plan of care Yes   Therapy Certification   Start of care date 10/24/19   Certification date from 10/24/19  "  Certification date to 10/24/19   Medical Diagnosis Dizziness   [facility] AM-PAC TM \"6 Clicks\"   2016, Trustees of [facility], under license to [employer].  All rights reserved.   6 Clicks Short Forms Basic Mobility Inpatient Short Form   [facility] AM-PAC  \"6 Clicks\" V.2 Basic Mobility Inpatient Short Form   1. Turning from your back to your side while in a flat bed without using bedrails? 4 - None   2. Moving from lying on your back to sitting on the side of a flat bed without using bedrails? 4 - None   3. Moving to and from a bed to a chair (including a wheelchair)? 3 - A Little   4. Standing up from a chair using your arms (e.g., wheelchair, or bedside chair)? 3 - A Little   5. To walk in hospital room? 3 - A Little   6. Climbing 3-5 steps with a railing? 3 - A Little   Basic Mobility Raw Score (Score out of 24.Lower scores equate to lower levels of function) 20   Total Evaluation Time   Total Evaluation Time (Minutes) 20     "

## 2019-10-24 NOTE — PROGRESS NOTES
[facility]    Stroke Progress Note    Reason for Consult:room spinning sensation     Chief Complaint: Dizziness    Overnight:  No issues overnight. MRI negative for stroke.        TPA Treatment   Not likely stroke    Endovascular Treatment  No large vessel occlusion     Impression  Likely peripheral vertigo presentation, CTH/CTA h and neck and CTP was unrevealing. No bleed or large vessel occlusion or perfusion deficit. Unlikely vascular etiology of presentation however we can obtain MRI brain once admitted to observation.  MRI brain negative for stroke, likely peripheral in etiology. Recommend prn meclizine for vertigo  And vestibular therapy/epleys maneuver.    Ok to discharge from stroke side. No follow up needed.  Stroke team will sign off  Mickie Post MD  Vascular Neurology Fellow  10/24/2019 10:53 AM        ______________________________________________________    Past Medical History   Past Medical History:   Diagnosis Date     Benign essential hypertension      Bloating      CAD (coronary artery disease)      Hyperlipidemia      Kidney stones      NSTEMI (non-ST elevated myocardial infarction) (H)      PFO (patent foramen ovale)      TIA (transient ischemic attack)      Past Surgical History   Past Surgical History:   Procedure Laterality Date     APPENDECTOMY       CARDIAC CATHERIZATION  09/15/2017     MOSES to mid-distal Cx x2     ENT SURGERY      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 Authorizing Provider   Acetaminophen (TYLENOL PO) Take 500 mg by mouth every 8 hours as needed for mild pain or fever    Unknown, Entered By History   amLODIPine (NORVASC) 5 MG tablet Take 1 tablet (5 mg) by mouth daily 9/26/18   Addis Lopez PA-C   ASPIRIN 81 PO     Reported, Patient    atorvastatin (LIPITOR) 20 MG tablet Take 1 tablet (20 mg) by mouth daily 9/26/18   Addis Lopez PA-C   donepezil (ARICEPT) 5 MG tablet Take 1 tablet (5 mg) by mouth At Bedtime 9/18/19   Gwyn Zheng MD   FAMOTIDINE PO Take 20 mg by mouth 2 times daily    Reported, Patient   losartan (COZAAR) 100 MG tablet TAKE ONE TABLET (100 mg) BY MOUTH ONE TIME DAILY 9/20/19   Chichi Yang MD   metoprolol succinate ER (TOPROL-XL) 25 MG 24 hr tablet Take 1 tablet (25 mg) by mouth daily 9/18/19   Gwyn Zheng MD   Multiple Vitamins-Minerals (MULTIVITAMIN & MINERAL PO) Take 1 tablet by mouth daily    Reported, Patient   nitroGLYcerin (NITROSTAT) 0.4 MG sublingual tablet For chest pain place 1 tablet under the tongue every 5 minutes for 3 doses. If symptoms persist 5 minutes after 1st dose call 911. 9/16/17   Juan Grubbs MD   Omega-3 Fatty Acids (OMEGA-3 FISH OIL PO) Take 2 g by mouth daily     Unknown, Entered By History   pantoprazole (PROTONIX) 40 MG EC tablet Take 1 tablet (40 mg) by mouth 2 times daily 10/18/19   Gwyn Zheng MD   psyllium (METAMUCIL/KONSYL) Packet Take 1 packet by mouth daily    Unknown, Entered By History       Scheduled Meds    sodium chloride 0.9 %  100 mL Intravenous Once     aspirin  81 mg Oral At Bedtime     atorvastatin  20 mg Oral QPM     donepezil  5 mg Oral At Bedtime     famotidine  20 mg Oral BID     losartan  100 mg Oral Daily     metoprolol succinate ER  25 mg Oral Daily       Infusion Meds    - MEDICATION INSTRUCTIONS -         PRN Meds      Allergies   Allergies   Allergen Reactions     Penicillins      Family History   Family History   Problem Relation Age of Onset     Respiratory Father         pneumonia     Unknown/Adopted Father      Alzheimer Disease Brother      Alzheimer Disease Sister      Emphysema Sister      Unknown/Adopted Mother      Social History   Social History     Tobacco Use     Smoking status: Former Smoker     Last  attempt to quit: 1968     Years since quittin.2     Smokeless tobacco: Former User   Substance Use Topics     Alcohol use: Yes     Comment: Occ     Drug use: No       Review of Systems   The 10 point Review of Systems is negative other than noted in the HPI or here.        PHYSICAL EXAMINATION  Temp:  [95.4  F (35.2  C)-96.6  F (35.9  C)] 95.5  F (35.3  C)  Pulse:  [70] 70  Heart Rate:  [67-87] 77  Resp:  [18-28] 18  BP: (106-147)/(51-62) 135/62  SpO2:  [92 %-95 %] 93 %       General:  patient lying in bed with nausea and vomiting  HEENT:  normocephalic/atraumatic  Cardio:  RRR  Pulmonary:  no respiratory distress  Abdomen: soft non tender  Extremities: pulses intact  Skin:  no lesions     Neurologic  Mental Status:  alert, oriented x 3, follows commands, speech clear and fluent, naming and repetition normal  Cranial Nerves:  visual fields intact, EOMI with normal smooth pursuit, hearing not formally tested but intact to conversation, no dysarthria, shoulder shrug equal bilaterally, tongue protrusion midline, no facial droop. Sensory equal bilaterally in v/1/2/3 distribution  Motor:  no abnormal movements, able to move all limbs antigravity spontaneously with no signs of hemiparesis observed, no pronator drift  Reflexes:  deferred  Sensory: sensation intact to light touch in all 4 ext   Coordination:  normal finger-to-nose   Station/Gait:  deferred     Dysphagia Screen  Per Nursing       Imaging  I personally reviewed all imaging; relevant findings per HPI.     Lab Results Data   CBC  Recent Labs   Lab 10/23/19  0845   WBC 5.1   RBC 4.64   HGB 15.0   HCT 42.5        Basic Metabolic Panel    Recent Labs   Lab 10/23/19  0845      POTASSIUM 3.9   CHLORIDE 103   CO2 28   BUN 18   CR 0.91   *   RUSTY 8.7     Liver Panel  Recent Labs   Lab Test 10/23/19  0845 19  0950 18  0900  17  1235 17  1020   PROTTOTAL 7.3  --   --   --  8.2 7.6   ALBUMIN 3.4  --   --   --  3.8 3.8    BILITOTAL 0.6  --   --   --  0.6 0.9   ALKPHOS 58  --   --   --  73 57   AST 29  --   --   --  28 25   ALT 34 6 26   < > 39 34    < > = values in this interval not displayed.     INR  Recent Labs   Lab Test 10/23/19  0845 09/12/14  1510   INR 1.06 1.01      Lipid Profile  Recent Labs   Lab Test 08/14/19  0950 09/17/18  0900 11/03/17  0928  08/31/15  1303 04/15/15  0740 02/03/15  0846   CHOL 122 86 98   < > 175 219* 208*   HDL 46 40 40   < > 40* 42 40*   LDL 59 30 41   < > 98 155* 144*   TRIG 85 78 84   < > 184* 109 122   CHOLHDLRATIO  --   --   --   --  4.4 5.2* 5.2*    < > = values in this interval not displayed.     A1CNo lab results found.  Troponin I  Recent Labs   Lab 10/23/19  1810 10/23/19  1410 10/23/19  0845   TROPI <0.015 <0.015 <0.015

## 2019-10-24 NOTE — PLAN OF CARE
PT:  Discharge Planner PT   Patient plan for discharge: Return home  Current status: Orders received, eval completed, treatment initiated. Pt admitted under observation status with dizziness. Prior to admit pt was living with his spouse in a 2 story townhouse, no AD use and independent with mobility, active at baseline. Currently requires SBA for transfers, gait of 200 ft without AD with no LOB noted with complaints of mild dizziness. Performed Jeffrey-Hallpike testing for BPPV and pt was positive in the L ear for posterior canal BPPV. Epley performed x2 with good results, no further nystagmus seen and no dizziness in the position. Pt had emesis. Pt demonstrates dizziness, dec activity tolerance and difficulty ambulating and transferring and would benefit from skilled PT services in order to improve this.  Barriers to return to prior living situation: None  Recommendations for discharge: Return home with spouse and OP vestibular therapy  Rationale for recommendations: Pt would benefit from OP vestibular therapy if his symptoms do not resolve in the next 24-48 hours. Orders entered.       Entered by: Abby De La Paz 10/24/2019 10:08 AM

## 2019-10-24 NOTE — PROGRESS NOTES
Observation goals PRIOR TO DISCHARGE     Comments: Neurology consult complete- Met  MRI complete   Met  Dizziness resolved Met  Nurse to notify provider when observation goals have been met and patient is ready for discharge.  Yes

## 2019-10-24 NOTE — PLAN OF CARE
Observation goals PRIOR TO DISCHARGE     Comments: Neurology consult complete  Not met  MRI complete   Met  Dizziness resolved   Met  Nurse to notify provider when observation goals have been met and patient is ready for discharge.  Yes

## 2019-10-24 NOTE — PROGRESS NOTES
Observation goals PRIOR TO DISCHARGE     Comments: Neurology consult complete  Not met  MRI complete   Met  Dizziness resolved   Not met  Nurse to notify provider when observation goals have been met and patient is ready for discharge.  Yes

## 2019-10-24 NOTE — DISCHARGE SUMMARY
[facility]  Hospitalist Discharge Summary       Date of Admission:  10/23/2019  Date of Discharge:  10/24/2019 12:43 PM  Discharging Provider: Miley Norman PA-C      Discharge Diagnoses   Dizziness secondary to BPPV  Coronary artery disease with history of an GERD NSTEMI 2017  GERD    Follow-ups Needed After Discharge   Follow-up Appointments     Follow-up and recommended labs and tests       Follow up with primary care provider, Gwyn Zheng, within 7-14   days for hospital follow- up.  No follow up labs or test are needed.             Unresulted Labs Ordered in the Past 30 Days of this Admission     No orders found for last 31 day(s).          Discharge Disposition   Discharged to home  Condition at discharge: Stable    Hospital Course   HPI from H&P per Ekta Keen PA-C  Cale Wagoner is an 86-year-old gentleman with a history of TIA, PFO, coronary artery disease, status post stent placement in 2017, hypertension, hyperlipidemia, kidney stones, mild cognitive impairment who presented to the Emergency Department via EMS today for evaluation of dizziness.  The patient reports he has been in his usual state of health and was feeling normal when he woke up this morning.  He went to take a shower and while showering experienced abrupt onset dizziness.  He has difficulty describing this, but states at times it felt like the room was spinning.  He reports the next thing he knew he was sitting in his rec room and does not recall the events between the dizziness and getting to the other room.  His wife reports she found him sitting there and that he seemed his normal self in terms of orientation, speech, etc.  He told her that he was very dizzy and felt he needed to go to the ER.  The patient does not recall having any associated palpitations, chest pain, shortness of breath.  He denies any associated focal weakness, changes in sensation, speech difficulties, difficulty swallowing.  He  has some baseline tinnitus, which is unchanged.  Again, he denies any recent illnesses preceding the event.  He was brought to the Emergency Department, where a code stroke was initially called.  CTA and CT perfusion were obtained showing some small aneurysms of the carotid arteries as well as some mild stenosis without any evidence of large vessel occlusion or perfusion defect.  He had significant nausea while in the Emergency Department, particularly when lying flat for imaging, for which he was given Benadryl, Zofran and Compazine with improvement.  He was seen by Neurology, who recommended obtaining MRI of the brain after admission.  He is presently evaluated in the Emergency Department by me.  He reports resolution of his symptoms at the time of my interview.  Denies any dizziness or nausea at present.  He is not having any chest pain, shortness of breath, visual changes.  He denies any abdominal pain or recent vomiting or diarrhea.  He reports he has not been on his Protonix for a week due to concerns for muscle cramping.  He denies fevers, chills.  Denies any recent anginal symptoms.     Dizziness secondary to BPPV: Presented with complaints of sudden onset of dizziness.  Initially concern for posterior circulation CVA.  Code stroke was called.  CTA and CT perfusion negative.  MRI ultimately obtained and also negative for acute CVA  -Admitted under observation.  Remained neurologically intact  -Neurology consulted.  Agreed likely dizziness was peripheral in origin.  -Physical therapy consult.  Positive nystagmus with Jeffrey-Hallpike consistent with BPPV on the left.  Patient now status post Epley treatment with improvement in dizziness  -Continue symptomatic treatment with meclizine and Zofran.  Referral to vestibular therapy as outpatient if symptoms do not improve over the next 48 hours    Coronary artery disease with history of NSTEMI 2017  -Continue prior to admission aspirin, statin, losartan and  beta-blocker    GERD: Long-standing history of GERD for which he takes both PPI and famotidine.  Notes previously on omeprazole which he tolerated well but it was discontinued at the timing of his stent in 2017 due to treatment with Plavix.  He is been on Protonix in the interim which patient believes causes him leg cramps.  Request new prescription for omeprazole.  He is no longer on Plavix  -Prescription for omeprazole provided at discharge  -Follow-up with PCP    Consultations This Hospital Stay   NEUROLOGY IP CONSULT  PHYSICAL THERAPY ADULT IP CONSULT  OCCUPATIONAL THERAPY ADULT IP CONSULT  SPEECH LANGUAGE PATH ADULT IP CONSULT  SWALLOW EVAL SPEECH PATH AT BEDSIDE IP CONSULT  SMOKING CESSATION PROGRAM IP CONSULT    Code Status   Full Code    Time Spent on this Encounter   I, Miley Norman PA-C, personally saw the patient today and spent greater than 30 minutes discharging this patient.       Miley Norman PA-C  [facility]  ______________________________________________________________________    Physical Exam   Vital Signs: Temp: 95.5  F (35.3  C) Temp src: Oral BP: 135/62   Heart Rate: 77 Resp: 18 SpO2: 93 % O2 Device: None (Room air)    Weight: 171 lbs 0 oz  Constitutional: Alert, resting comfortably in NAD  HEENT: Head normocephalic, atraumatic. Eyes sclera non icteric.   Respiratory: Normal effort, symmetric expansion, no crackles or wheezing  Cardiovascular: RRR no murmurs   GI: Non distended, normal bowels sounds, no tenderness or guarding  MSK: LE without edema. Dorsalis pedis pulse palpated bilaterally.   Skin/Integumen: Clear  Neuro: CN II-XII grossly intact  Psych:  Alert and oriented x 3. Normal affect         Primary Care Physician   Gwyn Zheng    Discharge Orders      PHYSICAL THERAPY REFERRAL      Reason for your hospital stay    You were admitted for dizziness. You MRI showed no evidence of stroke. Your symptoms were found to be due to positional vertigo.  You can use Meclizine as needed for dizziness and Zofran as needed for nausea. If your symptoms do not improve over the next 48 hrs you should follow up for vestibular physical therapy.     Follow-up and recommended labs and tests     Follow up with primary care provider, Gwyn Zheng, within 7-14 days for hospital follow- up.  No follow up labs or test are needed.     Activity    Your activity upon discharge: activity as tolerated     Diet    Follow this diet upon discharge: Orders Placed This Encounter      Regular Diet Adult       Significant Results and Procedures   Most Recent 3 CBC's:  Recent Labs   Lab Test 10/23/19  0845 09/17/18  0900 04/11/18  1519   WBC 5.1 4.7 5.8   HGB 15.0 14.4 14.8   MCV 92 93 93    158 144*     Most Recent 3 BMP's:  Recent Labs   Lab Test 10/23/19  0845 08/14/19  0950 09/17/18  0900    137 137   POTASSIUM 3.9 4.0 4.2   CHLORIDE 103 104 103   CO2 28 26 26   BUN 18 17 11   CR 0.91 1.05 0.96   ANIONGAP 4 11 8   RUSTY 8.7 9.2 8.5   * 108* 101*     Most Recent 3 Troponin's:  Recent Labs   Lab Test 10/23/19  1810 10/23/19  1410 10/23/19  0845   TROPI <0.015 <0.015 <0.015     Most Recent TSH and T4:  Recent Labs   Lab Test 09/13/14  0857   TSH 2.27   ,   Results for orders placed or performed during the hospital encounter of 10/23/19   CT Head Perfusion w Contrast    Narrative    CT BRAIN PERFUSION 10/23/2019 9:21 AM    HISTORY: TIA, initial exam. Code stroke.    TECHNIQUE: Time sequential axial CT images of the head were acquired  during the administration of 50 mL Isovue-370 IV. Color perfusion maps  of the brain were created from this time sequential axial source data.      Radiation dose for this scan was reduced using automated exposure  control, adjustment of the mA and/or kV according to patient size, or  iterative reconstruction technique.    COMPARISON: CT angiogram of the head and neck of same date.    FINDINGS: There are no gross focal or regional  perfusion defects in  the brain.      Impression    IMPRESSION: No gross focal or regional perfusion asymmetries.    DEMETRI LANDA MD   CT Head w/o Contrast    Narrative    CT SCAN OF THE HEAD WITHOUT CONTRAST   10/23/2019 9:09 AM     HISTORY: TIA, initial exam. Code Stroke.    TECHNIQUE: Axial images of the head and coronal reformations without  IV contrast material. Radiation dose for this scan was reduced using  automated exposure control, adjustment of the mA and/or kV according  to patient size, or iterative reconstruction technique.    COMPARISON: MRI brain dated 9/13/2014.    FINDINGS: There is no evidence of intracranial hemorrhage, mass, acute  infarct or anomaly. The ventricles are normal in size, shape and  configuration. Moderate diffuse parenchymal volume loss. Mild patchy  periventricular white matter hypodensities which are nonspecific, but  likely related to chronic microvascular ischemic disease. Scattered  intracranial vascular calcifications.    Bilateral lens replacements. Orbits otherwise appear normal. The bony  calvarium and bones of the skull base appear intact.       Impression    IMPRESSION: No CT findings of acute extended infarct or intracranial  hemorrhage. ASPECTS score = 10.    The findings from the noncontrast head CT and CT angiogram were  communicated to Dr. Cruz by myself at 9:20 AM on 10/23/2019.    DEMETRI LANDA MD   CTA Head Neck with Contrast    Narrative    CT ANGIOGRAM OF THE HEAD AND NECK WITH CONTRAST October 23, 2019 9:17  AM     HISTORY: Transient ischemic attack, initial exam. Code Stroke.     TECHNIQUE: CT angiography with an injection of 70mL Isovue-370 IV with  scans through the head and neck. Images were transferred to a separate  3-D workstation where multiplanar reformations and 3-D images were  created. Estimates of carotid stenoses are made relative to the distal  internal carotid artery diameters except as noted. Radiation dose for  this scan was reduced  using automated exposure control, adjustment of  the mA and/or kV according to patient size, or iterative  reconstruction technique.      COMPARISON: None.     CT HEAD FINDINGS: No contrast enhancing lesions. Cerebral blood flow  is grossly normal.     CT ANGIOGRAM HEAD FINDINGS: Nonflow-limiting atherosclerotic  calcifications involving the carotid siphons bilaterally. Mild tandem  areas of stenosis involving the M1 segment of the right middle  cerebral artery, without evidence for definite flow limiting stenosis.  This is likely due to atherosclerotic disease. No other significant  stenosis identified. The major intracranial arteries including the  proximal branches of the anterior cerebral, middle cerebral, and  posterior cerebral arteries otherwise appear patent without vascular  cutoff.    There is a small posteriorly directed saccular outpouching arising  from the supraclinoid right internal carotid artery (series 7 image  204, series 12 image 78) measuring approximately 3 mm in diameter at  its base, and 4 mm from base to dome. Likewise, there is an additional  inferomedially directed saccular outpouching measuring approximately 4  mm at its base and 4 mm from base to dome arising from the  supraclinoid left internal carotid artery (series 7 image 195, series  12 image 100).    There is a small fenestration in the lower aspect of the basilar  artery. No significant stenosis. Venous circulation is unremarkable.     CT ANGIOGRAM NECK FINDINGS: Scattered atherosclerotic calcification in  the aortic arch without significant stenosis at the origins of the  great vessels.     Right carotid artery: The right common and internal carotid arteries  are patent. Mixed atherosclerotic disease at the carotid bifurcation  and proximal internal carotid artery with less than 50% stenosis by  NASCET criteria.     Left carotid artery: There is mixed atherosclerotic disease at the  left carotid bifurcation and extending into the  proximal cervical  internal carotid artery with prominent soft component resulting in  approximately 55-60% stenosis of the proximal cervical internal  carotid artery by NASCET criteria.    Vertebral arteries: Vertebral arteries are patent without evidence of  dissection. No significant stenosis.     Other findings: Degenerative changes in the cervical spine.       Impression    IMPRESSION:  1. No large vessel occlusion or dissection involving the major  craniocervical arterial vasculature.  2.Two areas of mild tandem stenosis involving the M1 segment of the  right middle cerebral artery likely due to atherosclerotic disease.  3. Two focal outpouchings arising from the supraclinoid segments of  the right and left internal carotid arteries measuring up to  approximately 4 mm, concerning for small aneurysms.  4. Mixed atherosclerotic disease at the left carotid bifurcation  resulting in approximately 55-60% stenosis. Milder degree of  atherosclerotic disease of the right carotid bifurcation with less  than 50% stenosis.    The findings from the noncontrast head CT and CT angiogram were  communicated to Dr. Cruz by myself at 9:20 AM on 10/23/2019.    DEMETRI LANDA MD   MR Brain w/o & w Contrast    Narrative    MRI BRAIN WITHOUT AND WITH CONTRAST  10/23/2019 9:03 PM    HISTORY: TIA, code stroke followup, initial exam.    TECHNIQUE: Multiplanar, multisequence MRI of the brain without and  with 8 mL Gadavist .    COMPARISON: Head CT 10/23/2019, head MRI 9/13/2014    FINDINGS: Moderate volume loss is present. Periventricular T2  hyperintensities likely represent chronic small vessel ischemic  change. No evidence of acute ischemia, hemorrhage, mass, mass effect,  or hydrocephalus. No abnormal enhancement or diffusion restriction is  identified. The visualized calvarium, tympanic cavities, mastoid  cavities, and paranasal sinuses are unremarkable.      Impression    IMPRESSION:  1. No evidence of acute ischemia or  hemorrhage.  2. Volume loss and white matter T2 hyperintensities likely reflecting  chronic small vessel ischemic change.    KAYDEN CURTIS MD       Discharge Medications   Discharge Medication List as of 10/24/2019 11:59 AM      START taking these medications    Details   meclizine (ANTIVERT) 25 MG tablet Take 1 tablet (25 mg) by mouth every 6 hours as needed for dizziness, Disp-20 tablet, R-0, E-Prescribe      omeprazole (PRILOSEC) 40 MG DR capsule Take 1 capsule (40 mg) by mouth daily, Disp-30 capsule, R-0, E-PrescribeFuture refills by PCP Dr. Gwyn Zheng with phone number [phone].      ondansetron (ZOFRAN-ODT) 4 MG ODT tab Take 1 tablet (4 mg) by mouth every 6 hours as needed for nausea or vomiting, Disp-15 tablet, R-0, Local Print         CONTINUE these medications which have NOT CHANGED    Details   Acetaminophen (TYLENOL PO) Take 500 mg by mouth every 8 hours as needed for mild pain or fever, Historical      aspirin 81 MG EC tablet Take 81 mg by mouth At Bedtime, Historical      atorvastatin (LIPITOR) 20 MG tablet Take 1 tablet (20 mg) by mouth daily, Disp-90 tablet, R-3, E-Prescribe      donepezil (ARICEPT) 5 MG tablet Take 1 tablet (5 mg) by mouth At Bedtime, Disp-30 tablet, R-3, E-Prescribe      FAMOTIDINE PO Take 20 mg by mouth 2 times daily, Historical      losartan (COZAAR) 100 MG tablet TAKE ONE TABLET (100 mg) BY MOUTH ONE TIME DAILY, Disp-90 tablet, R-3, E-Prescribe      metoprolol succinate ER (TOPROL-XL) 25 MG 24 hr tablet Take 1 tablet (25 mg) by mouth daily, Disp-90 tablet, R-3, E-Prescribe      Multiple Vitamins-Minerals (MULTIVITAMIN & MINERAL PO) Take 1 tablet by mouth daily, Historical      nitroGLYcerin (NITROSTAT) 0.4 MG sublingual tablet For chest pain place 1 tablet under the tongue every 5 minutes for 3 doses. If symptoms persist 5 minutes after 1st dose call 911., Disp-25 tablet, R-1, Local Print      Omega-3 Fatty Acids (OMEGA-3 FISH OIL PO) Take 2 g by mouth daily ,  Historical      psyllium (METAMUCIL/KONSYL) Packet Take 1 packet by mouth daily, Historical         STOP taking these medications       pantoprazole (PROTONIX) 40 MG EC tablet Comments:   Reason for Stopping:             Allergies   Allergies   Allergen Reactions     Penicillins

## 2019-10-24 NOTE — PLAN OF CARE
Pt A&Ox4 with VSS on RA. Tele NSR, and tolerating regular diet. Voiding. Neuros intact. 4 mg Zofran po given for nausea with relief. Pt denies pain. Up with assist of 1.

## 2019-10-24 NOTE — PLAN OF CARE
A&O.  AVSS.  's/60's, 135/62.  RA.  Neuro's intact.  Tele SR.  Denies pain.  Regular diet.  Went down to MRI this evening.  PCD on.  NS infusing at 70 ml/hr.  PT/OT/neuro consult.  Voiding.

## 2019-10-25 ENCOUNTER — TELEPHONE (OUTPATIENT)
Dept: FAMILY MEDICINE | Facility: CLINIC | Age: 84
End: 2019-10-25

## 2019-10-25 DIAGNOSIS — E78.5 HYPERLIPIDEMIA LDL GOAL <70: ICD-10-CM

## 2019-10-25 RX ORDER — ATORVASTATIN CALCIUM 20 MG/1
20 TABLET, FILM COATED ORAL DAILY
Qty: 90 TABLET | Refills: 3 | Status: SHIPPED | OUTPATIENT
Start: 2019-10-25 | End: 2020-10-19

## 2019-10-25 NOTE — TELEPHONE ENCOUNTER
"Hospital/TCU/ED for chronic condition Discharge Protocol    \"Hi, my name is Taras Vilchis RN, a registered nurse, and I am calling from [facility].  I am calling to follow up and see how things are going for you after your recent emergency visit/hospital/TCU stay.\"    Tell me how you are doing now that you are home?\" Things are pretty good since being back at home. States it was quite an experience. The medications he was given are helping a lot and he is not having anymore dizziness.       Discharge Instructions    \"Let's review your discharge instructions.  What is/are the follow-up recommendations?  Pt. Response: follow up with PCP, rest, take medications as prescribed     \"Has an appointment with your primary care provider been scheduled?\"   Pt has sadi 11/20/2019 with PCP. Would like to keep that one at this time. States no need to come in sooner than that.     \"When you see the provider, I would recommend that you bring your medications with you.\"    Medications    \"Tell me what changed about your medicines when you discharged?\"    Changes to chronic meds?    2 or more  Started:  Meclizine- dizziness  Omeprazole  Zofran    Stopped:  Pantoprazole     Understands how to take new medications.     \"What questions do you have about your medications?\"    None     New diagnoses of heart failure, COPD, diabetes, or MI?    No              Post Discharge Medication Reconciliation Status: discharge medications reconciled and changed, per note/orders (see AVS).    Was MTM referral placed (*Make sure to put transitions as reason for referral)?   No    Call Summary    \"What questions or concerns do you have about your recent visit and your follow-up care?\"     none    \"If you have questions or things don't continue to improve, we encourage you contact us through the main clinic number (give number).  Even if the clinic is not open, triage nurses are available 24/7 to help you.     We would like you to know that " "our clinic has extended hours (provide information).  We also have urgent care (provide details on closest location and hours/contact info)\"      \"Thank you for your time and take care!\"             "

## 2019-10-28 NOTE — PROGRESS NOTES
[facility]      OUTPATIENT PHYSICAL THERAPY EVALUATION  PLAN OF TREATMENT FOR OUTPATIENT REHABILITATION  (COMPLETE FOR INITIAL CLAIMS ONLY)  Patient's Last Name, First Name, M.I.  YOB: 1933  Cale Wagoner                        Provider's Name  [facility] Medical Record No.  [id number]                               Onset Date:  10/23/19   Start of Care Date:  10/24/19      Type:     _X_PT   ___OT   ___SLP Medical Diagnosis:  Dizziness                        PT Diagnosis:  Dizziness   Visits from SOC:  1   _________________________________________________________________________________  Plan of Treatment/Functional Goals    Planned Interventions:  ,    gait training, neuromuscular re-education, transfer training,       Goals: See Physical Therapy Goals on Care Plan in [employer] electronic health record.    Therapy Frequency: Daily  Predicted Duration of Therapy Intervention: 1 day  _________________________________________________________________________________    I CERTIFY THE NEED FOR THESE SERVICES FURNISHED UNDER        THIS PLAN OF TREATMENT AND WHILE UNDER MY CARE     (Physician co-signature of this document indicates review and certification of the therapy plan).                Certification date from: 10/24/19, Certification date to: 10/24/19    Referring Physician: Ekta Keen PA-C            Initial Assessment        See Physical Therapy evaluation dated 10/24/19 in Epic electronic health record.

## 2020-01-10 DIAGNOSIS — R41.3 POOR SHORT TERM MEMORY: ICD-10-CM

## 2020-01-10 RX ORDER — DONEPEZIL HYDROCHLORIDE 5 MG/1
TABLET, FILM COATED ORAL
Qty: 30 TABLET | Refills: 0 | Status: SHIPPED | OUTPATIENT
Start: 2020-01-10 | End: 2020-01-28 | Stop reason: DRUGHIGH

## 2020-01-10 NOTE — TELEPHONE ENCOUNTER
Medication is being filled for 1 time refill only due to:  Patient needs to be seen because follow up from last OV.        Appt scheduled.    Future Office Visit:   Next 5 appointments (look out 90 days)    Jan 28, 2020  8:30 AM CST  Office Visit with Gwyn Zheng MD  [facility] ([facility]) [address] [place] [id number]  [phone]

## 2020-01-10 NOTE — TELEPHONE ENCOUNTER
"Requested Prescriptions   Pending Prescriptions Disp Refills     donepezil (ARICEPT) 5 MG tablet [Pharmacy Med Name: DONEPEZIL 5MG TABLETS]  Last Written Prescription Date:  9/18/2019  Last Fill Quantity: 30 tablet,  # refills: 3   Last office visit: 9/18/2019 with prescribing provider:  Marce   Future Office Visit:     30 tablet 3     Sig: TAKE 1 TABLET(5 MG) BY MOUTH AT BEDTIME       Miscellaneous Dementia Agents Passed - 1/10/2020  3:38 AM        Passed - Recent (12 mo) or future (30 days) visit within the authorizing provider's specialty     Patient has had an office visit with the authorizing provider or a provider within the authorizing providers department within the previous 12 mos or has a future within next 30 days. See \"Patient Info\" tab in inbasket, or \"Choose Columns\" in Meds & Orders section of the refill encounter.              Passed - Medication is active on med list        Passed - Patient is 18 years of age or older           "

## 2020-01-28 ENCOUNTER — OFFICE VISIT (OUTPATIENT)
Dept: FAMILY MEDICINE | Facility: CLINIC | Age: 85
End: 2020-01-28
Payer: COMMERCIAL

## 2020-01-28 VITALS
RESPIRATION RATE: 16 BRPM | WEIGHT: 172 LBS | BODY MASS INDEX: 27.35 KG/M2 | OXYGEN SATURATION: 94 % | SYSTOLIC BLOOD PRESSURE: 130 MMHG | TEMPERATURE: 97.2 F | HEART RATE: 68 BPM | DIASTOLIC BLOOD PRESSURE: 68 MMHG

## 2020-01-28 DIAGNOSIS — R41.3 POOR SHORT TERM MEMORY: Primary | ICD-10-CM

## 2020-01-28 PROCEDURE — 99213 OFFICE O/P EST LOW 20 MIN: CPT | Performed by: FAMILY MEDICINE

## 2020-01-28 RX ORDER — DONEPEZIL HYDROCHLORIDE 10 MG/1
10 TABLET, FILM COATED ORAL AT BEDTIME
Qty: 30 TABLET | Refills: 1 | Status: SHIPPED | OUTPATIENT
Start: 2020-01-28 | End: 2020-10-19

## 2020-01-28 NOTE — PROGRESS NOTES
Subjective     Cale Wagoner is a 86 year old male who presents to clinic today for the following health issues:    HPI   Medication Followup of aricept    Taking Medication as prescribed: yes    Side Effects:  None    Medication Helping Symptoms:  unknown         Patient Active Problem List   Diagnosis     Bloating     GERD (gastroesophageal reflux disease)     Health Care Home     Cerebrovascular accident (CVA), unspecified mechanism (H)     Mixed hyperlipidemia     Slow transit constipation     Tingling of left upper extremity     Chronic pain of right knee     Screening for prostate cancer     Atypical chest pain     NSTEMI (non-ST elevated myocardial infarction) (H)     Coronary artery disease involving native coronary artery of native heart without angina pectoris     Benign essential hypertension     PFO (patent foramen ovale)     TIA (transient ischemic attack)     Right sided facial pain     Right-sided headache     History of kidney stones     Dizziness     Past Surgical History:   Procedure Laterality Date     APPENDECTOMY       CARDIAC CATHERIZATION  09/15/2017     MOSES to mid-distal Cx x2     ENT SURGERY      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     EYE SURGERY      cataracts     HERNIA REPAIR      twice     SOFT TISSUE SURGERY      skin cancer right temple       Social History     Tobacco Use     Smoking status: Former Smoker     Last attempt to quit: 1968     Years since quittin.4     Smokeless tobacco: Former User   Substance Use Topics     Alcohol use: Yes     Comment: Occ     Family History   Problem Relation Age of Onset     Respiratory Father         pneumonia     Unknown/Adopted Father      Alzheimer Disease Brother      Alzheimer Disease Sister      Emphysema Sister      Unknown/Adopted Mother              Reviewed and updated as needed this visit by Provider         Review of Systems   ROS COMP: Constitutional, HEENT, cardiovascular, pulmonary, gi and gu systems are negative, except as  "otherwise noted.      Objective    /68   Pulse 68   Temp 97.2  F (36.2  C) (Tympanic)   Resp 16   Wt 78 kg (172 lb)   SpO2 94%   BMI 27.35 kg/m    Body mass index is 27.35 kg/m .  Physical Exam   GENERAL APPEARANCE: healthy, alert and no distress            Assessment & Plan       ICD-10-CM    1. Poor short term memory R41.3 donepezil (ARICEPT) 10 MG tablet        BMI:   Estimated body mass index is 27.35 kg/m  as calculated from the following:    Height as of 9/18/19: 1.689 m (5' 6.5\").    Weight as of this encounter: 78 kg (172 lb).           Patient Instructions   The patient's wife, Dalila, is also on Aricept and has more forgetfulness than the patient does according to the patient.  We did discuss possibility of moving living conditions to a more supervised area.  The patient is convinced that as of right now they are doing okay.  We will increase his Aricept to 10 mg daily.  He will follow-up in about 6 weeks sooner as needed.      Return in about 6 weeks (around 3/10/2020) for Short-term memory.    Gwyn Zheng MD  [facility]      "

## 2020-01-28 NOTE — PATIENT INSTRUCTIONS
The patient's wife, Dalila, is also on Aricept and has more forgetfulness than the patient does according to the patient.  We did discuss possibility of moving living conditions to a more supervised area.  The patient is convinced that as of right now they are doing okay.  We will increase his Aricept to 10 mg daily.  He will follow-up in about 6 weeks sooner as needed.

## 2020-02-11 DIAGNOSIS — R41.3 POOR SHORT TERM MEMORY: ICD-10-CM

## 2020-02-11 RX ORDER — DONEPEZIL HYDROCHLORIDE 5 MG/1
TABLET, FILM COATED ORAL
Qty: 30 TABLET | Refills: 0 | OUTPATIENT
Start: 2020-02-11

## 2020-02-11 NOTE — TELEPHONE ENCOUNTER
"Requested Prescriptions   Pending Prescriptions Disp Refills     donepezil (ARICEPT) 5 MG tablet [Pharmacy Med Name: DONEPEZIL 5MG TABLETS]  DISCONTINUED  Last Written Prescription Date:  1/10/2020 END: 1/28/2020  Last Fill Quantity: 30 tablet,  # refills: 0   Last office visit: 1/28/2020 with prescribing provider:  Marce   Future Office Visit:   Next 5 appointments (look out 90 days)    Mar 11, 2020 10:45 AM CDT  [facility] [address] [place] [id number] [phone]          30 tablet 0     Sig: TAKE 1 TABLET(5 MG) BY MOUTH AT BEDTIME       Miscellaneous Dementia Agents Passed - 2/11/2020  3:35 AM        Passed - Recent (12 mo) or future (30 days) visit within the authorizing provider's specialty     Patient has had an office visit with the authorizing provider or a provider within the authorizing providers department within the previous 12 mos or has a future within next 30 days. See \"Patient Info\" tab in inbasket, or \"Choose Columns\" in Meds & Orders section of the refill encounter.              Passed - Medication is active on med list        Passed - Patient is 18 years of age or older           "

## 2020-02-11 NOTE — TELEPHONE ENCOUNTER
Pharmacy Contact    Per chart review, dose was changed 1/28/2020. Called pharmacy to verify they have current rx on file. They will fill rx for 10 mg tablet.     Rx denied, wrong dose.

## 2020-03-11 ENCOUNTER — OFFICE VISIT (OUTPATIENT)
Dept: FAMILY MEDICINE | Facility: CLINIC | Age: 85
End: 2020-03-11
Payer: COMMERCIAL

## 2020-03-11 VITALS
RESPIRATION RATE: 14 BRPM | SYSTOLIC BLOOD PRESSURE: 138 MMHG | HEART RATE: 70 BPM | TEMPERATURE: 97.8 F | HEIGHT: 67 IN | BODY MASS INDEX: 26.76 KG/M2 | WEIGHT: 170.5 LBS | OXYGEN SATURATION: 96 % | DIASTOLIC BLOOD PRESSURE: 62 MMHG

## 2020-03-11 DIAGNOSIS — I10 BENIGN ESSENTIAL HYPERTENSION: ICD-10-CM

## 2020-03-11 DIAGNOSIS — R41.3 POOR SHORT TERM MEMORY: Primary | ICD-10-CM

## 2020-03-11 PROCEDURE — 99213 OFFICE O/P EST LOW 20 MIN: CPT | Performed by: FAMILY MEDICINE

## 2020-03-11 ASSESSMENT — MIFFLIN-ST. JEOR: SCORE: 1404.07

## 2020-03-11 NOTE — PATIENT INSTRUCTIONS
Given the fact that the patient had wild dreams and increased confusion on the increased dose of Aricept he stopped the medicine on his own.  He has been feeling fine since.  In discussion with him today he does not want to try a different medication for his memory issues.  He says they are mild and he is coping pretty well with that.  He did miss his last annual exam which usually occurs in the fall.  I did set him up for his annual exam to occur in May.

## 2020-03-11 NOTE — PROGRESS NOTES
Subjective     Cale Wagoner is a 86 year old male who presents to clinic today for the following health issues:    HPI   Medication Followup of Aricept    Taking Medication as prescribed: NO    Side Effects:  Wild dreams, with confusion    Medication Helping Symptoms:  NO         Patient Active Problem List   Diagnosis     Bloating     GERD (gastroesophageal reflux disease)     Health Care Home     Cerebrovascular accident (CVA), unspecified mechanism (H)     Mixed hyperlipidemia     Slow transit constipation     Tingling of left upper extremity     Chronic pain of right knee     Screening for prostate cancer     Atypical chest pain     NSTEMI (non-ST elevated myocardial infarction) (H)     Coronary artery disease involving native coronary artery of native heart without angina pectoris     Benign essential hypertension     PFO (patent foramen ovale)     TIA (transient ischemic attack)     Right sided facial pain     Right-sided headache     History of kidney stones     Dizziness     Past Surgical History:   Procedure Laterality Date     APPENDECTOMY       CARDIAC CATHERIZATION  09/15/2017     MOSES to mid-distal Cx x2     ENT SURGERY      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     EYE SURGERY      cataracts     HERNIA REPAIR      twice     SOFT TISSUE SURGERY      skin cancer right temple       Social History     Tobacco Use     Smoking status: Former Smoker     Last attempt to quit: 1968     Years since quittin.5     Smokeless tobacco: Former User   Substance Use Topics     Alcohol use: Yes     Comment: Occ     Family History   Problem Relation Age of Onset     Respiratory Father         pneumonia     Unknown/Adopted Father      Alzheimer Disease Brother      Alzheimer Disease Sister      Emphysema Sister      Unknown/Adopted Mother              Reviewed and updated as needed this visit by Provider         Review of Systems   ROS COMP: Constitutional, HEENT, cardiovascular, pulmonary, gi and gu systems are  "negative, except as otherwise noted.      Objective    /62 (Patient Position: Sitting, Cuff Size: Adult Regular)   Pulse 70   Temp 97.8  F (36.6  C) (Tympanic)   Resp 14   Ht 1.689 m (5' 6.5\")   Wt 77.3 kg (170 lb 8 oz)   SpO2 96%   BMI 27.11 kg/m    Body mass index is 27.11 kg/m .  Physical Exam   GENERAL APPEARANCE: healthy, alert and no distress            Assessment & Plan       ICD-10-CM    1. Poor short term memory  R41.3    2. Benign essential hypertension  I10         BMI:   Estimated body mass index is 27.11 kg/m  as calculated from the following:    Height as of this encounter: 1.689 m (5' 6.5\").    Weight as of this encounter: 77.3 kg (170 lb 8 oz).           Patient Instructions   Given the fact that the patient had wild dreams and increased confusion on the increased dose of Aricept he stopped the medicine on his own.  He has been feeling fine since.  In discussion with him today he does not want to try a different medication for his memory issues.  He says they are mild and he is coping pretty well with that.  He did miss his last annual exam which usually occurs in the fall.  I did set him up for his annual exam to occur in May.      Return in about 2 months (around 5/11/2020) for wellness exam.    Gwyn Zheng MD  [facility]      "

## 2020-05-13 ENCOUNTER — VIRTUAL VISIT (OUTPATIENT)
Dept: FAMILY MEDICINE | Facility: CLINIC | Age: 85
End: 2020-05-13
Payer: COMMERCIAL

## 2020-05-13 DIAGNOSIS — Z53.9 ERRONEOUS ENCOUNTER--DISREGARD: Primary | ICD-10-CM

## 2020-07-16 ENCOUNTER — TELEPHONE (OUTPATIENT)
Dept: FAMILY MEDICINE | Facility: CLINIC | Age: 85
End: 2020-07-16

## 2020-07-16 NOTE — LETTER
July 16, 2020    Cale Wagoner  [address]  [place] [address]    Dear Ethan Madsen cares about your health and your health plan.  I have reviewed your medical conditions, medication list and lab results, and am making recommendations based on this review to better manage your health.    You are in particular need of attention regarding:  -Wellness (Physical) Visit     I am recommending that you:     -schedule a WELLNESS (Physical) APPOINTMENT with me.   I will check fasting labs the same day - nothing to eat except water and meds for 8-10 hours prior. (If you go elsewhere for Wellness visits then please disregard this reminder.)      Please call us at the [place] location:  [phone] or use [employer] to address the above recommendations.     Thank you for trusting [facility].  We appreciate the opportunity to serve you and look forward to supporting your healthcare in the future.    If you have (or plan to have) any of these tests done at a facility other than a [facility] or a [facility], please have the results sent to the [facility] location noted above.      Best Regards,    Gwyn Zheng MD

## 2020-07-16 NOTE — TELEPHONE ENCOUNTER
Panel Management Review      Patient has the following on his problem list:     Hypertension   Last three blood pressure readings:  BP Readings from Last 3 Encounters:   03/11/20 138/62   01/28/20 130/68   10/24/19 135/62     Blood pressure: Passed    HTN Guidelines:  Less than 140/90      Composite cancer screening  Chart review shows that this patient is due/due soon for the following None  Summary:    Patient is due/failing the following:   PHYSICAL    Action needed:   Patient needs office visit for physical.    Type of outreach:    Sent letter.    Questions for provider review:    None

## 2020-09-15 DIAGNOSIS — I10 BENIGN ESSENTIAL HYPERTENSION: ICD-10-CM

## 2020-09-15 RX ORDER — LOSARTAN POTASSIUM 100 MG/1
TABLET ORAL
Qty: 90 TABLET | Refills: 0 | Status: SHIPPED | OUTPATIENT
Start: 2020-09-15 | End: 2020-10-19

## 2020-09-18 DIAGNOSIS — I25.10 CORONARY ARTERY DISEASE INVOLVING NATIVE CORONARY ARTERY OF NATIVE HEART WITHOUT ANGINA PECTORIS: ICD-10-CM

## 2020-09-18 RX ORDER — METOPROLOL SUCCINATE 25 MG/1
25 TABLET, EXTENDED RELEASE ORAL DAILY
Qty: 90 TABLET | Refills: 0 | Status: SHIPPED | OUTPATIENT
Start: 2020-09-18 | End: 2020-10-19

## 2020-10-19 ENCOUNTER — OFFICE VISIT (OUTPATIENT)
Dept: FAMILY MEDICINE | Facility: CLINIC | Age: 85
End: 2020-10-19
Payer: COMMERCIAL

## 2020-10-19 VITALS
TEMPERATURE: 97.8 F | RESPIRATION RATE: 20 BRPM | BODY MASS INDEX: 27.31 KG/M2 | DIASTOLIC BLOOD PRESSURE: 82 MMHG | HEART RATE: 75 BPM | WEIGHT: 174 LBS | SYSTOLIC BLOOD PRESSURE: 136 MMHG | OXYGEN SATURATION: 97 % | HEIGHT: 67 IN

## 2020-10-19 DIAGNOSIS — Z12.5 SCREENING FOR PROSTATE CANCER: ICD-10-CM

## 2020-10-19 DIAGNOSIS — K21.9 GASTROESOPHAGEAL REFLUX DISEASE WITHOUT ESOPHAGITIS: ICD-10-CM

## 2020-10-19 DIAGNOSIS — E78.5 HYPERLIPIDEMIA LDL GOAL <70: ICD-10-CM

## 2020-10-19 DIAGNOSIS — Z00.00 ROUTINE MEDICAL EXAM: Primary | ICD-10-CM

## 2020-10-19 DIAGNOSIS — I25.10 CORONARY ARTERY DISEASE INVOLVING NATIVE CORONARY ARTERY OF NATIVE HEART WITHOUT ANGINA PECTORIS: ICD-10-CM

## 2020-10-19 DIAGNOSIS — I10 BENIGN ESSENTIAL HYPERTENSION: ICD-10-CM

## 2020-10-19 DIAGNOSIS — Z23 NEED FOR PROPHYLACTIC VACCINATION AND INOCULATION AGAINST INFLUENZA: ICD-10-CM

## 2020-10-19 DIAGNOSIS — E78.2 MIXED HYPERLIPIDEMIA: ICD-10-CM

## 2020-10-19 LAB
BASOPHILS # BLD AUTO: 0 10E9/L (ref 0–0.2)
BASOPHILS NFR BLD AUTO: 0.5 %
DIFFERENTIAL METHOD BLD: ABNORMAL
EOSINOPHIL # BLD AUTO: 0.1 10E9/L (ref 0–0.7)
EOSINOPHIL NFR BLD AUTO: 1.1 %
ERYTHROCYTE [DISTWIDTH] IN BLOOD BY AUTOMATED COUNT: 12.5 % (ref 10–15)
HCT VFR BLD AUTO: 46.1 % (ref 40–53)
HGB BLD-MCNC: 15.7 G/DL (ref 13.3–17.7)
LYMPHOCYTES # BLD AUTO: 2.2 10E9/L (ref 0.8–5.3)
LYMPHOCYTES NFR BLD AUTO: 36.4 %
MCH RBC QN AUTO: 31.9 PG (ref 26.5–33)
MCHC RBC AUTO-ENTMCNC: 34.1 G/DL (ref 31.5–36.5)
MCV RBC AUTO: 94 FL (ref 78–100)
MONOCYTES # BLD AUTO: 0.7 10E9/L (ref 0–1.3)
MONOCYTES NFR BLD AUTO: 11.9 %
NEUTROPHILS # BLD AUTO: 3.1 10E9/L (ref 1.6–8.3)
NEUTROPHILS NFR BLD AUTO: 50.1 %
PLATELET # BLD AUTO: 145 10E9/L (ref 150–450)
RBC # BLD AUTO: 4.92 10E12/L (ref 4.4–5.9)
WBC # BLD AUTO: 6.1 10E9/L (ref 4–11)

## 2020-10-19 PROCEDURE — G0008 ADMIN INFLUENZA VIRUS VAC: HCPCS | Performed by: FAMILY MEDICINE

## 2020-10-19 PROCEDURE — 99397 PER PM REEVAL EST PAT 65+ YR: CPT | Mod: 25 | Performed by: FAMILY MEDICINE

## 2020-10-19 PROCEDURE — 81001 URINALYSIS AUTO W/SCOPE: CPT | Performed by: FAMILY MEDICINE

## 2020-10-19 PROCEDURE — 80061 LIPID PANEL: CPT | Performed by: FAMILY MEDICINE

## 2020-10-19 PROCEDURE — 90662 IIV NO PRSV INCREASED AG IM: CPT | Performed by: FAMILY MEDICINE

## 2020-10-19 PROCEDURE — 80053 COMPREHEN METABOLIC PANEL: CPT | Performed by: FAMILY MEDICINE

## 2020-10-19 PROCEDURE — 99214 OFFICE O/P EST MOD 30 MIN: CPT | Mod: 25 | Performed by: FAMILY MEDICINE

## 2020-10-19 PROCEDURE — 85025 COMPLETE CBC W/AUTO DIFF WBC: CPT | Performed by: FAMILY MEDICINE

## 2020-10-19 PROCEDURE — G0103 PSA SCREENING: HCPCS | Performed by: FAMILY MEDICINE

## 2020-10-19 PROCEDURE — 36415 COLL VENOUS BLD VENIPUNCTURE: CPT | Performed by: FAMILY MEDICINE

## 2020-10-19 RX ORDER — METOPROLOL SUCCINATE 25 MG/1
25 TABLET, EXTENDED RELEASE ORAL DAILY
Qty: 90 TABLET | Refills: 3 | Status: SHIPPED | OUTPATIENT
Start: 2020-10-19 | End: 2021-09-23

## 2020-10-19 RX ORDER — ATORVASTATIN CALCIUM 20 MG/1
20 TABLET, FILM COATED ORAL DAILY
Qty: 90 TABLET | Refills: 3 | Status: SHIPPED | OUTPATIENT
Start: 2020-10-19 | End: 2021-09-23

## 2020-10-19 RX ORDER — LOSARTAN POTASSIUM 100 MG/1
TABLET ORAL
Qty: 90 TABLET | Refills: 3 | Status: SHIPPED | OUTPATIENT
Start: 2020-10-19 | End: 2021-09-23

## 2020-10-19 ASSESSMENT — ACTIVITIES OF DAILY LIVING (ADL): CURRENT_FUNCTION: NO ASSISTANCE NEEDED

## 2020-10-19 ASSESSMENT — MIFFLIN-ST. JEOR: SCORE: 1414.95

## 2020-10-19 NOTE — LETTER
October 22, 2020      Cale Wagoner  [address]  [place] [place] [facility] [address]        Dear ,    We are writing to inform you of your test results.    Your test results fall within the expected range(s) or remain unchanged from previous results.  Please continue with current treatment plan.    Resulted Orders   UA with Microscopic   Result Value Ref Range    Color Urine Yellow     Appearance Urine Clear     Glucose Urine Negative NEG^Negative mg/dL    Bilirubin Urine Negative NEG^Negative    Ketones Urine Negative NEG^Negative mg/dL    Specific Gravity Urine 1.015 1.003 - 1.035    pH Urine 5.5 5.0 - 7.0 pH    Protein Albumin Urine Negative NEG^Negative mg/dL    Urobilinogen Urine 0.2 0.2 - 1.0 EU/dL    Nitrite Urine Negative NEG^Negative    Blood Urine Negative NEG^Negative    Leukocyte Esterase Urine Negative NEG^Negative    Source Midstream Urine     WBC Urine 0 - 5 OTO5^0 - 5 /HPF    RBC Urine O - 2 OTO2^O - 2 /HPF   CBC with platelets differential   Result Value Ref Range    WBC 6.1 4.0 - 11.0 10e9/L    RBC Count 4.92 4.4 - 5.9 10e12/L    Hemoglobin 15.7 13.3 - 17.7 g/dL    Hematocrit 46.1 40.0 - 53.0 %    MCV 94 78 - 100 fl    MCH 31.9 26.5 - 33.0 pg    MCHC 34.1 31.5 - 36.5 g/dL    RDW 12.5 10.0 - 15.0 %    Platelet Count 145 (L) 150 - 450 10e9/L    Diff Method Automated Method     % Neutrophils 50.1 %    % Lymphocytes 36.4 %    % Monocytes 11.9 %    % Eosinophils 1.1 %    % Basophils 0.5 %    Absolute Neutrophil 3.1 1.6 - 8.3 10e9/L    Absolute Lymphocytes 2.2 0.8 - 5.3 10e9/L    Absolute Monocytes 0.7 0.0 - 1.3 10e9/L    Absolute Eosinophils 0.1 0.0 - 0.7 10e9/L    Absolute Basophils 0.0 0.0 - 0.2 10e9/L   Comprehensive metabolic panel   Result Value Ref Range    Sodium 136 133 - 144 mmol/L    Potassium 3.8 3.4 - 5.3 mmol/L    Chloride 104 94 - 109 mmol/L    Carbon Dioxide 28 20 - 32 mmol/L    Anion Gap 4 3 - 14 mmol/L    Glucose 94 70 - 99 mg/dL    Urea Nitrogen 18 7 - 30 mg/dL    Creatinine  0.98 0.66 - 1.25 mg/dL    GFR Estimate 69 >60 mL/min/[1.73_m2]      Comment:      Non  GFR Calc  Starting 12/18/2018, serum creatinine based estimated GFR (eGFR) will be   calculated using the Chronic Kidney Disease Epidemiology Collaboration   (CKD-EPI) equation.      GFR Estimate If Black 80 >60 mL/min/[1.73_m2]      Comment:       GFR Calc  Starting 12/18/2018, serum creatinine based estimated GFR (eGFR) will be   calculated using the Chronic Kidney Disease Epidemiology Collaboration   (CKD-EPI) equation.      Calcium 9.5 8.5 - 10.1 mg/dL    Bilirubin Total 0.9 0.2 - 1.3 mg/dL    Albumin 4.0 3.4 - 5.0 g/dL    Protein Total 8.1 6.8 - 8.8 g/dL    Alkaline Phosphatase 68 40 - 150 U/L    ALT 35 0 - 70 U/L    AST 27 0 - 45 U/L   Lipid Profile   Result Value Ref Range    Cholesterol 120 <200 mg/dL    Triglycerides 92 <150 mg/dL    HDL Cholesterol 49 >39 mg/dL    LDL Cholesterol Calculated 53 <100 mg/dL      Comment:      Desirable:       <100 mg/dl    Non HDL Cholesterol 71 <130 mg/dL   Prostate spec antigen screen   Result Value Ref Range    PSA 2.20 0 - 4 ug/L      Comment:      Assay Method:  Chemiluminescence using Siemens Vista analyzer       If you have any questions or concerns, please call the clinic at the number listed above.       Sincerely,        Gwyn Zheng MD

## 2020-10-19 NOTE — PROGRESS NOTES
"SUBJECTIVE:   Cale Wagoner is a 87 year old male who presents for Preventive Visit.      Patient has been advised of split billing requirements and indicates understanding: Yes   Are you in the first 12 months of your Medicare coverage?  No    Healthy Habits:     In general, how would you rate your overall health?  Good    Frequency of exercise:  2-3 days/week    Duration of exercise:  15-30 minutes    Do you usually eat at least 4 servings of fruit and vegetables a day, include whole grains    & fiber and avoid regularly eating high fat or \"junk\" foods?  Yes    Taking medications regularly:  No    Barriers to taking medications:  Side effects    Medication side effects:  Muscle aches    Ability to successfully perform activities of daily living:  No assistance needed    Home Safety:  No safety concerns identified    Hearing Impairment:  No hearing concerns    In the past 6 months, have you been bothered by leaking of urine?  No    In general, how would you rate your overall mental or emotional health?  Good      PHQ-2 Total Score: 0    Additional concerns today:  No    Do you feel safe in your environment? Yes    Have you ever done Advance Care Planning? (For example, a Health Directive, POLST, or a discussion with a medical provider or your loved ones about your wishes): Yes, advance care planning is on file.      Fall risk     No falls within last year    Cognitive Screening Not appropriate due to known dementia    Do you have sleep apnea, excessive snoring or daytime drowsiness?: no    Reviewed and updated as needed this visit by clinical staff  Tobacco  Allergies  Meds   Med Hx  Surg Hx  Fam Hx  Soc Hx        Reviewed and updated as needed this visit by Provider                Social History     Tobacco Use     Smoking status: Former Smoker     Quit date: 1968     Years since quittin.2     Smokeless tobacco: Former User   Substance Use Topics     Alcohol use: Yes     Comment: Occ     If " you drink alcohol do you typically have >3 drinks per day or >7 drinks per week? No    Alcohol Use 10/19/2020   Prescreen: >3 drinks/day or >7 drinks/week? No   Prescreen: >3 drinks/day or >7 drinks/week? -           Hyperlipidemia Follow-Up      Are you regularly taking any medication or supplement to lower your cholesterol?   Yes- statin    Are you having muscle aches or other side effects that you think could be caused by your cholesterol lowering medication?  No    Hypertension Follow-up      Do you check your blood pressure regularly outside of the clinic? No     Are you following a low salt diet? Yes    Are your blood pressures ever more than 140 on the top number (systolic) OR more   than 90 on the bottom number (diastolic), for example 140/90? No    Chronic Kidney Disease Follow-up      Do you take any over the counter pain medicine?: No      Current providers sharing in care for this patient include:   Patient Care Team:  Gwyn Zheng MD as PCP - General (Family Practice)  Gwyn Zheng MD as Assigned PCP    The following health maintenance items are reviewed in Epic and correct as of today:  Health Maintenance   Topic Date Due     ZOSTER IMMUNIZATION (1 of 2) 05/26/1983     DTAP/TDAP/TD IMMUNIZATION (3 - Td) 08/12/2020     FALL RISK ASSESSMENT  03/11/2021     MEDICARE ANNUAL WELLNESS VISIT  10/19/2021     LIPID  10/19/2021     ADVANCE CARE PLANNING  10/19/2025     PHQ-2  Completed     INFLUENZA VACCINE  Completed     Pneumococcal Vaccine: 65+ Years  Completed     Pneumococcal Vaccine: Pediatrics (0 to 5 Years) and At-Risk Patients (6 to 64 Years)  Aged Out     IPV IMMUNIZATION  Aged Out     MENINGITIS IMMUNIZATION  Aged Out     HEPATITIS B IMMUNIZATION  Aged Out     Patient Active Problem List   Diagnosis     Bloating     GERD (gastroesophageal reflux disease)     Health Care Home     Cerebrovascular accident (CVA), unspecified mechanism (H)     Mixed hyperlipidemia     Slow transit  "constipation     Tingling of left upper extremity     Chronic pain of right knee     Screening for prostate cancer     Atypical chest pain     NSTEMI (non-ST elevated myocardial infarction) (H)     Coronary artery disease involving native coronary artery of native heart without angina pectoris     Benign essential hypertension     PFO (patent foramen ovale)     TIA (transient ischemic attack)     Right sided facial pain     Right-sided headache     History of kidney stones     Dizziness     Past Surgical History:   Procedure Laterality Date     APPENDECTOMY       CARDIAC CATHERIZATION  09/15/2017     MOSES to mid-distal Cx x2     ENT SURGERY      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     EYE SURGERY      cataracts     HERNIA REPAIR      twice     SOFT TISSUE SURGERY      skin cancer right temple       Social History     Tobacco Use     Smoking status: Former Smoker     Quit date: 1968     Years since quittin.2     Smokeless tobacco: Former User   Substance Use Topics     Alcohol use: Yes     Comment: Occ     Family History   Problem Relation Age of Onset     Respiratory Father         pneumonia     Unknown/Adopted Father      Alzheimer Disease Brother      Alzheimer Disease Sister      Emphysema Sister      Unknown/Adopted Mother              Review of Systems  Constitutional, HEENT, cardiovascular, pulmonary, gi and gu systems are negative, except as otherwise noted.    OBJECTIVE:   /82   Pulse 75   Temp 97.8  F (36.6  C)   Resp 20   Ht 1.689 m (5' 6.5\")   Wt 78.9 kg (174 lb)   SpO2 97%   BMI 27.66 kg/m   Estimated body mass index is 27.66 kg/m  as calculated from the following:    Height as of this encounter: 1.689 m (5' 6.5\").    Weight as of this encounter: 78.9 kg (174 lb).  Physical Exam  GENERAL: healthy, alert and no distress  EYES: Eyes grossly normal to inspection, PERRL and conjunctivae and sclerae normal  HENT: ear canals and TM's normal, nose and mouth without ulcers or lesions  NECK: no " adenopathy, no asymmetry, masses, or scars and thyroid normal to palpation  RESP: lungs clear to auscultation - no rales, rhonchi or wheezes  CV: regular rate and rhythm, normal S1 S2, no S3 or S4, no murmur, click or rub, no peripheral edema and peripheral pulses strong  ABDOMEN: soft, nontender, no hepatosplenomegaly, no masses and bowel sounds normal  MS: no gross musculoskeletal defects noted, no edema  SKIN: no suspicious lesions or rashes  NEURO: Normal strength and tone, mentation intact and speech normal  PSYCH: mentation appears normal, affect normal/bright  LYMPH: no cervical, supraclavicular, axillary, or inguinal adenopathy        ASSESSMENT / PLAN:       ICD-10-CM    1. Routine medical exam  Z00.00    2. Benign essential hypertension  I10 UA with Microscopic     CBC with platelets differential     Comprehensive metabolic panel     losartan (COZAAR) 100 MG tablet   3. Mixed hyperlipidemia  E78.2 Lipid Profile   4. Screening for prostate cancer  Z12.5 Prostate spec antigen screen   5. Need for prophylactic vaccination and inoculation against influenza  Z23 FLUZONE HIGH DOSE 65+  [[id number]]     ADMIN INFLUENZA (For MEDICARE Patients ONLY) []   6. Gastroesophageal reflux disease without esophagitis  K21.9    7. Coronary artery disease involving native coronary artery of native heart without angina pectoris  I25.10 metoprolol succinate ER (TOPROL-XL) 25 MG 24 hr tablet   8. Hyperlipidemia LDL goal <70  E78.5 atorvastatin (LIPITOR) 20 MG tablet       Patient has been advised of split billing requirements and indicates understanding: Yes  COUNSELING:  Reviewed preventive health counseling, as reflected in patient instructions       Regular exercise       Immunizations    Vaccinated for: Influenza             Prostate cancer screening       The patient has been getting muscle cramps since going on pantoprazole.  When he first went on medication for reflux he was on both omeprazole and famotidine.  We had a  "conversation today about discontinuing pantoprazole and just trying the famotidine since his stomach is not been bothering him much at all of late.    Estimated body mass index is 27.66 kg/m  as calculated from the following:    Height as of this encounter: 1.689 m (5' 6.5\").    Weight as of this encounter: 78.9 kg (174 lb).        He reports that he quit smoking about 52 years ago. He has quit using smokeless tobacco.      Appropriate preventive services were discussed with this patient, including applicable screening as appropriate for cardiovascular disease, diabetes, osteopenia/osteoporosis, and glaucoma.  As appropriate for age/gender, discussed screening for colorectal cancer, prostate cancer, breast cancer, and cervical cancer. Checklist reviewing preventive services available has been given to the patient.    Reviewed patients plan of care and provided an AVS. The Basic Care Plan (routine screening as documented in Health Maintenance) for Cale meets the Care Plan requirement. This Care Plan has been established and reviewed with the Patient.    Counseling Resources:  ATP IV Guidelines  Pooled Cohorts Equation Calculator  Breast Cancer Risk Calculator  Breast Cancer: Medication to Reduce Risk  FRAX Risk Assessment  ICSI Preventive Guidelines  Dietary Guidelines for Americans, 2010  [employer]'s MyPlate  ASA Prophylaxis  Lung CA Screening    Gwyn Zheng MD  [facility]    Identified Health Risks:    "

## 2020-10-19 NOTE — LETTER
October 23, 2020      Cale Wagoner  [address]  [facility] [address]        Dear ,    We are writing to inform you of your test results.    Your test results fall within the expected range(s) or remain unchanged from previous results.  Please continue with current treatment plan.    Resulted Orders   UA with Microscopic   Result Value Ref Range    Color Urine Yellow     Appearance Urine Clear     Glucose Urine Negative NEG^Negative mg/dL    Bilirubin Urine Negative NEG^Negative    Ketones Urine Negative NEG^Negative mg/dL    Specific Gravity Urine 1.015 1.003 - 1.035    pH Urine 5.5 5.0 - 7.0 pH    Protein Albumin Urine Negative NEG^Negative mg/dL    Urobilinogen Urine 0.2 0.2 - 1.0 EU/dL    Nitrite Urine Negative NEG^Negative    Blood Urine Negative NEG^Negative    Leukocyte Esterase Urine Negative NEG^Negative    Source Midstream Urine     WBC Urine 0 - 5 OTO5^0 - 5 /HPF    RBC Urine O - 2 OTO2^O - 2 /HPF   CBC with platelets differential   Result Value Ref Range    WBC 6.1 4.0 - 11.0 10e9/L    RBC Count 4.92 4.4 - 5.9 10e12/L    Hemoglobin 15.7 13.3 - 17.7 g/dL    Hematocrit 46.1 40.0 - 53.0 %    MCV 94 78 - 100 fl    MCH 31.9 26.5 - 33.0 pg    MCHC 34.1 31.5 - 36.5 g/dL    RDW 12.5 10.0 - 15.0 %    Platelet Count 145 (L) 150 - 450 10e9/L    Diff Method Automated Method     % Neutrophils 50.1 %    % Lymphocytes 36.4 %    % Monocytes 11.9 %    % Eosinophils 1.1 %    % Basophils 0.5 %    Absolute Neutrophil 3.1 1.6 - 8.3 10e9/L    Absolute Lymphocytes 2.2 0.8 - 5.3 10e9/L    Absolute Monocytes 0.7 0.0 - 1.3 10e9/L    Absolute Eosinophils 0.1 0.0 - 0.7 10e9/L    Absolute Basophils 0.0 0.0 - 0.2 10e9/L   Comprehensive metabolic panel   Result Value Ref Range    Sodium 136 133 - 144 mmol/L    Potassium 3.8 3.4 - 5.3 mmol/L    Chloride 104 94 - 109 mmol/L    Carbon Dioxide 28 20 - 32 mmol/L    Anion Gap 4 3 - 14 mmol/L    Glucose 94 70 - 99 mg/dL    Urea Nitrogen 18 7 - 30 mg/dL    Creatinine  0.98 0.66 - 1.25 mg/dL    GFR Estimate 69 >60 mL/min/[1.73_m2]      Comment:      Non  GFR Calc  Starting 12/18/2018, serum creatinine based estimated GFR (eGFR) will be   calculated using the Chronic Kidney Disease Epidemiology Collaboration   (CKD-EPI) equation.      GFR Estimate If Black 80 >60 mL/min/[1.73_m2]      Comment:       GFR Calc  Starting 12/18/2018, serum creatinine based estimated GFR (eGFR) will be   calculated using the Chronic Kidney Disease Epidemiology Collaboration   (CKD-EPI) equation.      Calcium 9.5 8.5 - 10.1 mg/dL    Bilirubin Total 0.9 0.2 - 1.3 mg/dL    Albumin 4.0 3.4 - 5.0 g/dL    Protein Total 8.1 6.8 - 8.8 g/dL    Alkaline Phosphatase 68 40 - 150 U/L    ALT 35 0 - 70 U/L    AST 27 0 - 45 U/L   Lipid Profile   Result Value Ref Range    Cholesterol 120 <200 mg/dL    Triglycerides 92 <150 mg/dL    HDL Cholesterol 49 >39 mg/dL    LDL Cholesterol Calculated 53 <100 mg/dL      Comment:      Desirable:       <100 mg/dl    Non HDL Cholesterol 71 <130 mg/dL   Prostate spec antigen screen   Result Value Ref Range    PSA 2.20 0 - 4 ug/L      Comment:      Assay Method:  Chemiluminescence using Siemens Vista analyzer       If you have any questions or concerns, please call the clinic at the number listed above.       Sincerely,        Gwyn Zheng MD

## 2020-10-20 LAB
ALBUMIN SERPL-MCNC: 4 G/DL (ref 3.4–5)
ALBUMIN UR-MCNC: NEGATIVE MG/DL
ALP SERPL-CCNC: 68 U/L (ref 40–150)
ALT SERPL W P-5'-P-CCNC: 35 U/L (ref 0–70)
ANION GAP SERPL CALCULATED.3IONS-SCNC: 4 MMOL/L (ref 3–14)
APPEARANCE UR: CLEAR
AST SERPL W P-5'-P-CCNC: 27 U/L (ref 0–45)
BILIRUB SERPL-MCNC: 0.9 MG/DL (ref 0.2–1.3)
BILIRUB UR QL STRIP: NEGATIVE
BUN SERPL-MCNC: 18 MG/DL (ref 7–30)
CALCIUM SERPL-MCNC: 9.5 MG/DL (ref 8.5–10.1)
CHLORIDE SERPL-SCNC: 104 MMOL/L (ref 94–109)
CHOLEST SERPL-MCNC: 120 MG/DL
CO2 SERPL-SCNC: 28 MMOL/L (ref 20–32)
COLOR UR AUTO: YELLOW
CREAT SERPL-MCNC: 0.98 MG/DL (ref 0.66–1.25)
GFR SERPL CREATININE-BSD FRML MDRD: 69 ML/MIN/{1.73_M2}
GLUCOSE SERPL-MCNC: 94 MG/DL (ref 70–99)
GLUCOSE UR STRIP-MCNC: NEGATIVE MG/DL
HDLC SERPL-MCNC: 49 MG/DL
HGB UR QL STRIP: NEGATIVE
KETONES UR STRIP-MCNC: NEGATIVE MG/DL
LDLC SERPL CALC-MCNC: 53 MG/DL
LEUKOCYTE ESTERASE UR QL STRIP: NEGATIVE
NITRATE UR QL: NEGATIVE
NONHDLC SERPL-MCNC: 71 MG/DL
PH UR STRIP: 5.5 PH (ref 5–7)
POTASSIUM SERPL-SCNC: 3.8 MMOL/L (ref 3.4–5.3)
PROT SERPL-MCNC: 8.1 G/DL (ref 6.8–8.8)
PSA SERPL-ACNC: 2.2 UG/L (ref 0–4)
RBC #/AREA URNS AUTO: NORMAL /HPF
SODIUM SERPL-SCNC: 136 MMOL/L (ref 133–144)
SOURCE: NORMAL
SP GR UR STRIP: 1.01 (ref 1–1.03)
TRIGL SERPL-MCNC: 92 MG/DL
UROBILINOGEN UR STRIP-ACNC: 0.2 EU/DL (ref 0.2–1)
WBC #/AREA URNS AUTO: NORMAL /HPF

## 2020-12-02 ENCOUNTER — OFFICE VISIT (OUTPATIENT)
Dept: CARDIOLOGY | Facility: CLINIC | Age: 85
End: 2020-12-02
Payer: COMMERCIAL

## 2020-12-02 VITALS
HEART RATE: 76 BPM | BODY MASS INDEX: 27.15 KG/M2 | SYSTOLIC BLOOD PRESSURE: 131 MMHG | DIASTOLIC BLOOD PRESSURE: 66 MMHG | HEIGHT: 67 IN | WEIGHT: 173 LBS

## 2020-12-02 DIAGNOSIS — I25.10 CORONARY ARTERY DISEASE INVOLVING NATIVE CORONARY ARTERY OF NATIVE HEART WITHOUT ANGINA PECTORIS: Primary | ICD-10-CM

## 2020-12-02 PROCEDURE — 99213 OFFICE O/P EST LOW 20 MIN: CPT | Performed by: INTERNAL MEDICINE

## 2020-12-02 ASSESSMENT — MIFFLIN-ST. JEOR: SCORE: 1410.41

## 2020-12-02 NOTE — LETTER
"12/2/2020    Gwyn Zheng MD  [address]  [facility] [address]    RE: Cale Wagoner       Dear Colleague,    I had the pleasure of seeing Cale Wagoner in the [facility].    HPI and Plan:    Mr. Cale Wagoner is now 87 years old and is followed for a history of a myocardial infarct, coronary artery disease and prior coronary artery intervention.  He is again accompanied by his wife, Dalila.    He denies any chest, neck or back discomfort.  He states that he is feeling well and is active without exertional dyspnea or angina.  He further denies any palpitations.  He performs most household tasks and joked about the \"[employer]\" with his multiple trips up and down the stairs daily.  His exertional tolerance has not changed and he has not had any of the marked central pressure with which he initially presented.  For the last 2 weeks, he has awakened with right upper arm aching, sits up and does stretching and the discomfort resolves and he returns to sleep.  He never has the discomfort at any other time and he has no associated symptoms.    Mr. Wagoner experienced a myocardial infarct during 2017 when he presented with central chest discomfort and underwent coronary angiography.  Two drug-eluting stents were placed to the mid and distal left circumflex coronary artery with resolution of his symptoms.  There was also demonstrated to be a 40%-50% eccentric distal left main coronary stenosis with scattered 35-40% diffuse disease throughout the LAD system and a first diagonal 50%-60% ostial stenosis.  The right coronary artery had mild disease until the proximal portion of the PDA where there was a bifurcation stenosis of about 60% in the 2 limbs of that vessel.        Echocardiography last done during late 2017 demonstrated normal left ventricular systolic performance with inferolateral hypokinesis without clinically significant valvular disease.  A " stress nuclear scan with Lexiscan stress during 2018 demonstrated a small to medium-sized fixed perfusion defect of moderate severity in the mid to basal inferolateral wall and the basal inferior wall.  There was no evidence of ischemia and the finding was consistent with his known CAD history.  His recent lipids included an LDL fraction of 53 mg/dl and HDL of 49 mg/dL with normal triglycerides.  He continues on low-dose aspirin, atorvastatin, losartan and metoprolol.       Exam:   This is a man in no apparent distress who appears younger than his stated age.   Blood pressure was 131/66 mmHg.    Chest is clear to auscultation.   On cardiac auscultation, there was an S1 and S2 without extra sounds or a murmur.  The rhythm was regular.      Assessment/Plan:    With regard to his history of coronary artery disease, he is doing well.  With his prior inferolateral myocardial infarct, he underwent revascularization and has remained asymptomatic and on good risk factor intervention.   His risk factors are under good control, including his blood pressure and his lipids.  He has overall normal left ventricular systolic performance.  An echocardiogram has been recommended with next year's appointment.    With regard to the nocturnal right arm discomfort, it is very atypical and most likely musculoskeletal. He has none with exertion (or at any other time), it does not occur when he first becomes supine, is not accompanied by radiation or associated symptoms and is also unlike his prior cardiac chest discomfort.  He will contact this clinic if the symptom changes in any of those ways but will discuss it further with his primary clinic for now.     He will return in 1 year unless he develops new symptoms.  He will contact us in the interim if that should occur.          CURRENT MEDICATIONS:  Current Outpatient Medications   Medication Sig Dispense Refill     Acetaminophen (TYLENOL PO) Take 500 mg by mouth every 8 hours as needed  for mild pain or fever       aspirin 81 MG EC tablet Take 81 mg by mouth At Bedtime       atorvastatin (LIPITOR) 20 MG tablet Take 1 tablet (20 mg) by mouth daily 90 tablet 3     FAMOTIDINE PO Take 20 mg by mouth 2 times daily       losartan (COZAAR) 100 MG tablet TAKE ONE TABLET (100 mg) BY MOUTH ONE TIME DAILY 90 tablet 3     meclizine (ANTIVERT) 25 MG tablet Take 1 tablet (25 mg) by mouth every 6 hours as needed for dizziness 20 tablet 0     metoprolol succinate ER (TOPROL-XL) 25 MG 24 hr tablet Take 1 tablet (25 mg) by mouth daily 90 tablet 3     Multiple Vitamins-Minerals (MULTIVITAMIN & MINERAL PO) Take 1 tablet by mouth daily       nitroGLYcerin (NITROSTAT) 0.4 MG sublingual tablet For chest pain place 1 tablet under the tongue every 5 minutes for 3 doses. If symptoms persist 5 minutes after 1st dose call 911. 25 tablet 1     Omega-3 Fatty Acids (OMEGA-3 FISH OIL PO) Take 2 g by mouth daily        ondansetron (ZOFRAN-ODT) 4 MG ODT tab Take 1 tablet (4 mg) by mouth every 6 hours as needed for nausea or vomiting 15 tablet 0     psyllium (METAMUCIL/KONSYL) Packet Take 1 packet by mouth daily         ALLERGIES     Allergies   Allergen Reactions     Penicillins        PAST MEDICAL HISTORY:  Past Medical History:   Diagnosis Date     Benign essential hypertension      Bloating      CAD (coronary artery disease)     cardiac cath 2017:  MOSES to mid-distal Cx x2     Hyperlipidemia      Kidney stones      NSTEMI (non-ST elevated myocardial infarction) (H)      PFO (patent foramen ovale)      TIA (transient ischemic attack)        PAST SURGICAL HISTORY:  Past Surgical History:   Procedure Laterality Date     APPENDECTOMY       CARDIAC CATHERIZATION  09/15/2017     MOSES to mid-distal Cx x2     ENT SURGERY      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     EYE SURGERY      cataracts     HERNIA REPAIR      twice     SOFT TISSUE SURGERY      skin cancer right temple       FAMILY HISTORY:  Family History   Problem Relation Age of Onset      Respiratory Father         pneumonia     Unknown/Adopted Father      Alzheimer Disease Brother      Alzheimer Disease Sister      Emphysema Sister      Unknown/Adopted Mother        SOCIAL HISTORY:  Social History     Socioeconomic History     Marital status:      Spouse name: None     Number of children: None     Years of education: None     Highest education level: None   Occupational History     None   Social Needs     Financial resource strain: None     Food insecurity     Worry: None     Inability: None     Transportation needs     Medical: None     Non-medical: None   Tobacco Use     Smoking status: Former Smoker     Quit date: 1968     Years since quittin.3     Smokeless tobacco: Former User   Substance and Sexual Activity     Alcohol use: Yes     Comment: Occ     Drug use: No     Sexual activity: Not Currently   Lifestyle     Physical activity     Days per week: None     Minutes per session: None     Stress: None   Relationships     Social connections     Talks on phone: None     Gets together: None     Attends Voodoo service: None     Active member of club or organization: None     Attends meetings of clubs or organizations: None     Relationship status: None     Intimate partner violence     Fear of current or ex partner: None     Emotionally abused: None     Physically abused: None     Forced sexual activity: None   Other Topics Concern     Parent/sibling w/ CABG, MI or angioplasty before 65F 55M? No   Social History Narrative     None       Review of Systems:  Skin:  Negative       Eyes:  Positive for glasses;cataracts both eyes and surgery was 5 years ago.  ENT:  Negative      Respiratory:  Negative       Cardiovascular:  Negative Positive for pt states he has regained strength but not stamina.  Gastroenterology: Positive for reflux;heartburn new medication pantoprazole not as effective as prilosec.  Genitourinary:  Negative decreased urinary stream;urinary frequency   "  Musculoskeletal:  Negative      Neurologic:  Positive for stroke Pt reports  he had ministroke 5 years ago.  Psychiatric:  Negative      Heme/Lymph/Imm:  Negative      Endocrine:  Negative        Physical Exam:  Vitals: /66   Pulse 76   Ht 1.689 m (5' 6.5\")   Wt 78.5 kg (173 lb)   BMI 27.50 kg/m      Constitutional:  cooperative, alert and oriented, well developed, well nourished, in no acute distress        Skin:  warm and dry to the touch          Head:  normocephalic        Eyes:  sclera white        Lymph:      ENT:           Neck:           Respiratory:  normal breath sounds, clear to auscultation, normal A-P diameter, normal symmetry, normal respiratory excursion, no use of accessory muscles         Cardiac: regular rhythm, normal S1/S2, no S3 or S4, apical impulse not displaced, no murmurs, gallops or rubs                                                         GI:           Extremities and Muscular Skeletal:                 Neurological:  no gross motor deficits;affect appropriate        Psych:  Alert and Oriented x 3;affect appropriate, oriented to time, person and place          CC  Gwyn Zheng MD  [address] [place] [address]                         Thank you for allowing me to participate in the care of your patient.      Sincerely,     Chichi Yang MD     [facility]    cc:   Gwyn Zheng MD  [address] [place] [address]        "

## 2020-12-02 NOTE — PROGRESS NOTES
"HPI and Plan:    Mr. Cale Wagoner is now 87 years old and is followed for a history of a myocardial infarct, coronary artery disease and prior coronary artery intervention.  He is again accompanied by his wife, Dalila.    He denies any chest, neck or back discomfort.  He states that he is feeling well and is active without exertional dyspnea or angina.  He further denies any palpitations.  He performs most household tasks and joked about the \"[employer] [place]\" with his multiple trips up and down the stairs daily.  His exertional tolerance has not changed and he has not had any of the marked central pressure with which he initially presented.  For the last 2 weeks, he has awakened with right upper arm aching, sits up and does stretching and the discomfort resolves and he returns to sleep.  He never has the discomfort at any other time and he has no associated symptoms.    Mr. Wagoner experienced a myocardial infarct during 2017 when he presented with central chest discomfort and underwent coronary angiography.  Two drug-eluting stents were placed to the mid and distal left circumflex coronary artery with resolution of his symptoms.  There was also demonstrated to be a 40%-50% eccentric distal left main coronary stenosis with scattered 35-40% diffuse disease throughout the LAD system and a first diagonal 50%-60% ostial stenosis.  The right coronary artery had mild disease until the proximal portion of the PDA where there was a bifurcation stenosis of about 60% in the 2 limbs of that vessel.        Echocardiography last done during late 2017 demonstrated normal left ventricular systolic performance with inferolateral hypokinesis without clinically significant valvular disease.  A stress nuclear scan with Lexiscan stress during 2018 demonstrated a small to medium-sized fixed perfusion defect of moderate severity in the mid to basal inferolateral wall and the basal inferior wall.  There was no evidence of ischemia " and the finding was consistent with his known CAD history.  His recent lipids included an LDL fraction of 53 mg/dl and HDL of 49 mg/dL with normal triglycerides.  He continues on low-dose aspirin, atorvastatin, losartan and metoprolol.       Exam:   This is a man in no apparent distress who appears younger than his stated age.   Blood pressure was 131/66 mmHg.    Chest is clear to auscultation.   On cardiac auscultation, there was an S1 and S2 without extra sounds or a murmur.  The rhythm was regular.      Assessment/Plan:    With regard to his history of coronary artery disease, he is doing well.  With his prior inferolateral myocardial infarct, he underwent revascularization and has remained asymptomatic and on good risk factor intervention.   His risk factors are under good control, including his blood pressure and his lipids.  He has overall normal left ventricular systolic performance.  An echocardiogram has been recommended with next year's appointment.    With regard to the nocturnal right arm discomfort, it is very atypical and most likely musculoskeletal. He has none with exertion (or at any other time), it does not occur when he first becomes supine, is not accompanied by radiation or associated symptoms and is also unlike his prior cardiac chest discomfort.  He will contact this clinic if the symptom changes in any of those ways but will discuss it further with his primary clinic for now.     He will return in 1 year unless he develops new symptoms.  He will contact us in the interim if that should occur.          CURRENT MEDICATIONS:  Current Outpatient Medications   Medication Sig Dispense Refill     Acetaminophen (TYLENOL PO) Take 500 mg by mouth every 8 hours as needed for mild pain or fever       aspirin 81 MG EC tablet Take 81 mg by mouth At Bedtime       atorvastatin (LIPITOR) 20 MG tablet Take 1 tablet (20 mg) by mouth daily 90 tablet 3     FAMOTIDINE PO Take 20 mg by mouth 2 times daily        losartan (COZAAR) 100 MG tablet TAKE ONE TABLET (100 mg) BY MOUTH ONE TIME DAILY 90 tablet 3     meclizine (ANTIVERT) 25 MG tablet Take 1 tablet (25 mg) by mouth every 6 hours as needed for dizziness 20 tablet 0     metoprolol succinate ER (TOPROL-XL) 25 MG 24 hr tablet Take 1 tablet (25 mg) by mouth daily 90 tablet 3     Multiple Vitamins-Minerals (MULTIVITAMIN & MINERAL PO) Take 1 tablet by mouth daily       nitroGLYcerin (NITROSTAT) 0.4 MG sublingual tablet For chest pain place 1 tablet under the tongue every 5 minutes for 3 doses. If symptoms persist 5 minutes after 1st dose call 911. 25 tablet 1     Omega-3 Fatty Acids (OMEGA-3 FISH OIL PO) Take 2 g by mouth daily        ondansetron (ZOFRAN-ODT) 4 MG ODT tab Take 1 tablet (4 mg) by mouth every 6 hours as needed for nausea or vomiting 15 tablet 0     psyllium (METAMUCIL/KONSYL) Packet Take 1 packet by mouth daily         ALLERGIES     Allergies   Allergen Reactions     Penicillins        PAST MEDICAL HISTORY:  Past Medical History:   Diagnosis Date     Benign essential hypertension      Bloating      CAD (coronary artery disease)     cardiac cath 2017:  MOSES to mid-distal Cx x2     Hyperlipidemia      Kidney stones      NSTEMI (non-ST elevated myocardial infarction) (H)      PFO (patent foramen ovale)      TIA (transient ischemic attack)        PAST SURGICAL HISTORY:  Past Surgical History:   Procedure Laterality Date     APPENDECTOMY       CARDIAC CATHERIZATION  09/15/2017     MOSES to mid-distal Cx x2     ENT SURGERY      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     EYE SURGERY      cataracts     HERNIA REPAIR      twice     SOFT TISSUE SURGERY      skin cancer right temple       FAMILY HISTORY:  Family History   Problem Relation Age of Onset     Respiratory Father         pneumonia     Unknown/Adopted Father      Alzheimer Disease Brother      Alzheimer Disease Sister      Emphysema Sister      Unknown/Adopted Mother        SOCIAL HISTORY:  Social History     Socioeconomic  History     Marital status:      Spouse name: None     Number of children: None     Years of education: None     Highest education level: None   Occupational History     None   Social Needs     Financial resource strain: None     Food insecurity     Worry: None     Inability: None     Transportation needs     Medical: None     Non-medical: None   Tobacco Use     Smoking status: Former Smoker     Quit date: 1968     Years since quittin.3     Smokeless tobacco: Former User   Substance and Sexual Activity     Alcohol use: Yes     Comment: Occ     Drug use: No     Sexual activity: Not Currently   Lifestyle     Physical activity     Days per week: None     Minutes per session: None     Stress: None   Relationships     Social connections     Talks on phone: None     Gets together: None     Attends Alevism service: None     Active member of club or organization: None     Attends meetings of clubs or organizations: None     Relationship status: None     Intimate partner violence     Fear of current or ex partner: None     Emotionally abused: None     Physically abused: None     Forced sexual activity: None   Other Topics Concern     Parent/sibling w/ CABG, MI or angioplasty before 65F 55M? No   Social History Narrative     None       Review of Systems:  Skin:  Negative       Eyes:  Positive for glasses;cataracts both eyes and surgery was 5 years ago.  ENT:  Negative      Respiratory:  Negative       Cardiovascular:  Negative Positive for pt states he has regained strength but not stamina.  Gastroenterology: Positive for reflux;heartburn new medication pantoprazole not as effective as prilosec.  Genitourinary:  Negative decreased urinary stream;urinary frequency    Musculoskeletal:  Negative      Neurologic:  Positive for stroke Pt reports  he had ministroke 5 years ago.  Psychiatric:  Negative      Heme/Lymph/Imm:  Negative      Endocrine:  Negative        Physical Exam:  Vitals: /66   Pulse 76    "Ht 1.689 m (5' 6.5\")   Wt 78.5 kg (173 lb)   BMI 27.50 kg/m      Constitutional:  cooperative, alert and oriented, well developed, well nourished, in no acute distress        Skin:  warm and dry to the touch          Head:  normocephalic        Eyes:  sclera white        Lymph:      ENT:           Neck:           Respiratory:  normal breath sounds, clear to auscultation, normal A-P diameter, normal symmetry, normal respiratory excursion, no use of accessory muscles         Cardiac: regular rhythm, normal S1/S2, no S3 or S4, apical impulse not displaced, no murmurs, gallops or rubs                                                         GI:           Extremities and Muscular Skeletal:                 Neurological:  no gross motor deficits;affect appropriate        Psych:  Alert and Oriented x 3;affect appropriate, oriented to time, person and place          CC  Gwyn Zheng MD  [address] [place] [address]                     "

## 2020-12-02 NOTE — LETTER
"12/2/2020    Gwyn Zheng MD  [address]  [facility] [address]    RE: Cale Wagoner       Dear Colleague,    I had the pleasure of seeing Cale Wagoner in the [facility].    HPI and Plan:    Mr. Cale Wagoner is now 87 years old and is followed for a history of a myocardial infarct, coronary artery disease and prior coronary artery intervention.  He is again accompanied by his wife, Dalila.    He denies any chest, neck or back discomfort.  He states that he is feeling well and is active without exertional dyspnea or angina.  He further denies any palpitations.  He performs most household tasks and joked about the \"[employer] in [employer]\" with his multiple trips up and down the stairs daily.  His exertional tolerance has not changed and he has not had any of the marked central pressure with which he initially presented.  For the last 2 weeks, he has awakened with right upper arm aching, sits up and does stretching and the discomfort resolves and he returns to sleep.  He never has the discomfort at any other time and he has no associated symptoms.    Mr. Wagoner experienced a myocardial infarct during 2017 when he presented with central chest discomfort and underwent coronary angiography.  Two drug-eluting stents were placed to the mid and distal left circumflex coronary artery with resolution of his symptoms.  There was also demonstrated to be a 40%-50% eccentric distal left main coronary stenosis with scattered 35-40% diffuse disease throughout the LAD system and a first diagonal 50%-60% ostial stenosis.  The right coronary artery had mild disease until the proximal portion of the PDA where there was a bifurcation stenosis of about 60% in the 2 limbs of that vessel.        Echocardiography last done during late 2017 demonstrated normal left ventricular systolic performance with inferolateral hypokinesis without clinically significant valvular disease.  A " stress nuclear scan with Lexiscan stress during 2018 demonstrated a small to medium-sized fixed perfusion defect of moderate severity in the mid to basal inferolateral wall and the basal inferior wall.  There was no evidence of ischemia and the finding was consistent with his known CAD history.  His recent lipids included an LDL fraction of 53 mg/dl and HDL of 49 mg/dL with normal triglycerides.  He continues on low-dose aspirin, atorvastatin, losartan and metoprolol.       Exam:   This is a man in no apparent distress who appears younger than his stated age.   Blood pressure was 131/66 mmHg.    Chest is clear to auscultation.   On cardiac auscultation, there was an S1 and S2 without extra sounds or a murmur.  The rhythm was regular.      Assessment/Plan:    With regard to his history of coronary artery disease, he is doing well.  With his prior inferolateral myocardial infarct, he underwent revascularization and has remained asymptomatic and on good risk factor intervention.   His risk factors are under good control, including his blood pressure and his lipids.  He has overall normal left ventricular systolic performance.  An echocardiogram has been recommended with next year's appointment.    With regard to the nocturnal right arm discomfort, it is very atypical and most likely musculoskeletal. He has none with exertion (or at any other time), it does not occur when he first becomes supine, is not accompanied by radiation or associated symptoms and is also unlike his prior cardiac chest discomfort.  He will contact this clinic if the symptom changes in any of those ways but will discuss it further with his primary clinic for now.     He will return in 1 year unless he develops new symptoms.  He will contact us in the interim if that should occur.          CURRENT MEDICATIONS:  Current Outpatient Medications   Medication Sig Dispense Refill     Acetaminophen (TYLENOL PO) Take 500 mg by mouth every 8 hours as needed  for mild pain or fever       aspirin 81 MG EC tablet Take 81 mg by mouth At Bedtime       atorvastatin (LIPITOR) 20 MG tablet Take 1 tablet (20 mg) by mouth daily 90 tablet 3     FAMOTIDINE PO Take 20 mg by mouth 2 times daily       losartan (COZAAR) 100 MG tablet TAKE ONE TABLET (100 mg) BY MOUTH ONE TIME DAILY 90 tablet 3     meclizine (ANTIVERT) 25 MG tablet Take 1 tablet (25 mg) by mouth every 6 hours as needed for dizziness 20 tablet 0     metoprolol succinate ER (TOPROL-XL) 25 MG 24 hr tablet Take 1 tablet (25 mg) by mouth daily 90 tablet 3     Multiple Vitamins-Minerals (MULTIVITAMIN & MINERAL PO) Take 1 tablet by mouth daily       nitroGLYcerin (NITROSTAT) 0.4 MG sublingual tablet For chest pain place 1 tablet under the tongue every 5 minutes for 3 doses. If symptoms persist 5 minutes after 1st dose call 911. 25 tablet 1     Omega-3 Fatty Acids (OMEGA-3 FISH OIL PO) Take 2 g by mouth daily        ondansetron (ZOFRAN-ODT) 4 MG ODT tab Take 1 tablet (4 mg) by mouth every 6 hours as needed for nausea or vomiting 15 tablet 0     psyllium (METAMUCIL/KONSYL) Packet Take 1 packet by mouth daily         ALLERGIES     Allergies   Allergen Reactions     Penicillins        PAST MEDICAL HISTORY:  Past Medical History:   Diagnosis Date     Benign essential hypertension      Bloating      CAD (coronary artery disease)     cardiac cath 2017:  MOSES to mid-distal Cx x2     Hyperlipidemia      Kidney stones      NSTEMI (non-ST elevated myocardial infarction) (H)      PFO (patent foramen ovale)      TIA (transient ischemic attack)        PAST SURGICAL HISTORY:  Past Surgical History:   Procedure Laterality Date     APPENDECTOMY       CARDIAC CATHERIZATION  09/15/2017     MOSES to mid-distal Cx x2     ENT SURGERY      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     EYE SURGERY      cataracts     HERNIA REPAIR      twice     SOFT TISSUE SURGERY      skin cancer right temple       FAMILY HISTORY:  Family History   Problem Relation Age of Onset      Respiratory Father         pneumonia     Unknown/Adopted Father      Alzheimer Disease Brother      Alzheimer Disease Sister      Emphysema Sister      Unknown/Adopted Mother        SOCIAL HISTORY:  Social History     Socioeconomic History     Marital status:      Spouse name: None     Number of children: None     Years of education: None     Highest education level: None   Occupational History     None   Social Needs     Financial resource strain: None     Food insecurity     Worry: None     Inability: None     Transportation needs     Medical: None     Non-medical: None   Tobacco Use     Smoking status: Former Smoker     Quit date: 1968     Years since quittin.3     Smokeless tobacco: Former User   Substance and Sexual Activity     Alcohol use: Yes     Comment: Occ     Drug use: No     Sexual activity: Not Currently   Lifestyle     Physical activity     Days per week: None     Minutes per session: None     Stress: None   Relationships     Social connections     Talks on phone: None     Gets together: None     Attends Hinduism service: None     Active member of club or organization: None     Attends meetings of clubs or organizations: None     Relationship status: None     Intimate partner violence     Fear of current or ex partner: None     Emotionally abused: None     Physically abused: None     Forced sexual activity: None   Other Topics Concern     Parent/sibling w/ CABG, MI or angioplasty before 65F 55M? No   Social History Narrative     None       Review of Systems:  Skin:  Negative       Eyes:  Positive for glasses;cataracts both eyes and surgery was 5 years ago.  ENT:  Negative      Respiratory:  Negative       Cardiovascular:  Negative Positive for pt states he has regained strength but not stamina.  Gastroenterology: Positive for reflux;heartburn new medication pantoprazole not as effective as prilosec.  Genitourinary:  Negative decreased urinary stream;urinary frequency   "  Musculoskeletal:  Negative      Neurologic:  Positive for stroke Pt reports  he had ministroke 5 years ago.  Psychiatric:  Negative      Heme/Lymph/Imm:  Negative      Endocrine:  Negative        Physical Exam:  Vitals: /66   Pulse 76   Ht 1.689 m (5' 6.5\")   Wt 78.5 kg (173 lb)   BMI 27.50 kg/m      Constitutional:  cooperative, alert and oriented, well developed, well nourished, in no acute distress        Skin:  warm and dry to the touch          Head:  normocephalic        Eyes:  sclera white        Lymph:      ENT:           Neck:           Respiratory:  normal breath sounds, clear to auscultation, normal A-P diameter, normal symmetry, normal respiratory excursion, no use of accessory muscles         Cardiac: regular rhythm, normal S1/S2, no S3 or S4, apical impulse not displaced, no murmurs, gallops or rubs                                                         GI:           Extremities and Muscular Skeletal:                 Neurological:  no gross motor deficits;affect appropriate        Psych:  Alert and Oriented x 3;affect appropriate, oriented to time, person and place          Thank you for allowing me to participate in the care of your patient.    Sincerely,     Chichi Yang MD     [facility]  "

## 2021-01-29 ENCOUNTER — TELEPHONE (OUTPATIENT)
Dept: CARDIOLOGY | Facility: CLINIC | Age: 86
End: 2021-01-29

## 2021-01-29 NOTE — TELEPHONE ENCOUNTER
Voicemail received from patient wondering if Dr Yang could help his wife get access to a COVID vaccine.     Returned patient's call, discussed that the cardiology clinic does not currently have any involvement in the distribution/administration of the COVID vaccine. Patient deferred to [facility] website or PCP.

## 2021-04-13 ENCOUNTER — APPOINTMENT (OUTPATIENT)
Dept: CT IMAGING | Facility: CLINIC | Age: 86
DRG: 389 | End: 2021-04-13
Attending: EMERGENCY MEDICINE
Payer: COMMERCIAL

## 2021-04-13 ENCOUNTER — HOSPITAL ENCOUNTER (INPATIENT)
Facility: CLINIC | Age: 86
LOS: 2 days | Discharge: HOME | DRG: 389 | End: 2021-04-16
Attending: EMERGENCY MEDICINE | Admitting: INTERNAL MEDICINE
Payer: COMMERCIAL

## 2021-04-13 DIAGNOSIS — K56.41 FECAL IMPACTION (H): Primary | ICD-10-CM

## 2021-04-13 DIAGNOSIS — R10.2 SUPRAPUBIC ABDOMINAL PAIN: ICD-10-CM

## 2021-04-13 LAB
ALBUMIN SERPL-MCNC: 3.8 G/DL (ref 3.4–5)
ALBUMIN UR-MCNC: NEGATIVE MG/DL
ALP SERPL-CCNC: 71 U/L (ref 40–150)
ALT SERPL W P-5'-P-CCNC: 34 U/L (ref 0–70)
ANION GAP SERPL CALCULATED.3IONS-SCNC: 5 MMOL/L (ref 3–14)
APPEARANCE UR: CLEAR
AST SERPL W P-5'-P-CCNC: 28 U/L (ref 0–45)
BASOPHILS # BLD AUTO: 0.1 10E9/L (ref 0–0.2)
BASOPHILS NFR BLD AUTO: 0.5 %
BILIRUB SERPL-MCNC: 0.9 MG/DL (ref 0.2–1.3)
BILIRUB UR QL STRIP: NEGATIVE
BUN SERPL-MCNC: 16 MG/DL (ref 7–30)
CALCIUM SERPL-MCNC: 8.4 MG/DL (ref 8.5–10.1)
CHLORIDE SERPL-SCNC: 101 MMOL/L (ref 94–109)
CO2 SERPL-SCNC: 28 MMOL/L (ref 20–32)
COLOR UR AUTO: ABNORMAL
CREAT SERPL-MCNC: 1.07 MG/DL (ref 0.66–1.25)
DIFFERENTIAL METHOD BLD: ABNORMAL
EOSINOPHIL # BLD AUTO: 0 10E9/L (ref 0–0.7)
EOSINOPHIL NFR BLD AUTO: 0.2 %
ERYTHROCYTE [DISTWIDTH] IN BLOOD BY AUTOMATED COUNT: 12.2 % (ref 10–15)
GFR SERPL CREATININE-BSD FRML MDRD: 62 ML/MIN/{1.73_M2}
GLUCOSE SERPL-MCNC: 104 MG/DL (ref 70–99)
GLUCOSE UR STRIP-MCNC: NEGATIVE MG/DL
HCT VFR BLD AUTO: 45.5 % (ref 40–53)
HGB BLD-MCNC: 15.5 G/DL (ref 13.3–17.7)
HGB UR QL STRIP: NEGATIVE
IMM GRANULOCYTES # BLD: 0 10E9/L (ref 0–0.4)
IMM GRANULOCYTES NFR BLD: 0.3 %
INTERPRETATION ECG - MUSE: NORMAL
KETONES UR STRIP-MCNC: NEGATIVE MG/DL
LACTATE BLD-SCNC: 1 MMOL/L (ref 0.7–2)
LEUKOCYTE ESTERASE UR QL STRIP: NEGATIVE
LIPASE SERPL-CCNC: 240 U/L (ref 73–393)
LYMPHOCYTES # BLD AUTO: 1.7 10E9/L (ref 0.8–5.3)
LYMPHOCYTES NFR BLD AUTO: 15.2 %
MCH RBC QN AUTO: 31.1 PG (ref 26.5–33)
MCHC RBC AUTO-ENTMCNC: 34.1 G/DL (ref 31.5–36.5)
MCV RBC AUTO: 91 FL (ref 78–100)
MONOCYTES # BLD AUTO: 1.3 10E9/L (ref 0–1.3)
MONOCYTES NFR BLD AUTO: 11.7 %
MUCOUS THREADS #/AREA URNS LPF: PRESENT /LPF
NEUTROPHILS # BLD AUTO: 8.2 10E9/L (ref 1.6–8.3)
NEUTROPHILS NFR BLD AUTO: 72.1 %
NITRATE UR QL: NEGATIVE
NRBC # BLD AUTO: 0 10*3/UL
NRBC BLD AUTO-RTO: 0 /100
PH UR STRIP: 7 PH (ref 5–7)
PLATELET # BLD AUTO: 168 10E9/L (ref 150–450)
POTASSIUM SERPL-SCNC: 4 MMOL/L (ref 3.4–5.3)
PROT SERPL-MCNC: 7.8 G/DL (ref 6.8–8.8)
RBC # BLD AUTO: 4.98 10E12/L (ref 4.4–5.9)
RBC #/AREA URNS AUTO: 0 /HPF (ref 0–2)
SODIUM SERPL-SCNC: 134 MMOL/L (ref 133–144)
SOURCE: ABNORMAL
SP GR UR STRIP: 1.01 (ref 1–1.03)
SQUAMOUS #/AREA URNS AUTO: 0 /HPF (ref 0–1)
TROPONIN I SERPL-MCNC: <0.015 UG/L (ref 0–0.04)
UROBILINOGEN UR STRIP-MCNC: 0 MG/DL (ref 0–2)
WBC # BLD AUTO: 11.4 10E9/L (ref 4–11)
WBC #/AREA URNS AUTO: 0 /HPF (ref 0–5)

## 2021-04-13 PROCEDURE — 83690 ASSAY OF LIPASE: CPT | Performed by: EMERGENCY MEDICINE

## 2021-04-13 PROCEDURE — 250N000011 HC RX IP 250 OP 636: Performed by: EMERGENCY MEDICINE

## 2021-04-13 PROCEDURE — 93005 ELECTROCARDIOGRAM TRACING: CPT

## 2021-04-13 PROCEDURE — 74177 CT ABD & PELVIS W/CONTRAST: CPT

## 2021-04-13 PROCEDURE — 83605 ASSAY OF LACTIC ACID: CPT | Performed by: EMERGENCY MEDICINE

## 2021-04-13 PROCEDURE — 99285 EMERGENCY DEPT VISIT HI MDM: CPT | Mod: 25

## 2021-04-13 PROCEDURE — 99207 PR CDG-MDM COMPONENT: MEETS HIGH - UP CODED: CPT | Performed by: INTERNAL MEDICINE

## 2021-04-13 PROCEDURE — C9803 HOPD COVID-19 SPEC COLLECT: HCPCS

## 2021-04-13 PROCEDURE — 99220 PR INITIAL OBSERVATION CARE,LEVEL III: CPT | Performed by: INTERNAL MEDICINE

## 2021-04-13 PROCEDURE — 96375 TX/PRO/DX INJ NEW DRUG ADDON: CPT

## 2021-04-13 PROCEDURE — 80053 COMPREHEN METABOLIC PANEL: CPT | Performed by: EMERGENCY MEDICINE

## 2021-04-13 PROCEDURE — 96361 HYDRATE IV INFUSION ADD-ON: CPT

## 2021-04-13 PROCEDURE — 250N000013 HC RX MED GY IP 250 OP 250 PS 637: Performed by: EMERGENCY MEDICINE

## 2021-04-13 PROCEDURE — 81001 URINALYSIS AUTO W/SCOPE: CPT | Performed by: EMERGENCY MEDICINE

## 2021-04-13 PROCEDURE — 96374 THER/PROPH/DIAG INJ IV PUSH: CPT | Mod: 59

## 2021-04-13 PROCEDURE — 85025 COMPLETE CBC W/AUTO DIFF WBC: CPT | Performed by: EMERGENCY MEDICINE

## 2021-04-13 PROCEDURE — 87635 SARS-COV-2 COVID-19 AMP PRB: CPT | Performed by: EMERGENCY MEDICINE

## 2021-04-13 PROCEDURE — 250N000009 HC RX 250: Performed by: EMERGENCY MEDICINE

## 2021-04-13 PROCEDURE — G0378 HOSPITAL OBSERVATION PER HR: HCPCS

## 2021-04-13 PROCEDURE — 258N000003 HC RX IP 258 OP 636: Performed by: EMERGENCY MEDICINE

## 2021-04-13 PROCEDURE — 84484 ASSAY OF TROPONIN QUANT: CPT | Performed by: EMERGENCY MEDICINE

## 2021-04-13 RX ORDER — ONDANSETRON 2 MG/ML
4 INJECTION INTRAMUSCULAR; INTRAVENOUS ONCE
Status: COMPLETED | OUTPATIENT
Start: 2021-04-13 | End: 2021-04-13

## 2021-04-13 RX ORDER — IOPAMIDOL 755 MG/ML
86 INJECTION, SOLUTION INTRAVASCULAR ONCE
Status: COMPLETED | OUTPATIENT
Start: 2021-04-13 | End: 2021-04-13

## 2021-04-13 RX ORDER — HYDROMORPHONE HYDROCHLORIDE 1 MG/ML
0.5 INJECTION, SOLUTION INTRAMUSCULAR; INTRAVENOUS; SUBCUTANEOUS
Status: DISCONTINUED | OUTPATIENT
Start: 2021-04-13 | End: 2021-04-14

## 2021-04-13 RX ADMIN — IOPAMIDOL 86 ML: 755 INJECTION, SOLUTION INTRAVENOUS at 20:52

## 2021-04-13 RX ADMIN — DOCUSATE SODIUM 286 ML: 50 LIQUID ORAL at 22:55

## 2021-04-13 RX ADMIN — SODIUM CHLORIDE 70 ML: 9 INJECTION, SOLUTION INTRAVENOUS at 20:52

## 2021-04-13 RX ADMIN — HYDROMORPHONE HYDROCHLORIDE 0.5 MG: 1 INJECTION, SOLUTION INTRAMUSCULAR; INTRAVENOUS; SUBCUTANEOUS at 20:39

## 2021-04-13 RX ADMIN — ONDANSETRON 4 MG: 2 INJECTION INTRAMUSCULAR; INTRAVENOUS at 20:37

## 2021-04-13 RX ADMIN — SODIUM CHLORIDE 1000 ML: 9 INJECTION, SOLUTION INTRAVENOUS at 20:01

## 2021-04-13 ASSESSMENT — ENCOUNTER SYMPTOMS
BLOOD IN STOOL: 0
VOMITING: 0
FEVER: 0
ABDOMINAL PAIN: 1
SHORTNESS OF BREATH: 0
DYSURIA: 0
NAUSEA: 1
HEMATURIA: 0

## 2021-04-13 ASSESSMENT — MIFFLIN-ST. JEOR: SCORE: 1388.86

## 2021-04-13 NOTE — ED TRIAGE NOTES
Lower Abd pain since last night, feeling distended. Denies bloody or diarrhea stools. No vomiting. Denies urinary retention or painful urination.

## 2021-04-14 ENCOUNTER — APPOINTMENT (OUTPATIENT)
Dept: GENERAL RADIOLOGY | Facility: CLINIC | Age: 86
DRG: 389 | End: 2021-04-14
Attending: PHYSICIAN ASSISTANT
Payer: COMMERCIAL

## 2021-04-14 LAB
LABORATORY COMMENT REPORT: NORMAL
SARS-COV-2 RNA RESP QL NAA+PROBE: NEGATIVE
SPECIMEN SOURCE: NORMAL

## 2021-04-14 PROCEDURE — 99232 SBSQ HOSP IP/OBS MODERATE 35: CPT | Performed by: INTERNAL MEDICINE

## 2021-04-14 PROCEDURE — 120N000001 HC R&B MED SURG/OB

## 2021-04-14 PROCEDURE — G0378 HOSPITAL OBSERVATION PER HR: HCPCS

## 2021-04-14 PROCEDURE — 74283 THER NMA RDCTJ INTUS/OBSTRCJ: CPT

## 2021-04-14 PROCEDURE — 250N000013 HC RX MED GY IP 250 OP 250 PS 637: Performed by: INTERNAL MEDICINE

## 2021-04-14 PROCEDURE — 258N000003 HC RX IP 258 OP 636: Performed by: INTERNAL MEDICINE

## 2021-04-14 PROCEDURE — 250N000011 HC RX IP 250 OP 636: Performed by: INTERNAL MEDICINE

## 2021-04-14 RX ORDER — ONDANSETRON 4 MG/1
4 TABLET, ORALLY DISINTEGRATING ORAL EVERY 6 HOURS PRN
Status: DISCONTINUED | OUTPATIENT
Start: 2021-04-14 | End: 2021-04-16 | Stop reason: HOSPADM

## 2021-04-14 RX ORDER — AMOXICILLIN 250 MG
1 CAPSULE ORAL 2 TIMES DAILY PRN
Status: DISCONTINUED | OUTPATIENT
Start: 2021-04-14 | End: 2021-04-16 | Stop reason: HOSPADM

## 2021-04-14 RX ORDER — NALOXONE HYDROCHLORIDE 0.4 MG/ML
0.2 INJECTION, SOLUTION INTRAMUSCULAR; INTRAVENOUS; SUBCUTANEOUS
Status: DISCONTINUED | OUTPATIENT
Start: 2021-04-14 | End: 2021-04-16 | Stop reason: HOSPADM

## 2021-04-14 RX ORDER — AMOXICILLIN 250 MG
2 CAPSULE ORAL 2 TIMES DAILY
Status: DISCONTINUED | OUTPATIENT
Start: 2021-04-14 | End: 2021-04-16 | Stop reason: HOSPADM

## 2021-04-14 RX ORDER — LIDOCAINE 40 MG/G
CREAM TOPICAL
Status: DISCONTINUED | OUTPATIENT
Start: 2021-04-14 | End: 2021-04-16 | Stop reason: HOSPADM

## 2021-04-14 RX ORDER — SODIUM CHLORIDE 9 MG/ML
INJECTION, SOLUTION INTRAVENOUS CONTINUOUS
Status: DISCONTINUED | OUTPATIENT
Start: 2021-04-14 | End: 2021-04-15

## 2021-04-14 RX ORDER — AMOXICILLIN 250 MG
1 CAPSULE ORAL 2 TIMES DAILY
Status: DISCONTINUED | OUTPATIENT
Start: 2021-04-14 | End: 2021-04-16 | Stop reason: HOSPADM

## 2021-04-14 RX ORDER — OXYCODONE HYDROCHLORIDE 5 MG/1
5 TABLET ORAL
Status: DISCONTINUED | OUTPATIENT
Start: 2021-04-14 | End: 2021-04-16 | Stop reason: HOSPADM

## 2021-04-14 RX ORDER — ASPIRIN 81 MG/1
81 TABLET ORAL AT BEDTIME
Status: DISCONTINUED | OUTPATIENT
Start: 2021-04-14 | End: 2021-04-16 | Stop reason: HOSPADM

## 2021-04-14 RX ORDER — HYDROMORPHONE HCL IN WATER/PF 6 MG/30 ML
0.2 PATIENT CONTROLLED ANALGESIA SYRINGE INTRAVENOUS
Status: DISCONTINUED | OUTPATIENT
Start: 2021-04-14 | End: 2021-04-16 | Stop reason: HOSPADM

## 2021-04-14 RX ORDER — PROCHLORPERAZINE 25 MG
12.5 SUPPOSITORY, RECTAL RECTAL EVERY 12 HOURS PRN
Status: DISCONTINUED | OUTPATIENT
Start: 2021-04-14 | End: 2021-04-16 | Stop reason: HOSPADM

## 2021-04-14 RX ORDER — NALOXONE HYDROCHLORIDE 0.4 MG/ML
0.4 INJECTION, SOLUTION INTRAMUSCULAR; INTRAVENOUS; SUBCUTANEOUS
Status: DISCONTINUED | OUTPATIENT
Start: 2021-04-14 | End: 2021-04-16 | Stop reason: HOSPADM

## 2021-04-14 RX ORDER — AMOXICILLIN 250 MG
2 CAPSULE ORAL 2 TIMES DAILY PRN
Status: DISCONTINUED | OUTPATIENT
Start: 2021-04-14 | End: 2021-04-16 | Stop reason: HOSPADM

## 2021-04-14 RX ORDER — PROCHLORPERAZINE MALEATE 5 MG
5 TABLET ORAL EVERY 6 HOURS PRN
Status: DISCONTINUED | OUTPATIENT
Start: 2021-04-14 | End: 2021-04-16 | Stop reason: HOSPADM

## 2021-04-14 RX ORDER — ACETAMINOPHEN 325 MG/1
650 TABLET ORAL EVERY 4 HOURS PRN
Status: DISCONTINUED | OUTPATIENT
Start: 2021-04-14 | End: 2021-04-16 | Stop reason: HOSPADM

## 2021-04-14 RX ORDER — POLYETHYLENE GLYCOL 3350 17 G/17G
17 POWDER, FOR SOLUTION ORAL DAILY PRN
Status: DISCONTINUED | OUTPATIENT
Start: 2021-04-14 | End: 2021-04-16 | Stop reason: HOSPADM

## 2021-04-14 RX ORDER — ACETAMINOPHEN 650 MG/1
650 SUPPOSITORY RECTAL EVERY 4 HOURS PRN
Status: DISCONTINUED | OUTPATIENT
Start: 2021-04-14 | End: 2021-04-16 | Stop reason: HOSPADM

## 2021-04-14 RX ORDER — POLYETHYLENE GLYCOL 3350 17 G/17G
17 POWDER, FOR SOLUTION ORAL 2 TIMES DAILY
Status: DISCONTINUED | OUTPATIENT
Start: 2021-04-14 | End: 2021-04-16 | Stop reason: HOSPADM

## 2021-04-14 RX ORDER — ONDANSETRON 2 MG/ML
4 INJECTION INTRAMUSCULAR; INTRAVENOUS EVERY 6 HOURS PRN
Status: DISCONTINUED | OUTPATIENT
Start: 2021-04-14 | End: 2021-04-16 | Stop reason: HOSPADM

## 2021-04-14 RX ORDER — BISACODYL 10 MG
10 SUPPOSITORY, RECTAL RECTAL DAILY PRN
Status: DISCONTINUED | OUTPATIENT
Start: 2021-04-14 | End: 2021-04-16 | Stop reason: HOSPADM

## 2021-04-14 RX ADMIN — POLYETHYLENE GLYCOL 3350 17 G: 17 POWDER, FOR SOLUTION ORAL at 02:07

## 2021-04-14 RX ADMIN — ASPIRIN 81 MG: 81 TABLET ORAL at 21:28

## 2021-04-14 RX ADMIN — SENNOSIDES AND DOCUSATE SODIUM 2 TABLET: 8.6; 5 TABLET ORAL at 21:28

## 2021-04-14 RX ADMIN — DIATRIZOATE MEGLUMINE AND DIATRIZOATE SODIUM 480 ML: 660; 100 SOLUTION ORAL; RECTAL at 14:43

## 2021-04-14 RX ADMIN — ONDANSETRON 4 MG: 2 INJECTION INTRAMUSCULAR; INTRAVENOUS at 17:57

## 2021-04-14 RX ADMIN — SENNOSIDES AND DOCUSATE SODIUM 1 TABLET: 8.6; 5 TABLET ORAL at 02:07

## 2021-04-14 RX ADMIN — SODIUM CHLORIDE: 9 INJECTION, SOLUTION INTRAVENOUS at 11:42

## 2021-04-14 RX ADMIN — OXYCODONE HYDROCHLORIDE 5 MG: 5 TABLET ORAL at 22:37

## 2021-04-14 RX ADMIN — ASPIRIN 81 MG: 81 TABLET ORAL at 02:07

## 2021-04-14 RX ADMIN — BISACODYL 10 MG: 10 SUPPOSITORY RECTAL at 09:51

## 2021-04-14 RX ADMIN — ACETAMINOPHEN 650 MG: 325 TABLET, FILM COATED ORAL at 09:51

## 2021-04-14 RX ADMIN — ONDANSETRON 4 MG: 2 INJECTION INTRAMUSCULAR; INTRAVENOUS at 22:37

## 2021-04-14 RX ADMIN — SENNOSIDES AND DOCUSATE SODIUM 2 TABLET: 8.6; 5 TABLET ORAL at 08:42

## 2021-04-14 RX ADMIN — POLYETHYLENE GLYCOL 3350 17 G: 17 POWDER, FOR SOLUTION ORAL at 08:42

## 2021-04-14 RX ADMIN — HYDROMORPHONE HYDROCHLORIDE 0.2 MG: 0.2 INJECTION, SOLUTION INTRAMUSCULAR; INTRAVENOUS; SUBCUTANEOUS at 18:20

## 2021-04-14 RX ADMIN — POLYETHYLENE GLYCOL 3350 17 G: 17 POWDER, FOR SOLUTION ORAL at 21:27

## 2021-04-14 ASSESSMENT — ACTIVITIES OF DAILY LIVING (ADL)
ADLS_ACUITY_SCORE: 13
ADLS_ACUITY_SCORE: 13

## 2021-04-14 NOTE — PROGRESS NOTES
Observation goals PRIOR TO DISCHARGE     Comments: -diagnostic tests and consults completed and resulted - xray today  -vital signs normal or at patient baseline - yes   -tolerating oral intake to maintain hydration - yes IV fluids started and tolerating clear liquid diet  -safe disposition plan has been identified - unknown  Nurse to notify provider when observation goals have been met and patient is ready for discharge.    Enema and suppository done this AM. Scant bowel fluids returned following enema. C/o abdominal cramping at this time.

## 2021-04-14 NOTE — ED PROVIDER NOTES
History   Chief Complaint:  Abdominal Pain     The history is provided by the patient.      Cale Wagoner is a 87 year old male with history of hypertension, hyperlipidemia, TIA, CAD, NSTEMI, and kidney stones who presents with non radiating lower abdominal pain since last night. The pain comes and goes and lasts only 30 seconds at a time. Initially the pain was coming very frequently, but it now comes about 4-5x an hour. He has never experienced pain like this before. Also reports nausea. He had a normal BM this morning, but has not passed any gas since. He denies any blood in the stool, dysuria,  testicular/penile pain, vomiting, hematuria, chest pain, shortness of breath, and fevers.  He has taken a laxative without any improvement in his symptoms.  He does have a history of kidney stones, but this pain feels different.     Review of Systems   Constitutional: Negative for fever.   Respiratory: Negative for shortness of breath.    Cardiovascular: Negative for chest pain.   Gastrointestinal: Positive for abdominal pain and nausea. Negative for blood in stool and vomiting.   Genitourinary: Negative for dysuria, hematuria, penile pain and testicular pain.   All other systems reviewed and are negative.    Allergies:  Penicillins    Medications:  Aspirin 81 mg  Atorvastatin  Losartan  Metoprolol succinate ER    Past Medical History:    Hypertension  CAD  Hyperlipidemia  Kidney stones  NSTEMI  PFO  TIA   GERD     Past Surgical History:    Appendectomy  Cardiac cath  Malignant  tumor of nose removal  Cataract  Hernia repair  Skin cancer right temple removed      Family History:    Alzheimer's     Social History:  Accompanied by his daughter.     Physical Exam     Patient Vitals for the past 24 hrs:   BP Temp Temp src Pulse Resp SpO2 Height Weight   04/13/21 2200 (!) 160/82 -- -- 99 -- 95 % -- --   04/13/21 2100 (!) 146/79 -- -- 101 -- 92 % -- --   04/13/21 1825 (!) 177/74 99.6  F (37.6  C) Temporal 88 18 96 %  "1.676 m (5' 6\") 77.1 kg (170 lb)     Physical Exam  General: Alert, appears elderly, otherwise well-developed and well-nourished. Cooperative.     In mild distress  HEENT:  Head:  Atraumatic  Ears:  External ears are normal  Mouth/Throat:  Oropharynx is without erythema or exudate and mucous membranes are moist.   Eyes:   Conjunctivae normal and EOM are normal. No scleral icterus.  CV:  Normal rate, regular rhythm, normal heart sounds and radial pulses are 2+ and symmetric.  No murmur.  Resp:  Breath sounds are clear bilaterally    Non-labored, no retractions or accessory muscle use  GI:  Abdomen is soft, no distension, midline suprapubic tenderness. No rebound or guarding.  No CVA tenderness bilaterally  MS:  Normal range of motion. No edema.    Normal strength in all 4 extremities.     Back atraumatic.    No midline cervical, thoracic, or lumbar tenderness  Skin:  Warm and dry.  No rash or lesions noted.  Neuro: Alert. Normal strength.  GCS: 15  Psych:  Normal mood and affect.    Emergency Department Course   ECG  ECG taken at 1957, ECG read at 2008  Normal sinus rhythm  Normal ECG   No significant changes as compared to prior, dated 10/23/2019.  Rate 88 bpm. SD interval 182 ms. QRS duration 84 ms. QT/QTc 340/411 ms. P-R-T axes 22 11 42.     Imaging:  CT abdomen pelvis w contrast:  IMPRESSION:   There appears to be an obstructing fecalith in the distal descending  colon with surrounding colonic infiltration.  Report per radiology     Laboratory:  CBC: WBC 11.4 (H), HGB 15.5,    CMP: glucose 104 (H), calcium 8.4 (L) o/w WNL (Creatinine 1.07)  Lipase: 240   Lactic acid (result time 2000) 1.0   Troponin (Collected 2000): <0.015    UA with Microscopic: mucous present o/w WNL    Asymptomatic COVID19 Virus PCR by nasopharyngeal swab pending    Emergency Department Course:    Reviewed:  2005 I reviewed nursing notes, vitals, past medical history and care everywhere    Assessments:  2007 I obtained history and " examined the patient as noted above.   2155 I rechecked the patient and explained findings.   2235 I rechecked the patient and he is agreeable to observation admission    Consults:   2238 I consulted with Dr. Linda of hospital service.       Interventions:  2001 NS 1L IV Bolus  2037 Zofran 4 mg IV  2039 Dilaudid 0.5 mg IV  Pink lady enema    Disposition:  The patient was admitted to the hospital under the care of Dr. Linda.       Impression & Plan     Medical Decision Making:  Patient is a 87-year-old male with a history of hypertension, hyperlipidemia, CAD, and kidney stones who presents with suprapubic abdominal pain.  CT imaging concerning for a fecal impaction with a moderate sized fecalith in the distal descending colon.  Unfortunate there is a significant amount of stool proximally and surrounding colonic inflammation from the obstructing fecalith.  Due to continued pain we will plan to admit to the hospital for observation for enema and continued bowel clear out.  Spoke with Dr. Linda who agreed to observation admission.    Covid-19  Cale Wagoner was evaluated during a global COVID-19 pandemic, which necessitated consideration that the patient might be at risk for infection with the SARS-CoV-2 virus that causes COVID-19.   Applicable protocols for evaluation were followed during the patient's care.   COVID-19 was considered as part of the patient's evaluation. The plan for testing is:  a test was obtained during this visit.  Diagnosis:    ICD-10-CM    1. Fecal impaction (H)  K56.41    2. Suprapubic abdominal pain  R10.2      Scribe Disclosure:  I, Catherine Thornton, am serving as a scribe at 7:56 PM on 4/13/2021 to document services personally performed by Trell Hernandez MD based on my observations and the provider's statements to me.          Trell Hernandez MD  04/13/21 [id number]

## 2021-04-14 NOTE — ED NOTES
"[facility]  ED Nurse Handoff Report    ED Chief complaint: Abdominal Pain      ED Diagnosis:   Final diagnoses:   None       Code Status: Full Code    Allergies:   Allergies   Allergen Reactions     Penicillins        Patient Story: Lower Abd pain since last night, feeling distended. Denies bloody or diarrhea stools. No vomiting. Denies urinary retention or painful urination.  Focused Assessment:  Low abd pain    Treatments and/or interventions provided: dilaudid x1, zofran  Patient's response to treatments and/or interventions: pain better after dilaudid    To be done/followed up on inpatient unit:      Does this patient have any cognitive concerns?: alert and oriented    Activity level - Baseline/Home:  Independent  Activity Level - Current:   Stand with Assist    Patient's Preferred language: English   Needed?: No    Isolation: None  Infection: Not Applicable  Patient tested for COVID 19 prior to admission: YES  Bariatric?: No    Vital Signs:   Vitals:    04/13/21 1825 04/13/21 2100   BP: (!) 177/74 (!) 146/79   Pulse: 88 101   Resp: 18    Temp: 99.6  F (37.6  C)    TempSrc: Temporal    SpO2: 96% 92%   Weight: 77.1 kg (170 lb)    Height: 1.676 m (5' 6\")        Cardiac Rhythm:     Was the PSS-3 completed:   Yes  What interventions are required if any?               Family Comments:   OBS brochure/video discussed/provided to patient/family: Yes              Name of person given brochure if not patient:               Relationship to patient:     For the majority of the shift this patient's behavior was Green.   Behavioral interventions performed were .    ED NURSE PHONE NUMBER: [phone]       "

## 2021-04-14 NOTE — PROVIDER NOTIFICATION
Brief note, H&P dictation pending    Patient presents with worsening cramping abdominal pain since this past weekend found to have a descending colonic fecalith as likely etiology of his pain with associated stercoral colitis.  No nausea or vomiting.    Aggressive bowel regimen including enemas    Colorectal surgery consulted for possible intervention either via endoscopy or surgical intervention if needed    If patient develops nausea and vomiting, may need to discontinue oral intake/bowel regimen and place NG tube if concern for developing worsening obstruction.    Alex Linda MD  6:27 AM

## 2021-04-14 NOTE — CONSULTS
"[facility]  Colon and Rectal Surgery Consult Note  Name: Cale Wagoner    MRN: [id number]  YOB: 1933    Age: 87 year old  Date of admission: 4/13/2021  Primary care provider: Gwyn Zheng     Requesting Physician:  Dr. Linda  Reason for consult:  Obstructive fecalith            History of Present Illness:   Cale Wagoner is an 87 year old male with a history of hypertension, hyperlipidemia, TIA, CAD, NSTEMI, kidney stones, seen at the request of Dr. Linda, who presents with abdominal pain.    Patient reports that starting 2-3 days ago he developed intermittent lower abdominal cramping with associated bloating.  No nausea or vomiting.  He reports having a good appetite.  After eating dinner last night, he had worsening cramping pain which prompted him to present to the Emergency Department for further evaluation.  In the ED, patient afebrile and vitals within normal limits.  WBC 11.4, labs otherwise largely unremarkable.  CT showed \"an obstructing fecalith in the distal descending colon with surrounding colonic infiltration.  No free air or adjacent abscess.\"  In the ED, he received an enema and was started on scheduled senna and twice daily Miralax.  He had a bowel movement last night around midnight.  No further BM since then.  He cannot remember when he last passed gas.    Patient with continued crampy abdominal pain this morning.  He feels very bloated.  No nausea.  He states he generally has a bowel movement once daily that is soft.  No blood. No significant changes in his bowel pattern recently.  He denies family history of colon or rectal cancer.    Colonoscopy History:  Last colonoscopy ~20 years ago per patient.    Surgical History: Appendectomy, Hernia repair            Past Medical History:     Past Medical History:   Diagnosis Date     Benign essential hypertension      Bloating      CAD (coronary artery disease)     cardiac cath 2017:  MOSES to mid-distal " "Cx x2     Hyperlipidemia      Kidney stones      NSTEMI (non-ST elevated myocardial infarction) (H)      PFO (patent foramen ovale)      TIA (transient ischemic attack)              Past Surgical History:     Past Surgical History:   Procedure Laterality Date     APPENDECTOMY       CARDIAC CATHERIZATION  09/15/2017     MOSES to mid-distal Cx x2     ENT SURGERY      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     EYE SURGERY      cataracts     HERNIA REPAIR      twice     SOFT TISSUE SURGERY      skin cancer right temple               Social History:     Social History     Tobacco Use     Smoking status: Former Smoker     Quit date: 1968     Years since quittin.6     Smokeless tobacco: Former User   Substance Use Topics     Alcohol use: Yes     Comment: Occ             Family History:     Family History   Problem Relation Age of Onset     Respiratory Father         pneumonia     Unknown/Adopted Father      Alzheimer Disease Brother      Alzheimer Disease Sister      Emphysema Sister      Unknown/Adopted Mother              Allergies:     Allergies   Allergen Reactions     Penicillins              Medications:       aspirin  81 mg Oral At Bedtime     polyethylene glycol  17 g Oral BID     senna-docusate  1 tablet Oral BID    Or     senna-docusate  2 tablet Oral BID     sodium chloride (PF)  3 mL Intracatheter Q8H             Review of Systems:   A comprehensive greater than 10 system review of systems was carried out.  Pertinent positives and negatives are noted above.  Otherwise negative for contributory info.            Physical Exam:     Blood pressure 117/59, pulse 86, temperature 98.6  F (37  C), temperature source Oral, resp. rate 16, height 1.676 m (5' 6\"), weight 77.1 kg (170 lb), SpO2 90 %.  No intake or output data in the 24 hours ending 21 1004  EXAM:  GEN: Awake alert and oriented, appears stated age  PULM: Non-labored breathing with normal respiratory effort  CVS: reg rate and rhythm, no peripheral " edema  ABD: Firm, distended, tender over lower abdomen. No rebound or guarding.  No peritoneal signs   RECTAL: Rectal exam was deferred.  NEURO: CN II-XII grossly intact  MSK: extremeties with no clubbing, cyanosis or edema; able to ambulate  PSYCH: responsive, alert, cooperative; oriented x3; appropriate mood and affect  EXT/SKIN: inspection reveals no rashes, lesions or ulcers, normal coloring         Data Reviewed:     Results for orders placed or performed during the hospital encounter of 04/13/21   CT Abdomen Pelvis w Contrast    Narrative    CT ABDOMEN AND PELVIS WITH CONTRAST 4/13/2021 9:25 PM    CLINICAL HISTORY: Diverticulitis suspected; Suprapubic abdominal pain  since last night.    TECHNIQUE: CT scan of the abdomen and pelvis was performed following  injection of IV contrast. Multiplanar reformats were obtained. Dose  reduction techniques were used.    CONTRAST: 86mL Isovue-370    COMPARISON: None.    FINDINGS:   LOWER CHEST: Normal.    HEPATOBILIARY: Subcentimeter hypodense liver lesions are too small to  characterize but statistically likely to be benign and require no  specific follow-up. A calcified gallstone is present in the  gallbladder.    PANCREAS: Normal.    SPLEEN: Normal.    ADRENAL GLANDS: Normal.    KIDNEYS/BLADDER: Simple appearing renal cyst requires no further  follow-up. No hydronephrosis or hydroureter. No evidence of solid  renal mass. Urinary bladder unremarkable.    BOWEL: Scattered colonic diverticula. There is abnormal inflammation  around the distal descending colon with what appears to be a large  obstructing fecalith (series 4, image 110). No free air or adjacent  abscess.    PELVIC ORGANS: Normal.    ADDITIONAL FINDINGS: No ascites or lymphadenopathy. Mild nonaneurysmal  aortic atherosclerosis.    MUSCULOSKELETAL: Unremarkable.      Impression    IMPRESSION:   There appears to be an obstructing fecalith in the distal descending  colon with surrounding colonic  infiltration.    CANDIDA RIDLEY MD       Recent Labs   Lab 04/13/21 2000   WBC 11.4*   HGB 15.5   HCT 45.5   MCV 91        Recent Labs   Lab 04/13/21 2000      POTASSIUM 4.0   CHLORIDE 101   CO2 28   ANIONGAP 5   *   BUN 16   CR 1.07   GFRESTIMATED 62   GFRESTBLACK 72   RUSTY 8.4*   PROTTOTAL 7.8   ALBUMIN 3.8   BILITOTAL 0.9   ALKPHOS 71   AST 28   ALT 34     Recent Labs   Lab 04/13/21 2042   COLOR Light Yellow   APPEARANCE Clear   URINEGLC Negative   URINEBILI Negative   URINEKETONE Negative   SG 1.014   UBLD Negative   URINEPH 7.0   PROTEIN Negative   NITRITE Negative   LEUKEST Negative   RBCU 0   WBCU 0         Assessment and Plan:   Cale Wagoner is an 87 year old male with a history of hypertension, hyperlipidemia, TIA, CAD, NSTEMI, who presents with abdominal pain.  CT consistent with obstructing fecalith in the distal descending colon with surrounding colonic infiltration.  No abscess or free air.  Abdomen firm & distended with lower abdominal tenderness.  Last BM at midnight.  He is hemodynamically stable.  Agree with aggressive bowel regimen with Miralax, senna, and enemas.  We will continue follow.    Plan:  1. Surgery: No emergent surgery indicated.  2. Diet: Clear liquid diet  3. IV Fluids: Per hospitalist  4. Antibiotics:  Not indicated from our perspective  5. Medications: Home meds per hospitalist  6. I&O s:  strict I&O s  7. Labs:   - Reviewed  - Ordered: None   8. Imaging:   - Dr. Arteaga and myself have personally viewed: CT abd/pelvis  - Ordered:  None  9. Activity: ambulate as tolerated, encourage OOB  10. DVT prophylaxis: SCD s  11. This plan has been discussed with Dr. Arteaga    Patient specific identified risk factors considered as part of today s evaluation include: Hx of NSTEMI      Additional history obtained from patient and chart.  Time spent on consultation: 40 minutes, greater than 50 percent of the total encounter time is spent in counseling and/or  coordination of care.          Tiffanie Banuelos PA-C  [facility]  Phone:  [phone]

## 2021-04-14 NOTE — H&P
[facility]    History and Physical - Hospitalist Service       Date of Admission:  4/13/2021    Assessment & Plan   Cale Wagoner is a 87 year old male admitted on 4/13/2021. He presents to the emergency department with complaints of cramping abdominal pain since this past Sunday, found to have fecalith with stercoral colitis    Fecalith with partial obstruction of descending colon:  -Enema requested in the emergency department, anticipate repeat in a.m.  -Scheduled senna and twice daily MiraLAX  -Additional as needed bowel regimen available if needed  -Colorectal surgery consulted; unclear if patient will require endoscopic intervention or even potentially procedural intervention if he fails to improve with aggressive bowel regimen.  If symptoms resolve with passing of fecalith, at some point in the future will likely require diagnostic colonoscopy in the setting of colitis  -If patient develops nausea and vomiting, de-escalate diet, hold oral bowel regimen, may require NG placement.  -Monitor stool output    Coronary artery disease: Drug-eluting stent placement x2 to mid and distal circumflex 2017  History of TIA  -Continue prior to admission aspirin 81 mg daily  -Resume prior to admission atorvastatin 20 mg daily at discharge.  Held given observation admission    Hypertension:  -Resume prior to admission metoprolol XL 25 mg daily, losartan 100 mg daily when reconciled by pharmacy       Diet:  Regular.diet as tolerated for now, de-escalate if worsening abdominal symptoms  DVT Prophylaxis: Ambulate every shift  Linder Catheter: not present  Code Status:  Full code         Disposition Plan   Expected discharge: Tomorrow, recommended to prior living arrangement once Fecalith passed, tolerating oral intake.  Possibly more prolonged hospitalization if fecal if not readily passed or ongoing pain.  Certainly might transition to inpatient status 4/14 if no improvement..  Entered: Alex Cai  MD Alexei 04/13/2021, 10:41 PM     The patient's care was discussed with the Patient and Dr. Hernandez in the emergency department.    Alex Linda MD  [facility]  Contact information available via [facility] Paging/Directory      ______________________________________________________________________    Chief Complaint   Abdominal pain and bloating    History is obtained from patient, chart review, discussion with Dr. Hernandez in the emergency department.    History of Present Illness   Cale Wagoner is a 87 year old male who presents the emergency department with complaints of abdominal pain and cramping, worse with oral intake over the preceding days.  Patient tells me that he began to have symptoms on Sunday evening.  Has never had similar symptoms before per his own description, but does have a history of bloating listed in his past medical history.  No clear precipitant of onset of symptoms.  He tells me the symptoms worsened and progressed over the next several days, and 4/13 had a bowl of soup for dinner which resulted in severe and significant cramps.  No nausea, no retching, no vomiting.  This led him to present to the emergency department where a CT was performed and he was found to have a fecalith involving the descending colon and associated colitis.    Patient has never had similar cramping pain before.  He tells me that his bowel movements are actually quite regular.  He describes daily bowel movements which are soft.  He tells me that his bowel movements generally lack form, fall apart as they hit the toilet water.  Does not recognize any history of constipation.  Bowel movements have been unchanged for many months and he does not believe they are abnormal for him.    Discussed CT findings as well as plan for aggressive bowel regimen.  Discussed also possibility for endoscopic intervention or if needed, surgical intervention.  Patient tolerated enema in the emergency department  and has since had 2 bowel movements, though not with significant stool output.  Site of fecalith is likely too proximal for significant effect from enemas.    Review of Systems    The 10 point Review of Systems is negative other than noted in the HPI or here.  No fevers  No nausea or vomiting    Past Medical History    I have reviewed this patient's medical history and updated it with pertinent information if needed.   Past Medical History:   Diagnosis Date     Benign essential hypertension      Bloating      CAD (coronary artery disease)     cardiac cath 2017:  MOSES to mid-distal Cx x2     Hyperlipidemia      Kidney stones      NSTEMI (non-ST elevated myocardial infarction) (H)      PFO (patent foramen ovale)      TIA (transient ischemic attack)        Past Surgical History   I have reviewed this patient's surgical history and updated it with pertinent information if needed.  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  Social History     Tobacco Use     Smoking status: Former Smoker     Quit date: 1968     Years since quittin.6     Smokeless tobacco: Former User   Substance Use Topics     Alcohol use: Yes     Comment: Occ     Drug use: No       Family History   I have reviewed this patient's family history and updated it with pertinent information if needed.  Family History   Problem Relation Age of Onset     Respiratory Father         pneumonia     Unknown/Adopted Father      Alzheimer Disease Brother      Alzheimer Disease Sister      Emphysema Sister      Unknown/Adopted Mother        Prior to Admission Medications   Prior to  Admission Medications   Prescriptions Last Dose Informant Patient Reported? Taking?   Acetaminophen (TYLENOL PO)  Self Yes No   Sig: Take 500 mg by mouth every 8 hours as needed for mild pain or fever   FAMOTIDINE PO  Self Yes No   Sig: Take 20 mg by mouth 2 times daily   Multiple Vitamins-Minerals (MULTIVITAMIN & MINERAL PO)  Self Yes No   Sig: Take 1 tablet by mouth daily   Omega-3 Fatty Acids (OMEGA-3 FISH OIL PO)  Self Yes No   Sig: Take 2 g by mouth daily    aspirin 81 MG EC tablet  Self Yes No   Sig: Take 81 mg by mouth At Bedtime   atorvastatin (LIPITOR) 20 MG tablet   No No   Sig: Take 1 tablet (20 mg) by mouth daily   losartan (COZAAR) 100 MG tablet   No No   Sig: TAKE ONE TABLET (100 mg) BY MOUTH ONE TIME DAILY   meclizine (ANTIVERT) 25 MG tablet   No No   Sig: Take 1 tablet (25 mg) by mouth every 6 hours as needed for dizziness   metoprolol succinate ER (TOPROL-XL) 25 MG 24 hr tablet   No No   Sig: Take 1 tablet (25 mg) by mouth daily   nitroGLYcerin (NITROSTAT) 0.4 MG sublingual tablet  Self No No   Sig: For chest pain place 1 tablet under the tongue every 5 minutes for 3 doses. If symptoms persist 5 minutes after 1st dose call 911.   ondansetron (ZOFRAN-ODT) 4 MG ODT tab   No No   Sig: Take 1 tablet (4 mg) by mouth every 6 hours as needed for nausea or vomiting   psyllium (METAMUCIL/KONSYL) Packet  Self Yes No   Sig: Take 1 packet by mouth daily      Facility-Administered Medications: None     Allergies   Allergies   Allergen Reactions     Penicillins        Physical Exam   Vital Signs: Temp: 99.6  F (37.6  C) Temp src: Temporal BP: (!) 160/82 Pulse: 99   Resp: 18 SpO2: 95 % O2 Device: None (Room air)    Weight: 170 lbs 0 oz    General Appearance: Robust and generally well-appearing 87-year-old male resting comfortably in bed.  Occasionally with bouts of cramping abdominal pain resulting in grimace  Eyes: No scleral icterus or injection  HEENT: Normocephalic, atraumatic  Respiratory: Breath sounds clear  bilaterally to auscultation.  Mild splinting related to abdominal discomfort with deep inspiration  Cardiovascular: Regular rate and rhythm with heart rate currently in the 90 range  GI: Abdomen with bowel sounds present, no palpable mass, though protuberant and diffusely tender to palpation.  No rebound tenderness.  Tenderness is worse in the left lower quadrant, and generally in bilateral lower quadrants.  Lymph/Hematologic: No lower extremity edema  Genitourinary: Not examined  Skin: No rashes, no jaundice  Musculoskeletal: Muscular tone intact in all extremities.  Tone and bulk appropriate or even greater than might be expected for age.  Neurologic: Alert, conversant, appropriate conversation.  Mental status grossly intact  Psychiatric: Very pleasant, normal affect    Data   Data reviewed today: I reviewed all medications, new labs and imaging results over the last 24 hours. I personally reviewed the abdominal CT image(s) showing Descending colon fecalith without significant colonic distention.  More notable stool burden in the ascending colon..    Recent Labs   Lab 04/13/21 2000   WBC 11.4*   HGB 15.5   MCV 91         POTASSIUM 4.0   CHLORIDE 101   CO2 28   BUN 16   CR 1.07   ANIONGAP 5   RUSTY 8.4*   *   ALBUMIN 3.8   PROTTOTAL 7.8   BILITOTAL 0.9   ALKPHOS 71   ALT 34   AST 28   LIPASE 240   TROPI <0.015

## 2021-04-14 NOTE — PLAN OF CARE
Pt A&Ox4. VSS on RA. Up indep in room. Taking tylenol for occasional abdominal cramping. Abdominal distended but not painful to touch. Voiding adequately and 2 liquid BM this shift. Continue to monitor.

## 2021-04-14 NOTE — PROGRESS NOTES
RECEIVING UNIT ED HANDOFF REVIEW    ED Nurse Handoff Report was reviewed by: Jazmin Macias RN on April 13, 2021 at 11:28 PM

## 2021-04-14 NOTE — UTILIZATION REVIEW
[facility] Utilization Review  Admission Status; Secondary Review Determination     Admission Date: 4/13/2021  7:45 PM      Under the authority of the Utilization Management Committee, the utilization review process indicated a secondary review on the above patient.  The review outcome is based on review of the medical records, discussions with staff, and applying clinical experience noted on the date of the review.        (X)      Inpatient Status Appropriate - This patient's medical care is consistent with medical management for inpatient care and reasonable inpatient medical practice.          RATIONALE FOR DETERMINATION   87-year-old male admitted with cramping abdominal pain since Sunday, found to have fecalith with stercoral colitis, partial obstruction of the descending colon.  Started on scheduled senna and MiraLAX, received enema in the ED.  Seen by colorectal surgery, started on clear liquid diet, IV fluid hydration.  Abdomen moderately distended today, needs serial abdominal exams, had x-ray abdomen  Which is pending.  Patient had bowel movement last night but continues to have abdominal pain and cramping with distention.  Complex patient who has partial bowel obstruction due to food bolus, needs aggressive bowel regimen with IV fluid hydration, if develops nausea, might need NG placement, anticipate more than 2 midnight hospital stay, with ongoing interventions, recommend change to inpatient status, communicated to Dr. Medel      The severity of illness, intensity of service provided, expected LOS and risk for adverse outcome make the care complex, high risk and appropriate for hospital admission.The patient requires hospital based medical care which is anticipated to require a stay of 2 or more midnights; according to CMS guidelines the patient should be admitted as inpatient        The information on this document is developed by the utilization review team in order for the business office  to ensure compliance.  This only denotes the appropriateness of proper admission status and does not reflect the quality of care rendered.         The definitions of Inpatient Status and Observation Status used in making the determination above are those provided in the CMS Coverage Manual, Chapter 1 and Chapter 6, section 70.4.      Sincerely,       Elizabeth Pathak MD  Physician Advisor  Utilization Review-[place]    Phone: [phone]

## 2021-04-14 NOTE — PLAN OF CARE
Summary: fecal impaction from ED - arrived at approximately 2345  Date/Time 4/13/2021 [id number]    Behavior/Aggression tool color: Green  Diagnosis: fecal impaction  POD#:n/a  Mental Status: A&OX4  Activity/dangle: Independant  Diet: Regular  Pain: Denies pain  Linder/Voiding: Continent - had BM after arrival on unit  Tele/Restraints/Iso: N/A  02/LDA: R PIV SL  D/C Date: pending

## 2021-04-14 NOTE — PROGRESS NOTES
[facility]    Hospitalist Progress Note    Date of Service (when I saw the patient): 04/14/2021    Assessment & Plan   Cale Wagoner is a 87 year old male who was admitted on 4/13/2021.  Assessment & Plan     Cale Wagoner is a 87 year old male admitted on 4/13/2021. He presents to the emergency department with complaints of cramping abdominal pain since this past Sunday, found to have fecalith with stercoral colitis     Fecalith with partial obstruction of descending colon:  -Enema requested in the emergency department, anticipate repeat in a.m.  -Scheduled senna and twice daily MiraLAX  -Additional as needed bowel regimen available if needed  -Colorectal surgery consulted; unclear if patient will require endoscopic intervention or even potentially procedural intervention if he fails to improve with aggressive bowel regimen.  If symptoms resolve with passing of fecalith, at some point in the future will likely require diagnostic colonoscopy in the setting of colitis  -If patient develops nausea and vomiting, de-escalate diet, hold oral bowel regimen, may require NG placement.  Patient had a bowel movement last night  Seen by colorectal surgery today  Clear liquid diet ordered  They are hopeful that continue with continued bowel regimen patient will feel better  His abdomen is moderately distended today.  Continue close monitoring with serial abdominal exams  Denies nausea currently so hold off on NG tube placement if patient becomes nauseated could place an NG tube to low intermittent suction  Continue bowel regimen and enemas daily as per colorectal surgery     Coronary artery disease: Drug-eluting stent placement x2 to mid and distal circumflex 2017  History of TIA  -Continue prior to admission aspirin 81 mg daily  -Resume prior to admission atorvastatin 20 mg daily at discharge.  Held given observation admission     Hypertension:  -Resume prior to admission metoprolol XL 25  mg daily, losartan 100 mg daily when reconciled by pharmacy           Diet:  Clear liquid diet for now  DVT Prophylaxis: Ambulate every shift  Linder Catheter: not present  Code Status:  Full code             Disposition Plan     Expected discharge:  In the next 2 days with improvement in abdominal distention and pain        Hemalatha Medel MD  [phone] (P)      Interval History   Patient had a bowel movement and still having abdominal pain and cramping and distension. Denies nausea currently. No flatus today . Had a BM last night     -Data reviewed today: I reviewed all new labs and imaging results over the last 24 hours. I personally reviewed no images or EKG's today.    Physical Exam   Temp: 98.6  F (37  C) Temp src: Oral BP: 117/59 Pulse: 86   Resp: 16 SpO2: 90 % O2 Device: None (Room air)    Vitals:    04/13/21 1825   Weight: 77.1 kg (170 lb)     Vital Signs with Ranges  Temp:  [98.3  F (36.8  C)-99.6  F (37.6  C)] 98.6  F (37  C)  Pulse:  [] 86  Resp:  [16-18] 16  BP: (117-177)/(59-82) 117/59  SpO2:  [90 %-96 %] 90 %  No intake/output data recorded.    Constitutional: Awake, alert, cooperative, no apparent distress  Respiratory: Clear to auscultation bilaterally, no crackles or wheezing  Cardiovascular: Regular rate and rhythm, normal S1 and S2, and no murmur noted  GI: Normal bowel sounds, soft, moderately distended, diffuse mild tenderness+  Skin/Integumen: No rashes, no cyanosis, no edema  Other:     Medications     sodium chloride         aspirin  81 mg Oral At Bedtime     polyethylene glycol  17 g Oral BID     senna-docusate  1 tablet Oral BID    Or     senna-docusate  2 tablet Oral BID     sodium chloride (PF)  3 mL Intracatheter Q8H       Data   Recent Labs   Lab 04/13/21 2000   WBC 11.4*   HGB 15.5   MCV 91         POTASSIUM 4.0   CHLORIDE 101   CO2 28   BUN 16   CR 1.07   ANIONGAP 5   RUSTY 8.4*   *   ALBUMIN 3.8   PROTTOTAL 7.8   BILITOTAL 0.9   ALKPHOS 71   ALT 34   AST 28    LIPASE 240   TROPI <0.015       Recent Results (from the past 24 hour(s))   CT Abdomen Pelvis w Contrast    Narrative    CT ABDOMEN AND PELVIS WITH CONTRAST 4/13/2021 9:25 PM    CLINICAL HISTORY: Diverticulitis suspected; Suprapubic abdominal pain  since last night.    TECHNIQUE: CT scan of the abdomen and pelvis was performed following  injection of IV contrast. Multiplanar reformats were obtained. Dose  reduction techniques were used.    CONTRAST: 86mL Isovue-370    COMPARISON: None.    FINDINGS:   LOWER CHEST: Normal.    HEPATOBILIARY: Subcentimeter hypodense liver lesions are too small to  characterize but statistically likely to be benign and require no  specific follow-up. A calcified gallstone is present in the  gallbladder.    PANCREAS: Normal.    SPLEEN: Normal.    ADRENAL GLANDS: Normal.    KIDNEYS/BLADDER: Simple appearing renal cyst requires no further  follow-up. No hydronephrosis or hydroureter. No evidence of solid  renal mass. Urinary bladder unremarkable.    BOWEL: Scattered colonic diverticula. There is abnormal inflammation  around the distal descending colon with what appears to be a large  obstructing fecalith (series 4, image 110). No free air or adjacent  abscess.    PELVIC ORGANS: Normal.    ADDITIONAL FINDINGS: No ascites or lymphadenopathy. Mild nonaneurysmal  aortic atherosclerosis.    MUSCULOSKELETAL: Unremarkable.      Impression    IMPRESSION:   There appears to be an obstructing fecalith in the distal descending  colon with surrounding colonic infiltration.    CANDIDA RIDLEY MD

## 2021-04-14 NOTE — PHARMACY-ADMISSION MEDICATION HISTORY
Pharmacy Medication History  Admission medication history interview status for the 4/13/2021  admission is complete. See EPIC admission navigator for prior to admission medications     Location of Interview: Phone  Medication history sources: Patient    Significant changes made to the medication list:  none    In the past week, patient estimated taking medication this percent of the time: greater than 90%      Medication reconciliation completed by provider prior to medication history? No    Time spent in this activity: 15    Prior to Admission medications    Medication Sig Last Dose Taking? Auth Provider   Acetaminophen (TYLENOL PO) Take 500 mg by mouth every 8 hours as needed for mild pain or fever 4/13/2021 at Unknown time Yes Unknown, Entered By History   aspirin 81 MG EC tablet Take 81 mg by mouth At Bedtime 4/12/2021 Yes Unknown, Entered By History   atorvastatin (LIPITOR) 20 MG tablet Take 1 tablet (20 mg) by mouth daily 4/12/2021 Yes Gwyn Zheng MD   FAMOTIDINE PO Take 20 mg by mouth 2 times daily 4/13/2021 at Unknown time Yes Reported, Patient   losartan (COZAAR) 100 MG tablet TAKE ONE TABLET (100 mg) BY MOUTH ONE TIME DAILY 4/13/2021 at Unknown time Yes Gwyn Zheng MD   metoprolol succinate ER (TOPROL-XL) 25 MG 24 hr tablet Take 1 tablet (25 mg) by mouth daily 4/12/2021 at Unknown time Yes Gwyn Zheng MD   Multiple Vitamins-Minerals (MULTIVITAMIN & MINERAL PO) Take 1 tablet by mouth daily 4/13/2021 at Unknown time Yes Reported, Patient   nitroGLYcerin (NITROSTAT) 0.4 MG sublingual tablet For chest pain place 1 tablet under the tongue every 5 minutes for 3 doses. If symptoms persist 5 minutes after 1st dose call 911.  Yes Juan Grubbs MD   Omega-3 Fatty Acids (OMEGA-3 FISH OIL PO) Take 2 g by mouth daily  4/13/2021 at Unknown time Yes Unknown, Entered By History   psyllium (METAMUCIL/KONSYL) Packet Take 1 packet by mouth daily 4/13/2021 at Unknown time Yes Unknown,  Entered By History       The information provided in this note is only as accurate as the sources available at the time of update(s)

## 2021-04-14 NOTE — PROGRESS NOTES
Observation goals PRIOR TO DISCHARGE     Comments: -diagnostic tests and consults completed and resulted - no   -vital signs normal or at patient baseline - yes   -tolerating oral intake to maintain hydration - no  -safe disposition plan has been identified - yes  Nurse to notify provider when observation goals have been met and patient is ready for discharge.

## 2021-04-15 LAB
ALBUMIN SERPL-MCNC: 3 G/DL (ref 3.4–5)
ALP SERPL-CCNC: 46 U/L (ref 40–150)
ALT SERPL W P-5'-P-CCNC: 23 U/L (ref 0–70)
ANION GAP SERPL CALCULATED.3IONS-SCNC: 3 MMOL/L (ref 3–14)
AST SERPL W P-5'-P-CCNC: 19 U/L (ref 0–45)
BILIRUB SERPL-MCNC: 1.6 MG/DL (ref 0.2–1.3)
BUN SERPL-MCNC: 12 MG/DL (ref 7–30)
CALCIUM SERPL-MCNC: 8 MG/DL (ref 8.5–10.1)
CHLORIDE SERPL-SCNC: 100 MMOL/L (ref 94–109)
CO2 SERPL-SCNC: 30 MMOL/L (ref 20–32)
CREAT SERPL-MCNC: 0.96 MG/DL (ref 0.66–1.25)
GFR SERPL CREATININE-BSD FRML MDRD: 70 ML/MIN/{1.73_M2}
GLUCOSE SERPL-MCNC: 122 MG/DL (ref 70–99)
POTASSIUM SERPL-SCNC: 3.4 MMOL/L (ref 3.4–5.3)
PROT SERPL-MCNC: 6.5 G/DL (ref 6.8–8.8)
SODIUM SERPL-SCNC: 133 MMOL/L (ref 133–144)

## 2021-04-15 PROCEDURE — 120N000001 HC R&B MED SURG/OB

## 2021-04-15 PROCEDURE — 250N000013 HC RX MED GY IP 250 OP 250 PS 637: Performed by: PHYSICIAN ASSISTANT

## 2021-04-15 PROCEDURE — 258N000003 HC RX IP 258 OP 636: Performed by: INTERNAL MEDICINE

## 2021-04-15 PROCEDURE — 250N000013 HC RX MED GY IP 250 OP 250 PS 637: Performed by: INTERNAL MEDICINE

## 2021-04-15 PROCEDURE — 36415 COLL VENOUS BLD VENIPUNCTURE: CPT | Performed by: INTERNAL MEDICINE

## 2021-04-15 PROCEDURE — 80053 COMPREHEN METABOLIC PANEL: CPT | Performed by: INTERNAL MEDICINE

## 2021-04-15 PROCEDURE — 999N000128 HC STATISTIC PERIPHERAL IV START W/O US GUIDANCE

## 2021-04-15 PROCEDURE — 99232 SBSQ HOSP IP/OBS MODERATE 35: CPT | Performed by: INTERNAL MEDICINE

## 2021-04-15 RX ORDER — ATORVASTATIN CALCIUM 20 MG/1
20 TABLET, FILM COATED ORAL DAILY
Status: DISCONTINUED | OUTPATIENT
Start: 2021-04-15 | End: 2021-04-16 | Stop reason: HOSPADM

## 2021-04-15 RX ORDER — CALCIUM CARBONATE 500 MG/1
1000 TABLET, CHEWABLE ORAL EVERY 4 HOURS PRN
Status: DISCONTINUED | OUTPATIENT
Start: 2021-04-15 | End: 2021-04-16 | Stop reason: HOSPADM

## 2021-04-15 RX ORDER — METOPROLOL SUCCINATE 25 MG/1
25 TABLET, EXTENDED RELEASE ORAL DAILY
Status: DISCONTINUED | OUTPATIENT
Start: 2021-04-15 | End: 2021-04-16 | Stop reason: HOSPADM

## 2021-04-15 RX ORDER — LOSARTAN POTASSIUM 100 MG/1
100 TABLET ORAL DAILY
Status: DISCONTINUED | OUTPATIENT
Start: 2021-04-15 | End: 2021-04-16 | Stop reason: HOSPADM

## 2021-04-15 RX ORDER — MAGNESIUM CARB/ALUMINUM HYDROX 105-160MG
296 TABLET,CHEWABLE ORAL ONCE
Status: COMPLETED | OUTPATIENT
Start: 2021-04-15 | End: 2021-04-15

## 2021-04-15 RX ORDER — FAMOTIDINE 20 MG/1
20 TABLET, FILM COATED ORAL 2 TIMES DAILY
Status: DISCONTINUED | OUTPATIENT
Start: 2021-04-15 | End: 2021-04-16 | Stop reason: HOSPADM

## 2021-04-15 RX ADMIN — ASPIRIN 81 MG: 81 TABLET ORAL at 20:32

## 2021-04-15 RX ADMIN — FAMOTIDINE 20 MG: 20 TABLET ORAL at 08:41

## 2021-04-15 RX ADMIN — LOSARTAN POTASSIUM 100 MG: 100 TABLET, FILM COATED ORAL at 08:41

## 2021-04-15 RX ADMIN — SENNOSIDES AND DOCUSATE SODIUM 2 TABLET: 8.6; 5 TABLET ORAL at 08:41

## 2021-04-15 RX ADMIN — SODIUM CHLORIDE: 9 INJECTION, SOLUTION INTRAVENOUS at 01:19

## 2021-04-15 RX ADMIN — ATORVASTATIN CALCIUM 20 MG: 20 TABLET, FILM COATED ORAL at 08:41

## 2021-04-15 RX ADMIN — METOPROLOL SUCCINATE 25 MG: 25 TABLET, EXTENDED RELEASE ORAL at 08:41

## 2021-04-15 RX ADMIN — FAMOTIDINE 20 MG: 20 TABLET ORAL at 20:32

## 2021-04-15 RX ADMIN — POLYETHYLENE GLYCOL 3350 17 G: 17 POWDER, FOR SOLUTION ORAL at 08:41

## 2021-04-15 RX ADMIN — CALCIUM CARBONATE 1000 MG: 500 TABLET ORAL at 03:10

## 2021-04-15 RX ADMIN — MAGNESIUM CITRATE 296 ML: 1.75 LIQUID ORAL at 10:01

## 2021-04-15 ASSESSMENT — ACTIVITIES OF DAILY LIVING (ADL)
ADLS_ACUITY_SCORE: 13

## 2021-04-15 NOTE — PROGRESS NOTES
[facility]    Hospitalist Progress Note    Date of Service (when I saw the patient): 04/15/2021    Assessment & Plan   Cale Wagoner is a 87 year old male who was admitted on 4/13/2021.  Assessment & Plan     Cale Wagoner is a 87 year old male admitted on 4/13/2021. He presents to the emergency department with complaints of cramping abdominal pain since this past Sunday, found to have fecalith with stercoral colitis     Fecalith with obstruction of descending colon:  -Enema requested in the emergency department, anticipate repeat in a.m.  -Scheduled senna and twice daily MiraLAX  -Additional as needed bowel regimen available if needed  -Colorectal surgery consulted; unclear if patient will require endoscopic intervention or even potentially procedural intervention if he fails to improve with aggressive bowel regimen.  If symptoms resolve with passing of fecalith, at some point in the future will likely require diagnostic colonoscopy in the setting of colitis  -If patient develops nausea and vomiting, de-escalate diet, hold oral bowel regimen, may require NG placement.  Seen by colorectal surgery on 4/14  Aggressive bowel regimen started  Patient had multiple bowel movements since yesterday  His abdominal distention is improving  Abdomen looks much softer today  Tolerating clear liquid diet advance to full liquid diet today    Coronary artery disease: Drug-eluting stent placement x2 to mid and distal circumflex 2017  History of TIA  -Continue prior to admission aspirin 81 mg daily  -Resumed  prior to admission atorvastatin 20 mg daily   Hypertension:  -Resumed  prior to admission metoprolol XL 25 mg daily, losartan 100 mg daily       Diet:  Full liquid diet  DVT Prophylaxis: Ambulate every shift  Linder Catheter: not present  Code Status:  Full code             Disposition Plan     Expected discharge:  In the next 1- 2 days with continued improvement of abdominal pain and  toleration of diet    Greater than 30 minutes were spent in reviewing the chart, counseling the patient in collaboration of care    Discussed with bedside RN and patient today    Hemalatha Medel MD  [phone] (P)      Interval History   Patient had multiple bowel movement since yesterday.  Abdomen is much less distended and softer today.  Denies any nausea or vomiting.  Passing flatus.  Tolerating clear liquids.  Advance to full liquids today    -Data reviewed today: I reviewed all new labs and imaging results over the last 24 hours. I personally reviewed no images or EKG's today.    Physical Exam   Temp: 98.2  F (36.8  C) Temp src: Oral BP: (!) 140/79 Pulse: 77   Resp: 16 SpO2: 93 % O2 Device: None (Room air)    Vitals:    04/13/21 1825   Weight: 77.1 kg (170 lb)     Vital Signs with Ranges  Temp:  [97.5  F (36.4  C)-98.5  F (36.9  C)] 98.2  F (36.8  C)  Pulse:  [77-98] 77  Resp:  [16] 16  BP: (140-160)/(79-95) 140/79  SpO2:  [93 %-94 %] 93 %  I/O last 3 completed shifts:  In: 497.5 [P.O.:250; I.V.:247.5]  Out: 150 [Urine:150]    Constitutional: Awake, alert, cooperative, no apparent distress  Respiratory: Clear to auscultation bilaterally, no crackles or wheezing  Cardiovascular: Regular rate and rhythm, normal S1 and S2, and no murmur noted  GI: Normal bowel sounds, soft, less distended today, abdomen is not tender today  Skin/Integumen: No rashes, no cyanosis, no edema  Other:     Medications       aspirin  81 mg Oral At Bedtime     atorvastatin  20 mg Oral Daily     famotidine  20 mg Oral BID     losartan  100 mg Oral Daily     metoprolol succinate ER  25 mg Oral Daily     polyethylene glycol  17 g Oral BID     senna-docusate  1 tablet Oral BID    Or     senna-docusate  2 tablet Oral BID     sodium chloride (PF)  3 mL Intracatheter Q8H       Data   Recent Labs   Lab 04/15/21  0639 04/13/21 2000   WBC  --  11.4*   HGB  --  15.5   MCV  --  91   PLT  --  168    134   POTASSIUM 3.4 4.0   CHLORIDE 100 101    CO2 30 28   BUN 12 16   CR 0.96 1.07   ANIONGAP 3 5   RUSTY 8.0* 8.4*   * 104*   ALBUMIN 3.0* 3.8   PROTTOTAL 6.5* 7.8   BILITOTAL 1.6* 0.9   ALKPHOS 46 71   ALT 23 34   AST 19 28   LIPASE  --  240   TROPI  --  <0.015       Recent Results (from the past 24 hour(s))   XR Colon Water Soluble    Narrative    COLON WATER SOLUBLE  4/14/2021 2:44 PM     HISTORY: Obstructing fecalith in descending colon    COMPARISON: CT of the abdomen and pelvis on 4/13/2021    TECHNIQUE: Water soluble contrast was introduced into the colon  through a rectal tube under gravity.      FLUOROSCOPY TIME: 1.4 minutes.    SPOT FILMS: 6    FINDINGS: Contrast did not flow past the location of the obstructing  fecalith. Sigmoid diverticuli. Only a small amount of formed stool in  the descending colon.      Impression    IMPRESSION: Contrast did not flow distal to the mid descending colon  where there is an obstructing fecalith noted on the CT on 4/13/2021.    ASHLY LÓPEZ MD

## 2021-04-15 NOTE — PLAN OF CARE
Pt up and walking in halls with SBA.  Pt states he has lower abd pain but doesn't request meds at this time.  Saline lock intact. Abd distended, rounded and soft.  Pt states he isn't passing flatus.

## 2021-04-15 NOTE — PLAN OF CARE
Up IND. A&Ox4. -160s. OVSS RA. Nausea w/dry heave improved after zofran and oxycodone. C/o heartburn, tums given w/relief. BS hyperactive. No BM this shift. Denies flautus. Voiding in BR. Abdomen distended, rounded. Scab to forehead, JORDAN. Continue with bowel regimen. Discharge pending progress.

## 2021-04-15 NOTE — PROGRESS NOTES
Pt is requesting his home BP meds, /88 last night, now 148/84. Text page to Dr. Medel re: resuming pt's PTA meds.

## 2021-04-15 NOTE — PLAN OF CARE
Summary: fecal impaction   Date/Time 4/15/2021 1300    Behavior/Aggression tool color: Green  Diagnosis: fecal impaction  POD#: n/a  Mental Status: A&OX4  Activity/dangle: Independent  Diet: Full liquid  Pain: Denies pain  Linder/Voiding: Continent, Loose watery stool  Tele/Restraints/Iso: N/A  02/LDA: L PIV SL  D/C Date: Home tomorrow pending resolution of abdominal distention and pain

## 2021-04-15 NOTE — PROGRESS NOTES
COLON & RECTAL SURGERY  PROGRESS NOTE    April 15, 2021    SUBJECTIVE:  Patient reports he is feeling better today.  No pain currently.  He still feels bloated.  No nausea.  He had a bowel movement this morning and notes he passed some harder stool with that.    OBJECTIVE:  Temp:  [97.5  F (36.4  C)-98.5  F (36.9  C)] 98.5  F (36.9  C)  Pulse:  [97-98] 98  Resp:  [16] 16  BP: (145-160)/(88-95) 160/88  SpO2:  [94 %] 94 %    Intake/Output Summary (Last 24 hours) at 4/15/2021 0800  Last data filed at 4/15/2021 0741  Gross per 24 hour   Intake 877.5 ml   Output 150 ml   Net 727.5 ml       GENERAL:  Awake, alert, no acute distress, sitting on edge of bed  HEAD: Nomocephalic atraumatic  SCLERA: anicteric  EXTREMITIES: warm and well perfused  ABDOMEN:  Soft, bloated, non tender, no rebound or guarding, no peritoneal signs.    LABS:  Lab Results   Component Value Date    WBC 11.4 04/13/2021     Lab Results   Component Value Date    HGB 15.5 04/13/2021     Lab Results   Component Value Date    HCT 45.5 04/13/2021     Lab Results   Component Value Date     04/13/2021     Last Basic Metabolic Panel:  Lab Results   Component Value Date     04/15/2021      Lab Results   Component Value Date    POTASSIUM 3.4 04/15/2021     Lab Results   Component Value Date    CHLORIDE 100 04/15/2021     Lab Results   Component Value Date    RUSTY PENDING 04/15/2021     Lab Results   Component Value Date    CO2 PENDING 04/15/2021     Lab Results   Component Value Date    BUN PENDING 04/15/2021     Lab Results   Component Value Date    CR PENDING 04/15/2021     Lab Results   Component Value Date    GLC PENDING 04/15/2021       ASSESSMENT/PLAN: 87 year old man admitted with abdominal pain and an obstructing fecalith in the descending colon.  He is feeling better with an aggressive oral bowel regimen and enemas.  +BM.  GGE 4/14 showed contrast did not flow distal to the mid descending colon.    - Magnesium citrate today  - Clear  liquids  - Continue Miralax, Senna, enemas  - Monitor stools  - We will continue to follow      For questions/paging, please contact the CRS office at [phone].    Tiffanie Banuelos PA-C  [facility]  Phone: [phone]

## 2021-04-16 ENCOUNTER — APPOINTMENT (OUTPATIENT)
Dept: GENERAL RADIOLOGY | Facility: CLINIC | Age: 86
DRG: 389 | End: 2021-04-16
Attending: COLON & RECTAL SURGERY
Payer: COMMERCIAL

## 2021-04-16 ENCOUNTER — APPOINTMENT (OUTPATIENT)
Dept: PHYSICAL THERAPY | Facility: CLINIC | Age: 86
DRG: 389 | End: 2021-04-16
Attending: INTERNAL MEDICINE
Payer: COMMERCIAL

## 2021-04-16 VITALS
HEART RATE: 71 BPM | WEIGHT: 170 LBS | HEIGHT: 66 IN | TEMPERATURE: 97.8 F | OXYGEN SATURATION: 95 % | BODY MASS INDEX: 27.32 KG/M2 | RESPIRATION RATE: 16 BRPM | SYSTOLIC BLOOD PRESSURE: 138 MMHG | DIASTOLIC BLOOD PRESSURE: 65 MMHG

## 2021-04-16 PROCEDURE — 97161 PT EVAL LOW COMPLEX 20 MIN: CPT | Mod: GP | Performed by: PHYSICAL THERAPIST

## 2021-04-16 PROCEDURE — 99239 HOSP IP/OBS DSCHRG MGMT >30: CPT | Performed by: INTERNAL MEDICINE

## 2021-04-16 PROCEDURE — 74018 RADEX ABDOMEN 1 VIEW: CPT

## 2021-04-16 PROCEDURE — 97110 THERAPEUTIC EXERCISES: CPT | Mod: GP | Performed by: PHYSICAL THERAPIST

## 2021-04-16 PROCEDURE — 250N000013 HC RX MED GY IP 250 OP 250 PS 637: Performed by: INTERNAL MEDICINE

## 2021-04-16 PROCEDURE — 97116 GAIT TRAINING THERAPY: CPT | Mod: GP | Performed by: PHYSICAL THERAPIST

## 2021-04-16 RX ORDER — POLYETHYLENE GLYCOL 3350 17 G/17G
17 POWDER, FOR SOLUTION ORAL 2 TIMES DAILY
Qty: 510 G | Refills: 0 | Status: SHIPPED | OUTPATIENT
Start: 2021-04-16

## 2021-04-16 RX ORDER — POLYETHYLENE GLYCOL 3350 17 G/17G
17 POWDER, FOR SOLUTION ORAL DAILY PRN
Qty: 510 G | COMMUNITY
Start: 2021-04-16 | End: 2021-09-23

## 2021-04-16 RX ADMIN — FAMOTIDINE 20 MG: 20 TABLET ORAL at 08:26

## 2021-04-16 RX ADMIN — METOPROLOL SUCCINATE 25 MG: 25 TABLET, EXTENDED RELEASE ORAL at 08:26

## 2021-04-16 RX ADMIN — LOSARTAN POTASSIUM 100 MG: 100 TABLET, FILM COATED ORAL at 08:26

## 2021-04-16 RX ADMIN — ATORVASTATIN CALCIUM 20 MG: 20 TABLET, FILM COATED ORAL at 08:26

## 2021-04-16 ASSESSMENT — ACTIVITIES OF DAILY LIVING (ADL)
ADLS_ACUITY_SCORE: 13

## 2021-04-16 NOTE — PLAN OF CARE
([id number]) patient is A&O. Up independent in room. Denies chest pain or SOB or pain in general. Had BM x1 this shift. Will continue to monitor.

## 2021-04-16 NOTE — PROGRESS NOTES
"   04/16/21 0900   Quick Adds   Type of Visit Initial PT Evaluation       Present no   Living Environment   People in home spouse   Current Living Arrangements house   Home Accessibility stairs within home   Number of Stairs, Within Home, Primary other (see comments)  (13)   Stair Railings, Within Home, Primary railing on left side (ascending)   Transportation Anticipated family or friend will provide   Living Environment Comments Pt lives in a multi-level home with spouse. He will need to have access to all levels of the home which includes negotiating 13 stairs. Pt states he is the primary caregiver for his wife and that his son can provide an additional assist 24/7 if needed.   Self-Care   Usual Activity Tolerance good   Current Activity Tolerance moderate   Regular Exercise No   Equipment Currently Used at Home raised toilet seat   Activity/Exercise/Self-Care Comment Prior to admission, pt was IND with all ADLs. At baseline, pt does not use an AD. Pt still drives and runs the errands for he and his spouse. Pt states he was able to ambulate unlimited distances without fatigue or limitation prior to admittance and stairs were not difficult.   Disability/Function   Hearing Difficulty or Deaf yes   Patient's preferred means of communication English speaker with hearing loss, no speech problems.   Describe hearing loss hearing loss on left side   Use of hearing assistive devices left hearing aid   Wear Glasses or Blind no   Concentrating, Remembering or Making Decisions Difficulty no   Difficulty Communicating no   Doing Errands Independently Difficulty (such as shopping) yes   Fall history within last six months no   Change in Functional Status Since Onset of Current Illness/Injury no   General Information   Onset of Illness/Injury or Date of Surgery 04/16/21   Referring Physician Hemalatha Medel MD   Patient/Family Therapy Goals Statement (PT) \"To go home\"   Pertinent History of Current Problem " (include personal factors and/or comorbidities that impact the POC) Admitted 4/13/2021 with fecalith with obstruction of descending colon   Existing Precautions/Restrictions fall   Weight-Bearing Status - LLE full weight-bearing   Weight-Bearing Status - RLE full weight-bearing   Cognition   Orientation Status (Cognition) oriented x 3   Pain Assessment   Patient Currently in Pain Yes, see Vital Sign flowsheet  (2/10)   Integumentary/Edema   Integumentary/Edema no deficits were identifed   Posture    Posture Forward head position;Protracted shoulders   Range of Motion (ROM)   ROM Quick Adds ROM WFL   Strength   Manual Muscle Testing Quick Adds Able to perform R SLR;Able to perform L SLR   Bed Mobility   Comment (Bed Mobility) Performed supine>sit w/ IND   Transfers   Transfer Safety Comments Performed sit>stand w/o AD and IND   Gait/Stairs (Locomotion)   Collier Level (Gait) supervision   Distance in Feet (Required for LE Total Joints) 300'   Comment (Gait/Stairs) Pt ambulated ~300' w/o AD and SBA. Pt ambulated with adequate gait speed, upright gaze and posture, and step-through pattern   Balance   Balance Comments Pt able to ambulate without AD and have no LOB   Sensory Examination   Sensory Perception patient reports no sensory changes   Clinical Impression   Criteria for Skilled Therapeutic Intervention yes, treatment indicated   PT Diagnosis (PT) Impaired gait   Influenced by the following impairments Increased pain, decreased activity tolerance   Functional limitations due to impairments Impaired functional mobility   Clinical Presentation Stable/Uncomplicated   Clinical Presentation Rationale Clinical Judgement   Clinical Decision Making (Complexity) low complexity   Therapy Frequency (PT) One time eval and treatment only   Predicted Duration of Therapy Intervention (days/wks) 2 days   Planned Therapy Interventions (PT) gait training;strengthening;patient/family education;stair training   Risk & Benefits  of therapy have been explained evaluation/treatment results reviewed;care plan/treatment goals reviewed;risks/benefits reviewed;current/potential barriers reviewed;participants voiced agreement with care plan;participants included;patient   PT Discharge Planning    PT Discharge Recommendation (DC Rec) home with assist   PT Rationale for DC Rec Anticipate pt will be SBA for bed mobility, functional transfers, gait, and CGA for stairs   PT Brief overview of current status  supine>sit w/ IND, sit>stand w/o AD and IND, gait without AD and SBA, and stairs w/ CGA   Total Evaluation Time   Total Evaluation Time (Minutes) 10

## 2021-04-16 NOTE — PROGRESS NOTES
Spoke with colorectal, states pt is OK to discharge after repeat abd xray is improved.    Spoke with patient and conveyed negative strep culture result. Patient states she is still having ear pain and plans to follow up with her PCP or urgent care. Patient verbalized understanding and had no further questions at this time.     ----- Message from Emile Rosado MD sent at 2/26/2020  8:32 AM CST -----  Please call the patient and let them know that the strep culture was negative. F/U with PCP if symptoms persist/do not improve.

## 2021-04-16 NOTE — PLAN OF CARE
A&Ox4. VSS on RA. Independent in room. Pt had 1 Bm during shift and stated that it was loose but was able to see it. Clear liquid diet. Continue to monitor.

## 2021-04-16 NOTE — DISCHARGE SUMMARY
[facility]  Discharge Summary        Cale Wagoner MRN# [id number]   YOB: 1933 Age: 87 year old     Date of Admission:  4/13/2021  Date of Discharge:  4/16/2021  Admitting Physician:  Hemalatha Medel MD  Discharge Physician: Hemalatha Medel MD  Discharging Service: Hospitalist     Primary Provider: Gwyn Zheng  Primary Care Physician Phone Number: [phone]         Discharge Diagnoses/Problem Oriented Hospital Course (Providers):    Cale Wagoner was admitted on 4/13/2021 by Hemalatha Medel MD and I would refer you to their history and physical.  The following problems were addressed during his hospitalization:  Assessment & Plan     Cale Wagoner is a 87 year old male who was admitted on 4/13/2021.  Assessment & Plan     Cale Wagoner is a 87 year old male admitted on 4/13/2021. He presents to the emergency department with complaints of cramping abdominal pain since this past Sunday, found to have fecalith with stercoral colitis     Fecalith with obstruction of descending colon:  -Enema requested in the emergency department, anticipate repeat in a.m.  -Scheduled senna and twice daily MiraLAX  -Additional as needed bowel regimen available if needed  -Colorectal surgery consulted; unclear if patient will require endoscopic intervention or even potentially procedural intervention if he fails to improve with aggressive bowel regimen.  If symptoms resolve with passing of fecalith, at some point in the future will likely require diagnostic colonoscopy in the setting of colitis  -If patient develops nausea and vomiting, de-escalate diet, hold oral bowel regimen, may require NG placement.  Seen by colorectal surgery on 4/14  Aggressive bowel regimen started  Patient had multiple bowel movements since admission  His abdominal distention resolved  Abdomen looks much softer today  Tolerating low fiber diet  We will plan on discharging with bowel regimen MiraLAX  twice a day.  Once daily as needed on top of that     Coronary artery disease: Drug-eluting stent placement x2 to mid and distal circumflex 2017  History of TIA  -Continue prior to admission aspirin 81 mg daily  -Resumed  prior to admission atorvastatin 20 mg daily   Hypertension:  -Resumed  prior to admission metoprolol XL 25 mg daily, losartan 100 mg daily       Diet:   Low fiber diet for 1 week and then regular diet  DVT Prophylaxis: Ambulate every shift  Linder Catheter: not present  Code Status:  Full code             Disposition Plan     Expected discharge: Home today    Greater than 35 minutes were spent in discharging this patient today including chart review, counseling the patient in collaboration of care    Hemalatha Medel MD   Page [phone](7AM-6PM)          Code Status:      Full Code        Brief Hospital Stay Summary Sent Home With Patient in AVS:        Reason for your hospital stay      Colonic obstruction from fecolith secondary to constipation                 Important Results:      See below        Pending Results:        Unresulted Labs Ordered in the Past 30 Days of this Admission     No orders found from 3/14/2021 to 4/14/2021.            Discharge Instructions and Follow-Up:      Follow-up Appointments     Follow-up and recommended labs and tests       Follow up with primary care provider, Gwyn Zheng, within 7   days for hospital follow- up.  No follow up labs or test are needed.               Discharge Disposition:      Discharged to home        Discharge Medications:        Current Discharge Medication List      START taking these medications    Details   !! polyethylene glycol (MIRALAX) 17 GM/Dose powder Take 17 g by mouth 2 times daily  Qty: 510 g, Refills: 0    Associated Diagnoses: Fecal impaction (H)      !! polyethylene glycol (MIRALAX) 17 GM/Dose powder Take 17 g by mouth daily as needed for constipation  Qty: 510 g       !! - Potential duplicate medications found. Please  "discuss with provider.      CONTINUE these medications which have NOT CHANGED    Details   Acetaminophen (TYLENOL PO) Take 500 mg by mouth every 8 hours as needed for mild pain or fever      aspirin 81 MG EC tablet Take 81 mg by mouth At Bedtime      atorvastatin (LIPITOR) 20 MG tablet Take 1 tablet (20 mg) by mouth daily  Qty: 90 tablet, Refills: 3    Associated Diagnoses: Hyperlipidemia LDL goal <70      FAMOTIDINE PO Take 20 mg by mouth 2 times daily      losartan (COZAAR) 100 MG tablet TAKE ONE TABLET (100 mg) BY MOUTH ONE TIME DAILY  Qty: 90 tablet, Refills: 3    Associated Diagnoses: Benign essential hypertension      metoprolol succinate ER (TOPROL-XL) 25 MG 24 hr tablet Take 1 tablet (25 mg) by mouth daily  Qty: 90 tablet, Refills: 3    Associated Diagnoses: Coronary artery disease involving native coronary artery of native heart without angina pectoris      Multiple Vitamins-Minerals (MULTIVITAMIN & MINERAL PO) Take 1 tablet by mouth daily      nitroGLYcerin (NITROSTAT) 0.4 MG sublingual tablet For chest pain place 1 tablet under the tongue every 5 minutes for 3 doses. If symptoms persist 5 minutes after 1st dose call 911.  Qty: 25 tablet, Refills: 1    Associated Diagnoses: Chest pain, unspecified type      Omega-3 Fatty Acids (OMEGA-3 FISH OIL PO) Take 2 g by mouth daily          STOP taking these medications       psyllium (METAMUCIL/KONSYL) Packet Comments:   Reason for Stopping:                 Allergies:         Allergies   Allergen Reactions     Penicillins            Consultations This Hospital Stay:      Consultation during this admission received from colorectal surgery        Condition and Physical on Discharge:      Discharge condition: Stable   Vitals: Blood pressure 138/65, pulse 71, temperature 97.8  F (36.6  C), temperature source Oral, resp. rate 16, height 1.676 m (5' 6\"), weight 77.1 kg (170 lb), SpO2 95 %.     Constitutional:  Alert awake, not in acute distress   Lungs:  Clear to " auscultation, no rales rhonchi wheezing   Cardiovascular:  Normal rate rhythm regular, no murmurs rubs or gallops   Abdomen:  Soft, nontender, nondistended, bowel sounds positive.  No guarding rigidity or rebound   Skin:  Warm and dry   Other:          Discharge Time:      Greater than 30 minutes.        Image Results From This Hospital Stay (For Non-EPIC Providers):        Results for orders placed or performed during the hospital encounter of 04/13/21   CT Abdomen Pelvis w Contrast    Narrative    CT ABDOMEN AND PELVIS WITH CONTRAST 4/13/2021 9:25 PM    CLINICAL HISTORY: Diverticulitis suspected; Suprapubic abdominal pain  since last night.    TECHNIQUE: CT scan of the abdomen and pelvis was performed following  injection of IV contrast. Multiplanar reformats were obtained. Dose  reduction techniques were used.    CONTRAST: 86mL Isovue-370    COMPARISON: None.    FINDINGS:   LOWER CHEST: Normal.    HEPATOBILIARY: Subcentimeter hypodense liver lesions are too small to  characterize but statistically likely to be benign and require no  specific follow-up. A calcified gallstone is present in the  gallbladder.    PANCREAS: Normal.    SPLEEN: Normal.    ADRENAL GLANDS: Normal.    KIDNEYS/BLADDER: Simple appearing renal cyst requires no further  follow-up. No hydronephrosis or hydroureter. No evidence of solid  renal mass. Urinary bladder unremarkable.    BOWEL: Scattered colonic diverticula. There is abnormal inflammation  around the distal descending colon with what appears to be a large  obstructing fecalith (series 4, image 110). No free air or adjacent  abscess.    PELVIC ORGANS: Normal.    ADDITIONAL FINDINGS: No ascites or lymphadenopathy. Mild nonaneurysmal  aortic atherosclerosis.    MUSCULOSKELETAL: Unremarkable.      Impression    IMPRESSION:   There appears to be an obstructing fecalith in the distal descending  colon with surrounding colonic infiltration.    CANDIDA RIDLEY MD   XR Colon Water Soluble     Narrative    COLON WATER SOLUBLE  4/14/2021 2:44 PM     HISTORY: Obstructing fecalith in descending colon    COMPARISON: CT of the abdomen and pelvis on 4/13/2021    TECHNIQUE: Water soluble contrast was introduced into the colon  through a rectal tube under gravity.      FLUOROSCOPY TIME: 1.4 minutes.    SPOT FILMS: 6    FINDINGS: Contrast did not flow past the location of the obstructing  fecalith. Sigmoid diverticuli. Only a small amount of formed stool in  the descending colon.      Impression    IMPRESSION: Contrast did not flow distal to the mid descending colon  where there is an obstructing fecalith noted on the CT on 4/13/2021.    ASHLY LÓPEZ MD   XR Abdomen 1 View    Narrative    ABDOMEN ONE VIEW 4/16/2021 1:03 PM    HISTORY: Colonic obstruction.    COMPARISON: CT dated April 13, 2021    FINDINGS: The bowel gas pattern appears within normal limits without  evidence for small bowel obstruction. A normal stool volume. The  obstructing choledocholith cannot be discretely visualized on this  study. Degenerative changes throughout the lumbar spine.    NICHELLE BECKETT MD           Most Recent Lab Results In EPIC (For Non-EPIC Providers):    Most Recent 3 CBC's:  Recent Labs   Lab Test 04/13/21  2000 10/19/20  1628 10/23/19  0845   WBC 11.4* 6.1 5.1   HGB 15.5 15.7 15.0   MCV 91 94 92    145* 154      Most Recent 3 BMP's:  Recent Labs   Lab Test 04/15/21  0639 04/13/21  2000 10/19/20  1628    134 136   POTASSIUM 3.4 4.0 3.8   CHLORIDE 100 101 104   CO2 30 28 28   BUN 12 16 18   CR 0.96 1.07 0.98   ANIONGAP 3 5 4   RUSTY 8.0* 8.4* 9.5   * 104* 94     Most Recent 3 Troponin's:  Recent Labs   Lab Test 04/13/21  2000 10/23/19  1810 10/23/19  1410   TROPI <0.015 <0.015 <0.015     Most Recent 3 INR's:  Recent Labs   Lab Test 10/23/19  0845 09/12/14  1510   INR 1.06 1.01     Most Recent 2 LFT's:  Recent Labs   Lab Test 04/15/21  0639 04/13/21 2000   AST 19 28   ALT 23 34   ALKPHOS 46 71   BILITOTAL  1.6* 0.9     Most Recent Cholesterol Panel:  Recent Labs   Lab Test 10/19/20  1628   CHOL 120   LDL 53   HDL 49   TRIG 92     Most Recent 6 Bacteria Isolates From Any Culture (See EPIC Reports for Culture Details):  Recent Labs   Lab Test 11/26/18  0814   CULT >100,000 colonies/mL  Coagulase negative Staphylococcus  *     Most Recent TSH, T4 and HgbA1c:   Recent Labs   Lab Test 09/13/14  0857   TSH 2.27

## 2021-04-16 NOTE — PROGRESS NOTES
Reviewed discharge with pt as well as daughter over the phone.  Pt verbalized understanding.  Paperwork & RX given to pt. Pt to discharge home with daughter.

## 2021-04-19 ENCOUNTER — TELEPHONE (OUTPATIENT)
Dept: INTERNAL MEDICINE | Facility: CLINIC | Age: 86
End: 2021-04-19

## 2021-04-19 NOTE — TELEPHONE ENCOUNTER
Made an appt for 5/6/2021.   
Reason for Call:  Other appointment    Detailed comments: Pt is requesting to get worked in for a hospital F/U on a bowl obstruction thank you    Phone Number Patient can be reached at: Home number on file [phone] (home)    Best Time: anytime    Can we leave a detailed message on this number? YES    Call taken on 4/19/2021 at 1:59 PM by April Cruz      
none

## 2021-05-06 ENCOUNTER — VIRTUAL VISIT (OUTPATIENT)
Dept: INTERNAL MEDICINE | Facility: CLINIC | Age: 86
End: 2021-05-06
Payer: COMMERCIAL

## 2021-05-06 DIAGNOSIS — K56.41 FECAL IMPACTION (H): Primary | ICD-10-CM

## 2021-05-06 PROCEDURE — 99214 OFFICE O/P EST MOD 30 MIN: CPT | Mod: 95 | Performed by: FAMILY MEDICINE

## 2021-05-06 NOTE — PATIENT INSTRUCTIONS
"Given the patient's age and the fact that he is having daily normal appearing and no straining bowel movements we will continue with Metamucil supplementation and MiraLAX as needed.  We did have a discussion about his wife, Dalila, as she has discontinued her Aricept.  Argentina says since that time her memory has really deteriorated and would like to get her back on the Aricept.  I did send a prescription over to the pharmacy for her for her Aricept.  We will reconnect in 3 to 4 weeks to see how that is working.  We had a discussion about doing a colonoscopy as a further diagnostic work-up for his fecal impaction, however, given his age and the fact that his bowels are now back to \"normal\" we decided not to do a colonoscopy at this point.  If things change she will let us know.  "

## 2021-05-06 NOTE — PROGRESS NOTES
"Cale is a 87 year old who is being evaluated via a billable telephone visit.      What phone number would you like to be contacted at? [phone]  How would you like to obtain your AVS? Mail a copy    Assessment & Plan     Fecal impaction (H)          25 minutes spent on the date of the encounter doing chart review, history and exam, documentation and further activities per the note       BMI:   Estimated body mass index is 27.44 kg/m  as calculated from the following:    Height as of 4/13/21: 1.676 m (5' 6\").    Weight as of 4/13/21: 77.1 kg (170 lb).       Patient Instructions   Given the patient's age and the fact that he is having daily normal appearing and no straining bowel movements we will continue with Metamucil supplementation and MiraLAX as needed.  We did have a discussion about his wife, Dalila, as she has discontinued her Aricept.  Argentina says since that time her memory has really deteriorated and would like to get her back on the Aricept.  I did send a prescription over to the pharmacy for her for her Aricept.  We will reconnect in 3 to 4 weeks to see how that is working.  We had a discussion about doing a colonoscopy as a further diagnostic work-up for his fecal impaction, however, given his age and the fact that his bowels are now back to \"normal\" we decided not to do a colonoscopy at this point.  If things change she will let us know.      No follow-ups on file.    Gwyn Zheng MD  [facility]    Aroldo Madsen is a 87 year old who presents for the following health issues     HPI       Hospital Follow-up Visit:    Hospital/Nursing Home/IP Rehab Facility: [facility]  Date of Admission: 04/13/2021  Date of Discharge: 04/16/2021  Reason(s) for Admission: Suprapubic abdominal pain, Fecal impaction      Was your hospitalization related to COVID-19? No   Problems taking medications regularly:  None  Medication changes since discharge: " Start taking Miralax and continued Metamucil  Problems adhering to non-medication therapy:  None and the patient is having daily bowel movements that are soft with no straining.  They appear normal to the patient and there is no blood in the stool.    Summary of hospitalization:  [facility] discharge summary reviewed  Diagnostic Tests/Treatments reviewed.  Follow up needed: There is no clear indication that the patient needs follow-up of his fecal impaction.  Last time he was sent for colonoscopy they refused to do it because of his age.  Other Healthcare Providers Involved in Patient s Care:         None  Update since discharge: improved. Post Discharge Medication Reconciliation: discharge medications reconciled, continue medications without change.  Plan of care communicated with patient              Review of Systems   Constitutional, HEENT, cardiovascular, pulmonary, gi and gu systems are negative, except as otherwise noted.      Objective           Vitals:  No vitals were obtained today due to virtual visit.    Physical Exam   healthy, alert and no distress  PSYCH: Alert and oriented times 3; coherent speech, normal   rate and volume, able to articulate logical thoughts, able   to abstract reason, no tangential thoughts, no hallucinations   or delusions  His affect is normal  RESP: No cough, no audible wheezing, able to talk in full sentences  Remainder of exam unable to be completed due to telephone visits                Phone call duration: 20 minutes

## 2021-09-23 ENCOUNTER — OFFICE VISIT (OUTPATIENT)
Dept: INTERNAL MEDICINE | Facility: CLINIC | Age: 86
End: 2021-09-23
Payer: COMMERCIAL

## 2021-09-23 VITALS
DIASTOLIC BLOOD PRESSURE: 66 MMHG | SYSTOLIC BLOOD PRESSURE: 128 MMHG | BODY MASS INDEX: 27.76 KG/M2 | WEIGHT: 172 LBS | HEART RATE: 66 BPM

## 2021-09-23 DIAGNOSIS — I25.10 CORONARY ARTERY DISEASE INVOLVING NATIVE CORONARY ARTERY OF NATIVE HEART WITHOUT ANGINA PECTORIS: ICD-10-CM

## 2021-09-23 DIAGNOSIS — R25.1 TREMOR: ICD-10-CM

## 2021-09-23 DIAGNOSIS — I10 BENIGN ESSENTIAL HYPERTENSION: Primary | ICD-10-CM

## 2021-09-23 DIAGNOSIS — K21.9 GASTROESOPHAGEAL REFLUX DISEASE WITHOUT ESOPHAGITIS: ICD-10-CM

## 2021-09-23 DIAGNOSIS — L72.3 SEBACEOUS CYST: ICD-10-CM

## 2021-09-23 DIAGNOSIS — E78.5 HYPERLIPIDEMIA LDL GOAL <70: ICD-10-CM

## 2021-09-23 DIAGNOSIS — Z63.6 CAREGIVER STRESS: ICD-10-CM

## 2021-09-23 PROCEDURE — 99214 OFFICE O/P EST MOD 30 MIN: CPT | Performed by: INTERNAL MEDICINE

## 2021-09-23 RX ORDER — METOPROLOL SUCCINATE 25 MG/1
25 TABLET, EXTENDED RELEASE ORAL DAILY
Qty: 90 TABLET | Refills: 3 | Status: SHIPPED | OUTPATIENT
Start: 2021-09-23 | End: 2021-12-14

## 2021-09-23 RX ORDER — LOSARTAN POTASSIUM 100 MG/1
TABLET ORAL
Qty: 90 TABLET | Refills: 3 | Status: SHIPPED | OUTPATIENT
Start: 2021-09-23

## 2021-09-23 RX ORDER — ATORVASTATIN CALCIUM 20 MG/1
20 TABLET, FILM COATED ORAL DAILY
Qty: 90 TABLET | Refills: 3 | Status: SHIPPED | OUTPATIENT
Start: 2021-09-23 | End: 2021-10-29

## 2021-09-23 SDOH — SOCIAL STABILITY - SOCIAL INSECURITY: DEPENDENT RELATIVE NEEDING CARE AT HOME: Z63.6

## 2021-09-23 NOTE — PROGRESS NOTES
"    Assessment & Plan     Benign essential hypertension  Good control  - losartan (COZAAR) 100 MG tablet  Dispense: 90 tablet; Refill: 3    Gastroesophageal reflux disease without esophagitis  Good control with famotidine    Sebaceous cyst  This cannot be drained, there is only slight inflammation.  This could be removed and he is referred to dermatology.  - Adult Dermatology Referral    Caregiver stress  Manageable per patient report    Tremor  Slight intention tremor.  He is already taking metoprolol.  However, we discussed that if we increase the dose to help with control of tremor, he may become bradycardic.    Hyperlipidemia LDL goal <70    - atorvastatin (LIPITOR) 20 MG tablet  Dispense: 90 tablet; Refill: 3    Coronary artery disease involving native coronary artery of native heart without angina pectoris    - metoprolol succinate ER (TOPROL-XL) 25 MG 24 hr tablet  Dispense: 90 tablet; Refill: 3               BMI:   Estimated body mass index is 27.76 kg/m  as calculated from the following:    Height as of 4/13/21: 1.676 m (5' 6\").    Weight as of this encounter: 78 kg (172 lb).       Patient Instructions                  Call for Dermatology appointment.                   Keep taking the same medications.      Please note that I am planning to retire on December 31, 2021.              You are welcome to continue your care through this clinic.              For a variety of reasons I will not refer you to a specific provider.                            Return in about 6 months (around 3/23/2022) for follow up of several issues.    Az Taylor MD  [facility]    Aroldo Madsen is a 88 year old who presents for the following health issues     HPI        c/o a \"recurrent boil\" posterior neck.      Wants it \" lanced\".                    Stress manageable;dealing with his wife with Alzheimer's.            O/w feels well.               Bowels ok; metamucil every day; Miralax " prn.       He mentions a slight intention tremor.  He wonders about treatment.            No cardiovascular symptoms.     Review of Systems         Current Outpatient Medications   Medication Sig Dispense Refill     atorvastatin (LIPITOR) 20 MG tablet Take 1 tablet (20 mg) by mouth daily 90 tablet 3     losartan (COZAAR) 100 MG tablet TAKE ONE TABLET (100 mg) BY MOUTH ONE TIME DAILY 90 tablet 3     metoprolol succinate ER (TOPROL-XL) 25 MG 24 hr tablet Take 1 tablet (25 mg) by mouth daily 90 tablet 3     Acetaminophen (TYLENOL PO) Take 500 mg by mouth every 8 hours as needed for mild pain or fever       aspirin 81 MG EC tablet Take 81 mg by mouth At Bedtime       FAMOTIDINE PO Take 20 mg by mouth 2 times daily       Multiple Vitamins-Minerals (MULTIVITAMIN & MINERAL PO) Take 1 tablet by mouth daily       nitroGLYcerin (NITROSTAT) 0.4 MG sublingual tablet For chest pain place 1 tablet under the tongue every 5 minutes for 3 doses. If symptoms persist 5 minutes after 1st dose call 911. 25 tablet 1     Omega-3 Fatty Acids (OMEGA-3 FISH OIL PO) Take 2 g by mouth daily        polyethylene glycol (MIRALAX) 17 GM/Dose powder Take 17 g by mouth 2 times daily 510 g 0     Immunization History   Administered Date(s) Administered     COVID-19,PF,Pfizer 01/29/2021, 02/18/2021     Influenza (High Dose) 3 valent vaccine 09/15/2017, 09/18/2019     Influenza (IIV3) PF 10/08/1999     Influenza Quad, Recombinant, pf(RIV4) (Flublok) 09/16/2021     Influenza, Quad, High Dose, Pf, 65yr+ (Fluzone HD) 10/19/2020     Pneumo Conj 13-V (2010&after) 09/13/2017     Pneumococcal 23 valent 10/08/1999, 10/09/2010     TD (ADULT, 7+) 01/01/2008     TDAP Vaccine (Adacel) 08/12/2010     Td (Adult), Adsorbed 06/07/2004     Wt Readings from Last 4 Encounters:   09/23/21 78 kg (172 lb)   04/13/21 77.1 kg (170 lb)   12/02/20 78.5 kg (173 lb)   10/19/20 78.9 kg (174 lb)       Objective    /66   Pulse 66   Wt 78 kg (172 lb)   BMI 27.76 kg/m    Body  mass index is 27.76 kg/m .  Physical Exam   GENERAL APPEARANCE: alert, no distress and over weight  RESP: lungs clear to auscultation - no rales, rhonchi or wheezes  CV: regular rates and rhythm, normal S1 S2, no S3 or S4 and no murmur, click or rub  SKIN: There is a small epidermoid cyst posterior neck, which is minimally inflamed, with scarring showing signs of previous drainage procedures.             I took a photo and showed him the picture.  PSYCH: mentation appears normal and affect normal/bright

## 2021-09-23 NOTE — PATIENT INSTRUCTIONS
Call for Dermatology appointment.                   Keep taking the same medications.      Please note that I am planning to retire on December 31, 2021.              You are welcome to continue your care through this clinic.              For a variety of reasons I will not refer you to a specific provider.

## 2021-10-29 DIAGNOSIS — E78.5 HYPERLIPIDEMIA LDL GOAL <70: ICD-10-CM

## 2021-10-29 RX ORDER — ATORVASTATIN CALCIUM 20 MG/1
TABLET, FILM COATED ORAL
Qty: 90 TABLET | Refills: 3 | Status: SHIPPED | OUTPATIENT
Start: 2021-10-29 | End: 2022-01-01

## 2021-10-29 NOTE — TELEPHONE ENCOUNTER
Routing refill request to provider for review/approval because:  LDL Cholesterol Calculated   Date Value Ref Range Status   10/19/2020 53 <100 mg/dL Final     Comment:     Desirable:       <100 mg/dl         Mirza Cade RN  [facility] Triage Nurse

## 2021-12-01 ENCOUNTER — HOSPITAL ENCOUNTER (OUTPATIENT)
Dept: CARDIOLOGY | Facility: CLINIC | Age: 86
Discharge: HOME | End: 2021-12-01
Attending: INTERNAL MEDICINE | Admitting: INTERNAL MEDICINE
Payer: COMMERCIAL

## 2021-12-01 DIAGNOSIS — I25.10 CORONARY ARTERY DISEASE INVOLVING NATIVE CORONARY ARTERY OF NATIVE HEART WITHOUT ANGINA PECTORIS: Primary | ICD-10-CM

## 2021-12-01 LAB — LVEF ECHO: NORMAL

## 2021-12-01 PROCEDURE — 999N000208 ECHOCARDIOGRAM COMPLETE

## 2021-12-01 PROCEDURE — 93306 TTE W/DOPPLER COMPLETE: CPT | Mod: 26 | Performed by: INTERNAL MEDICINE

## 2021-12-01 PROCEDURE — 255N000002 HC RX 255 OP 636: Performed by: INTERNAL MEDICINE

## 2021-12-01 RX ADMIN — HUMAN ALBUMIN MICROSPHERES AND PERFLUTREN 3 ML: 10; .22 INJECTION, SOLUTION INTRAVENOUS at 13:19

## 2021-12-07 ENCOUNTER — OFFICE VISIT (OUTPATIENT)
Dept: DERMATOLOGY | Facility: CLINIC | Age: 86
End: 2021-12-07
Payer: COMMERCIAL

## 2021-12-07 VITALS — OXYGEN SATURATION: 97 % | DIASTOLIC BLOOD PRESSURE: 78 MMHG | SYSTOLIC BLOOD PRESSURE: 154 MMHG | HEART RATE: 75 BPM

## 2021-12-07 DIAGNOSIS — L57.0 ACTINIC KERATOSIS: Primary | ICD-10-CM

## 2021-12-07 DIAGNOSIS — L82.1 SEBORRHEIC KERATOSIS: ICD-10-CM

## 2021-12-07 DIAGNOSIS — L72.3 SEBACEOUS CYST: ICD-10-CM

## 2021-12-07 PROCEDURE — 17000 DESTRUCT PREMALG LESION: CPT | Performed by: PHYSICIAN ASSISTANT

## 2021-12-07 PROCEDURE — 17003 DESTRUCT PREMALG LES 2-14: CPT | Performed by: PHYSICIAN ASSISTANT

## 2021-12-07 PROCEDURE — 99203 OFFICE O/P NEW LOW 30 MIN: CPT | Mod: 25 | Performed by: PHYSICIAN ASSISTANT

## 2021-12-07 NOTE — PROGRESS NOTES
HPI:   Chief complaints: Cale Wagoner is a pleasant 88 year old male who presents for evaluation of several spots of concern. He has a bump on the back of the neck which has been present for awhile. He also has persistent, non tender scaly spots on the scalp. Additionally, he wants some brown spots on the back checked.       PHYSICAL EXAM:    BP (!) 154/78   Pulse 75   SpO2 97%   Skin exam performed as follows: Type 2 skin. Mood appropriate  Alert and Oriented X 3. Well developed, well nourished in no distress.  General appearance: Normal  Head including face: Normal  Eyes: conjunctiva and lids: Normal  Mouth: Lips, teeth, gums: Normal  Neck: Normal  Cardiovascular: Exam of peripheral vascular system by observation for swelling, varicosities, edema: Normal  Right upper extremity: Normal  Left upper extremity: Normal  Right lower extremity: Normal  Left lower extremity: Normal  Skin: Scalp and body hair: See below    15 mm smooth mobile subcutaneous nodule on the mid posterior neck  Pink gritty papules on the left parietal scalp x 2, superior occipital scalp x 1  Numerous keratotic papules on the back    ASSESSMENT/PLAN:     1. Cyst on the mid posterior neck - he will schedule for excision with Dr Leslie  2. Actinic keratosis on the left parietal scalp x 2, superior occipital scalp x 1. As precancerous, cryosurgery performed. Advised on blistering and post-op care. Advised if not resolved in 1-2 months to return for evaluation  3. Numerous seborrheic keratoses on the back - advised benign no treatment needed.   4. Cale to follow up with Primary Care provider regarding elevated blood pressure.        Follow-up: PRN  CC:   Scribed By: Remedios Clemons, MS, PA-C

## 2021-12-07 NOTE — LETTER
12/7/2021         RE: Cale Wagoner  [address]  [facility] [address]        Dear Colleague,    Thank you for referring your patient, Cale Wagoner, to the [facility]. Please see a copy of my visit note below.    HPI:   Chief complaints: Cale Wagoner is a pleasant 88 year old male who presents for evaluation of several spots of concern. He has a bump on the back of the neck which has been present for awhile. He also has persistent, non tender scaly spots on the scalp. Additionally, he wants some brown spots on the back checked.       PHYSICAL EXAM:    BP (!) 154/78   Pulse 75   SpO2 97%   Skin exam performed as follows: Type 2 skin. Mood appropriate  Alert and Oriented X 3. Well developed, well nourished in no distress.  General appearance: Normal  Head including face: Normal  Eyes: conjunctiva and lids: Normal  Mouth: Lips, teeth, gums: Normal  Neck: Normal  Cardiovascular: Exam of peripheral vascular system by observation for swelling, varicosities, edema: Normal  Right upper extremity: Normal  Left upper extremity: Normal  Right lower extremity: Normal  Left lower extremity: Normal  Skin: Scalp and body hair: See below    15 mm smooth mobile subcutaneous nodule on the mid posterior neck  Pink gritty papules on the left parietal scalp x 2, superior occipital scalp x 1  Numerous keratotic papules on the back    ASSESSMENT/PLAN:     1. Cyst on the mid posterior neck - he will schedule for excision with Dr Leslie  2. Actinic keratosis on the left parietal scalp x 2, superior occipital scalp x 1. As precancerous, cryosurgery performed. Advised on blistering and post-op care. Advised if not resolved in 1-2 months to return for evaluation  3. Numerous seborrheic keratoses on the back - advised benign no treatment needed.   4. Cale to follow up with Primary Care provider regarding elevated blood pressure.        Follow-up: PRN  CC:   Scribed By: Remedios  Kaci MS, PABashirC          Again, thank you for allowing me to participate in the care of your patient.        Sincerely,        Remedios Nash PA-C

## 2021-12-13 DIAGNOSIS — I10 BENIGN ESSENTIAL HYPERTENSION: ICD-10-CM

## 2021-12-13 DIAGNOSIS — I25.10 CORONARY ARTERY DISEASE INVOLVING NATIVE CORONARY ARTERY OF NATIVE HEART WITHOUT ANGINA PECTORIS: ICD-10-CM

## 2021-12-14 ENCOUNTER — OFFICE VISIT (OUTPATIENT)
Dept: CARDIOLOGY | Facility: CLINIC | Age: 86
End: 2021-12-14
Payer: COMMERCIAL

## 2021-12-14 VITALS
OXYGEN SATURATION: 95 % | SYSTOLIC BLOOD PRESSURE: 150 MMHG | BODY MASS INDEX: 26.85 KG/M2 | HEART RATE: 70 BPM | WEIGHT: 171.1 LBS | DIASTOLIC BLOOD PRESSURE: 83 MMHG | HEIGHT: 67 IN

## 2021-12-14 DIAGNOSIS — E78.2 MIXED HYPERLIPIDEMIA: ICD-10-CM

## 2021-12-14 DIAGNOSIS — I10 BENIGN ESSENTIAL HYPERTENSION: ICD-10-CM

## 2021-12-14 DIAGNOSIS — E78.5 HYPERLIPIDEMIA LDL GOAL <70: ICD-10-CM

## 2021-12-14 DIAGNOSIS — I25.10 CORONARY ARTERY DISEASE INVOLVING NATIVE CORONARY ARTERY OF NATIVE HEART WITHOUT ANGINA PECTORIS: Primary | ICD-10-CM

## 2021-12-14 PROCEDURE — 99214 OFFICE O/P EST MOD 30 MIN: CPT | Performed by: INTERNAL MEDICINE

## 2021-12-14 RX ORDER — METOPROLOL SUCCINATE 25 MG/1
50 TABLET, EXTENDED RELEASE ORAL DAILY
Qty: 90 TABLET | Refills: 3 | Status: SHIPPED | OUTPATIENT
Start: 2021-12-14

## 2021-12-14 ASSESSMENT — MIFFLIN-ST. JEOR: SCORE: 1396.79

## 2021-12-14 NOTE — PATIENT INSTRUCTIONS
1. Recheck lipids today  2. Increase metoprolol to 50mg by mouth daily.  3. Follow up in one year.   4. Check your blood pressure at the pharmacy or fire station periodically and please call us if the top number is consistently more than 150.

## 2021-12-14 NOTE — LETTER
12/14/2021    Gwyn Zheng MD  [address]  [facility] [address]    RE: Cale Wagoner       Dear Colleague,     I had the pleasure of seeing Cale Wagoner in the [facility].  [facility] Progress note    Assessment:  1. Coronary artery disease history of non-STEMI with PCI with MOSES x2 to the RCA in 2017  a. No symptoms of angina or heart failure.  On aspirin, statin, beta-blocker  2. Hypertension, not optimally controlled.   3. Dyslipidemia, due for lipid recheck  4. History of CVA      Plan:  1. Recheck lipids today  2. Increase metoprolol to 50mg by mouth daily.  3. Intermittently check blood pressure at the pharmacy or fire station and notify clinic if the systolic is consistently greater than 150.    4. Plan for follow up in one year.     HPI:   Cale Wagoner Is a very nice 88-year-old gentleman here today with his wife for routine annual follow-up of coronary disease, hypertension and dyslipidemia.  He was previously followed by Dr. Yang and I'm very happy to meet him today.  He is feeling well and has no changes in his symptoms since last year.  He has a constant mild aching in his right arm which he discussed with Dr. Yang last year and this continued to be present although was mild and not particularly bothersome and is unchanged.  He has no exertional symptoms and he and his wife still live in their building which has many stairs.  He can go up and down stairs without difficulty and actually drags a shopping cart up and down the stairs regularly.    His last echocardiogram was done on 12/1/2021 which showed an ejection fraction of 60 to 65% and no significant valvular disease.  In comparison to the previous study from 2017 the inferolateral hypokinesis resolved and EF had improved from 55 to 65%.    He is here with his wife today who is also a patient with us.  She is suffering from dementia.  They feel rylee to have had such good health for so long  and they are both hoping to minimize visits, testing and care for both of them.      Encounter Diagnoses   Name Primary?     Coronary artery disease involving native coronary artery of native heart without angina pectoris Yes     Benign essential hypertension      Hyperlipidemia LDL goal <70        CURRENT MEDICATIONS:  Current Outpatient Medications   Medication Sig Dispense Refill     Acetaminophen (TYLENOL PO) Take 500 mg by mouth every 8 hours as needed for mild pain or fever       aspirin 81 MG EC tablet Take 81 mg by mouth At Bedtime       atorvastatin (LIPITOR) 20 MG tablet TAKE 1 TABLET(20 MG) BY MOUTH DAILY 90 tablet 3     FAMOTIDINE PO Take 20 mg by mouth 2 times daily       losartan (COZAAR) 100 MG tablet TAKE ONE TABLET (100 mg) BY MOUTH ONE TIME DAILY 90 tablet 3     metoprolol succinate ER (TOPROL-XL) 25 MG 24 hr tablet Take 1 tablet (25 mg) by mouth daily 90 tablet 3     Multiple Vitamins-Minerals (MULTIVITAMIN & MINERAL PO) Take 1 tablet by mouth daily       nitroGLYcerin (NITROSTAT) 0.4 MG sublingual tablet For chest pain place 1 tablet under the tongue every 5 minutes for 3 doses. If symptoms persist 5 minutes after 1st dose call 911. 25 tablet 1     Omega-3 Fatty Acids (OMEGA-3 FISH OIL PO) Take 2 g by mouth daily        polyethylene glycol (MIRALAX) 17 GM/Dose powder Take 17 g by mouth 2 times daily 510 g 0       ALLERGIES     Allergies   Allergen Reactions     Penicillins        PAST MEDICAL, SURGICAL, FAMILY, SOCIAL HISTORY:  History was reviewed and updated as needed, see medical record.  They live nearby and multifamily housing with stairs.  They have 5 children, 8 grandchildren and 7 great-grandchildren who they are close to and are supportive.    REVIEW OF SYSTEMS:  Skin:  Positive for lumps or bumps   Eyes:  Positive for glasses;cataracts  ENT:  Negative    Respiratory:  Negative    Cardiovascular:  Negative    Gastroenterology: Negative    Genitourinary:  Negative    Musculoskeletal:   Positive for    Neurologic:  Positive for stroke  Psychiatric:  Negative    Heme/Lymph/Imm:  Positive for allergies  Endocrine:  Negative      PHYSICAL EXAM:      BP: (!) 150/83 Pulse: 74            Vital Signs with Ranges  Pulse:  [74] 74  BP: (161)/(78) 161/78  171 lbs 1.6 oz    Constitutional: awake, alert, no distress  Eyes: sclera nonicteric  ENT: trachea midline  Respiratory: Clear to auscultation bilaterally  Cardiovascular: Regular rate and rhythm no murmur  GI: nondistended, nontender, bowel sounds present  Lymph/Hematologic: no lymphadenopathy  Skin: dry, no rash no edema  Musculoskeletal: grossly normal muscle bulk and tone  Neurologic: no focal deficits  Neuropsychiatric: appropriate affect    Recent Lab Results:  LIPID RESULTS:  Lab Results   Component Value Date    CHOL 120 10/19/2020    HDL 49 10/19/2020    LDL 53 10/19/2020    TRIG 92 10/19/2020    CHOLHDLRATIO 4.4 08/31/2015       LIVER ENZYME RESULTS:  Lab Results   Component Value Date    AST 19 04/15/2021    ALT 23 04/15/2021       CBC RESULTS:  Lab Results   Component Value Date    WBC 11.4 (H) 04/13/2021    RBC 4.98 04/13/2021    HGB 15.5 04/13/2021    HCT 45.5 04/13/2021    MCV 91 04/13/2021    MCH 31.1 04/13/2021    MCHC 34.1 04/13/2021    RDW 12.2 04/13/2021     04/13/2021       BMP RESULTS:  Lab Results   Component Value Date     04/15/2021    POTASSIUM 3.4 04/15/2021    CHLORIDE 100 04/15/2021    CO2 30 04/15/2021    ANIONGAP 3 04/15/2021     (H) 04/15/2021    BUN 12 04/15/2021    CR 0.96 04/15/2021    GFRESTIMATED 70 04/15/2021    GFRESTBLACK 82 04/15/2021    RUSTY 8.0 (L) 04/15/2021        A1C RESULTS:  No results found for: A1C    INR RESULTS:  Lab Results   Component Value Date    INR 1.06 10/23/2019    INR 1.01 09/12/2014     Thank you for allowing me to participate in the care of your patient.      Sincerely,     Vanessa Lemos MD   [facility]  cc: No  referring provider defined for this encounter.

## 2021-12-14 NOTE — PROGRESS NOTES
[facility] Progress note    Assessment:  1. Coronary artery disease history of non-STEMI with PCI with MOSES x2 to the RCA in 2017  a. No symptoms of angina or heart failure.  On aspirin, statin, beta-blocker  2. Hypertension, not optimally controlled.   3. Dyslipidemia, due for lipid recheck  4. History of CVA      Plan:  1. Recheck lipids today  2. Increase metoprolol to 50mg by mouth daily.  3. Intermittently check blood pressure at the pharmacy or fire station and notify clinic if the systolic is consistently greater than 150.    4. Plan for follow up in one year.     HPI:   Cale Wagoner Is a very nice 88-year-old gentleman here today with his wife for routine annual follow-up of coronary disease, hypertension and dyslipidemia.  He was previously followed by Dr. Yang and I'm very happy to meet him today.  He is feeling well and has no changes in his symptoms since last year.  He has a constant mild aching in his right arm which he discussed with Dr. Yang last year and this continued to be present although was mild and not particularly bothersome and is unchanged.  He has no exertional symptoms and he and his wife still live in their building which has many stairs.  He can go up and down stairs without difficulty and actually drags a shopping cart up and down the stairs regularly.    His last echocardiogram was done on 12/1/2021 which showed an ejection fraction of 60 to 65% and no significant valvular disease.  In comparison to the previous study from 2017 the inferolateral hypokinesis resolved and EF had improved from 55 to 65%.    He is here with his wife today who is also a patient with us.  She is suffering from dementia.  They feel rylee to have had such good health for so long and they are both hoping to minimize visits, testing and care for both of them.      Encounter Diagnoses   Name Primary?     Coronary artery disease involving native coronary artery of native heart without angina pectoris  Yes     Benign essential hypertension      Hyperlipidemia LDL goal <70        CURRENT MEDICATIONS:  Current Outpatient Medications   Medication Sig Dispense Refill     Acetaminophen (TYLENOL PO) Take 500 mg by mouth every 8 hours as needed for mild pain or fever       aspirin 81 MG EC tablet Take 81 mg by mouth At Bedtime       atorvastatin (LIPITOR) 20 MG tablet TAKE 1 TABLET(20 MG) BY MOUTH DAILY 90 tablet 3     FAMOTIDINE PO Take 20 mg by mouth 2 times daily       losartan (COZAAR) 100 MG tablet TAKE ONE TABLET (100 mg) BY MOUTH ONE TIME DAILY 90 tablet 3     metoprolol succinate ER (TOPROL-XL) 25 MG 24 hr tablet Take 1 tablet (25 mg) by mouth daily 90 tablet 3     Multiple Vitamins-Minerals (MULTIVITAMIN & MINERAL PO) Take 1 tablet by mouth daily       nitroGLYcerin (NITROSTAT) 0.4 MG sublingual tablet For chest pain place 1 tablet under the tongue every 5 minutes for 3 doses. If symptoms persist 5 minutes after 1st dose call 911. 25 tablet 1     Omega-3 Fatty Acids (OMEGA-3 FISH OIL PO) Take 2 g by mouth daily        polyethylene glycol (MIRALAX) 17 GM/Dose powder Take 17 g by mouth 2 times daily 510 g 0       ALLERGIES     Allergies   Allergen Reactions     Penicillins        PAST MEDICAL, SURGICAL, FAMILY, SOCIAL HISTORY:  History was reviewed and updated as needed, see medical record.  They live nearby and multifamily housing with stairs.  They have 5 children, 8 grandchildren and 7 great-grandchildren who they are close to and are supportive.    REVIEW OF SYSTEMS:  Skin:  Positive for lumps or bumps   Eyes:  Positive for glasses;cataracts  ENT:  Negative    Respiratory:  Negative    Cardiovascular:  Negative    Gastroenterology: Negative    Genitourinary:  Negative    Musculoskeletal:  Positive for    Neurologic:  Positive for stroke  Psychiatric:  Negative    Heme/Lymph/Imm:  Positive for allergies  Endocrine:  Negative      PHYSICAL EXAM:      BP: (!) 150/83 Pulse: 74            Vital Signs with  Ranges  Pulse:  [74] 74  BP: (161)/(78) 161/78  171 lbs 1.6 oz    Constitutional: awake, alert, no distress  Eyes: sclera nonicteric  ENT: trachea midline  Respiratory: Clear to auscultation bilaterally  Cardiovascular: Regular rate and rhythm no murmur  GI: nondistended, nontender, bowel sounds present  Lymph/Hematologic: no lymphadenopathy  Skin: dry, no rash no edema  Musculoskeletal: grossly normal muscle bulk and tone  Neurologic: no focal deficits  Neuropsychiatric: appropriate affect    Recent Lab Results:  LIPID RESULTS:  Lab Results   Component Value Date    CHOL 120 10/19/2020    HDL 49 10/19/2020    LDL 53 10/19/2020    TRIG 92 10/19/2020    CHOLHDLRATIO 4.4 08/31/2015       LIVER ENZYME RESULTS:  Lab Results   Component Value Date    AST 19 04/15/2021    ALT 23 04/15/2021       CBC RESULTS:  Lab Results   Component Value Date    WBC 11.4 (H) 04/13/2021    RBC 4.98 04/13/2021    HGB 15.5 04/13/2021    HCT 45.5 04/13/2021    MCV 91 04/13/2021    MCH 31.1 04/13/2021    MCHC 34.1 04/13/2021    RDW 12.2 04/13/2021     04/13/2021       BMP RESULTS:  Lab Results   Component Value Date     04/15/2021    POTASSIUM 3.4 04/15/2021    CHLORIDE 100 04/15/2021    CO2 30 04/15/2021    ANIONGAP 3 04/15/2021     (H) 04/15/2021    BUN 12 04/15/2021    CR 0.96 04/15/2021    GFRESTIMATED 70 04/15/2021    GFRESTBLACK 82 04/15/2021    RUSTY 8.0 (L) 04/15/2021        A1C RESULTS:  No results found for: A1C    INR RESULTS:  Lab Results   Component Value Date    INR 1.06 10/23/2019    INR 1.01 09/12/2014

## 2021-12-15 RX ORDER — LOSARTAN POTASSIUM 100 MG/1
TABLET ORAL
Qty: 90 TABLET | Refills: 3 | OUTPATIENT
Start: 2021-12-15

## 2021-12-15 RX ORDER — METOPROLOL SUCCINATE 25 MG/1
TABLET, EXTENDED RELEASE ORAL
Qty: 90 TABLET | Refills: 3 | OUTPATIENT
Start: 2021-12-15

## 2022-01-01 ENCOUNTER — APPOINTMENT (OUTPATIENT)
Dept: CT IMAGING | Facility: CLINIC | Age: 87
DRG: 037 | End: 2022-01-01
Attending: EMERGENCY MEDICINE
Payer: COMMERCIAL

## 2022-01-01 ENCOUNTER — ANESTHESIA EVENT (OUTPATIENT)
Dept: SURGERY | Facility: CLINIC | Age: 87
DRG: 037 | End: 2022-01-01
Payer: COMMERCIAL

## 2022-01-01 ENCOUNTER — APPOINTMENT (OUTPATIENT)
Dept: CT IMAGING | Facility: CLINIC | Age: 87
DRG: 037 | End: 2022-01-01
Attending: NURSE PRACTITIONER
Payer: COMMERCIAL

## 2022-01-01 ENCOUNTER — APPOINTMENT (OUTPATIENT)
Dept: MRI IMAGING | Facility: CLINIC | Age: 87
DRG: 037 | End: 2022-01-01
Attending: PSYCHIATRY & NEUROLOGY
Payer: COMMERCIAL

## 2022-01-01 ENCOUNTER — APPOINTMENT (OUTPATIENT)
Dept: PHYSICAL THERAPY | Facility: CLINIC | Age: 87
DRG: 037 | End: 2022-01-01
Attending: HOSPITALIST
Payer: COMMERCIAL

## 2022-01-01 ENCOUNTER — ANESTHESIA (OUTPATIENT)
Dept: SURGERY | Facility: CLINIC | Age: 87
DRG: 037 | End: 2022-01-01
Payer: COMMERCIAL

## 2022-01-01 ENCOUNTER — APPOINTMENT (OUTPATIENT)
Dept: CARDIOLOGY | Facility: CLINIC | Age: 87
DRG: 037 | End: 2022-01-01
Attending: STUDENT IN AN ORGANIZED HEALTH CARE EDUCATION/TRAINING PROGRAM
Payer: COMMERCIAL

## 2022-01-01 ENCOUNTER — HOSPITAL ENCOUNTER (INPATIENT)
Facility: CLINIC | Age: 87
LOS: 2 days | Discharge: HOME | DRG: 037 | End: 2022-11-27
Attending: EMERGENCY MEDICINE | Admitting: HOSPITALIST
Payer: COMMERCIAL

## 2022-01-01 ENCOUNTER — TELEPHONE (OUTPATIENT)
Dept: NEUROLOGY | Facility: CLINIC | Age: 87
End: 2022-01-01

## 2022-01-01 ENCOUNTER — OFFICE VISIT (OUTPATIENT)
Dept: OTHER | Facility: CLINIC | Age: 87
End: 2022-01-01
Attending: SURGERY
Payer: COMMERCIAL

## 2022-01-01 ENCOUNTER — PATIENT OUTREACH (OUTPATIENT)
Dept: CARE COORDINATION | Facility: CLINIC | Age: 87
End: 2022-01-01

## 2022-01-01 VITALS
WEIGHT: 170 LBS | TEMPERATURE: 97.3 F | BODY MASS INDEX: 27.44 KG/M2 | OXYGEN SATURATION: 96 % | SYSTOLIC BLOOD PRESSURE: 105 MMHG | RESPIRATION RATE: 16 BRPM | DIASTOLIC BLOOD PRESSURE: 52 MMHG | HEART RATE: 74 BPM

## 2022-01-01 VITALS — DIASTOLIC BLOOD PRESSURE: 79 MMHG | OXYGEN SATURATION: 97 % | SYSTOLIC BLOOD PRESSURE: 152 MMHG | HEART RATE: 91 BPM

## 2022-01-01 DIAGNOSIS — Z98.890 HISTORY OF LEFT-SIDED CAROTID ENDARTERECTOMY: ICD-10-CM

## 2022-01-01 DIAGNOSIS — E78.5 HYPERLIPIDEMIA LDL GOAL <70: ICD-10-CM

## 2022-01-01 DIAGNOSIS — I10 BENIGN ESSENTIAL HYPERTENSION: ICD-10-CM

## 2022-01-01 DIAGNOSIS — I65.22 CAROTID ARTERY STENOSIS, SYMPTOMATIC, LEFT: Primary | ICD-10-CM

## 2022-01-01 DIAGNOSIS — I63.9 CEREBROVASCULAR ACCIDENT (CVA), UNSPECIFIED MECHANISM (H): ICD-10-CM

## 2022-01-01 LAB
ANION GAP SERPL CALCULATED.3IONS-SCNC: 5 MMOL/L (ref 3–14)
ANION GAP SERPL CALCULATED.3IONS-SCNC: 8 MMOL/L (ref 3–14)
APTT PPP: 27 SECONDS (ref 22–38)
ATRIAL RATE - MUSE: 70 BPM
BUN SERPL-MCNC: 17 MG/DL (ref 7–30)
BUN SERPL-MCNC: 19 MG/DL (ref 7–30)
CALCIUM SERPL-MCNC: 8 MG/DL (ref 8.5–10.1)
CALCIUM SERPL-MCNC: 8.6 MG/DL (ref 8.5–10.1)
CHLORIDE BLD-SCNC: 96 MMOL/L (ref 94–109)
CHLORIDE BLD-SCNC: 96 MMOL/L (ref 94–109)
CHOLEST SERPL-MCNC: 92 MG/DL
CO2 SERPL-SCNC: 28 MMOL/L (ref 20–32)
CO2 SERPL-SCNC: 29 MMOL/L (ref 20–32)
CREAT SERPL-MCNC: 1.05 MG/DL (ref 0.66–1.25)
CREAT SERPL-MCNC: 1.13 MG/DL (ref 0.66–1.25)
CREAT SERPL-MCNC: 1.16 MG/DL (ref 0.66–1.25)
DIASTOLIC BLOOD PRESSURE - MUSE: NORMAL MMHG
ERYTHROCYTE [DISTWIDTH] IN BLOOD BY AUTOMATED COUNT: 12.3 % (ref 10–15)
ERYTHROCYTE [DISTWIDTH] IN BLOOD BY AUTOMATED COUNT: 12.3 % (ref 10–15)
ERYTHROCYTE [DISTWIDTH] IN BLOOD BY AUTOMATED COUNT: 12.5 % (ref 10–15)
GFR SERPL CREATININE-BSD FRML MDRD: 60 ML/MIN/1.73M2
GFR SERPL CREATININE-BSD FRML MDRD: 62 ML/MIN/1.73M2
GFR SERPL CREATININE-BSD FRML MDRD: 68 ML/MIN/1.73M2
GLUCOSE BLD-MCNC: 138 MG/DL (ref 70–99)
GLUCOSE BLD-MCNC: 83 MG/DL (ref 70–99)
GLUCOSE BLDC GLUCOMTR-MCNC: 118 MG/DL (ref 70–99)
GLUCOSE BLDC GLUCOMTR-MCNC: 118 MG/DL (ref 70–99)
GLUCOSE BLDC GLUCOMTR-MCNC: 128 MG/DL (ref 70–99)
GLUCOSE BLDC GLUCOMTR-MCNC: 132 MG/DL (ref 70–99)
HBA1C MFR BLD: 5.4 % (ref 0–5.6)
HCT VFR BLD AUTO: 40.4 % (ref 40–53)
HCT VFR BLD AUTO: 41.8 % (ref 40–53)
HCT VFR BLD AUTO: 44.2 % (ref 40–53)
HDLC SERPL-MCNC: 38 MG/DL
HGB BLD-MCNC: 13.5 G/DL (ref 13.3–17.7)
HGB BLD-MCNC: 14.2 G/DL (ref 13.3–17.7)
HGB BLD-MCNC: 15 G/DL (ref 13.3–17.7)
INR PPP: 1.16 (ref 0.85–1.15)
INTERPRETATION ECG - MUSE: NORMAL
LDLC SERPL CALC-MCNC: 25 MG/DL
LVEF ECHO: NORMAL
MCH RBC QN AUTO: 31.5 PG (ref 26.5–33)
MCH RBC QN AUTO: 31.5 PG (ref 26.5–33)
MCH RBC QN AUTO: 31.8 PG (ref 26.5–33)
MCHC RBC AUTO-ENTMCNC: 33.4 G/DL (ref 31.5–36.5)
MCHC RBC AUTO-ENTMCNC: 33.9 G/DL (ref 31.5–36.5)
MCHC RBC AUTO-ENTMCNC: 34 G/DL (ref 31.5–36.5)
MCV RBC AUTO: 93 FL (ref 78–100)
MCV RBC AUTO: 94 FL (ref 78–100)
MCV RBC AUTO: 94 FL (ref 78–100)
NONHDLC SERPL-MCNC: 54 MG/DL
P AXIS - MUSE: 58 DEGREES
PLATELET # BLD AUTO: 154 10E3/UL (ref 150–450)
PLATELET # BLD AUTO: 160 10E3/UL (ref 150–450)
PLATELET # BLD AUTO: 161 10E3/UL (ref 150–450)
POTASSIUM BLD-SCNC: 4.2 MMOL/L (ref 3.4–5.3)
POTASSIUM BLD-SCNC: 4.5 MMOL/L (ref 3.4–5.3)
PR INTERVAL - MUSE: 202 MS
QRS DURATION - MUSE: 84 MS
QT - MUSE: 372 MS
QTC - MUSE: 401 MS
R AXIS - MUSE: 23 DEGREES
RBC # BLD AUTO: 4.29 10E6/UL (ref 4.4–5.9)
RBC # BLD AUTO: 4.51 10E6/UL (ref 4.4–5.9)
RBC # BLD AUTO: 4.72 10E6/UL (ref 4.4–5.9)
SARS-COV-2 RNA RESP QL NAA+PROBE: NEGATIVE
SODIUM SERPL-SCNC: 130 MMOL/L (ref 133–144)
SODIUM SERPL-SCNC: 132 MMOL/L (ref 133–144)
SYSTOLIC BLOOD PRESSURE - MUSE: NORMAL MMHG
T AXIS - MUSE: 85 DEGREES
TRIGL SERPL-MCNC: 144 MG/DL
TROPONIN I SERPL HS-MCNC: 15 NG/L
TROPONIN I SERPL HS-MCNC: 15 NG/L
VENTRICULAR RATE- MUSE: 70 BPM
WBC # BLD AUTO: 5 10E3/UL (ref 4–11)
WBC # BLD AUTO: 5.2 10E3/UL (ref 4–11)
WBC # BLD AUTO: 6.5 10E3/UL (ref 4–11)

## 2022-01-01 PROCEDURE — BW191ZZ FLUOROSCOPY OF HEAD AND NECK USING LOW OSMOLAR CONTRAST: ICD-10-PCS | Performed by: SURGERY

## 2022-01-01 PROCEDURE — 258N000003 HC RX IP 258 OP 636: Performed by: PHYSICIAN ASSISTANT

## 2022-01-01 PROCEDURE — 999N000141 HC STATISTIC PRE-PROCEDURE NURSING ASSESSMENT: Performed by: SURGERY

## 2022-01-01 PROCEDURE — 99207 PR APP CREDIT; MD BILLING SHARED VISIT: CPT | Performed by: PHYSICIAN ASSISTANT

## 2022-01-01 PROCEDURE — 0042T CT HEAD PERFUSION W CONTRAST: CPT

## 2022-01-01 PROCEDURE — 70551 MRI BRAIN STEM W/O DYE: CPT

## 2022-01-01 PROCEDURE — 258N000003 HC RX IP 258 OP 636: Performed by: SURGERY

## 2022-01-01 PROCEDURE — 35301 RECHANNELING OF ARTERY: CPT | Mod: LT | Performed by: SURGERY

## 2022-01-01 PROCEDURE — G0463 HOSPITAL OUTPT CLINIC VISIT: HCPCS

## 2022-01-01 PROCEDURE — 250N000011 HC RX IP 250 OP 636: Performed by: NURSE ANESTHETIST, CERTIFIED REGISTERED

## 2022-01-01 PROCEDURE — 272N000001 HC OR GENERAL SUPPLY STERILE: Performed by: SURGERY

## 2022-01-01 PROCEDURE — 250N000011 HC RX IP 250 OP 636: Performed by: HOSPITALIST

## 2022-01-01 PROCEDURE — 120N000013 HC R&B IMCU

## 2022-01-01 PROCEDURE — 250N000009 HC RX 250: Performed by: SURGERY

## 2022-01-01 PROCEDURE — 85027 COMPLETE CBC AUTOMATED: CPT | Performed by: PHYSICIAN ASSISTANT

## 2022-01-01 PROCEDURE — 99291 CRITICAL CARE FIRST HOUR: CPT | Mod: FS | Performed by: PHYSICIAN ASSISTANT

## 2022-01-01 PROCEDURE — 36415 COLL VENOUS BLD VENIPUNCTURE: CPT | Performed by: EMERGENCY MEDICINE

## 2022-01-01 PROCEDURE — 84484 ASSAY OF TROPONIN QUANT: CPT | Performed by: HOSPITALIST

## 2022-01-01 PROCEDURE — 120N000001 HC R&B MED SURG/OB

## 2022-01-01 PROCEDURE — 99239 HOSP IP/OBS DSCHRG MGMT >30: CPT | Performed by: STUDENT IN AN ORGANIZED HEALTH CARE EDUCATION/TRAINING PROGRAM

## 2022-01-01 PROCEDURE — 057Q3ZZ DILATION OF LEFT EXTERNAL JUGULAR VEIN, PERCUTANEOUS APPROACH: ICD-10-PCS | Performed by: SURGERY

## 2022-01-01 PROCEDURE — 250N000011 HC RX IP 250 OP 636: Performed by: SURGERY

## 2022-01-01 PROCEDURE — 85730 THROMBOPLASTIN TIME PARTIAL: CPT | Performed by: EMERGENCY MEDICINE

## 2022-01-01 PROCEDURE — 03CL0ZZ EXTIRPATION OF MATTER FROM LEFT INTERNAL CAROTID ARTERY, OPEN APPROACH: ICD-10-PCS | Performed by: SURGERY

## 2022-01-01 PROCEDURE — 70496 CT ANGIOGRAPHY HEAD: CPT

## 2022-01-01 PROCEDURE — 250N000025 HC SEVOFLURANE, PER MIN: Performed by: SURGERY

## 2022-01-01 PROCEDURE — 999N000226 HC STATISTIC SLP IP EVAL DEFER: Performed by: SPEECH-LANGUAGE PATHOLOGIST

## 2022-01-01 PROCEDURE — 250N000011 HC RX IP 250 OP 636: Performed by: EMERGENCY MEDICINE

## 2022-01-01 PROCEDURE — 36415 COLL VENOUS BLD VENIPUNCTURE: CPT | Performed by: STUDENT IN AN ORGANIZED HEALTH CARE EDUCATION/TRAINING PROGRAM

## 2022-01-01 PROCEDURE — 70450 CT HEAD/BRAIN W/O DYE: CPT

## 2022-01-01 PROCEDURE — 250N000013 HC RX MED GY IP 250 OP 250 PS 637: Performed by: PHYSICIAN ASSISTANT

## 2022-01-01 PROCEDURE — 03CN0ZZ EXTIRPATION OF MATTER FROM LEFT EXTERNAL CAROTID ARTERY, OPEN APPROACH: ICD-10-PCS | Performed by: SURGERY

## 2022-01-01 PROCEDURE — 250N000011 HC RX IP 250 OP 636: Performed by: NURSE PRACTITIONER

## 2022-01-01 PROCEDURE — U0003 INFECTIOUS AGENT DETECTION BY NUCLEIC ACID (DNA OR RNA); SEVERE ACUTE RESPIRATORY SYNDROME CORONAVIRUS 2 (SARS-COV-2) (CORONAVIRUS DISEASE [COVID-19]), AMPLIFIED PROBE TECHNIQUE, MAKING USE OF HIGH THROUGHPUT TECHNOLOGIES AS DESCRIBED BY CMS-2020-01-R: HCPCS | Performed by: EMERGENCY MEDICINE

## 2022-01-01 PROCEDURE — 710N000010 HC RECOVERY PHASE 1, LEVEL 2, PER MIN: Performed by: SURGERY

## 2022-01-01 PROCEDURE — 258N000003 HC RX IP 258 OP 636: Performed by: NURSE ANESTHETIST, CERTIFIED REGISTERED

## 2022-01-01 PROCEDURE — 36415 COLL VENOUS BLD VENIPUNCTURE: CPT | Performed by: PHYSICIAN ASSISTANT

## 2022-01-01 PROCEDURE — 80048 BASIC METABOLIC PNL TOTAL CA: CPT | Performed by: HOSPITALIST

## 2022-01-01 PROCEDURE — 99223 1ST HOSP IP/OBS HIGH 75: CPT | Mod: AI | Performed by: HOSPITALIST

## 2022-01-01 PROCEDURE — 99233 SBSQ HOSP IP/OBS HIGH 50: CPT | Mod: FS | Performed by: PHYSICIAN ASSISTANT

## 2022-01-01 PROCEDURE — 36415 COLL VENOUS BLD VENIPUNCTURE: CPT | Performed by: HOSPITALIST

## 2022-01-01 PROCEDURE — 370N000017 HC ANESTHESIA TECHNICAL FEE, PER MIN: Performed by: SURGERY

## 2022-01-01 PROCEDURE — 250N000013 HC RX MED GY IP 250 OP 250 PS 637: Performed by: STUDENT IN AN ORGANIZED HEALTH CARE EDUCATION/TRAINING PROGRAM

## 2022-01-01 PROCEDURE — 272N000282 HC OR IOM SUPPLIES OPNP: Performed by: SURGERY

## 2022-01-01 PROCEDURE — 03CY0ZZ EXTIRPATION OF MATTER FROM UPPER ARTERY, OPEN APPROACH: ICD-10-PCS | Performed by: SURGERY

## 2022-01-01 PROCEDURE — 97116 GAIT TRAINING THERAPY: CPT | Mod: GP

## 2022-01-01 PROCEDURE — 250N000011 HC RX IP 250 OP 636: Performed by: PSYCHIATRY & NEUROLOGY

## 2022-01-01 PROCEDURE — 03UL07Z SUPPLEMENT LEFT INTERNAL CAROTID ARTERY WITH AUTOLOGOUS TISSUE SUBSTITUTE, OPEN APPROACH: ICD-10-PCS | Performed by: SURGERY

## 2022-01-01 PROCEDURE — 80061 LIPID PANEL: CPT | Performed by: HOSPITALIST

## 2022-01-01 PROCEDURE — 80048 BASIC METABOLIC PNL TOTAL CA: CPT | Performed by: EMERGENCY MEDICINE

## 2022-01-01 PROCEDURE — 85027 COMPLETE CBC AUTOMATED: CPT | Performed by: NURSE PRACTITIONER

## 2022-01-01 PROCEDURE — 250N000009 HC RX 250: Performed by: NURSE ANESTHETIST, CERTIFIED REGISTERED

## 2022-01-01 PROCEDURE — C9803 HOPD COVID-19 SPEC COLLECT: HCPCS

## 2022-01-01 PROCEDURE — 99024 POSTOP FOLLOW-UP VISIT: CPT | Performed by: SURGERY

## 2022-01-01 PROCEDURE — 85027 COMPLETE CBC AUTOMATED: CPT | Performed by: EMERGENCY MEDICINE

## 2022-01-01 PROCEDURE — 250N000013 HC RX MED GY IP 250 OP 250 PS 637: Performed by: HOSPITALIST

## 2022-01-01 PROCEDURE — 999N000208 ECHOCARDIOGRAM COMPLETE

## 2022-01-01 PROCEDURE — 84484 ASSAY OF TROPONIN QUANT: CPT | Performed by: EMERGENCY MEDICINE

## 2022-01-01 PROCEDURE — 93005 ELECTROCARDIOGRAM TRACING: CPT

## 2022-01-01 PROCEDURE — 255N000002 HC RX 255 OP 636: Performed by: HOSPITALIST

## 2022-01-01 PROCEDURE — 82565 ASSAY OF CREATININE: CPT | Performed by: STUDENT IN AN ORGANIZED HEALTH CARE EDUCATION/TRAINING PROGRAM

## 2022-01-01 PROCEDURE — 250N000009 HC RX 250: Performed by: EMERGENCY MEDICINE

## 2022-01-01 PROCEDURE — 97161 PT EVAL LOW COMPLEX 20 MIN: CPT | Mod: GP

## 2022-01-01 PROCEDURE — 258N000003 HC RX IP 258 OP 636: Performed by: ANESTHESIOLOGY

## 2022-01-01 PROCEDURE — 922N000001 HC NEURO MONITORING SERVICE, UP TO 7 HOURS (T1FEE): Performed by: SURGERY

## 2022-01-01 PROCEDURE — 360N000077 HC SURGERY LEVEL 4, PER MIN: Performed by: SURGERY

## 2022-01-01 PROCEDURE — 999N000157 HC STATISTIC RCP TIME EA 10 MIN

## 2022-01-01 PROCEDURE — 99222 1ST HOSP IP/OBS MODERATE 55: CPT | Mod: 57 | Performed by: SURGERY

## 2022-01-01 PROCEDURE — 99232 SBSQ HOSP IP/OBS MODERATE 35: CPT | Performed by: STUDENT IN AN ORGANIZED HEALTH CARE EDUCATION/TRAINING PROGRAM

## 2022-01-01 PROCEDURE — 83036 HEMOGLOBIN GLYCOSYLATED A1C: CPT | Performed by: HOSPITALIST

## 2022-01-01 PROCEDURE — 93306 TTE W/DOPPLER COMPLETE: CPT | Mod: 26 | Performed by: INTERNAL MEDICINE

## 2022-01-01 PROCEDURE — 76998 US GUIDE INTRAOP: CPT | Mod: 26 | Performed by: SURGERY

## 2022-01-01 PROCEDURE — 05BQ0ZZ EXCISION OF LEFT EXTERNAL JUGULAR VEIN, OPEN APPROACH: ICD-10-PCS | Performed by: SURGERY

## 2022-01-01 PROCEDURE — 99285 EMERGENCY DEPT VISIT HI MDM: CPT | Mod: 25

## 2022-01-01 PROCEDURE — 70498 CT ANGIOGRAPHY NECK: CPT

## 2022-01-01 PROCEDURE — 96374 THER/PROPH/DIAG INJ IV PUSH: CPT | Mod: 59

## 2022-01-01 PROCEDURE — 85610 PROTHROMBIN TIME: CPT | Performed by: EMERGENCY MEDICINE

## 2022-01-01 RX ORDER — NALOXONE HYDROCHLORIDE 0.4 MG/ML
0.2 INJECTION, SOLUTION INTRAMUSCULAR; INTRAVENOUS; SUBCUTANEOUS
Status: DISCONTINUED | OUTPATIENT
Start: 2022-01-01 | End: 2022-01-01 | Stop reason: HOSPADM

## 2022-01-01 RX ORDER — IOPAMIDOL 755 MG/ML
125 INJECTION, SOLUTION INTRAVASCULAR ONCE
Status: COMPLETED | OUTPATIENT
Start: 2022-01-01 | End: 2022-01-01

## 2022-01-01 RX ORDER — ONDANSETRON 4 MG/1
4 TABLET, ORALLY DISINTEGRATING ORAL EVERY 6 HOURS PRN
Status: DISCONTINUED | OUTPATIENT
Start: 2022-01-01 | End: 2022-01-01 | Stop reason: HOSPADM

## 2022-01-01 RX ORDER — ACETAMINOPHEN 500 MG
500 TABLET ORAL EVERY 8 HOURS PRN
Status: DISCONTINUED | OUTPATIENT
Start: 2022-01-01 | End: 2022-01-01

## 2022-01-01 RX ORDER — LABETALOL HYDROCHLORIDE 5 MG/ML
10 INJECTION, SOLUTION INTRAVENOUS EVERY 10 MIN PRN
Status: DISCONTINUED | OUTPATIENT
Start: 2022-01-01 | End: 2022-01-01 | Stop reason: HOSPADM

## 2022-01-01 RX ORDER — DEXAMETHASONE SODIUM PHOSPHATE 4 MG/ML
INJECTION, SOLUTION INTRA-ARTICULAR; INTRALESIONAL; INTRAMUSCULAR; INTRAVENOUS; SOFT TISSUE PRN
Status: DISCONTINUED | OUTPATIENT
Start: 2022-01-01 | End: 2022-01-01

## 2022-01-01 RX ORDER — HEPARIN SODIUM 1000 [USP'U]/ML
INJECTION, SOLUTION INTRAVENOUS; SUBCUTANEOUS PRN
Status: DISCONTINUED | OUTPATIENT
Start: 2022-01-01 | End: 2022-01-01

## 2022-01-01 RX ORDER — HEPARIN SODIUM 10000 [USP'U]/ML
INJECTION, SOLUTION INTRAVENOUS; SUBCUTANEOUS PRN
Status: DISCONTINUED | OUTPATIENT
Start: 2022-01-01 | End: 2022-01-01 | Stop reason: HOSPADM

## 2022-01-01 RX ORDER — ATORVASTATIN CALCIUM 20 MG/1
20 TABLET, FILM COATED ORAL DAILY
Qty: 90 TABLET | Refills: 0 | Status: SHIPPED | OUTPATIENT
Start: 2022-01-01

## 2022-01-01 RX ORDER — AMOXICILLIN 250 MG
1 CAPSULE ORAL 2 TIMES DAILY
Status: DISCONTINUED | OUTPATIENT
Start: 2022-01-01 | End: 2022-01-01 | Stop reason: HOSPADM

## 2022-01-01 RX ORDER — NALOXONE HYDROCHLORIDE 0.4 MG/ML
0.4 INJECTION, SOLUTION INTRAMUSCULAR; INTRAVENOUS; SUBCUTANEOUS
Status: DISCONTINUED | OUTPATIENT
Start: 2022-01-01 | End: 2022-01-01 | Stop reason: HOSPADM

## 2022-01-01 RX ORDER — PROPOFOL 10 MG/ML
INJECTION, EMULSION INTRAVENOUS PRN
Status: DISCONTINUED | OUTPATIENT
Start: 2022-01-01 | End: 2022-01-01

## 2022-01-01 RX ORDER — CLOPIDOGREL BISULFATE 75 MG/1
75 TABLET ORAL DAILY
Status: DISCONTINUED | OUTPATIENT
Start: 2022-01-01 | End: 2022-01-01

## 2022-01-01 RX ORDER — ONDANSETRON 2 MG/ML
4 INJECTION INTRAMUSCULAR; INTRAVENOUS ONCE
Status: COMPLETED | OUTPATIENT
Start: 2022-01-01 | End: 2022-01-01

## 2022-01-01 RX ORDER — FENTANYL CITRATE 50 UG/ML
INJECTION, SOLUTION INTRAMUSCULAR; INTRAVENOUS PRN
Status: DISCONTINUED | OUTPATIENT
Start: 2022-01-01 | End: 2022-01-01

## 2022-01-01 RX ORDER — KETOROLAC TROMETHAMINE 30 MG/ML
INJECTION, SOLUTION INTRAMUSCULAR; INTRAVENOUS PRN
Status: DISCONTINUED | OUTPATIENT
Start: 2022-01-01 | End: 2022-01-01

## 2022-01-01 RX ORDER — ACETAMINOPHEN 325 MG/1
650 TABLET ORAL EVERY 4 HOURS PRN
Status: DISCONTINUED | OUTPATIENT
Start: 2022-01-01 | End: 2022-01-01 | Stop reason: HOSPADM

## 2022-01-01 RX ORDER — CLINDAMYCIN PHOSPHATE 900 MG/50ML
900 INJECTION, SOLUTION INTRAVENOUS
Status: COMPLETED | OUTPATIENT
Start: 2022-01-01 | End: 2022-01-01

## 2022-01-01 RX ORDER — HYDROMORPHONE HYDROCHLORIDE 1 MG/ML
INJECTION, SOLUTION INTRAMUSCULAR; INTRAVENOUS; SUBCUTANEOUS PRN
Status: DISCONTINUED | OUTPATIENT
Start: 2022-01-01 | End: 2022-01-01

## 2022-01-01 RX ORDER — HYDRALAZINE HYDROCHLORIDE 20 MG/ML
10 INJECTION INTRAMUSCULAR; INTRAVENOUS EVERY 4 HOURS PRN
Status: DISCONTINUED | OUTPATIENT
Start: 2022-01-01 | End: 2022-01-01 | Stop reason: HOSPADM

## 2022-01-01 RX ORDER — GLYCOPYRROLATE 0.2 MG/ML
INJECTION, SOLUTION INTRAMUSCULAR; INTRAVENOUS PRN
Status: DISCONTINUED | OUTPATIENT
Start: 2022-01-01 | End: 2022-01-01

## 2022-01-01 RX ORDER — LOSARTAN POTASSIUM 100 MG/1
TABLET ORAL
Qty: 90 TABLET | Refills: 3 | OUTPATIENT
Start: 2022-01-01

## 2022-01-01 RX ORDER — OXYCODONE HYDROCHLORIDE 5 MG/1
5 TABLET ORAL EVERY 4 HOURS PRN
Status: DISCONTINUED | OUTPATIENT
Start: 2022-01-01 | End: 2022-01-01 | Stop reason: HOSPADM

## 2022-01-01 RX ORDER — ONDANSETRON 2 MG/ML
4 INJECTION INTRAMUSCULAR; INTRAVENOUS EVERY 6 HOURS PRN
Status: DISCONTINUED | OUTPATIENT
Start: 2022-01-01 | End: 2022-01-01

## 2022-01-01 RX ORDER — DEXTROSE MONOHYDRATE, SODIUM CHLORIDE, AND POTASSIUM CHLORIDE 50; 1.49; 4.5 G/1000ML; G/1000ML; G/1000ML
INJECTION, SOLUTION INTRAVENOUS CONTINUOUS
Status: DISCONTINUED | OUTPATIENT
Start: 2022-01-01 | End: 2022-01-01 | Stop reason: ALTCHOICE

## 2022-01-01 RX ORDER — ONDANSETRON 2 MG/ML
4 INJECTION INTRAMUSCULAR; INTRAVENOUS EVERY 6 HOURS PRN
Status: DISCONTINUED | OUTPATIENT
Start: 2022-01-01 | End: 2022-01-01 | Stop reason: HOSPADM

## 2022-01-01 RX ORDER — FENTANYL CITRATE 0.05 MG/ML
25 INJECTION, SOLUTION INTRAMUSCULAR; INTRAVENOUS EVERY 5 MIN PRN
Status: DISCONTINUED | OUTPATIENT
Start: 2022-01-01 | End: 2022-01-01 | Stop reason: HOSPADM

## 2022-01-01 RX ORDER — NEOSTIGMINE METHYLSULFATE 1 MG/ML
VIAL (ML) INJECTION PRN
Status: DISCONTINUED | OUTPATIENT
Start: 2022-01-01 | End: 2022-01-01

## 2022-01-01 RX ORDER — SODIUM CHLORIDE, SODIUM LACTATE, POTASSIUM CHLORIDE, CALCIUM CHLORIDE 600; 310; 30; 20 MG/100ML; MG/100ML; MG/100ML; MG/100ML
INJECTION, SOLUTION INTRAVENOUS CONTINUOUS
Status: DISCONTINUED | OUTPATIENT
Start: 2022-01-01 | End: 2022-01-01 | Stop reason: HOSPADM

## 2022-01-01 RX ORDER — HYDROMORPHONE HCL IN WATER/PF 6 MG/30 ML
0.2 PATIENT CONTROLLED ANALGESIA SYRINGE INTRAVENOUS EVERY 5 MIN PRN
Status: DISCONTINUED | OUTPATIENT
Start: 2022-01-01 | End: 2022-01-01 | Stop reason: HOSPADM

## 2022-01-01 RX ORDER — NITROGLYCERIN 0.4 MG/1
0.4 TABLET SUBLINGUAL EVERY 5 MIN PRN
Status: DISCONTINUED | OUTPATIENT
Start: 2022-01-01 | End: 2022-01-01 | Stop reason: HOSPADM

## 2022-01-01 RX ORDER — HYDRALAZINE HYDROCHLORIDE 20 MG/ML
10-20 INJECTION INTRAMUSCULAR; INTRAVENOUS
Status: DISCONTINUED | OUTPATIENT
Start: 2022-01-01 | End: 2022-01-01

## 2022-01-01 RX ORDER — LIDOCAINE 40 MG/G
CREAM TOPICAL
Status: DISCONTINUED | OUTPATIENT
Start: 2022-01-01 | End: 2022-01-01

## 2022-01-01 RX ORDER — METOPROLOL SUCCINATE 25 MG/1
25 TABLET, EXTENDED RELEASE ORAL DAILY
Status: DISCONTINUED | OUTPATIENT
Start: 2022-01-01 | End: 2022-01-01 | Stop reason: HOSPADM

## 2022-01-01 RX ORDER — ACETAMINOPHEN 500 MG
500 TABLET ORAL EVERY 8 HOURS PRN
Status: DISCONTINUED | OUTPATIENT
Start: 2022-01-01 | End: 2022-01-01 | Stop reason: DRUGHIGH

## 2022-01-01 RX ORDER — CLOPIDOGREL BISULFATE 75 MG/1
75 TABLET ORAL DAILY
Status: DISCONTINUED | OUTPATIENT
Start: 2022-01-01 | End: 2022-01-01 | Stop reason: HOSPADM

## 2022-01-01 RX ORDER — ONDANSETRON 2 MG/ML
4 INJECTION INTRAMUSCULAR; INTRAVENOUS EVERY 30 MIN PRN
Status: DISCONTINUED | OUTPATIENT
Start: 2022-01-01 | End: 2022-01-01 | Stop reason: HOSPADM

## 2022-01-01 RX ORDER — ATORVASTATIN CALCIUM 20 MG/1
20 TABLET, FILM COATED ORAL DAILY
Status: DISCONTINUED | OUTPATIENT
Start: 2022-01-01 | End: 2022-01-01 | Stop reason: HOSPADM

## 2022-01-01 RX ORDER — CLOPIDOGREL 300 MG/1
300 TABLET, FILM COATED ORAL ONCE
Status: COMPLETED | OUTPATIENT
Start: 2022-01-01 | End: 2022-01-01

## 2022-01-01 RX ORDER — HYDROMORPHONE HCL IN WATER/PF 6 MG/30 ML
0.4 PATIENT CONTROLLED ANALGESIA SYRINGE INTRAVENOUS EVERY 5 MIN PRN
Status: DISCONTINUED | OUTPATIENT
Start: 2022-01-01 | End: 2022-01-01 | Stop reason: HOSPADM

## 2022-01-01 RX ORDER — ASPIRIN 81 MG/1
81 TABLET ORAL DAILY
Status: DISCONTINUED | OUTPATIENT
Start: 2022-01-01 | End: 2022-01-01 | Stop reason: HOSPADM

## 2022-01-01 RX ORDER — ASPIRIN 325 MG
325 TABLET ORAL ONCE
Status: COMPLETED | OUTPATIENT
Start: 2022-01-01 | End: 2022-01-01

## 2022-01-01 RX ORDER — MAGNESIUM HYDROXIDE 1200 MG/15ML
LIQUID ORAL PRN
Status: DISCONTINUED | OUTPATIENT
Start: 2022-01-01 | End: 2022-01-01 | Stop reason: HOSPADM

## 2022-01-01 RX ORDER — HEPARIN SODIUM 1000 [USP'U]/ML
INJECTION, SOLUTION INTRAVENOUS; SUBCUTANEOUS PRN
Status: DISCONTINUED | OUTPATIENT
Start: 2022-01-01 | End: 2022-01-01 | Stop reason: HOSPADM

## 2022-01-01 RX ORDER — POLYETHYLENE GLYCOL 3350 17 G/17G
17 POWDER, FOR SOLUTION ORAL DAILY
Status: DISCONTINUED | OUTPATIENT
Start: 2022-01-01 | End: 2022-01-01 | Stop reason: HOSPADM

## 2022-01-01 RX ORDER — ENOXAPARIN SODIUM 100 MG/ML
40 INJECTION SUBCUTANEOUS EVERY 24 HOURS
Status: DISCONTINUED | OUTPATIENT
Start: 2022-01-01 | End: 2022-01-01 | Stop reason: HOSPADM

## 2022-01-01 RX ORDER — LIDOCAINE HYDROCHLORIDE 20 MG/ML
INJECTION, SOLUTION INFILTRATION; PERINEURAL PRN
Status: DISCONTINUED | OUTPATIENT
Start: 2022-01-01 | End: 2022-01-01

## 2022-01-01 RX ORDER — ACETAMINOPHEN 325 MG/1
975 TABLET ORAL EVERY 8 HOURS
Status: DISCONTINUED | OUTPATIENT
Start: 2022-01-01 | End: 2022-01-01 | Stop reason: HOSPADM

## 2022-01-01 RX ORDER — ONDANSETRON 4 MG/1
4 TABLET, ORALLY DISINTEGRATING ORAL EVERY 30 MIN PRN
Status: DISCONTINUED | OUTPATIENT
Start: 2022-01-01 | End: 2022-01-01 | Stop reason: HOSPADM

## 2022-01-01 RX ORDER — LABETALOL HYDROCHLORIDE 5 MG/ML
10-20 INJECTION, SOLUTION INTRAVENOUS EVERY 10 MIN PRN
Status: DISCONTINUED | OUTPATIENT
Start: 2022-01-01 | End: 2022-01-01

## 2022-01-01 RX ORDER — ASPIRIN 300 MG/1
600 SUPPOSITORY RECTAL ONCE
Status: DISCONTINUED | OUTPATIENT
Start: 2022-01-01 | End: 2022-01-01 | Stop reason: HOSPADM

## 2022-01-01 RX ORDER — CLOPIDOGREL BISULFATE 75 MG/1
75 TABLET ORAL DAILY
Qty: 30 TABLET | Refills: 0 | Status: SHIPPED | OUTPATIENT
Start: 2022-01-01 | End: 2023-01-01

## 2022-01-01 RX ORDER — BUPIVACAINE HYDROCHLORIDE 5 MG/ML
INJECTION, SOLUTION PERINEURAL PRN
Status: DISCONTINUED | OUTPATIENT
Start: 2022-01-01 | End: 2022-01-01 | Stop reason: HOSPADM

## 2022-01-01 RX ORDER — ONDANSETRON 2 MG/ML
INJECTION INTRAMUSCULAR; INTRAVENOUS PRN
Status: DISCONTINUED | OUTPATIENT
Start: 2022-01-01 | End: 2022-01-01

## 2022-01-01 RX ORDER — SODIUM CHLORIDE 9 MG/ML
INJECTION, SOLUTION INTRAVENOUS CONTINUOUS
Status: DISCONTINUED | OUTPATIENT
Start: 2022-01-01 | End: 2022-01-01 | Stop reason: HOSPADM

## 2022-01-01 RX ORDER — ONDANSETRON 4 MG/1
4 TABLET, ORALLY DISINTEGRATING ORAL EVERY 6 HOURS PRN
Status: DISCONTINUED | OUTPATIENT
Start: 2022-01-01 | End: 2022-01-01

## 2022-01-01 RX ORDER — ONDANSETRON 4 MG/1
4 TABLET, FILM COATED ORAL EVERY 6 HOURS PRN
Status: DISCONTINUED | OUTPATIENT
Start: 2022-01-01 | End: 2022-01-01

## 2022-01-01 RX ORDER — HEPARIN SODIUM 10000 [USP'U]/100ML
0-5000 INJECTION, SOLUTION INTRAVENOUS CONTINUOUS
Status: DISCONTINUED | OUTPATIENT
Start: 2022-01-01 | End: 2022-01-01

## 2022-01-01 RX ORDER — PROCHLORPERAZINE MALEATE 5 MG
5 TABLET ORAL EVERY 6 HOURS PRN
Status: DISCONTINUED | OUTPATIENT
Start: 2022-01-01 | End: 2022-01-01 | Stop reason: HOSPADM

## 2022-01-01 RX ORDER — LIDOCAINE 40 MG/G
CREAM TOPICAL
Status: DISCONTINUED | OUTPATIENT
Start: 2022-01-01 | End: 2022-01-01 | Stop reason: HOSPADM

## 2022-01-01 RX ORDER — BISACODYL 10 MG
10 SUPPOSITORY, RECTAL RECTAL DAILY PRN
Status: DISCONTINUED | OUTPATIENT
Start: 2022-01-01 | End: 2022-01-01 | Stop reason: HOSPADM

## 2022-01-01 RX ORDER — FENTANYL CITRATE 0.05 MG/ML
50 INJECTION, SOLUTION INTRAMUSCULAR; INTRAVENOUS EVERY 5 MIN PRN
Status: DISCONTINUED | OUTPATIENT
Start: 2022-01-01 | End: 2022-01-01 | Stop reason: HOSPADM

## 2022-01-01 RX ORDER — ASPIRIN 81 MG/1
81 TABLET ORAL DAILY
Status: DISCONTINUED | OUTPATIENT
Start: 2022-01-01 | End: 2022-01-01

## 2022-01-01 RX ORDER — ATORVASTATIN CALCIUM 20 MG/1
20 TABLET, FILM COATED ORAL DAILY
Status: DISCONTINUED | OUTPATIENT
Start: 2022-01-01 | End: 2022-01-01

## 2022-01-01 RX ORDER — SODIUM CHLORIDE 9 MG/ML
INJECTION, SOLUTION INTRAVENOUS CONTINUOUS PRN
Status: DISCONTINUED | OUTPATIENT
Start: 2022-01-01 | End: 2022-01-01 | Stop reason: HOSPADM

## 2022-01-01 RX ADMIN — IOPAMIDOL 125 ML: 755 INJECTION, SOLUTION INTRAVENOUS at 14:20

## 2022-01-01 RX ADMIN — METOPROLOL SUCCINATE 25 MG: 25 TABLET, EXTENDED RELEASE ORAL at 19:20

## 2022-01-01 RX ADMIN — SODIUM CHLORIDE, POTASSIUM CHLORIDE, SODIUM LACTATE AND CALCIUM CHLORIDE: 600; 310; 30; 20 INJECTION, SOLUTION INTRAVENOUS at 15:10

## 2022-01-01 RX ADMIN — ONDANSETRON 4 MG: 2 INJECTION INTRAMUSCULAR; INTRAVENOUS at 16:03

## 2022-01-01 RX ADMIN — ASPIRIN 81 MG: 81 TABLET, COATED ORAL at 08:45

## 2022-01-01 RX ADMIN — SENNOSIDES AND DOCUSATE SODIUM 1 TABLET: 50; 8.6 TABLET ORAL at 08:33

## 2022-01-01 RX ADMIN — PHENYLEPHRINE HYDROCHLORIDE 200 MCG: 10 INJECTION INTRAVENOUS at 16:51

## 2022-01-01 RX ADMIN — ONDANSETRON 4 MG: 2 INJECTION INTRAMUSCULAR; INTRAVENOUS at 16:14

## 2022-01-01 RX ADMIN — ATORVASTATIN CALCIUM 20 MG: 20 TABLET, FILM COATED ORAL at 08:33

## 2022-01-01 RX ADMIN — ONDANSETRON 4 MG: 2 INJECTION INTRAMUSCULAR; INTRAVENOUS at 15:48

## 2022-01-01 RX ADMIN — CLOPIDOGREL BISULFATE 75 MG: 75 TABLET ORAL at 08:33

## 2022-01-01 RX ADMIN — FENTANYL CITRATE 50 MCG: 50 INJECTION, SOLUTION INTRAMUSCULAR; INTRAVENOUS at 15:48

## 2022-01-01 RX ADMIN — HYDROMORPHONE HYDROCHLORIDE 0.5 MG: 1 INJECTION, SOLUTION INTRAMUSCULAR; INTRAVENOUS; SUBCUTANEOUS at 16:43

## 2022-01-01 RX ADMIN — NEOSTIGMINE METHYLSULFATE 3 MG: 1 INJECTION, SOLUTION INTRAVENOUS at 18:25

## 2022-01-01 RX ADMIN — ACETAMINOPHEN 975 MG: 325 TABLET, FILM COATED ORAL at 21:27

## 2022-01-01 RX ADMIN — LIDOCAINE HYDROCHLORIDE 100 MG: 20 INJECTION, SOLUTION INFILTRATION; PERINEURAL at 15:48

## 2022-01-01 RX ADMIN — ASPIRIN 325 MG ORAL TABLET 325 MG: 325 PILL ORAL at 17:49

## 2022-01-01 RX ADMIN — ATORVASTATIN CALCIUM 20 MG: 20 TABLET, FILM COATED ORAL at 08:45

## 2022-01-01 RX ADMIN — ACETAMINOPHEN 500 MG: 500 TABLET ORAL at 02:00

## 2022-01-01 RX ADMIN — KETOROLAC TROMETHAMINE 15 MG: 30 INJECTION, SOLUTION INTRAMUSCULAR at 17:56

## 2022-01-01 RX ADMIN — PHENYLEPHRINE HYDROCHLORIDE 0.5 MCG/KG/MIN: 10 INJECTION INTRAVENOUS at 15:51

## 2022-01-01 RX ADMIN — SODIUM CHLORIDE 100 ML: 900 INJECTION INTRAVENOUS at 14:20

## 2022-01-01 RX ADMIN — PROPOFOL 200 MG: 10 INJECTION, EMULSION INTRAVENOUS at 15:48

## 2022-01-01 RX ADMIN — HEPARIN SODIUM 5000 UNITS: 1000 INJECTION INTRAVENOUS; SUBCUTANEOUS at 16:48

## 2022-01-01 RX ADMIN — ASPIRIN 81 MG: 81 TABLET, COATED ORAL at 08:33

## 2022-01-01 RX ADMIN — PHENYLEPHRINE HYDROCHLORIDE 100 MCG: 10 INJECTION INTRAVENOUS at 15:48

## 2022-01-01 RX ADMIN — CLOPIDOGREL BISULFATE 300 MG: 300 TABLET, FILM COATED ORAL at 17:48

## 2022-01-01 RX ADMIN — ROCURONIUM BROMIDE 50 MG: 50 INJECTION, SOLUTION INTRAVENOUS at 15:48

## 2022-01-01 RX ADMIN — HUMAN ALBUMIN MICROSPHERES AND PERFLUTREN 9 ML: 10; .22 INJECTION, SOLUTION INTRAVENOUS at 10:21

## 2022-01-01 RX ADMIN — METOPROLOL SUCCINATE 25 MG: 25 TABLET, EXTENDED RELEASE ORAL at 08:33

## 2022-01-01 RX ADMIN — CLINDAMYCIN PHOSPHATE 900 MG: 900 INJECTION, SOLUTION INTRAVENOUS at 15:11

## 2022-01-01 RX ADMIN — DEXAMETHASONE SODIUM PHOSPHATE 4 MG: 4 INJECTION, SOLUTION INTRA-ARTICULAR; INTRALESIONAL; INTRAMUSCULAR; INTRAVENOUS; SOFT TISSUE at 16:03

## 2022-01-01 RX ADMIN — SODIUM CHLORIDE: 9 INJECTION, SOLUTION INTRAVENOUS at 12:15

## 2022-01-01 RX ADMIN — ONDANSETRON 4 MG: 2 INJECTION INTRAMUSCULAR; INTRAVENOUS at 10:04

## 2022-01-01 RX ADMIN — ACETAMINOPHEN 975 MG: 325 TABLET, FILM COATED ORAL at 04:51

## 2022-01-01 RX ADMIN — PHENYLEPHRINE HYDROCHLORIDE 100 MCG: 10 INJECTION INTRAVENOUS at 15:51

## 2022-01-01 RX ADMIN — PHENYLEPHRINE HYDROCHLORIDE 100 MCG: 10 INJECTION INTRAVENOUS at 18:30

## 2022-01-01 RX ADMIN — GLYCOPYRROLATE 0.6 MG: 0.2 INJECTION, SOLUTION INTRAMUSCULAR; INTRAVENOUS at 18:25

## 2022-01-01 RX ADMIN — IOPAMIDOL 125 ML: 755 INJECTION, SOLUTION INTRAVENOUS at 10:30

## 2022-01-01 RX ADMIN — SENNOSIDES AND DOCUSATE SODIUM 1 TABLET: 50; 8.6 TABLET ORAL at 21:27

## 2022-01-01 RX ADMIN — NITROGLYCERIN 20 MCG: 5 INJECTION, SOLUTION INTRAVENOUS at 16:44

## 2022-01-01 RX ADMIN — FENTANYL CITRATE 50 MCG: 50 INJECTION, SOLUTION INTRAMUSCULAR; INTRAVENOUS at 16:02

## 2022-01-01 RX ADMIN — CLOPIDOGREL BISULFATE 75 MG: 75 TABLET ORAL at 09:39

## 2022-01-01 RX ADMIN — POLYETHYLENE GLYCOL 3350 17 G: 17 POWDER, FOR SOLUTION ORAL at 08:33

## 2022-01-01 ASSESSMENT — ACTIVITIES OF DAILY LIVING (ADL)
CHANGE_IN_FUNCTIONAL_STATUS_SINCE_ONSET_OF_CURRENT_ILLNESS/INJURY: NO
DRESSING/BATHING_DIFFICULTY: NO
ADLS_ACUITY_SCORE: 35
ADLS_ACUITY_SCORE: 35
ADLS_ACUITY_SCORE: 37
ADLS_ACUITY_SCORE: 35
WEAR_GLASSES_OR_BLIND: NO
ADLS_ACUITY_SCORE: 37
ADLS_ACUITY_SCORE: 37
ADLS_ACUITY_SCORE: 35
WALKING_OR_CLIMBING_STAIRS_DIFFICULTY: NO
ADLS_ACUITY_SCORE: 35
ADLS_ACUITY_SCORE: 37
DIFFICULTY_EATING/SWALLOWING: NO
ADLS_ACUITY_SCORE: 37
ADLS_ACUITY_SCORE: 35
ADLS_ACUITY_SCORE: 37
ADLS_ACUITY_SCORE: 37
TOILETING_ISSUES: NO
ADLS_ACUITY_SCORE: 37
FALL_HISTORY_WITHIN_LAST_SIX_MONTHS: NO
ADLS_ACUITY_SCORE: 35
ADLS_ACUITY_SCORE: 35
ADLS_ACUITY_SCORE: 37
ADLS_ACUITY_SCORE: 35
ADLS_ACUITY_SCORE: 35
DOING_ERRANDS_INDEPENDENTLY_DIFFICULTY: YES
ADLS_ACUITY_SCORE: 35
ADLS_ACUITY_SCORE: 37
CONCENTRATING,_REMEMBERING_OR_MAKING_DECISIONS_DIFFICULTY: NO

## 2022-01-01 ASSESSMENT — VISUAL ACUITY
OU: NORMAL ACUITY;BASELINE
OU: NORMAL ACUITY;BASELINE

## 2022-01-01 ASSESSMENT — LIFESTYLE VARIABLES: TOBACCO_USE: 1

## 2022-01-01 ASSESSMENT — ENCOUNTER SYMPTOMS
SEIZURES: 0
DYSRHYTHMIAS: 0

## 2022-03-10 ENCOUNTER — OFFICE VISIT (OUTPATIENT)
Dept: DERMATOLOGY | Facility: CLINIC | Age: 87
End: 2022-03-10
Payer: COMMERCIAL

## 2022-03-10 VITALS
HEART RATE: 72 BPM | BODY MASS INDEX: 27.62 KG/M2 | SYSTOLIC BLOOD PRESSURE: 143 MMHG | DIASTOLIC BLOOD PRESSURE: 73 MMHG | HEIGHT: 66 IN

## 2022-03-10 DIAGNOSIS — L72.0 RUPTURED EPITHELIAL CYST: Primary | ICD-10-CM

## 2022-03-10 PROCEDURE — 99207 PR NO CHARGE LOS: CPT | Performed by: DERMATOLOGY

## 2022-03-10 PROCEDURE — 12041 INTMD RPR N-HF/GENIT 2.5CM/<: CPT | Performed by: DERMATOLOGY

## 2022-03-10 PROCEDURE — 88331 PATH CONSLTJ SURG 1 BLK 1SPC: CPT | Performed by: DERMATOLOGY

## 2022-03-10 PROCEDURE — 11423 EXC H-F-NK-SP B9+MARG 2.1-3: CPT | Performed by: DERMATOLOGY

## 2022-03-10 NOTE — PATIENT INSTRUCTIONS
Sutured Wound Care     [facility]: [phone]    [facility]: [phone]          ? No strenuous activity for 48 hours. Resume moderate activity in 48 hours. No heavy exercising until you are seen for follow up in one week.     ? Take Tylenol as needed for discomfort.                         ? Do not drink alcoholic beverages for 48 hours.     ? Keep the pressure bandage in place for 24 hours. If the bandage becomes blood tinged or loose, reinforce it with gauze and tape.        (Refer to the reverse side of this page for management of bleeding).    ? Remove pressure bandage in 24 hours     ? Leave the flat bandage in place until your follow up appointment.    ? Keep the bandage dry. Wash around it carefully.    ? If the tape becomes soiled or starts to come off, reinforce it with additional paper tape.    ? Do not smoke for 3 weeks; smoking is detrimental to wound healing.    ? It is normal to have swelling and bruising around the surgical site. The bruising will fade in approximately 10-14 days. Elevate the area to reduce swelling.    ? Numbness, itchiness and sensitivity to temperature changes can occur after surgery and may take up to 18 months to normalize.                    POSSIBLE COMPLICATIONS    BLEEDIN. Leave the bandage in place.Use tightly rolled up gauze or a cloth to apply direct pressure over the bandage for 20 minutes.  2. Reapply pressure for an additional 20 minutes if necessary  3. Call the office or go to the nearest emergency room if pressure fails to stop the bleeding.  4. Use additional gauze and tape to maintain pressure once the bleeding has stopped.        PAIN:    1. Post operative pain should slowly get better, never worse.  2. A severe increase in pain may indicate a problem. Call the office if this occurs.    In case of emergency phone:Dr Leslie [phone]

## 2022-03-10 NOTE — PROGRESS NOTES
Cale Wagoner is an extremely pleasant 88 year old year old male patient here today for evaluation and managment of ruptured cyst on neck.  I+D x2.  Patient has no other skin complaints today.  Remainder of the HPI, Meds, PMH, Allergies, FH, and SH was reviewed in chart.      Past Medical History:   Diagnosis Date     Benign essential hypertension      Bloating      CAD (coronary artery disease)     cardiac cath 2017:  MOSES to mid-distal Cx x2     Hyperlipidemia      Kidney stones      NSTEMI (non-ST elevated myocardial infarction) (H)      PFO (patent foramen ovale)      Skin cancer     unknown what type     TIA (transient ischemic attack)        Past Surgical History:   Procedure Laterality Date     APPENDECTOMY       CARDIAC CATHERIZATION  09/15/2017     MOSES to mid-distal Cx x2     ENT SURGERY      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     EYE SURGERY      cataracts     HERNIA REPAIR      twice     SOFT TISSUE SURGERY      skin cancer right temple        Family History   Problem Relation Age of Onset     Respiratory Father         pneumonia     Unknown/Adopted Father      Alzheimer Disease Brother      Alzheimer Disease Sister      Emphysema Sister      Unknown/Adopted Mother        Social History     Socioeconomic History     Marital status:      Spouse name: Not on file     Number of children: Not on file     Years of education: Not on file     Highest education level: Not on file   Occupational History     Not on file   Tobacco Use     Smoking status: Former Smoker     Quit date: 1968     Years since quittin.5     Smokeless tobacco: Former User   Substance and Sexual Activity     Alcohol use: Yes     Comment: Occ     Drug use: No     Sexual activity: Not Currently   Other Topics Concern     Parent/sibling w/ CABG, MI or angioplasty before 65F 55M? No   Social History Narrative     Not on file     Social Determinants of Health     Financial Resource Strain: Not on file   Food Insecurity: Not on file  "  Transportation Needs: Not on file   Physical Activity: Not on file   Stress: Not on file   Social Connections: Not on file   Intimate Partner Violence: Not on file   Housing Stability: Not on file       Outpatient Encounter Medications as of 3/10/2022   Medication Sig Dispense Refill     Acetaminophen (TYLENOL PO) Take 500 mg by mouth every 8 hours as needed for mild pain or fever       aspirin 81 MG EC tablet Take 81 mg by mouth At Bedtime       atorvastatin (LIPITOR) 20 MG tablet TAKE 1 TABLET(20 MG) BY MOUTH DAILY 90 tablet 3     FAMOTIDINE PO Take 20 mg by mouth 2 times daily       losartan (COZAAR) 100 MG tablet TAKE ONE TABLET (100 mg) BY MOUTH ONE TIME DAILY 90 tablet 3     metoprolol succinate ER (TOPROL-XL) 25 MG 24 hr tablet Take 2 tablets (50 mg) by mouth daily 90 tablet 3     Multiple Vitamins-Minerals (MULTIVITAMIN & MINERAL PO) Take 1 tablet by mouth daily       nitroGLYcerin (NITROSTAT) 0.4 MG sublingual tablet For chest pain place 1 tablet under the tongue every 5 minutes for 3 doses. If symptoms persist 5 minutes after 1st dose call 911. 25 tablet 1     Omega-3 Fatty Acids (OMEGA-3 FISH OIL PO) Take 2 g by mouth daily        polyethylene glycol (MIRALAX) 17 GM/Dose powder Take 17 g by mouth 2 times daily 510 g 0     No facility-administered encounter medications on file as of 3/10/2022.             O:   NAD, WDWN, Alert & Oriented, Mood & Affect wnl, Vitals stable   Here today alone   BP (!) 143/73   Pulse 72   Ht 1.676 m (5' 6\")   BMI 27.62 kg/m     General appearance normal   Vitals stable   Alert, oriented and in no acute distress     Mid post neck 2.2cm scarred nodule      Eyes: Conjunctivae/lids:Normal     ENT: Lips, buccal mucosa, tongue: normal    MSK:Normal    Cardiovascular: peripheral edema none    Pulm: Breathing Normal    Neuro/Psych: Orientation:Alert and Orientedx3 ; Mood/Affect:normal       MICRO:   Mid post neck: Normal epidermis with ruptured cyst lined by stratified squamous " epithelium with epidermal keratinization   A/P:  1. Ruptured cyst   Scar and recurrence discussed with patient   EXCISION OF CYST AND int : After thorough discussion of PGACAC, consent obtained, anesthesia and prep, the margins of the cyst were identified and an incision was made encompassing the cyst. The incisions were made through the skin and down to and including the subcutaneous tissue. The cyst was removed en bloc and submitted for frozen pathologic review. The wound edges were widely undermined until adequate tissue mobility was obtained. hemostasis was achieved. The wound edges were then closed in a layered fashion, being careful not to leave any dead space. Postoperative length was 2 cm.   EBL minimal; complications none; wound care routine. The patient was discharged in good condition and will return in one week for wound evaluation.    It was a pleasure speaking to Cale Wagoner today.

## 2022-03-16 ENCOUNTER — ALLIED HEALTH/NURSE VISIT (OUTPATIENT)
Dept: DERMATOLOGY | Facility: CLINIC | Age: 87
End: 2022-03-16
Payer: COMMERCIAL

## 2022-03-16 DIAGNOSIS — Z48.01 ENCOUNTER FOR CHANGE OR REMOVAL OF SURGICAL WOUND DRESSING: Primary | ICD-10-CM

## 2022-03-16 PROCEDURE — 99207 PR NO CHARGE NURSE ONLY: CPT

## 2022-03-16 NOTE — NURSING NOTE
Pt returned to clinic for post surgery 1 week follow up bandage change. Pt has no complaints, denies pain. Bandage removed from mid posterior neck, area cleansed with normal saline. Site is healing and wound edges approximating well. Reapplied new steri strips and paper tape.    Advised to watch for signs/sx of infection; spreading redness, drainage, odor, fever. Call or report promptly to clinic. Pt given written instructions and informed to rtc as needed. Patient verbalized understanding.     ROWENA Schreiber-BSN-PHN  [facility]  [phone]

## 2022-03-16 NOTE — PATIENT INSTRUCTIONS

## 2022-03-26 ENCOUNTER — OFFICE VISIT (OUTPATIENT)
Dept: OTHER | Facility: CLINIC | Age: 87
End: 2022-03-26
Payer: COMMERCIAL

## 2022-03-26 VITALS
SYSTOLIC BLOOD PRESSURE: 186 MMHG | DIASTOLIC BLOOD PRESSURE: 82 MMHG | BODY MASS INDEX: 27.32 KG/M2 | RESPIRATION RATE: 18 BRPM | WEIGHT: 170 LBS | OXYGEN SATURATION: 100 % | TEMPERATURE: 97.9 F | HEIGHT: 66 IN | HEART RATE: 85 BPM

## 2022-03-26 DIAGNOSIS — Z00.00 ENCOUNTER FOR MEDICARE ANNUAL WELLNESS EXAM: Primary | ICD-10-CM

## 2022-03-26 PROCEDURE — G0439 PPPS, SUBSEQ VISIT: HCPCS | Performed by: FAMILY MEDICINE

## 2022-03-26 ASSESSMENT — ACTIVITIES OF DAILY LIVING (ADL): CURRENT_FUNCTION: NO ASSISTANCE NEEDED

## 2022-03-26 ASSESSMENT — PAIN SCALES - GENERAL: PAINLEVEL: NO PAIN (0)

## 2022-03-26 NOTE — PATIENT INSTRUCTIONS
Patient Education   Personalized Prevention Plan  You are due for the preventive services outlined below.  Your care team is available to assist you in scheduling these services.  If you have already completed any of these items, please share that information with your care team to update in your medical record.  Health Maintenance Due   Topic Date Due     Zoster (Shingles) Vaccine (1 of 2) Never done     Diptheria Tetanus Pertussis (DTAP/TDAP/TD) Vaccine (3 - Td or Tdap) 08/12/2020     Cholesterol Lab  10/19/2021

## 2022-03-26 NOTE — PROGRESS NOTES
"Assessment & Plan     ICD-10-CM    1. Encounter for Medicare annual wellness exam  Z00.00        Follow Up/Next Steps    Return in about 53 weeks (around 4/1/2023) for Annual Wellness Visit.  Recommended that patient follow up with PCP to address the following : Overdue for zoster and Tdap caccines. /82, patient is on BP medications. Patient declined follow up appointment with PCP regarding elevated BP. Eduction provided. Per patient he saw his PCP over a year ago and will make an appointment soon.    Counseling and Education  Reviewed preventive health counseling, as reflected in patient instructions      BMI:   Estimated body mass index is 27.44 kg/m  as calculated from the following:    Height as of this encounter: 1.676 m (5' 6\").    Weight as of this encounter: 77.1 kg (170 lb).   Weight management plan: Discussed healthy diet and exercise guidelines      Appropriate preventive services were discussed with this patient, including applicable screening as appropriate for cardiovascular disease, diabetes, osteopenia/osteoporosis, and glaucoma.  As appropriate for age/gender, discussed screening for colorectal cancer, prostate cancer, breast cancer, and cervical cancer. Checklist reviewing preventive services available has been given to the patient.    Reviewed patients plan of care and provided an AVS. The Basic Care Plan (routine screening as documented in Health Maintenance) for Cale meets the Care Plan requirement. This Care Plan has been established and reviewed with the Patient.    Visit Provider: Taylor Carrillo RN  Supervising Provider: Carmen Kwok MD, MD  [facility]       Subjective   Cale is a 88 year old who is being seen for an Annual Wellness Visit  accompanied by his spouse.    Healthy Habits:     In general, how would you rate your overall health?  Good    Frequency of exercise:  6-7 days/week    Duration of exercise:  15-30 minutes    Do " "you usually eat at least 4 servings of fruit and vegetables a day, include whole grains    & fiber and avoid regularly eating high fat or \"junk\" foods?  Yes    Taking medications regularly:  Yes    Medication side effects:  None    Ability to successfully perform activities of daily living:  No assistance needed    Home Safety:  Lack of grab bars in the bathroom    Hearing Impairment:  Need to ask people to speak up or repeat themselves    In the past 6 months, have you been bothered by leaking of urine?  No    In general, how would you rate your overall mental or emotional health?  Good      PHQ-2 Total Score: 0    Additional concerns today:  No    Do you feel safe in your environment? Yes    Have you ever done Advance Care Planning? (For example, a Health Directive, POLST, or a discussion with a medical provider or your loved ones about your wishes): Yes, advance care planning is on file.    Fall risk  Fallen 2 or more times in the past year?: No  Any fall with injury in the past year?: No  Cognitive Screening   1) Repeat 3 items (Leader, Season, Table)    2) Clock draw: NORMAL  3) 3 item recall: Recalls 1 object   Results: NORMAL clock, 1-2 items recalled: COGNITIVE IMPAIRMENT LESS LIKELY    Mini-CogTM Copyright S Zhen. Licensed by the author for use in [facility]; reprinted with permission (soob@.[email or web address]). All rights reserved.      Do you have sleep apnea, excessive snoring or daytime drowsiness?: no    Reviewed and updated as needed this visit by clinical staff    Allergies  Meds  Problems             Social History     Tobacco Use     Smoking status: Former Smoker     Quit date: 1968     Years since quittin.6     Smokeless tobacco: Former User   Substance Use Topics     Alcohol use: Yes     Comment: Occ       Current providers sharing in care for this patient include:   Patient Care Team:  Remedios Clemons PA-C as Assigned Surgical Provider  Vanessa Lemos MD as Assigned Heart " "and Vascular Provider  Gwyn Zheng MD as Assigned PCP    The following health maintenance items are reviewed in Epic and correct as of today:  Health Maintenance Due   Topic Date Due     ZOSTER IMMUNIZATION (1 of 2) Never done     DTAP/TDAP/TD IMMUNIZATION (3 - Td or Tdap) 08/12/2020     LIPID  10/19/2021               Objective    BP (!) 186/82 (BP Location: Right arm, Patient Position: Sitting, Cuff Size: Adult Regular)   Pulse 85   Temp 97.9  F (36.6  C)   Resp 18   Ht 1.676 m (5' 6\")   Wt 77.1 kg (170 lb)   SpO2 100%   BMI 27.44 kg/m   Estimated body mass index is 27.44 kg/m  as calculated from the following:    Height as of this encounter: 1.676 m (5' 6\").    Weight as of this encounter: 77.1 kg (170 lb).  Physical Exam  Patient appears comfortable and in no acute distress.        Identified Health Risks:  "

## 2022-11-25 NOTE — ED NOTES
Bedside dysphagia screen performed. Pt able to swallow liquids and solids without difficulty. Pt became nauseous after screening. PO meds withheld, repeat IV Zofran given

## 2022-11-25 NOTE — ED NOTES
Bed: ST02  Expected date:   Expected time:   Means of arrival:   Comments:  448  89 M poss CVA/last known well 1630

## 2022-11-25 NOTE — H&P
[facility]    History and Physical  Hospitalist       Date of Admission:  11/25/2022    Assessment & Plan   Cale Wagoner is a 89 year old male with a history of HTN, HLD, CAD, TIA who presented with speech difficulty.    Acute ischemic stroke, undetermined etiology  Expressive aphasia, improving  HTN  HLD  Prior history of TIA  ?  Fluctuating altered mental//acute encephalopathy  Last known time well is somewhat unclear. He lives with his wife who has dementia who could not confirm time of onset.  Around 1:30 PM while talking with family on phone patient was noted to have difficulty speaking with word finding difficulty and slow speech.  Hence presented to ED.  Stroke code called in the ED.  No complaints of focal numbness/weakness/vision issues.  He has had prior TIA in 2014 when he experienced some left-sided weakness and difficulty speaking but brain MRI negative for stroke.  In the ED, /82, other vitals stable, afebrile.  Noted to have expressive aphasia.  EKG with NSR, no acute ischemic changes.  CT head, CTA head and neck showed multiple mild stenosis, no evidence of large vessel occlusion.  Approximately 60 to 70% stenosis involving proximal left ICA.  No acute bleed.  Hyperacute MRI done that showed subtle area of hyperintensity on DWI involving left paracentral lobule which may represent subtle acute infarct.  Labs mainly remarkable for sodium 130, at rest all in acceptable range.  Patient notes that speech difficulty has improved while in the ED and he was able to converse somewhat fluently during the encounter.  Not a candidate for any neuro intervention due to uncertainty of timing of symptom onset.  -Admit as inpatient  -Consult to stroke neurology  -Admit under ischemic stroke protocol  -Allow for permissive hypertension, hold PTA losartan, metoprolol.  As needed labetalol/hydralazine available for SBP greater than 220.  -Telemetry monitoring  -TTE  -Loaded with  aspirin 325 Mg and Plavix 300 Mg x1 in ED.  Further dosing per neurology recommendations.  -TC 92, LDL 25, HDL 38.  Continue PTA atorvastatin.  -HbA1c 5.4.  -PT/OT/SLP.  -Neurology considering EEG as they were concerned about patient's fluctuating altered mental status.  This was not evident during patient encounter.  Further work-up pending clinical progress.  Does not appear to have any other obvious causes of encephalopathy.  No evidence of infection.  Labs look okay.      CAD  Drug-eluting stent placement x2 to mid and distal circumflex 2017  No recent chest pain.  -On ASA and Plavix per neurology.  Was previously on baby ASA.    ?  Shortness of breath  Family noted that patient appeared to have shortness of breath over the last several weeks.  Patient himself does not offer any complaints regarding shortness of breath.  He is maintaining normal O2 sats on room air.  Continue to monitor.  Normal work of breathing on exam.    DVT Prophylaxis: Pneumatic Compression Devices  Code Status: Full Code     Expected Discharge Date: 11/27/2022               Mamie Estrada MD    Clinically Significant Risk Factors Present on Admission         # Hyponatremia: Lowest Na = 130 mmol/L (Ref range: 136-145) in last 2 days, will monitor as appropriate       # Coagulation Defect: INR = 1.16 (Ref range: 0.85 - 1.15) and/or PTT = 27 Seconds (Ref range: 22 - 38 Seconds), will monitor for bleeding    # Hypertension: home medication list includes antihypertensive(s)               Primary Care Physician   Physician No Ref-Primary    Chief Complaint   Speech difficulty    History is obtained from the patient    History of Present Illness   Cale Wagoner is a 89 year old male with a history of HTN, HLD, CAD, TIA who presented with speech difficulty.  Last known time well is somewhat unclear.  Apparently he did call a family member around 1030 this morning and language seem relatively intact.  He lives with his wife who has dementia  who could not confirm time of onset.  Family did express concern that he seems to have had increasing word finding difficulty in recent days.  Around 1:30 PM while talking with family on phone patient was noted to have difficulty speaking with word finding difficulty and slow speech.  Hence presented to ED.  Stroke code called in the ED.  No complaints of focal numbness/weakness/vision issues.  He has had prior TIA in 2014 when he experienced some left-sided weakness and difficulty speaking but brain MRI negative for stroke.  In the ED, /82, other vitals stable, afebrile.  Noted to have expressive aphasia.  EKG with NSR, no acute ischemic changes.  CT head, CTA head and neck showed multiple mild stenosis, no evidence of large vessel occlusion.  Approximately 60 to 70% stenosis involving proximal left ICA.  No acute bleed.  Hyperacute MRI done that showed subtle area of hyperintensity on DWI involving left paracentral lobule which may represent subtle acute infarct.  Labs mainly remarkable for sodium 130, at rest all in acceptable range.  Patient notes that speech difficulty has improved while in the ED and he was able to converse somewhat fluently during the encounter.    Past Medical History    I have reviewed this patient's medical history and updated it with pertinent information if needed.   Past Medical History:   Diagnosis Date     Benign essential hypertension      Bloating      CAD (coronary artery disease)     cardiac cath 2017:  MOSES to mid-distal Cx x2     Hyperlipidemia      Kidney stones      NSTEMI (non-ST elevated myocardial infarction) (H)      PFO (patent foramen ovale)      Skin cancer     unknown what type     TIA (transient ischemic attack)        Past Surgical History   I have reviewed this patient's surgical history and updated it with pertinent information if needed.  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 Taking?   Acetaminophen (TYLENOL PO)  Self Yes No   Sig: Take 500 mg by mouth every 8 hours as needed for mild pain or fever   FAMOTIDINE PO  Self Yes No   Sig: Take 20 mg by mouth 2 times daily   Multiple Vitamins-Minerals (MULTIVITAMIN & MINERAL PO)  Self Yes No   Sig: Take 1 tablet by mouth daily   Omega-3 Fatty Acids (OMEGA-3 FISH OIL PO)  Self Yes No   Sig: Take 2 g by mouth daily    aspirin 81 MG EC tablet  Self Yes No   Sig: Take 81 mg by mouth At Bedtime   atorvastatin (LIPITOR) 20 MG tablet   No No   Sig: Take 1 tablet (20 mg) by mouth daily   losartan (COZAAR) 100 MG tablet   No No   Sig: TAKE ONE TABLET (100 mg) BY MOUTH ONE TIME DAILY   metoprolol succinate ER (TOPROL-XL) 25 MG 24 hr tablet   No No   Sig: Take 2 tablets (50 mg) by mouth daily   nitroGLYcerin (NITROSTAT) 0.4 MG sublingual tablet  Self No No   Sig: For chest pain place 1 tablet under the tongue every 5 minutes for 3 doses. If symptoms persist 5 minutes after 1st dose call 911.   polyethylene glycol (MIRALAX) 17 GM/Dose powder   No No   Sig: Take 17 g by mouth 2 times daily      Facility-Administered Medications: None     Allergies   Allergies   Allergen Reactions     Penicillins        Social History   I have reviewed this patient's social history and updated it with pertinent information if needed. Cale Wagoner  reports that he quit smoking about 54 years ago. He has quit using smokeless tobacco. He reports current alcohol use. He reports that he does not use drugs.    Family History   I have reviewed this patient's family history and updated it with pertinent information if needed.   Family History   Problem Relation Age of Onset      Respiratory Father         pneumonia     Unknown/Adopted Father      Alzheimer Disease Brother      Alzheimer Disease Sister      Emphysema Sister      Unknown/Adopted Mother        Review of Systems   The 10 point Review of Systems is negative other than noted in the HPI or here.     Physical Exam   Temp: 97.4  F (36.3  C) Temp src: Temporal BP: (!) 152/82 Pulse: 85   Resp: 18 SpO2: 97 %      Vital Signs with Ranges  Temp:  [97.4  F (36.3  C)] 97.4  F (36.3  C)  Pulse:  [85] 85  Resp:  [18] 18  BP: (152)/(82) 152/82  SpO2:  [97 %] 97 %  0 lbs 0 oz    Constitutional: Awake, alert, cooperative, no apparent distress.  Eyes: no icterus, EOMs intact  HEENT: Moist mucous membranes  Respiratory: Clear to auscultation bilaterally, no crackles or wheezing.  Cardiovascular: Regular rate and rhythm, normal S1 and S2, and no murmur noted.  GI: Soft, non-distended, non-tender, normal bowel sounds.  Skin: No rashes, no cyanosis, no edema.  Musculoskeletal: No joint swelling, erythema or tenderness.  Neurologic: Alert, oriented and engages in appropriate conversation, somewhat slow speech but able to use words appropriately, no significant aphasia noted, moving all extremities, no facial asymmetry  Psychiatric: Calm, pleasant    Data   Data reviewed today:  I personally reviewed EKG with result as noted above, CT scan with result as noted above and MRI scan with result as noted above  Recent Labs   Lab 11/25/22  1445   WBC 6.5   HGB 13.5   MCV 94      INR 1.16*   *   POTASSIUM 4.2   CHLORIDE 96   CO2 29   BUN 19   CR 1.05   ANIONGAP 5   RUSTY 8.0*   GLC 83       Recent Results (from the past 24 hour(s))   CT Head w/o Contrast    Narrative    CT SCAN OF THE HEAD WITHOUT CONTRAST   11/25/2022 2:26 PM     HISTORY: Code Stroke, aphasia.    TECHNIQUE: Axial images of the head and coronal reformations without  IV contrast material. Radiation dose for this scan was reduced using  automated exposure control, adjustment of  the mA and/or kV according  to patient size, or iterative reconstruction technique.    COMPARISON: None.      Impression    IMPRESSION:  No evidence of hemorrhage. Volume loss and white matter  hypoattenuation likely represent chronic small vessel ischemic change.  No acute osseous abnormality.    Findings were discussed by phone between Dr. Cohen and Dr. Salvador at  2:28 PM on 11/25/2022.    KAYDEN COHEN MD         SYSTEM ID:  [id number]   CTA Head Neck w Contrast    Narrative    CT ANGIOGRAM OF THE HEAD AND NECK WITH CONTRAST November 25, 2022 2:27  PM     HISTORY: Code Stroke.     TECHNIQUE: CT angiography with an injection of 75mL Isovue-370 IV with  scans through the head and neck. Images were transferred to a separate  3-D workstation where multiplanar reformations and 3-D images were  created. Estimates of carotid stenoses are made relative to the distal  internal carotid artery diameters except as noted. Radiation dose for  this scan was reduced using automated exposure control, adjustment of  the mA and/or kV according to patient size, or iterative  reconstruction technique.    COMPARISON: CTA of the head and neck 10/23/2019.     CT ANGIOGRAM HEAD FINDINGS: No evidence of large vessel occlusion or  high-grade stenosis. Scattered atherosclerotic plaquing with multiple  mild stenoses. 4 mm saccular outpouching of the left internal carotid  artery at the expected location of a posterior communicating artery,  unchanged. Otherwise, no convincing aneurysms. Dural venous sinuses  are unremarkable. Incidental basilar artery fenestration.    CT ANGIOGRAM NECK FINDINGS: No evidence of large vessel occlusion.  Approximately 30-40% stenosis of the proximal right internal carotid  artery. Approximately 60-70% stenosis involving the proximal left  internal carotid artery. No evidence of dissection.      Impression    IMPRESSION:   CTA Head:   1. No evidence of large vessel occlusion or high-grade stenosis.  2.  Scattered atherosclerotic plaquing with multiple mild stenoses.  3. 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   4. No other convincing aneurysms.    CTA Neck:   1. No evidence of large vessel occlusion.  2. Approximately 60-70% stenosis involving the proximal left internal  carotid artery most likely related to mostly noncalcified plaque.  Carotid Doppler ultrasound correlation may be helpful for further  characterization.  3. Approximately 30-40% stenosis involving the proximal right internal  carotid artery.    KAYDEN CURTIS MD         SYSTEM ID:  [id number]   CT Head Perfusion w Contrast    Narrative    CT BRAIN PERFUSION 11/25/2022 3:05 PM    HISTORY: Code Stroke    TECHNIQUE: Time sequential axial CT images of the head were acquired  during the administration of 125ML isovue 370 IV. Color perfusion maps  of the brain were created from this time sequential axial source data.      Radiation dose for this scan was reduced using automated exposure  control, adjustment of the mA and/or kV according to patient size, or  iterative reconstruction technique.    COMPARISON: None.      Impression    IMPRESSION: Nondiagnostic attempt at perfusion imaging.    KAYDEN CURTIS MD         SYSTEM ID:  [id number]   MR Brain w/o Contrast    Narrative    MRI BRAIN WITHOUT CONTRAST  11/25/2022 3:36 PM    HISTORY: Hyperacute protocol    TECHNIQUE: Multiplanar, multisequence MRI of the brain without  gadolinium IV contrast material.      COMPARISON:  None.      Impression    IMPRESSION:   Subtle area of hyperintensity on diffusion weighted imaging involving  the left paracentral lobule which may represent subtle acute infarct.    Presumed artifactual subarachnoid FLAIR hyperintensity within the  basilar cisterns and left posterior fossa (further workup could be  pursued if indicated).    Volume loss and white matter T2  hyperintensities which likely  represent chronic small vessel ischemic change.    KAYDEN CURTIS MD         SYSTEM ID:  [id number]

## 2022-11-25 NOTE — PHARMACY-ADMISSION MEDICATION HISTORY
Pharmacy Medication History  Admission medication history interview status for the 11/25/2022  admission is complete. See EPIC admission navigator for prior to admission medications     Location of Interview: Patient room  Medication history sources: Patient    Significant changes made to the medication list:    Added- Metamucil   Changed- Metoprolol dose   Removed- fish oil     In the past week, patient estimated taking medication this percent of the time: greater than 90%    Additional medication history information:     Patient is a good historian.    Medication reconciliation completed by provider prior to medication history? Yes    Time spent in this activity: 10 minutes     Prior to Admission medications    Medication Sig Last Dose Taking? Auth Provider Long Term End Date   Acetaminophen (TYLENOL PO) Take 500 mg by mouth every 8 hours as needed for mild pain or fever  at PRN Yes Unknown, Entered By History     aspirin 81 MG EC tablet Take 81 mg by mouth At Bedtime 11/24/2022 at pm Yes Unknown, Entered By History     atorvastatin (LIPITOR) 20 MG tablet Take 1 tablet (20 mg) by mouth daily 11/25/2022 at am Yes Vanessa Lemos MD Yes    FAMOTIDINE PO Take 20 mg by mouth 2 times daily 11/25/2022 at am Yes Reported, Patient     losartan (COZAAR) 100 MG tablet TAKE ONE TABLET (100 mg) BY MOUTH ONE TIME DAILY 11/25/2022 at am Yes Az Taylor MD Yes    METAMUCIL FIBER PO Take 2 Tablespoonful by mouth every morning 11/25/2022 at am Yes Unknown, Entered By History     metoprolol succinate ER (TOPROL-XL) 25 MG 24 hr tablet Take 2 tablets (50 mg) by mouth daily  Patient taking differently: Take 25 mg by mouth daily 11/24/2022 at pm Yes Vanessa Lemos MD Yes    Multiple Vitamins-Minerals (MULTIVITAMIN & MINERAL PO) Take 1 tablet by mouth daily 11/25/2022 at am Yes Reported, Patient     nitroGLYcerin (NITROSTAT) 0.4 MG sublingual tablet For chest pain place 1 tablet under the tongue every 5 minutes for 3 doses. If  symptoms persist 5 minutes after 1st dose call 911.  at PRN Yes Juan Grubbs MD Yes    polyethylene glycol (MIRALAX) 17 GM/Dose powder Take 17 g by mouth 2 times daily  Patient taking differently: Take 17 g by mouth 2 times daily as needed  at PRN Yes Hemalatha Medel MD         The information provided in this note is only as accurate as the sources available at the time of update(s)   Elza David, KvngD

## 2022-11-25 NOTE — ED PROVIDER NOTES
History     Chief Complaint:  Aphasia     HPI:  The history is provided by the EMS personnel, the patient and a relative. The history is limited by the condition of the patient.      Cale Wagoner is a 89 year old male on 81 mg aspirin with history of CVA/TIA, NSTEMI, hypertension, hyperlipidemia who presents with expressive aphasia which was noted while he was speaking with his son over the phone approximately one hour ago. Initial reports indicate that the patient was last known well approximately 22 hours ago at 1630 yesterday. However, family members who arrived later note that his mental status was normal when speaking with him around 1030 (approximately 4 hours prior to arrival). His difficulty speaking during the phone call with his son approximately one hour ago prompted a 911 call. Upon arrival to Cale's home and en route to the ED, no additional neurological deficits were noted with the exception of some weakness in his right foot. No sensation changes or facial droop. Blood glucose was recorded at 98. He was given a 4 mg dose of Zofran as he was complaining of feeling nauseated. Within approximately 5 minutes of his arrival to the ED, his aphasia seemed to begin improving, though Cale presently endorses some ongoing speech difficulty. He also endorses experiencing a headache. No shortness of breath or abdominal pain. EMS notes that the patient has a history of strokes but is not currently anticoagulated and has some reported baseline speech difficulties. The patient lives at home with his wife.    Review of Systems   Unable to perform ROS: Mental status change     Allergies:  Penicillins    Medications:  Aspirin 81 mg  Lipitor  Famotidine  Losartan  Metoprolol  Nitroglycerin    Past Medical History:     GERD  CVA  Hyperlipidemia  Chronic pain of right knee  NSTEMI  Coronary artery disease  Hypertension  PFO  TIA  Kidney stones  Fecal impaction  Tremor  Skin cancer    Past Surgical History:     Appendectomy  Cardiac catheterization  Nose tumor excision  Cataract surgery  Hernia repair x2  Skin cancer excision from face    Family History:    Sister: Alzheimer disease, emphysema  Brother: Alzheimer disease    Social History:  The patient presents to the ED alone.  The patient presents to the ED via EMS.  The patient lives at home with his wife.     Physical Exam     Patient Vitals for the past 24 hrs:   BP Temp Temp src Pulse Resp SpO2   11/25/22 1430 (!) 152/82 97.4  F (36.3  C) Temporal 85 18 97 %     Physical Exam  General: Alert and cooperative with exam. Patient in mild distress. Normal mentation.  Head:  Scalp is NC/AT  Eyes:  No scleral icterus, PERRL, EOMI   ENT:  The external nose and ears are normal. The oropharynx is normal and without erythema; mucus membranes are moist. Uvula midline, no evidence of deep space infection.  Neck:  Normal range of motion without rigidity.  CV:  Regular rate and rhythm  Resp:  Breath sounds are clear bilaterally    Non-labored, no retractions or accessory muscle use  GI:  Abdomen is soft, no distension, no tenderness. No peritoneal signs  MS:  No lower extremity edema   Skin:  Warm and dry, No rash or lesions noted.  Neuro: Oriented x 3. No gross motor deficits.    Strength and sensation grossly intact in all 4 extremities.      Cranial nerves 2-12 intact.    GCS: 15    Expressive aphasia    Emergency Department Course     ECG  ECG results from 11/25/22   EKG 12-lead, tracing only     Value    Systolic Blood Pressure     Diastolic Blood Pressure     Ventricular Rate 70    Atrial Rate 70    NJ Interval 202    QRS Duration 84        QTc 401    P Axis 58    R AXIS 23    T Axis 85    Interpretation ECG      Sinus rhythm  Normal ECG  When compared with ECG of 13-APR-2021 19:57,  Nonspecific T wave abnormality now evident in Lateral leads  Confirmed by GENERATED REPORT, COMPUTER ([id number]),  Magalie Maguire ([id number]) on 11/25/2022 5:08:37 PM       Imaging:    MR  Brain w/o Contrast  Subtle area of hyperintensity on diffusion weighted imaging involving  the left paracentral lobule which may represent subtle acute infarct.  Presumed artifactual subarachnoid FLAIR hyperintensity within the  basilar cisterns and left posterior fossa (further workup could be  pursued if indicated).  Volume loss and white matter T2 hyperintensities which likely  represent chronic small vessel ischemic change.  Reading per radiology     CT Head Perfusion w Contrast  Nondiagnostic attempt at perfusion imaging.  Reading per radiology     CTA Head Neck w Contrast  CTA Head:   1. No evidence of large vessel occlusion or high-grade stenosis.  2. Scattered atherosclerotic plaquing with multiple mild stenoses.  3. 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   4. No other convincing aneurysms.  CTA Neck:   1. No evidence of large vessel occlusion.  2. Approximately 60-70% stenosis involving the proximal left internal  carotid artery most likely related to mostly noncalcified plaque.  Carotid Doppler ultrasound correlation may be helpful for further  characterization.  3. Approximately 30-40% stenosis involving the proximal right internal  carotid artery.  Reading per radiology     CT Head w/o Contrast  No evidence of hemorrhage. Volume loss and white matter  hypoattenuation likely represent chronic small vessel ischemic change.  No acute osseous abnormality.  Reading per radiology     Laboratory:  Labs Ordered and Resulted from Time of ED Arrival to Time of ED Departure   CBC WITH PLATELETS - Abnormal       Result Value    WBC Count 6.5      RBC Count 4.29 (*)     Hemoglobin 13.5      Hematocrit 40.4      MCV 94      MCH 31.5      MCHC 33.4      RDW 12.5      Platelet Count 160     INR - Abnormal    INR 1.16 (*)    BASIC METABOLIC PANEL - Abnormal    Sodium 130 (*)     Potassium 4.2      Chloride 96       Carbon Dioxide (CO2) 29      Anion Gap 5      Urea Nitrogen 19      Creatinine 1.05      Calcium 8.0 (*)     Glucose 83      GFR Estimate 68     LIPID PROFILE - Abnormal    Cholesterol 92      Triglycerides 144      Direct Measure HDL 38 (*)     LDL Cholesterol Calculated 25      Non HDL Cholesterol 54     PARTIAL THROMBOPLASTIN TIME - Normal    aPTT 27     TROPONIN I - Normal    Troponin I High Sensitivity 15     GLUCOSE MONITOR NURSING POCT   COVID-19 VIRUS (CORONAVIRUS) BY PCR   HEMOGLOBIN A1C      Emergency Department Course:       Reviewed:  I reviewed nursing notes, vitals, past medical history and Care Everywhere    Assessments/Consults:  1412 I obtained history and examined the patient as noted above.   1416 Tier II code stroke was called.  1427 I spoke with Faviola Colvin PA-C from the stroke neurology service regarding the patient's presentation and plan of care.   1428 I spoke with Dr. Cohen from radiology regarding the patient's imaging findings.   1440 I rechecked the patient and obtained additional history from the patient's family members. I also spoke with the stroke/neurology team.  1531 I spoke with Faviola Colvin PA-C from the stroke neurology service regarding the patient's plan of care. Code stroke is de-escalated. Plan to administer a full dose of aspirin and Plavix.  1600 I spoke with Dr. Estrada from the hospitalist service regarding the patient's presentation, findings here in the ED, and plan of care.    Interventions:  Medications   acetaminophen (TYLENOL) tablet 500 mg (has no administration in time range)   nitroGLYcerin (NITROSTAT) sublingual tablet 0.4 mg (has no administration in time range)   labetalol (NORMODYNE/TRANDATE) injection 10-20 mg (has no administration in time range)     Or   hydrALAZINE (APRESOLINE) injection 10-20 mg (has no administration in time range)   100mL Saline Flush (100 mLs Intravenous Given 11/25/22 1420)   iopamidol (ISOVUE-370) solution 125 mL (125 mLs  Intravenous Given 11/25/22 1420)   ondansetron (ZOFRAN) injection 4 mg (4 mg Intravenous Given 11/25/22 1548)   clopidogrel (PLAVIX) tablet 300 mg (300 mg Oral Given 11/25/22 1748)   aspirin (ASA) tablet 325 mg (325 mg Oral Given 11/25/22 1749)   ondansetron (ZOFRAN) injection 4 mg (4 mg Intravenous Given 11/25/22 1614)       Disposition:  The patient was admitted to the hospital under the care of Dr. Estrada.     Impression & Plan     CMS Diagnoses: The patient has stroke symptoms:      ED Stroke specific documentation           NIHSS PDF     Patient last known well time: 1030  ED Provider first to bedside at: 1412  CT Results received at: 1428    Thrombolytics:   Not given due to:   - unclear or unfavorable risk-benefit profile for extended window thrombolysis beyond the conventional 4.5 hour time window    If treating with thrombolytics: Ensure SBP<180 and DBP<105 prior to treatment with thrombolytics.  Administering thrombolytics after treatment with IV labetalol, hydralazine, or nicardipine is reasonable once BP control is established.    Endovascular Retrieval:  Not initiated due to absence of proximal vessel occlusion    National [employer] of Health Stroke Scale (Baseline)  Time Performed: 1412     Score    Level of consciousness: (0)   Alert, keenly responsive    LOC questions: (0)   Answers both questions correctly    LOC commands: (0)   Performs both tasks correctly    Best gaze: (0)   Normal    Visual: (0)   No visual loss    Facial palsy: (0)   Normal symmetrical movements    Motor arm (left): (0)   No drift    Motor arm (right): (0)   No drift    Motor leg (left): (0)   No drift    Motor leg (right): (0)   No drift    Limb ataxia: (0)   Absent    Sensory: (0)   Normal- no sensory loss    Best language: (2)   Severe aphasia    Dysarthria: (0)   Normal    Extinction and inattention: (0)   No abnormality        Total Score:  2      Stroke Mimics were considered (including migraine headache, seizure  disorder, hypoglycemia (or hyperglycemia), head or spinal trauma, CNS infection, Toxin ingestion and shock state (e.g. sepsis) .      Medical Decision Making:  Cale Wagoner is a 89 year old male who presents to the emergency department for evaluation of expressive aphasia. This is consistent withcerebrovascular accident. Imaging as noted above. Based on the time since symptom onset, they are not a candidate for TNK.  Differential considered for symptoms included CVA, TIA, dissection, cerebral tumor, migraine, infection, metabolic derangement, demyelination, etc. EKG shows no atrial fibrillation nor arrhythmia noted on telemetry monitoring.   Discussed with stroke neurology team.  Patient provided aspirin and Plavix load in the ED.  Admitted to hospitalist service.      Diagnosis:    ICD-10-CM    1. Cerebrovascular accident (CVA), unspecified mechanism (H)  I63.9         Scribe Disclosure:  Ana BOTELLO, am serving as a scribe at 2:13 PM on 11/25/2022 to document services personally performed by Matt Pringle DO based on my observations and the provider's statements to me.      Matt Pringle DO  11/25/22 1814

## 2022-11-25 NOTE — ED TRIAGE NOTES
Pt presents to ED via EMS with aphasia that was first noticed this afternoon.  Pt last known well yesterday 11/24/22.     Triage Assessment     Row Name 11/25/22 3244       Triage Assessment (Adult)    Airway WDL WDL       Respiratory WDL    Respiratory WDL WDL       Skin Circulation/Temperature WDL    Skin Circulation/Temperature WDL WDL       Cardiac WDL    Cardiac WDL WDL       Cognitive/Neuro/Behavioral WDL    Cognitive/Neuro/Behavioral WDL WDL

## 2022-11-25 NOTE — CONSULTS
[facility]    Stroke Consult Note    Reason for Consult: Stroke Code     Chief Complaint: Aphasia      HPI  Cale Ernesto Wagoner is a 89 year old male with PMH HTN, HLD, CAD. Family report prior stroke with some residual language difficulties though per my review of his chart he has only been diagnosed with TIAs (2014 presented with transient L sided weakness, L facial droop, difficlty speaking, brain MRI negative for stroke. 2019 presented with acute vertigo and nausea, brain MRI was negative and symptoms felt to likely be peripheral).    Patient was last seen well by family yesterday 11/24 at 1630. He called family today 11/25 at ~1330 and was noted to have difficulty speaking with word finding difficulty and slow speech. He lives with his wife who has dementia so she was not able to confirm a time of onset. He was able to indicate that symptoms started today but could not give a clear time of onset. In talking with family, he did speak on the phone with a family member around 1030 this morning and his language seemed to be relatively intact at that time though they also note that in recent days he has been exhibiting increased word finding issues compared to his baseline. Given uncertain time of symptom onset with last time seen well ~22 hours prior to presentation we opted to purse hyperacute MRI to assist with the decision for TNK administration, this did show a subtle area of hyperintensity on DWI imaging involving the left paracentral lobule which did have corresponding signal change on FLAIR.  Given these findings and the uncertainty regarding timing of symptom onset he was not felt to be a candidate for TNK administration.      Imaging Findings  Head CT: No evidence of hemorrhage. Volume loss and white matter  hypoattenuation likely represent chronic small vessel ischemic change.  No acute osseous abnormality.    CTA Head:   1. No evidence of large vessel occlusion or  "high-grade stenosis.  2. Scattered atherosclerotic plaquing with multiple mild stenoses.  3. 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   4. No other convincing aneurysms.     CTA Neck:   1. No evidence of large vessel occlusion.  2. Approximately 60-70% stenosis involving the proximal left internal  carotid artery most likely related to mostly noncalcified plaque.  Carotid Doppler ultrasound correlation may be helpful for further  characterization.  3. Approximately 30-40% stenosis involving the proximal right internal  carotid artery.    CTP: Nondiagnostic attempt at perfusion imaging.    Intravenous Thrombolysis  Not given due to:   - unclear or unfavorable risk-benefit profile for extended window thrombolysis beyond the conventional 4.5 hour time window, uncertainty of timing of onset and hyperacute MRI with matched DWI/FLAIR changes    Endovascular Treatment  Not initiated due to absence of proximal vessel occlusion    Impression   1. Acute ischemic stroke of L frontal due to undetermined etiology  - consider large artery athero given moderate to severe L ICA stenosis   2. Fluctuating altered mental status, not clear if this is all explained by the stroke. Recommend evaluation for other toxic/metabolic etiologies of encephalopathy. Pending course may consider EEG.    Recommendations  - Use orderset: \"Ischemic Stroke Routine Admission\" or \"Ischemic Stroke No Thrombolytics/No Thrombectomy ICU Admission\"  - Neurochecks and Vital Signs every 4 hours   - Permissive HTN; goal SBP < 220 mmHg  - Load with aspirin 325 mg and Plavix 300 mg x1  - Statin: pending LDL, continue PTA atorvastatin for now  - TTE  - Telemetry, EKG  - Bedside Glucose Monitoring  - Nutrition: nursing to document bedside swallow eval prior to oral intake  - A1c, Lipid Panel, Troponin x 3  - PT/OT/SLP  - Stroke Education  - Euthermia, " "Euglycemia  - work up for Infectious/metabolic causes of encephalopathy as per primary team  - Consider EEG  - Pending clinical course and work up may consider vascular surgery consult    Patient Follow-up     - final recommendation pending work-up    Thank you for this consult. We will continue to follow.      Faviola Colvin PA-C  Vascular Neurology    To page me or covering stroke neurology team member, click here: [facility]   Choose \"On Call\" tab at top, then search dropdown box for \"Neurology Adult\", select location, press Enter, then look for stroke/neuro ICU/telestroke.  ______________________________________________________    Clinically Significant Risk Factors Present on Admission         # Hyponatremia: Lowest Na = 130 mmol/L (Ref range: 136-145) in last 2 days, will monitor as appropriate       # Coagulation Defect: INR = 1.16 (Ref range: 0.85 - 1.15) and/or PTT = 27 Seconds (Ref range: 22 - 38 Seconds), will monitor for bleeding        Past Medical History   Past Medical History:   Diagnosis Date     Benign essential hypertension      Bloating      CAD (coronary artery disease)     cardiac cath 2017:  MOSES to mid-distal Cx x2     Hyperlipidemia      Kidney stones      NSTEMI (non-ST elevated myocardial infarction) (H)      PFO (patent foramen ovale)      Skin cancer     unknown what type     TIA (transient ischemic attack)      Past Surgical History   Past Surgical History:   Procedure Laterality Date     APPENDECTOMY       CARDIAC CATHERIZATION  09/15/2017     MOSES to mid-distal Cx x2     ENT SURGERY      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 Authorizing Provider   Acetaminophen (TYLENOL PO) Take 500 mg by mouth every 8 hours as needed for mild pain or fever    Unknown, Entered By History   aspirin 81 MG " EC tablet Take 81 mg by mouth At Bedtime    Unknown, Entered By History   atorvastatin (LIPITOR) 20 MG tablet Take 1 tablet (20 mg) by mouth daily 11/15/22   Vanessa Lemos MD   FAMOTIDINE PO Take 20 mg by mouth 2 times daily    Reported, Patient   losartan (COZAAR) 100 MG tablet TAKE ONE TABLET (100 mg) BY MOUTH ONE TIME DAILY 21   Az Taylor MD   metoprolol succinate ER (TOPROL-XL) 25 MG 24 hr tablet Take 2 tablets (50 mg) by mouth daily 21   Vanessa Lemos MD   Multiple Vitamins-Minerals (MULTIVITAMIN & MINERAL PO) Take 1 tablet by mouth daily    Reported, Patient   nitroGLYcerin (NITROSTAT) 0.4 MG sublingual tablet For chest pain place 1 tablet under the tongue every 5 minutes for 3 doses. If symptoms persist 5 minutes after 1st dose call 911. 17   Juan Grubbs MD   Omega-3 Fatty Acids (OMEGA-3 FISH OIL PO) Take 2 g by mouth daily     Unknown, Entered By History   polyethylene glycol (MIRALAX) 17 GM/Dose powder Take 17 g by mouth 2 times daily 21   Hemalatha Medel MD       Scheduled Meds    aspirin  325 mg Oral Once     clopidogrel  300 mg Oral Once       Infusion Meds      PRN Meds      Allergies   Allergies   Allergen Reactions     Penicillins      Family History   Family History   Problem Relation Age of Onset     Respiratory Father         pneumonia     Unknown/Adopted Father      Alzheimer Disease Brother      Alzheimer Disease Sister      Emphysema Sister      Unknown/Adopted Mother      Social History   Social History     Tobacco Use     Smoking status: Former     Types: Cigarettes     Quit date: 1968     Years since quittin.3     Smokeless tobacco: Former   Substance Use Topics     Alcohol use: Yes     Comment: Occ     Drug use: No       Review of Systems   The 10 point Review of Systems is negative other than noted in the HPI or here.        PHYSICAL EXAMINATION  Temp:  [97.4  F (36.3  C)] 97.4  F (36.3  C)  Pulse:  [85] 85  Resp:  [18] 18  BP: (152)/(82)  152/82  SpO2:  [97 %] 97 %     General Exam  General:  patient lying in bed without any acute distress    HEENT:  normocephalic/atraumatic  Pulmonary:  no respiratory distress  Extremities:  no edema  Skin:  intact     Neuro Exam  Mental Status:  alert, oriented x 3, follows commands, speech is halting and slow, word finding difficulty noted but able to name objects and respond to most questions with appropriate answers given time  Cranial Nerves:  visual fields intact, EOMI with normal smooth pursuit, facial sensation intact and symmetric, facial movements symmetric, hearing not formally tested but intact to conversation, palate elevation symmetric and uvula midline, shoulder shrug strong bilaterally, tongue protrusion midline, halting speech, intermittent unusual repetitive jaw movements  Motor:  normal muscle tone and bulk, able to move all limbs spontaneously, strength 5/5 throughout upper and lower extremities, no pronator drift, intermittently when evaluating speech patient develops repetitive chewing movements of the jaw, also noted some tremulous movements in the RUE  Sensory:  light touch sensation intact and symmetric throughout upper and lower extremities, no extinction on double simultaneous stimulation   Coordination:  normal finger-to-nose and heel-to-shin bilaterally without dysmetria, rapid alternating movements symmetric  Station/Gait:  deferred    Dysphagia Screen  Per Nursing - document swallow eval prior to oral intake    Stroke Scales    NIHSS  1a. Level of Consciousness 0-->Alert, keenly responsive   1b. LOC Questions 0-->Answers both questions correctly   1c. LOC Commands 0-->Performs both tasks correctly   2.   Best Gaze 0-->Normal   3.   Visual 0-->No visual loss   4.   Facial Palsy 0-->Normal symmetrical movements   5a. Motor Arm, Left 0-->No drift, limb holds 90 (or 45) degrees for full 10 secs   5b. Motor Arm, Right 0-->No drift, limb holds 90 (or 45) degrees for full 10 secs   6a. Motor  Leg, Left 0-->No drift, leg holds 30 degree position for full 5 secs   6b. Motor Leg, right 0-->No drift, leg holds 30 degree position for full 5 secs   7.   Limb Ataxia 0-->Absent   8.   Sensory 0-->Normal, no sensory loss   9.   Best Language 2-->Severe aphasia, all communication is through fragmentary expression, great need for inference, questioning, and guessing by the listener. Range of information that can be exchanged is limited, listener carries burden of. . . (see row details)   10. Dysarthria 0-->Normal   11. Extinction and Inattention  0-->No abnormality   Total 2 (11/25/22 1420)       Imaging  I personally reviewed all imaging; relevant findings per HPI.     Lab Results Data   CBC  Recent Labs   Lab 11/25/22  1445   WBC 6.5   RBC 4.29*   HGB 13.5   HCT 40.4        Basic Metabolic Panel    Recent Labs   Lab 11/25/22  1445   *   POTASSIUM 4.2   CHLORIDE 96   CO2 29   BUN 19   CR 1.05   GLC 83   RUSTY 8.0*     Liver Panel  No results for input(s): PROTTOTAL, ALBUMIN, BILITOTAL, ALKPHOS, AST, ALT, BILIDIRECT in the last 168 hours.  INR    Recent Labs   Lab Test 11/25/22  1445 10/23/19  0845 09/12/14  1510   INR 1.16* 1.06 1.01      Lipid Profile    Recent Labs   Lab Test 10/19/20  1628 08/14/19  0950 09/17/18  0900 03/15/16  0854 08/31/15  1303 04/15/15  0740 02/03/15  0846   CHOL 120 122 86   < > 175 219* 208*   HDL 49 46 40   < > 40* 42 40*   LDL 53 59 30   < > 98 155* 144*   TRIG 92 85 78   < > 184* 109 122   CHOLHDLRATIO  --   --   --   --  4.4 5.2* 5.2*    < > = values in this interval not displayed.     A1C  No lab results found.  Troponin    Recent Labs   Lab 11/25/22  1445   TROPONINIS 15          Stroke Code Data Data   Stroke Code Data  (for stroke code without tele)  Stroke code activated 11/25/22   1414   First stroke provider response 11/25/22   1416   Last known normal 11/24/22   1630   Time of discovery   (or onset of symptoms) 11/25/22   1330   Head CT read by Stroke Neuro  Dr/Provider 11/25/22   1426   Was stroke code de-escalated? Yes 11/25/22 1005            I personally examined and evaluated the patient today. At the time of my evaluation and management the patient was in critical condition today due to stroke code. I personally managed history, exam, imaging review, discussion with care team and family. Key decisions made today included: no TNK, hyperacute MRI. I spent a total of 90 minutes providing critical care services, evaluating the patient, directing care and reviewing laboratory values and radiologic reports.

## 2022-11-25 NOTE — ED NOTES
[facility]  ED Nurse Handoff Report    ED Chief complaint: Aphasia      ED Diagnosis:   Final diagnoses:   Cerebrovascular accident (CVA), unspecified mechanism (H)       Code Status: Full Code    Allergies:   Allergies   Allergen Reactions     Penicillins        Patient Story: Pt presents with expressive aphasia, but no other neurological symptoms. Last known well 11/25 at 1030.  MRI results with suggestion of stroke.   Focused Assessment:    MR Brain w/o Contrast   Final Result   IMPRESSION:    Subtle area of hyperintensity on diffusion weighted imaging involving   the left paracentral lobule which may represent subtle acute infarct.      Presumed artifactual subarachnoid FLAIR hyperintensity within the   basilar cisterns and left posterior fossa (further workup could be   pursued if indicated).      Volume loss and white matter T2 hyperintensities which likely   represent chronic small vessel ischemic change.      KAYDEN CURTIS MD            SYSTEM ID:  [id number]      CT Head Perfusion w Contrast   Final Result   IMPRESSION: Nondiagnostic attempt at perfusion imaging.      KAYDEN CURTIS MD            SYSTEM ID:  [id number]      CTA Head Neck w Contrast   Final Result   IMPRESSION:    CTA Head:    1. No evidence of large vessel occlusion or high-grade stenosis.   2. Scattered atherosclerotic plaquing with multiple mild stenoses.   3. Incidental 4 mm saccular outpouching of the left internal carotid   artery at the expected location of the posterior commuting artery,   likely aneurysm.    4. No other convincing aneurysms.      CTA Neck:    1. No evidence of large vessel occlusion.   2. Approximately 60-70% stenosis involving the proximal left internal   carotid artery most likely related to mostly noncalcified plaque.   Carotid Doppler ultrasound correlation may be helpful for further   characterization.   3. Approximately 30-40% stenosis involving the proximal right internal   carotid artery.       KAYDEN COHEN MD            SYSTEM ID:  [id number]      CT Head w/o Contrast   Final Result   IMPRESSION:   No evidence of hemorrhage. Volume loss and white matter   hypoattenuation likely represent chronic small vessel ischemic change.   No acute osseous abnormality.      Findings were discussed by phone between Dr. Cohen and Dr. Salvador at   2:28 PM on 11/25/2022.      KAYDEN COHEN MD            SYSTEM ID:  [id number]      Echocardiogram Complete - For age > 60 yrs    (Results Pending)     Abnormal Labs Resulted from Time of ED Arrival to Time of ED Departure   CBC WITH PLATELETS - Abnormal       Result Value    WBC Count 6.5      RBC Count 4.29 (*)     Hemoglobin 13.5      Hematocrit 40.4      MCV 94      MCH 31.5      MCHC 33.4      RDW 12.5      Platelet Count 160     INR - Abnormal    INR 1.16 (*)    BASIC METABOLIC PANEL - Abnormal    Sodium 130 (*)     Potassium 4.2      Chloride 96      Carbon Dioxide (CO2) 29      Anion Gap 5      Urea Nitrogen 19      Creatinine 1.05      Calcium 8.0 (*)     Glucose 83      GFR Estimate 68         Abnormal Labs Resulted from Time of ED Arrival to Time of ED Departure   CBC WITH PLATELETS - Abnormal       Result Value    WBC Count 6.5      RBC Count 4.29 (*)     Hemoglobin 13.5      Hematocrit 40.4      MCV 94      MCH 31.5      MCHC 33.4      RDW 12.5      Platelet Count 160     INR - Abnormal    INR 1.16 (*)    BASIC METABOLIC PANEL - Abnormal    Sodium 130 (*)     Potassium 4.2      Chloride 96      Carbon Dioxide (CO2) 29      Anion Gap 5      Urea Nitrogen 19      Creatinine 1.05      Calcium 8.0 (*)     Glucose 83      GFR Estimate 68           Treatments and/or interventions provided: Imaging, labs  Patient's response to treatments and/or interventions: N/A    To be done/followed up on inpatient unit:  See inpatient orders    Does this patient have any cognitive concerns?: A&O x4    Activity level - Baseline/Home:  Independent  Activity Level - Current:   Stand  with Assist    Patient's Preferred language: English   Needed?: No    Isolation: None  Infection: Not Applicable  Patient tested for COVID 19 prior to admission: YES  Bariatric?: No    Vital Signs:   Vitals:    11/25/22 1430   BP: (!) 152/82   Pulse: 85   Resp: 18   Temp: 97.4  F (36.3  C)   TempSrc: Temporal   SpO2: 97%       Cardiac Rhythm:     Was the PSS-3 completed:   Yes  What interventions are required if any?               Family Comments: Daughters and wife at bedside  OBS brochure/video discussed/provided to patient/family: N/A         For the majority of the shift this patient's behavior was Green.   Behavioral interventions performed were None.    ED NURSE PHONE NUMBER: [phone]

## 2022-11-26 NOTE — CONSULTS
[facility]    Stroke Consult Note    Reason for Consult:  stroke    Chief Complaint: Aphasia       HPI  Cale Ernesto Wagoner is a 89 year old male with PMH HTN, HLD, CAD. Family report prior stroke with some residual language difficulties though per my review of his chart he has only been diagnosed with TIAs (2014 presented with transient L sided weakness, L facial droop, difficlty speaking, brain MRI negative for stroke. 2019 presented with acute vertigo and nausea, brain MRI was negative and symptoms felt to likely be peripheral).     Presented 11/25 with new word finding difficulty and slow speech. Timing of onset was uncertain. Given uncertain time of symptom onset with last time seen well ~22 hours prior to presentation we opted to purse hyperacute MRI to assist with the decision for TNK administration, this did show a subtle area of hyperintensity on DWI imaging involving the left paracentral lobule which did have corresponding signal change on FLAIR.  Given these findings and the uncertainty regarding timing not felt to be a TNK candidate. CTA showed severe L ICA stenosis. Patient reportedly returned to his baseline by yesterday evening.    Stroke code called this morning when patient developed recurrence of word finding difficulty and nausea, also indicated some blurring of vision. Exam appeared similar to when he was evaluated yesterday in the ED. NIHSS 1 for mild aphasia. STAT head CT with no acute findings. CTA head/neck with stable L ICA stenosis, MCA M1 stenosis, L PCA stenosis.       Stroke Evaluation Summarized    MRI/Head CT Hyperacute MRI 11/25: left paracentral lobule infarct with matched FLAIR/DWI hyperintensity    Head CT 11/26: no acute findings, mild to moderate chronic SVID   Intracranial Vasculature CTA head: no LVO, R MCA M1 multifocal stenosis, L PCA stenosis   Cervical Vasculature CTA neck: L ICA non calcified plaque with 60-70% stenosism R ICA 30-40%  "stenosis     Echocardiogram pending   EKG/Telemetry Sinus rhythm   Other Testing Not Applicable      LDL  11/25/2022: 25 mg/dL   A1C  11/25/2022: 5.4 %   Troponin 11/26/2022: 15 ng/L       Impression   1. Acute ischemic stroke of L frontal due to large artery athero (severe L ICA stenosis)    2. Moderate to severe L ICA stenosis, non-calcified plaque - symptomatic with recurrent/stuttering left hemispheric symptoms    Recommendations  - Neurochecks and Vital Signs every 2 hours   - Permissive HTN; goal SBP < 220 mmHg  - Appreciate vascular surgery consult - planning for L CEA today  - Antiplatelet recommendation post surgery as per vascular surgery  - Statin: continue PTA atorvastatin 20 for now, consider decreasing dose as an outpatient given LDL <40 which is associated with increased risk for ICH, goal LDL 40-70  - TTE pending  - Telemetry, EKG  - Bedside Glucose Monitoring  - Nutrition: per nursing  - PT/OT/SLP  - Stroke Education  - Euthermia, Euglycemia      Patient Follow-up    - in the next 1-2 week(s) with PCP  - in 6-8 weeks with general neurology ([phone])    Thank you for this consult. We will continue to follow.     Faviola Colvin PA-C  Vascular Neurology    To page me or covering stroke neurology team member, click here: [facility]   Choose \"On Call\" tab at top, then search dropdown box for \"Neurology Adult\", select location, press Enter, then look for stroke/neuro ICU/telestroke.  _____________________________________________________    Clinically Significant Risk Factors Present on Admission         # Hyponatremia: Lowest Na = 130 mmol/L (Ref range: 136-145) in last 2 days, will monitor as appropriate       # Coagulation Defect: INR = 1.16 (Ref range: 0.85 - 1.15) and/or PTT = 27 Seconds (Ref range: 22 - 38 Seconds), will monitor for bleeding        Past Medical History   Past Medical History:   Diagnosis Date     Benign essential hypertension      Bloating      CAD (coronary artery disease)     cardiac " cath 2017:  MOSES to mid-distal Cx x2     Hyperlipidemia      Kidney stones      NSTEMI (non-ST elevated myocardial infarction) (H)      PFO (patent foramen ovale)      Skin cancer     unknown what type     TIA (transient ischemic attack)      Past Surgical History   Past Surgical History:   Procedure Laterality Date     APPENDECTOMY       CARDIAC CATHERIZATION  09/15/2017     MOSES to mid-distal Cx x2     ENT SURGERY      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 Authorizing Provider   Acetaminophen (TYLENOL PO) Take 500 mg by mouth every 8 hours as needed for mild pain or fever   Yes Unknown, Entered By History   aspirin 81 MG EC tablet Take 81 mg by mouth At Bedtime   Yes Unknown, Entered By History   atorvastatin (LIPITOR) 20 MG tablet Take 1 tablet (20 mg) by mouth daily 11/15/22  Yes Vanessa Lemos MD   FAMOTIDINE PO Take 20 mg by mouth 2 times daily   Yes Reported, Patient   losartan (COZAAR) 100 MG tablet TAKE ONE TABLET (100 mg) BY MOUTH ONE TIME DAILY 9/23/21  Yes Az Taylor MD   METAMUCIL FIBER PO Take 2 Tablespoonful by mouth every morning   Yes Unknown, Entered By History   metoprolol succinate ER (TOPROL-XL) 25 MG 24 hr tablet Take 2 tablets (50 mg) by mouth daily  Patient taking differently: Take 25 mg by mouth daily 12/14/21  Yes Vanessa Lemos MD   Multiple Vitamins-Minerals (MULTIVITAMIN & MINERAL PO) Take 1 tablet by mouth daily   Yes Reported, Patient   nitroGLYcerin (NITROSTAT) 0.4 MG sublingual tablet For chest pain place 1 tablet under the tongue every 5 minutes for 3 doses. If symptoms persist 5 minutes after 1st dose call 911. 9/16/17  Yes Juan Grubbs MD   polyethylene glycol (MIRALAX) 17 GM/Dose powder Take 17 g by mouth 2 times daily  Patient taking differently: Take 17 g  by mouth 2 times daily as needed 21  Yes Hemalatha Medel MD       Scheduled Meds    atorvastatin  20 mg Oral Daily     sodium chloride (PF)  3 mL Intracatheter Q8H     sodium chloride (PF)  3 mL Intracatheter Q8H     sodium chloride 0.9 %  100 mL Intravenous Once       Infusion Meds    heparin       - MEDICATION INSTRUCTIONS -       - MEDICATION INSTRUCTIONS -       sodium chloride 75 mL/hr at 22 1215     sodium chloride         PRN Meds  acetaminophen, labetalol **OR** hydrALAZINE, lidocaine 4%, lidocaine (buffered or not buffered), - MEDICATION INSTRUCTIONS -, - MEDICATION INSTRUCTIONS -, nitroGLYcerin, ondansetron **OR** ondansetron, sodium chloride (PF), sodium chloride (PF), sodium chloride    Allergies   Allergies   Allergen Reactions     Penicillins      Family History   Family History   Problem Relation Age of Onset     Respiratory Father         pneumonia     Unknown/Adopted Father      Alzheimer Disease Brother      Alzheimer Disease Sister      Emphysema Sister      Unknown/Adopted Mother      Social History   Social History     Tobacco Use     Smoking status: Former     Types: Cigarettes     Quit date: 1968     Years since quittin.3     Smokeless tobacco: Former   Substance Use Topics     Alcohol use: Yes     Comment: Occ     Drug use: No       Review of Systems   The 10 point Review of Systems is negative other than noted in the HPI or here.        PHYSICAL EXAMINATION   Temp:  [97.4  F (36.3  C)-97.6  F (36.4  C)] 97.6  F (36.4  C)  Pulse:  [69-92] 92  Resp:  [14-23] 16  BP: (108-156)/(53-92) 135/78  SpO2:  [93 %-98 %] 98 %    General Exam  General:  patient lying in bed without any acute distress    HEENT:  normocephalic/atraumatic  Pulmonary:  no respiratory distress  Skin:  intact     Neuro Exam  Mental Status:  alert, oriented x 3, follows commands, speech is halting and slow, word finding difficulty noted but able to name objects and respond to questions appropriately given  time, can repeat short phrases accurately  Cranial Nerves:  visual fields intact, PERRL, EOMI with normal smooth pursuit, facial sensation intact and symmetric, facial movements symmetric, hearing not formally tested but intact to conversation, palate elevation symmetric and uvula midline, tongue protrusion midline, halting speech  Motor:  normal muscle tone and bulk, no abnormal movements, able to move all limbs spontaneously, strength 5/5 throughout upper and lower extremities, no pronator drift  Sensory:  light touch sensation intact and symmetric throughout upper and lower extremities, no extinction on double simultaneous stimulation   Coordination:  normal finger-to-nose and heel-to-shin bilaterally without dysmetria, rapid alternating movements symmetric  Station/Gait:  deferred    Dysphagia Screen  Per Nursing    Stroke Scales    NIHSS  1a. Level of Consciousness 0-->Alert, keenly responsive   1b. LOC Questions 0-->Answers both questions correctly   1c. LOC Commands 0-->Performs both tasks correctly   2.   Best Gaze 0-->Normal   3.   Visual 0-->No visual loss   4.   Facial Palsy 0-->Normal symmetrical movements   5a. Motor Arm, Left 0-->No drift, limb holds 90 (or 45) degrees for full 10 secs   5b. Motor Arm, Right 0-->No drift, limb holds 90 (or 45) degrees for full 10 secs   6a. Motor Leg, Left 0-->No drift, leg holds 30 degree position for full 5 secs   6b. Motor Leg, right 0-->No drift, leg holds 30 degree position for full 5 secs   7.   Limb Ataxia 0-->Absent   8.   Sensory 0-->Normal, no sensory loss   9.   Best Language 1-->Mild-to-moderate aphasia, some obvious loss of fluency or facility of comprehension, without significant limitation on ideas expressed or form of expression. Reduction of speech and/or comprehension, however, makes conversation. . . (see row details)   10. Dysarthria 0-->Normal   11. Extinction and Inattention  0-->No abnormality   Total 1 (11/26/22 1241)       Imaging  I personally  reviewed all imaging; relevant findings per HPI.    Labs Data   CBC  Recent Labs   Lab 11/26/22  1130 11/26/22  1005 11/25/22  1445   WBC 5.2 5.0 6.5   RBC 4.51 4.72 4.29*   HGB 14.2 15.0 13.5   HCT 41.8 44.2 40.4    161 160     Basic Metabolic Panel   Recent Labs   Lab 11/26/22  0946 11/26/22  0927 11/25/22  1445   NA  --  132* 130*   POTASSIUM  --  4.5 4.2   CHLORIDE  --  96 96   CO2  --  28 29   BUN  --  17 19   CR  --  1.13 1.05   * 138* 83   RUSTY  --  8.6 8.0*     Liver Panel  No results for input(s): PROTTOTAL, ALBUMIN, BILITOTAL, ALKPHOS, AST, ALT, BILIDIRECT in the last 168 hours.  INR    Recent Labs   Lab Test 11/25/22  1445 10/23/19  0845 09/12/14  1510   INR 1.16* 1.06 1.01      Lipid Profile    Recent Labs   Lab Test 11/25/22  1445 10/19/20  1628 08/14/19  0950 03/15/16  0854 08/31/15  1303 04/15/15  0740 02/03/15  0846   CHOL 92 120 122   < > 175 219* 208*   HDL 38* 49 46   < > 40* 42 40*   LDL 25 53 59   < > 98 155* 144*   TRIG 144 92 85   < > 184* 109 122   CHOLHDLRATIO  --   --   --   --  4.4 5.2* 5.2*    < > = values in this interval not displayed.     A1C    Recent Labs   Lab Test 11/25/22  1445   A1C 5.4     Troponin    Recent Labs   Lab 11/26/22  0927 11/25/22  1445   TROPONINIS 15 15          Stroke Consult Data Data   This was a non-emergent, non-telestroke consult.  I have personally spent a total of 60 minutes providing care today, time spent in reviewing medical records and reviewing tests, examining the patient and obtaining history, coordination of care, and discussion with the patient and/or family regarding diagnostic results, prognosis, symptom management, risks and benefits of management options, and development of plan of care. Greater than 50% was spent in counseling and coordination of care.

## 2022-11-26 NOTE — PROGRESS NOTES
[facility]    Medicine Progress Note - Hospitalist Service    Date of Admission:  11/25/2022  Date of Service: 11/26/2022    Assessment & Plan      Cale Wagoner is a 89 year old male with a history of HTN, HLD, CAD, TIA who presented with speech difficulty.    Acute ischemic stroke, undetermined etiology  Expressive aphasia, improving  HTN  HLD  Prior history of TIA  Fluctuating altered mental//acute encephalopathy  Last known time well is somewhat unclear. He lives with his wife who has dementia who could not confirm time of onset.  Around 1:30 PM while talking with family on phone patient was noted to have difficulty speaking with word finding difficulty and slow speech.  Hence presented to ED.  Stroke code called in the ED.  No complaints of focal numbness/weakness/vision issues.  He has had prior TIA in 2014 when he experienced some left-sided weakness and difficulty speaking but brain MRI negative for stroke.  In the ED, /82, other vitals stable, afebrile.  Noted to have expressive aphasia.  EKG with NSR, no acute ischemic changes.  CT head, CTA head and neck showed multiple mild stenosis, no evidence of large vessel occlusion.  Approximately 60 to 70% stenosis involving proximal left ICA.  No acute bleed.  Hyperacute MRI done that showed subtle area of hyperintensity on DWI involving left paracentral lobule which may represent subtle acute infarct.  Labs mainly remarkable for sodium 130, at rest all in acceptable range.  Patient notes that speech difficulty has improved while in the ED and he was able to converse somewhat fluently during the encounter.  Not a candidate for any neuro intervention due to uncertainty of timing of symptom onset.  11/26 -> Stroke code called when patient developed recurrence of word finding difficulty and nausea, also indicated some blurring of vision. CTA head/neck with stable L ICA stenosis, MCA M1 stenosis, L PCA stenosis.   Plan:  -  Consulted to stroke neurology  - Neurochecks and Vital Signs every 2 hours   - Permissive HTN; goal SBP < 220 mmHg  -Vascular surgery consulted -> Due to this recurrent episode we felt that we should move up the surgery and do emergently today  -Allow for permissive hypertension, hold PTA losartan, metoprolol.  As needed labetalol/hydralazine available for SBP greater than 220.  -Telemetry monitoring  -TTE pending  -Loaded with aspirin 325 Mg and Plavix 300 Mg x1 in ED -> Antiplatelet recommendation post surgery as per vascular surgery  -TC 92, LDL 25, HDL 38.  Continue PTA atorvastatin.  -HbA1c 5.4.  -PT/OT/SLP.    CAD  Drug-eluting stent placement x2 to mid and distal circumflex 2017  No recent chest pain.  Plan:  - Was previously on baby ASA -> Antiplatelet recommendation post surgery as per vascular surgery post CEA    Shortness of breath  Family noted that patient appeared to have shortness of breath over the last several weeks.  Patient himself does not offer any complaints regarding shortness of breath.  He is maintaining normal O2 sats on room air.  Continue to monitor.  Normal work of breathing on exam.       Diet: Regular Diet Adult  NPO per Anesthesia Guidelines for Procedure/Surgery Except for: Meds  NPO per Anesthesia Guidelines for Procedure/Surgery Except for: Meds    DVT Prophylaxis: Pneumatic Compression Devices  Linder Catheter: Not present  Central Lines: None  Cardiac Monitoring: ACTIVE order. Indication: Stroke, acute (48 hours)  Code Status: Full Code      Disposition Plan      Expected Discharge Date: 11/28/2022,  3:00 PM              The patient's care was discussed with the Bedside Nurse and Patient.    Donato Craig MD  Hospitalist Service  [facility]  Securely message with the Vocera Web Console (learn more here)  Text page via [employer] Paging/Directory         Clinically Significant Risk Factors Present on Admission         # Hyponatremia: Lowest Na = 130 mmol/L (Ref  range: 136-145) in last 2 days, will monitor as appropriate       # Coagulation Defect: INR = 1.16 (Ref range: 0.85 - 1.15) and/or PTT = 27 Seconds (Ref range: 22 - 38 Seconds), will monitor for bleeding    # Hypertension: home medication list includes antihypertensive(s)             ______________________________________________________________________    Interval History     RRT this AM when patient developed recurrence of word finding difficulty and nausea, also indicated some blurring of vision.  No CP/SOB  No nausea / vomiting   No fevers or chills    Data reviewed today: I reviewed all medications, new labs and imaging results over the last 24 hours. I personally reviewed no images or EKG's today.    Physical Exam   Vital Signs: Temp: 97.6  F (36.4  C) Temp src: Oral BP: (!) 150/78 Pulse: 77   Resp: 12 SpO2: 94 % O2 Device: None (Room air)    Weight: 170 lbs 0 oz    Constitutional: awake, alert, cooperative, no apparent distress.   Respiratory: CTABL   Cardiovascular: RRR with no m/r/g   GI: Normal bowel sounds, soft, non-distended, non-tender. Skin: normal skin color, texture, turgor   Musculoskeletal: There is no redness, warmth, or swelling of the joints. Full range of motion noted.   Neurologic: Awake, alert, oriented to name, place and time. Cranial nerves II-XII are grossly intact. Motor is 5 out of 5 bilaterally. Sensory is intact. Speech is slowed and aphasia present.   Neuropsychiatric: normal mood and affect    Data   Recent Labs   Lab 11/26/22  1130 11/26/22  1005 11/26/22  0946 11/26/22  0927 11/25/22  1445   WBC 5.2 5.0  --   --  6.5   HGB 14.2 15.0  --   --  13.5   MCV 93 94  --   --  94    161  --   --  160   INR  --   --   --   --  1.16*   NA  --   --   --  132* 130*   POTASSIUM  --   --   --  4.5 4.2   CHLORIDE  --   --   --  96 96   CO2  --   --   --  28 29   BUN  --   --   --  17 19   CR  --   --   --  1.13 1.05   ANIONGAP  --   --   --  8 5   RUSTY  --   --   --  8.6 8.0*   GLC  --    --  128* 138* 83     Recent Results (from the past 24 hour(s))   MR Brain w/o Contrast    Narrative    MRI BRAIN WITHOUT CONTRAST  11/25/2022 3:36 PM    HISTORY: Hyperacute protocol    TECHNIQUE: Multiplanar, multisequence MRI of the brain without  gadolinium IV contrast material.      COMPARISON:  None.      Impression    IMPRESSION:   Subtle area of hyperintensity on diffusion weighted imaging involving  the left paracentral lobule which may represent subtle acute infarct.    Presumed artifactual subarachnoid FLAIR hyperintensity within the  basilar cisterns and left posterior fossa (further workup could be  pursued if indicated).    Volume loss and white matter T2 hyperintensities which likely  represent chronic small vessel ischemic change.    KAYDEN CURTIS MD         SYSTEM ID:  [id number]   CT Head w/o Contrast    Narrative    EXAM: CTA HEAD NECK WITH CONTRAST  LOCATION: [facility]  DATE/TIME: 11/26/2022, 10:30 AM    INDICATION: Worsening stroke deficits. Evaluate LVO.  COMPARISON: CTA [place] [facility] [place] and neck 11/25/2022.  CONTRAST: 75 mL Isovue 370.  TECHNIQUE: Head and neck CT angiogram with IV contrast. Noncontrast head CT followed by axial helical CT images of the head and neck vessels obtained during the arterial phase of intravenous contrast administration. Axial 2D reconstructed images and   multiplanar 3D MIP reconstructed images of the head and neck vessels were performed by the technologist. Dose reduction techniques were used. All stenosis measurements made according to NASCET criteria unless otherwise specified.    FINDINGS:   NONCONTRAST HEAD CT:   INTRACRANIAL CONTENTS: No finding for intracranial hemorrhage or mass or convincing finding for acute infarct. Mild to moderate prominence of the lateral and third ventricles and sulci. Mild presumed sequelae of chronic microvascular ischemic change. No   transcortical areas of low-attenuation change. Cerebellar tonsils are  normally positioned. Sella is unremarkable for technique. Corpus callosum is normally formed.    VISUALIZED ORBITS/SINUSES/MASTOIDS: Prior cataract surgeries. No paranasal sinus mucosal disease. No middle ear or mastoid effusion.    BONES/SOFT TISSUES: Calvarium is intact, without suspicious lytic or blastic foci. Degenerative changes both temporomandibular joints.    HEAD CTA:  ANTERIOR CIRCULATION: Mild atherosclerotic plaque both carotid siphons. Mild associated luminal narrowing. Small right posterior communicating artery infundibulum. There is stable mild to moderate narrowing of the proximal and mid to distal right M1   segment of the middle cerebral artery. More distal right MCA is unremarkable. Left MCA is unremarkable. Both anterior cerebral arteries are normal in appearance.    POSTERIOR CIRCULATION: Left vertebral artery is dominant. Both intracranial vertebral arteries and basilar artery are normal in appearance. Small caliber right posterior communicating artery. Left posterior communicating artery not identified. Short   segment mild to moderate stenosis is seen within the P3 segment left posterior cerebral artery, stable.    DURAL VENOUS SINUSES: No findings for dural venous sinus thrombosis.    NECK CTA:  RIGHT CAROTID: Moderate calcified and noncalcified plaque at the level of the right carotid bulb/bifurcation. Mild associated luminal narrowing, less than 40% by modified NASCET criteria.    LEFT CAROTID: Heavy predominantly noncalcified plaque is seen at the level of the left carotid bulb/bifurcation with severe associated luminal narrowing, approximately 60-70% by modified NASCET criteria. Mild left-sided narrowing of the left common   carotid artery.    VERTEBRAL ARTERIES: Left vertebral artery is minimally larger than the right. No findings for dissection or significant stenosis.    AORTIC ARCH: Mild left-sided plaque within the visualized aortic arch and proximal great vessels.    NONVASCULAR  STRUCTURES: Visualized lung apices are clear. Visualized osseous structures are without suspicious lytic or blastic foci.      Impression    IMPRESSION:   HEAD CT:  1.  No finding for intracranial hemorrhage, mass, or acute infarct.    2.  Mild to moderate volume loss and mild presumed sequelae of chronic microvascular ischemic change, similar to prior.    HEAD CTA:   1.  No vascular cutoff, aneurysm, or flow-limiting stenosis.    2.  Stable areas of narrowing are seen within the proximal and mid to distal right M1 segment and left posterior cerebral artery.    NECK CTA:  1.  Extensive noncalcified atherosclerotic plaque is seen at the left carotid bulb/bifurcation with approximately 60-70% stenosis by modified NASCET criteria.    2.  Moderate atherosclerotic plaque right carotid bulb/bifurcation with approximately 40% or less stenosis by modified NASCET criteria.    Hospital called at 10:26 AM. Results of the noncontrast and contrast-enhanced portions of the exam were communicated to GUERLINE Colvin at 11:02 AM.     CTA Head Neck with Contrast    Narrative    EXAM: CTA HEAD NECK WITH CONTRAST  LOCATION: [facility]  DATE/TIME: 11/26/2022, 10:30 AM    INDICATION: Worsening stroke deficits. Evaluate LVO.  COMPARISON: CTA [place] [facility] [place] and neck 11/25/2022.  CONTRAST: 75 mL Isovue 370.  TECHNIQUE: Head and neck CT angiogram with IV contrast. Noncontrast head CT followed by axial helical CT images of the head and neck vessels obtained during the arterial phase of intravenous contrast administration. Axial 2D reconstructed images and   multiplanar 3D MIP reconstructed images of the head and neck vessels were performed by the technologist. Dose reduction techniques were used. All stenosis measurements made according to NASCET criteria unless otherwise specified.    FINDINGS:   NONCONTRAST HEAD CT:   INTRACRANIAL CONTENTS: No finding for intracranial hemorrhage or mass or convincing finding for  acute infarct. Mild to moderate prominence of the lateral and third ventricles and sulci. Mild presumed sequelae of chronic microvascular ischemic change. No   transcortical areas of low-attenuation change. Cerebellar tonsils are normally positioned. Sella is unremarkable for technique. Corpus callosum is normally formed.    VISUALIZED ORBITS/SINUSES/MASTOIDS: Prior cataract surgeries. No paranasal sinus mucosal disease. No middle ear or mastoid effusion.    BONES/SOFT TISSUES: Calvarium is intact, without suspicious lytic or blastic foci. Degenerative changes both temporomandibular joints.    HEAD CTA:  ANTERIOR CIRCULATION: Mild atherosclerotic plaque both carotid siphons. Mild associated luminal narrowing. Small right posterior communicating artery infundibulum. There is stable mild to moderate narrowing of the proximal and mid to distal right M1   segment of the middle cerebral artery. More distal right MCA is unremarkable. Left MCA is unremarkable. Both anterior cerebral arteries are normal in appearance.    POSTERIOR CIRCULATION: Left vertebral artery is dominant. Both intracranial vertebral arteries and basilar artery are normal in appearance. Small caliber right posterior communicating artery. Left posterior communicating artery not identified. Short   segment mild to moderate stenosis is seen within the P3 segment left posterior cerebral artery, stable.    DURAL VENOUS SINUSES: No findings for dural venous sinus thrombosis.    NECK CTA:  RIGHT CAROTID: Moderate calcified and noncalcified plaque at the level of the right carotid bulb/bifurcation. Mild associated luminal narrowing, less than 40% by modified NASCET criteria.    LEFT CAROTID: Heavy predominantly noncalcified plaque is seen at the level of the left carotid bulb/bifurcation with severe associated luminal narrowing, approximately 60-70% by modified NASCET criteria. Mild left-sided narrowing of the left common   carotid artery.    VERTEBRAL  ARTERIES: Left vertebral artery is minimally larger than the right. No findings for dissection or significant stenosis.    AORTIC ARCH: Mild left-sided plaque within the visualized aortic arch and proximal great vessels.    NONVASCULAR STRUCTURES: Visualized lung apices are clear. Visualized osseous structures are without suspicious lytic or blastic foci.      Impression    IMPRESSION:   HEAD CT:  1.  No finding for intracranial hemorrhage, mass, or acute infarct.    2.  Mild to moderate volume loss and mild presumed sequelae of chronic microvascular ischemic change, similar to prior.    HEAD CTA:   1.  No vascular cutoff, aneurysm, or flow-limiting stenosis.    2.  Stable areas of narrowing are seen within the proximal and mid to distal right M1 segment and left posterior cerebral artery.    NECK CTA:  1.  Extensive noncalcified atherosclerotic plaque is seen at the left carotid bulb/bifurcation with approximately 60-70% stenosis by modified NASCET criteria.    2.  Moderate atherosclerotic plaque right carotid bulb/bifurcation with approximately 40% or less stenosis by modified NASCET criteria.    Hospital called at 10:26 AM. Results of the noncontrast and contrast-enhanced portions of the exam were communicated to GUERLINE Colvin at 11:02 AM.       Medications     heparin       lactated ringers 25 mL/hr at 11/26/22 1510     - MEDICATION INSTRUCTIONS -       - MEDICATION INSTRUCTIONS -       sodium chloride 75 mL/hr at 11/26/22 1215     sodium chloride         [Auto Hold] atorvastatin  20 mg Oral or Feeding Tube Daily     clindamycin  900 mg Intravenous Pre-Op/Pre-procedure x 1 dose     [Auto Hold] sodium chloride (PF)  3 mL Intracatheter Q8H     [Auto Hold] sodium chloride (PF)  3 mL Intracatheter Q8H     [Auto Hold] sodium chloride 0.9 %  100 mL Intravenous Once

## 2022-11-26 NOTE — ANESTHESIA PROCEDURE NOTES
Arterial Line Procedure Note    Pre-Procedure   Staff -        Anesthesiologist:  Jenny Simmons MD       Performed By: anesthesiologist       Location: pre-op       Pre-Anesthestic Checklist: patient identified, IV checked, site marked, risks and benefits discussed, informed consent, monitors and equipment checked, pre-op evaluation and at physician/surgeon's request  Timeout:       Correct Patient: Yes        Correct Procedure: Yes        Correct Site: Yes        Correct Position: Yes   Line Placement:   This line was placed Pre Induction starting at 11/26/2022 3:05 PM and ending at 11/26/2022 3:10 PM  Procedure   Procedure: arterial line       Laterality: left       Insertion Site: radial.  Sterile Prep        All elements of maximal sterile barrier technique followed       Patient Prep/Sterile Barriers: hand hygiene, sterile gloves, hat, mask       Skin prep: Chloraprep  Insertion/Injection        Technique: Seldinger Technique and ultrasound guided (no image saved)        1. Ultrasound was used to evaluate the access site.       2. Artery evaluated via ultrasound for patency/adequacy.       3. Using real-time ultrasound the needle/catheter was observed entering the artery/vein.       5. The visualized structures were anatomically normal.       6. There were no apparent abnormal pathologic findings.       Catheter Type/Size: 20 gauge, 1.75 in/4.5 cm quick cath (integral wire)  Narrative        Tegaderm dressing used.       Arterial waveform: Yes        IBP within 10% of NIBP: Yes

## 2022-11-26 NOTE — CONSULTS
VASCULAR SURGERY    We saw Cale Wagoner in the ED this morning with vent overnight.  Presented with acute onset of expressive aphasia that he noted when he awoke from a nap at approximately 1100 hrs. yesterday.  Associate with the no other neurological symptoms.  Presented to ED yesterday afternoon noted by 1800 hrs. that the speech was back to normal.    He had a previous episode in 2014 with expressive aphasia that lasted for a longer period of time but also associated with left side weakness that completely resolved.    History of coronary stent x2 many years ago.  Active at home and has no chest pain no shortness of breath.    Non-smoker.  Hyperlipidemia on statin with LDL =25.  No diabetes with normal A1c.  On chronic aspirin.    Swelling with PCN while in [employer]    Exam: Alert and appropriate.  Speech is normal.   Vital signs normal   No neck tenderness with full range of motion   Chest= clear   +3 DP/PT pulses bilaterally   Motor function is normal.      MRI with acute subtle changes left hemisphere.    CTA with approximately 50 to 60% soft plaque at the proximal left ICA with possible ulceration.  Calcified plaque with 30-40% stenosis of right CEA.    EKG with NSR.  Echo is pending.    IMPRESSION: Acute left hemispheric infarct with expressive aphasia which is resolved.  Suspect this is due to the left carotid stenosis which is likely ulcerated.  Excellent risk factor control and thus nothing can be done further from the standpoint.  Would recommend left CEA which is scheduled for tomorrow morning.  Risk benefits reviewed with patient.  Trying to get hold of his daughter but unable to reach her at this point.    45 minutes with patient today.       Trev Mensah MD

## 2022-11-26 NOTE — PROGRESS NOTES
RECEIVING UNIT ED HANDOFF REVIEW    ED Nurse Handoff Report was reviewed by: Ernestina Ibrahim RN on November 26, 2022 at 9:06 AM

## 2022-11-26 NOTE — PLAN OF CARE
SLP: Orders received and chart reviewed patient having waxing and waning garble speech. Currently NPO for possible emergent CEA today. Patient in surgery will reschedule for 11/27/22.

## 2022-11-26 NOTE — ANESTHESIA PREPROCEDURE EVALUATION
Anesthesia Pre-Procedure Evaluation    Patient: Cale Wagoner   MRN: [id number] : 1933        Procedure : Procedure(s):  LEFT CAROTID ENDARTERECTOMY WITH ELECTROENCEPHALOGRAM MONITORING          Past Medical History:   Diagnosis Date     Benign essential hypertension      Bloating      CAD (coronary artery disease)     cardiac cath 2017:  MOSES to mid-distal Cx x2     Hyperlipidemia      Kidney stones      NSTEMI (non-ST elevated myocardial infarction) (H)      PFO (patent foramen ovale)      Skin cancer     unknown what type     TIA (transient ischemic attack)       Past Surgical History:   Procedure Laterality Date     APPENDECTOMY       CARDIAC CATHERIZATION  09/15/2017     MOSES to mid-distal Cx x2     ENT SURGERY      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     EYE SURGERY      cataracts     HERNIA REPAIR      twice     SOFT TISSUE SURGERY      skin cancer right temple      Allergies   Allergen Reactions     Penicillins       Social History     Tobacco Use     Smoking status: Former     Types: Cigarettes     Quit date: 1968     Years since quittin.3     Smokeless tobacco: Former   Substance Use Topics     Alcohol use: Yes     Comment: Occ      Wt Readings from Last 1 Encounters:   22 77.1 kg (170 lb)        Prior to Admission medications    Medication Sig Start Date End Date Taking? Authorizing Provider   Acetaminophen (TYLENOL PO) Take 500 mg by mouth every 8 hours as needed for mild pain or fever   Yes Unknown, Entered By History   aspirin 81 MG EC tablet Take 81 mg by mouth At Bedtime   Yes Unknown, Entered By History   atorvastatin (LIPITOR) 20 MG tablet Take 1 tablet (20 mg) by mouth daily 11/15/22  Yes Vanessa Lemos MD   FAMOTIDINE PO Take 20 mg by mouth 2 times daily   Yes Reported, Patient   losartan (COZAAR) 100 MG tablet TAKE ONE TABLET (100 mg) BY MOUTH ONE TIME DAILY 21  Yes Az Taylor MD   METAMUCIL FIBER PO Take 2 Tablespoonful by mouth every morning   Yes Unknown,  Entered By History   metoprolol succinate ER (TOPROL-XL) 25 MG 24 hr tablet Take 2 tablets (50 mg) by mouth daily  Patient taking differently: Take 25 mg by mouth daily 12/14/21  Yes Vanessa Lemos MD   Multiple Vitamins-Minerals (MULTIVITAMIN & MINERAL PO) Take 1 tablet by mouth daily   Yes Reported, Patient   nitroGLYcerin (NITROSTAT) 0.4 MG sublingual tablet For chest pain place 1 tablet under the tongue every 5 minutes for 3 doses. If symptoms persist 5 minutes after 1st dose call 911. 9/16/17  Yes Juan Grubbs MD   polyethylene glycol (MIRALAX) 17 GM/Dose powder Take 17 g by mouth 2 times daily  Patient taking differently: Take 17 g by mouth 2 times daily as needed 4/16/21  Yes Hemalatha Medel MD     ECG 11/25/22: Sinus rhythm   Normal ECG  ECHO 12/1/21: Interpretation Summary     The left ventricle is normal in size.  Left ventricular systolic function is normal.  The visual ejection fraction is 60-65%.  No regional wall motion abnormalities noted.  The right ventricle is normal in size and function.  No significant valve disease.     Compared to the previous study dated 9/16/2017, inferolateral hypokinesis has  resolved, LVEF has improved from 55% to 65%.  ______________________________________________________________________________  Left Ventricle  The left ventricle is normal in size. There is normal left ventricular wall  thickness. Left ventricular systolic function is normal. The visual ejection  fraction is 60-65%. Grade I or early diastolic dysfunction. No regional wall  motion abnormalities noted.     Right Ventricle  The right ventricle is normal in size and function.     Atria  The left atrium is moderately dilated. Right atrial size is normal. There is  no color Doppler evidence of an atrial shunt.     Mitral Valve  The mitral valve leaflets appear normal. There is no evidence of stenosis,  fluttering, or prolapse. There is trace mitral regurgitation.     Tricuspid Valve  Normal tricuspid  valve. There is trace tricuspid regurgitation. Right  ventricular systolic pressure could not be approximated due to inadequate  tricuspid regurgitation.     Aortic Valve  The aortic valve is trileaflet. No aortic regurgitation is present. No aortic  stenosis is present.     Pulmonic Valve  The pulmonic valve is not well seen, but is grossly normal. This degree of  valvular regurgitation is within normal limits.     Vessels  The aortic root is normal size. Normal size ascending aorta. The inferior vena  cava is normal.     Pericardium  There is no pericardial effusion.     Rhythm  Sinus rhythm was noted.     Anesthesia Evaluation   Pt has had prior anesthetic. Type: General.    No history of anesthetic complications       ROS/MED HX  ENT/Pulmonary:     (+) tobacco use, Past use,  (-) asthma and sleep apnea   Neurologic:     (+) CVA,  (-) no seizures and migraines   Cardiovascular:     (+) Dyslipidemia hypertension--CAD --- (-) ROACH, arrhythmias and valvular problems/murmurs   METS/Exercise Tolerance: 3 - Able to walk 1-2 blocks without stopping    Hematologic:    (-) history of blood clots and anemia   Musculoskeletal:    (-) arthritis   GI/Hepatic:     (+) GERD,  (-) liver disease   Renal/Genitourinary:     (+) renal disease, type: CRI,  (-) nephrolithiasis   Endo:    (-) Type I DM, Type II DM, thyroid disease and obesity   Psychiatric/Substance Use:    (-) psychiatric history   Infectious Disease:    (-) Recent Fever   Malignancy:       Other:            Physical Exam    Airway        Mallampati: III   TM distance: > 3 FB   Neck ROM: full   Mouth opening: > 3 cm    Respiratory Devices and Support         Dental       (+) chipped      Cardiovascular   cardiovascular exam normal          Pulmonary   pulmonary exam normal                OUTSIDE LABS:  CBC:   Lab Results   Component Value Date    WBC 5.2 11/26/2022    WBC 5.0 11/26/2022    HGB 14.2 11/26/2022    HGB 15.0 11/26/2022    HCT 41.8 11/26/2022    HCT 44.2  11/26/2022     11/26/2022     11/26/2022     BMP:   Lab Results   Component Value Date     (L) 11/26/2022     (L) 11/25/2022    POTASSIUM 4.5 11/26/2022    POTASSIUM 4.2 11/25/2022    CHLORIDE 96 11/26/2022    CHLORIDE 96 11/25/2022    CO2 28 11/26/2022    CO2 29 11/25/2022    BUN 17 11/26/2022    BUN 19 11/25/2022    CR 1.13 11/26/2022    CR 1.05 11/25/2022     (H) 11/26/2022     (H) 11/26/2022     COAGS:   Lab Results   Component Value Date    PTT 27 11/25/2022    INR 1.16 (H) 11/25/2022     POC:   Lab Results   Component Value Date     (H) 10/23/2019     HEPATIC:   Lab Results   Component Value Date    ALBUMIN 3.0 (L) 04/15/2021    PROTTOTAL 6.5 (L) 04/15/2021    ALT 23 04/15/2021    AST 19 04/15/2021    ALKPHOS 46 04/15/2021    BILITOTAL 1.6 (H) 04/15/2021     OTHER:   Lab Results   Component Value Date    LACT 1.0 04/13/2021    A1C 5.4 11/25/2022    RUSTY 8.6 11/26/2022    PHOS 3.3 04/07/2011    LIPASE 240 04/13/2021    TSH 2.27 09/13/2014    CRP 1.7 09/13/2014    SED 7 04/11/2018       Anesthesia Plan    ASA Status:  4   NPO Status:  NPO Appropriate    Anesthesia Type: General.     - Airway: ETT   Induction: Propofol.   Maintenance: Balanced.   Techniques and Equipment:     - Airway: Video-Laryngoscope     - Lines/Monitors: 2nd IV, Arterial Line     - Drips/Meds: Phenylephrine     Consents    Anesthesia Plan(s) and associated risks, benefits, and realistic alternatives discussed. Questions answered and patient/representative(s) expressed understanding.    - Discussed:     - Discussed with:  Patient         Postoperative Care    Pain management: Multi-modal analgesia.   PONV prophylaxis: Ondansetron (or other 5HT-3), Dexamethasone or Solumedrol, Background Propofol Infusion     Comments:                Jenny Simmons MD

## 2022-11-26 NOTE — CODE/RAPID RESPONSE
[facility]    Code Stroke  House Officer Note    ////////////////////////////////////////////////////////////////////////////////////////////////////////////////////////////////  Acute stroke evaluation:  Time of arrival: 0948am   Time called: 1017am   Glucose level 128mg/dL   Time patient seen by neurology: 1015am   Time onset of stroke symptoms / witnessed onset: 0945am   Time last known well: Evening 11/25/22   IV tPA admistered: no   IA / Thrombolectomy no   Stroke Presentation Type Inhouse Stroke   Stroke Thrombolytic Ineligible Reason Stroke in previous 3 months   Stroke Thrombolytic Treatments Medical mgmt, possible carotid artery stenting   Neurologists Dr. Corrales   Stroke Type of Vascular Event Cerebral Infarct, Ischemic   Pre TPa Wts entered? Yes   Post TPa VS Neuro Checks na      IV tPA:  Patient was not treated with rt-TP due to the following reason(s):  Major stroke or head trauma within 3 months or minor stroke within 1 month     IA thrombolectomy:  N/A      [employer] Stroke Scale  Exam Interval: Baseline   Score    Level of consciousness: (0)   Alert, keenly responsive    LOC questions: (0)   Answers both questions correctly    LOC commands: (0)   Performs both tasks correctly    Best gaze: (0)   Normal    Visual: (0)   No visual loss    Facial palsy: (0)   Normal symmetrical movements    Motor arm (left): (0)   No drift    Motor arm (right): (0)   No drift    Motor leg (left): (0)   No drift    Motor leg (right): (0)   No drift    Limb ataxia: (1)   Present in one limb, LUE    Sensory: (0)   Normal- no sensory loss    Best language: (1)   Mild to moderate aphasia    Dysarthria: (1)   Mild to moderate dysarthria    Extinction and inattention: (0)   No abnormality        Total Score:  2       Imaging:  Recent Results (from the past 24 hour(s))   CT Head w/o Contrast    Narrative    CT SCAN OF THE HEAD WITHOUT CONTRAST   11/25/2022 2:26 PM     HISTORY: Code  Stroke, aphasia.    TECHNIQUE: Axial images of the head and coronal reformations without  IV contrast material. Radiation dose for this scan was reduced using  automated exposure control, adjustment of the mA and/or kV according  to patient size, or iterative reconstruction technique.    COMPARISON: None.      Impression    IMPRESSION:  No evidence of hemorrhage. Volume loss and white matter  hypoattenuation likely represent chronic small vessel ischemic change.  No acute osseous abnormality.    Findings were discussed by phone between Dr. Cohen and Dr. Salvador at  2:28 PM on 11/25/2022.    KAYDEN COHEN MD         SYSTEM ID:  [id number]   CTA Head Neck w Contrast    Narrative    CT ANGIOGRAM OF THE HEAD AND NECK WITH CONTRAST November 25, 2022 2:27  PM     HISTORY: Code Stroke.     TECHNIQUE: CT angiography with an injection of 75mL Isovue-370 IV with  scans through the head and neck. Images were transferred to a separate  3-D workstation where multiplanar reformations and 3-D images were  created. Estimates of carotid stenoses are made relative to the distal  internal carotid artery diameters except as noted. Radiation dose for  this scan was reduced using automated exposure control, adjustment of  the mA and/or kV according to patient size, or iterative  reconstruction technique.    COMPARISON: CTA of the head and neck 10/23/2019.     CT ANGIOGRAM HEAD FINDINGS: No evidence of large vessel occlusion or  high-grade stenosis. Scattered atherosclerotic plaquing with multiple  mild stenoses. 4 mm saccular outpouching of the left internal carotid  artery at the expected location of a posterior communicating artery,  unchanged. Otherwise, no convincing aneurysms. Dural venous sinuses  are unremarkable. Incidental basilar artery fenestration.    CT ANGIOGRAM NECK FINDINGS: No evidence of large vessel occlusion.  Approximately 30-40% stenosis of the proximal right internal carotid  artery. Approximately 60-70% stenosis  involving the proximal left  internal carotid artery. No evidence of dissection.      Impression    IMPRESSION:   CTA Head:   1. No evidence of large vessel occlusion or high-grade stenosis.  2. Scattered atherosclerotic plaquing with multiple mild stenoses.  3. 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   4. No other convincing aneurysms.    CTA Neck:   1. No evidence of large vessel occlusion.  2. Approximately 60-70% stenosis involving the proximal left internal  carotid artery most likely related to mostly noncalcified plaque.  Carotid Doppler ultrasound correlation may be helpful for further  characterization.  3. Approximately 30-40% stenosis involving the proximal right internal  carotid artery.    KAYDEN CURTIS MD         SYSTEM ID:  [id number]   CT Head Perfusion w Contrast    Narrative    CT BRAIN PERFUSION 11/25/2022 3:05 PM    HISTORY: Code Stroke    TECHNIQUE: Time sequential axial CT images of the head were acquired  during the administration of 125ML isovue 370 IV. Color perfusion maps  of the brain were created from this time sequential axial source data.      Radiation dose for this scan was reduced using automated exposure  control, adjustment of the mA and/or kV according to patient size, or  iterative reconstruction technique.    COMPARISON: None.      Impression    IMPRESSION: Nondiagnostic attempt at perfusion imaging.    KAYDEN CURTIS MD         SYSTEM ID:  [id number]   MR Brain w/o Contrast    Narrative    MRI BRAIN WITHOUT CONTRAST  11/25/2022 3:36 PM    HISTORY: Hyperacute protocol    TECHNIQUE: Multiplanar, multisequence MRI of the brain without  gadolinium IV contrast material.      COMPARISON:  None.      Impression    IMPRESSION:   Subtle area of hyperintensity on diffusion weighted imaging involving  the left paracentral lobule which may represent subtle acute  infarct.    Presumed artifactual subarachnoid FLAIR hyperintensity within the  basilar cisterns and left posterior fossa (further workup could be  pursued if indicated).    Volume loss and white matter T2 hyperintensities which likely  represent chronic small vessel ischemic change.    KAYDEN CURTIS MD         SYSTEM ID:  [id number]       Physical Exam   Vital Signs with Ranges:  Temp:  [97.4  F (36.3  C)-97.6  F (36.4  C)] 97.6  F (36.4  C)  Pulse:  [69-92] 92  Resp:  [14-23] 16  BP: (108-156)/(53-92) 135/78  SpO2:  [93 %-98 %] 98 %    Assessment & Plan     I was called to a rapid response evaluation on this patient who reported to nursing staff difficulty with visual acuity particularly reading his name on the whiteboard.  There were no complaints of diplopia.  This is an 89-year-old man with PMH of hypertension hyperlipidemia CAD TIA admitted on 11/25/2022 with left frontal acute ischemic stroke and fluctuating encephalopathy possibly toxic metabolic type on DAPT awaiting left carotid endarterectomy for left carotid artery stenosis who presented to the ED with expressive aphasia  manifesting as stuttering and slow speech.    On my arrival heart rate is 81, SPO2 98% on room air BP is 150/82.  As I was performing NIH stroke scale exam patient became nauseated and was retching.  He also began having stuttering type speech and jaw involuntary movements.  Aside from right upper extremity ataxia there were no other symptoms aside from the stuttering type speech that seemed to occur in a waxing waning fashion.    Since he had already been diagnosed with acute ischemic stroke of the left frontal lobe on 11/25/2022 I did not immediately activate stroke code.  I did request a page to stroke neurology team who can come to bedside and felt that the symptoms were similar to what they had seen previously, 1 day prior.  After conferring with that team we ultimately decided to repeat code stroke imaging to rule out any  additional atheroembolism from the left carotid artery lesion.    Stroke risk factors  Hypertension, hyperlipidemia, prior TIA    Impression  Recurrent stroke symptoms of stuttering speech/ expressive aphasia    DDx:  intracranial hemorrhage/hemorrhagic transformation, progression of the stroke, seizures, metabolic etiology including hyponatremia, hypoperfusion in setting of carotid stenosis, increased intracranial pressure, MAR reviewed no offending medications identified    INTERVENTIONS:  -Glucose 128 mg/dL  - NIH stroke scale as above  - /82  --IV access  - Zofran 4 mg IV x1  - Code stroke activated at 10:17 AM with completion of CT head without, CT angio head and neck and CT perfusion studies  -Stroke neurology team conferring with vascular surgery regarding re: potentially expediting revascularization of left carotid artery given recurrence of symptoms.  -At the end of the RRT BP was 149/82.  Stuttering type speech continue to wax and wane fluent at times, more pronounced stuttering at other times.  -heparin drip, bedrest, IV fluids as per stroke neurology  - Neuro IMC  - Code stroke de-escalated at 11:02 AM  -At all times patient was hemodynamically stable and sufficiently protecting his airway    Discussed with and defer further cares to hospitalist attending physician Dr.Nick Craig and Stroke neurology team    Code Status: Full Code    Physical Exam   Constitutional: vs as per EMR  General:  adult pt lying in bed without acute distress   GCS:   Motor 6=Obeys commands   Verbal 5=Oriented   Eye Opening 4=Spontaneous   Total: 15     Neuro: See NIH stroke scale above  Eyes pupils equal round 3mm briskly reactive bilat, sclera nonicteric, noninjected, conjunctiva pink,  Head, ENT & mouth: NC/AT,  mouth moist oral mucosa  Neck: supple  CV S1S2 no murmurs  resp: CTAB upper lobes  gi:normoactive bowel sounds, soft, nontender, nondisteded  Ext: no edema or mottling  Skin: no rashes on exposed skin  Lymph:  defer  Musculoskeletal no bony joint deformities    Data   Recent Labs   Lab 11/26/22  1005 11/25/22  1445   WBC 5.0 6.5   HGB 15.0 13.5   MCV 94 94    160   INR  --  1.16*       Time Spent on this Encounter   I spent 68 minutes (0792 - 2869) of critical care time on the unit/floor managing the care of Cale Wagoner. Upon evaluation, this patient had a high probability of imminent or life-threatening deterioration due to acute recurrent focal neurologic deficit, which required my direct attention, intervention, and personal management. 100% of my time was spent at the bedside monitoring patency of airway and hemodynamics and coordinating with stroke neurology and hospital medicine teams, counseling the patient and/or coordinating care regarding services listed in this note.    Nichole Granados CNP  Hospitalist - House [facility] 7am-6pm  Text Page   Pager [phone]      .

## 2022-11-26 NOTE — PHARMACY-CONSULT NOTE
Pharmacy Consult to evaluate for medication related stroke core measures    Cale Wagoner, 89 year old male admitted for CVA on 11/25/2022.    Thrombolytic was not given because of Time from onset contraindications    VTE Prophylaxis Heparin given on 11/26 as appropriate prior to end of hospital day 2.    Antithrombotic: apixaban and clopidogrel started on 11/25, as appropriate by end of hospital day 2. Continue antithrombotic therapy on discharge to meet quality measures, unless contraindicated.    Anticoagulation if history of A-fib/flutter: Patient does not have history of A-fib/flutter - anticoagulation not required for medication related stroke core measures.     LDL Cholesterol Calculated   Date Value Ref Range Status   11/25/2022 25 <=100 mg/dL Final   10/19/2020 53 <100 mg/dL Final     Comment:     Desirable:       <100 mg/dl       Patient's home statin, Lipitor (atorvastatin) restarted; continue statin on discharge to meet quality measures, unless contraindicated.     Recommendations: Could considering decreasing statin dose based on LDL 26. Please resume antithrombotic therapy when appropriate unless contraindicated and continue at discharge.    Thank you for the consult.    Lyly Hickman RP 11/26/2022 12:51 PM

## 2022-11-26 NOTE — PROGRESS NOTES
Stroke code called this morning for new onset blurry vision.  I saw the patient in the ED.  He clearly has worse expressive aphasia since I last saw him 4 hours ago and some new arm ataxia which I did not appreciate earlier today.    Discussed with patient and daughter, Luna, that we will plan to move surgery up and do carotid endarterectomy today.    Received asa and plavix this morning (plavix loaded yesterday). On heparin gtt. Continue to surgery.    Discussed with staff, Dr. Mensah.    Darrell Davies MD

## 2022-11-27 NOTE — OP NOTE
Procedure Date: 11/26/2022    PREOPERATIVE DIAGNOSIS:  High-grade symptomatic left carotid stenosis with embolization.    POSTOPERATIVE DIAGNOSIS:  High-grade symptomatic left carotid stenosis with embolization.    OPERATIVE PROCEDURE PERFORMED:  Extended left carotid enterectomy with distal external jugular vein patch angioplasty.  Intraoperative shunting, EEG monitoring (D-10).    SURGEON:  Trev Mensah MD  ASSISTANT SURGEON:  Darrell Davies MD (Vascular Fellow)    ANESTHESIA:  General.    PREOPERATIVE MEDICATIONS:  Cleocin 900 mg IV.    INDICATIONS FOR PROCEDURE:  An 89-year-old patient developed expressive aphasia yesterday that lasted for several hours with resolution.  CTA revealed a 60-70% stenosis of the left proximal ICA, which was likely ulcerated.  There were some mild changes on MRI.  The patient had been on chronic aspirin and Plavix has been initiated.  The patient was admitted and scheduled for surgery tomorrow morning, but he had another episode of expressive aphasia that lasted for approximately an hour with resolution.  We felt this was an unstable plaque with crescendo TIA and an emergent carotid endarterectomy was indicated.    DESCRIPTION OF PROCEDURE:  The patient was brought to the operating room, induced under general anesthesia and oriented without difficulty.  Blood pressure was monitored via left radial arterial line and was stable, somewhat hypertensive per our plan.  Calf pneumatic compression boots were used and pillows placed knees.  External jugular vein appeared to be very adequate by my duplex ultrasound.  Left neck area was prepped and draped.  Timeout was called and the sites were identified.    VASCULAR EXPOSURE:  Standard left carotid incision was made.  Dissection was carried down to the platysmas.  We did not identify a mandibular cutaneous nerve.  We dissected free the external jugular vein and this was an adequate vessel and one side branch was ligated between 7-0  Prolene sutures and mobilized but left with flow.  Sternocleidomastoid muscle was retracted laterally.  We dissected down to the carotid sheath.  Several small facial venous branches were divided between 4-0 silk suture.  We identified the ansa cervicalis nerve and this was preserved.  It had a high collateral branch to the vagus nerve above the carotid bulb that was transected.  We had good visualization of the hypoglossal nerve with minimal retraction.  A crossing vein was ligated over the hypoglossal nerve with 4-0 silk sutures, but also a 6-0 Prolene suture.  We avoided the carotid bulb, which was dilated and had obvious atherosclerotic debris.  This extended approximately 2 cm.  The distal ICA appeared to be free of disease and was encircled.  There was more plaque than we expected within the common carotid artery and this was encircled with a Silastic vessel loop at the level of the omohyoid muscle.  The external carotid artery had disease in its origin, but none distally and this was likewise encircled.    CAROTID ENTERECTOMY:  5000 units of intravenous heparin was given.  Vessel was sequentially tightened with a stable EEG.  We entered the common carotid artery, which was markedly diseased and we had to dissect further to find the CCA lumen for the inflow.  Edge scissors extended the arteriotomy to the more distal ICA where we found a markedly irregular plaque with thrombus and a large amount of atherosclerotic debris.  Distal vessel was relatively disease free, measuring almost 4 mm in diameter.  A preheparinized Sundt shunt was inserted and secured with vessel loop proximally and a Jimmy clamp distally with a stable EEG.    ENDARTERECTOMY:  With the aid of loupe magnification, we were able to make a good endpoint on the distal ICA with the aid of a Parker blade.  Endarterectomy was performed down to the deep media.  An excellent eversion endarterectomy was performed on the external carotid artery with  good backbleeding.  All loose medial fibers were removed.  We had more disease in the inflow common carotid artery that would be dealt with later.  Distal endpoint in the ICA was tacked down with interrupted 7-0 Prolene suture.    EXTERNAL JUGULAR VEIN PATCH:  We then harvested the external jugular vein from the neck.  This was gently dilated with 0.5% Marcaine.  This was everted and left normal direction of flow with no valve leaflets.  We excised the redundant portion of the carotid bulb and proximal ICA and sewed our vein patch from the distal endpoint down to the carotid bulb with running 6-0 Prolene suture with this patch measuring approximately 3.5 cm.  We then closed the distal common carotid artery with running 6-0 Prolene suture.    EXTENDED ARTERIOTOMY:  Since we had more disease in the common carotid artery, we would need to dissect the CCA almost to the level of the clavicle.  We placed a vessel loop around the distal common carotid artery and allowed backbleeding into the external and internal carotid artery and removed our shunt allowing retrograde flow of the external/internal carotid artery with good hemostasis and stable EEG.  We then placed a vascular clamp on the inflow common carotid artery where there was a soft spot.  We extended arteriotomy and performed an endarterectomy down to a very smooth plane and a partial eversion on the common carotid artery with very minimal disease noted in the inflow.  This vessel was then closed primarily with running 6-0 Prolene suture.    Prior to complete closure, the vessels were flushed proximally and distally and the arteriotomy was completed.  Blood flow was sequentially allowed to go up the ICA.  We did place two 7-0 mattress Prolene mattress sutures on our patch and also one on the common carotid closure for excellent hemostasis with a strong pulse being noted and a stable EEG.    Total arteriotomy measured 10 cm in length.  A small amount of Surgicel was  placed over this.  Neck was closed in layers of interrupted running 3-0 Vicryl suture.  Since the patient was on Plavix and we did not reverse the heparin due to the recent stroke, we left a #15 round Lorenzo-Hill drain through a stab incision.  Skin was closed with 4-0 Monocryl in subcuticular fashion.    Wounds were infiltrated with 0.5% Marcaine for post-analgesia.  Toradol 15 mg IV was given.  Steri-Strips and gauze dressing applied.    The patient tolerated the procedure well.  He was extubated and returned to the recovery room in satisfactory condition, neurologically intact with normal speech.  Needle and sponge counts x 2.    ESTIMATED BLOOD LOSS:  50 mL    Operative procedure was much more difficult due to the significant extent of disease extending distally well above the hypoglossal nerve, confluence of the ansa, and also the inflow common carotid artery plaque.              Trev Mensah MD        D: 2022   T: 2022   MT: [id number]    Name:     MARIAH EDMAR BUSTILLOJordan  MRN:      [id number]        Account:        [id number]   :      1933           Procedure Date: 2022     Document: [id number]    cc:  Trev Mensah MD

## 2022-11-27 NOTE — PROGRESS NOTES
Occupational Therapy: Orders received. Chart reviewed and discussed with care team.? Occupational Therapy not indicated due to pt screened, symptoms resolved. Able to perform ADL,self care.?no OT needs or therapy needs at discharge. Defer discharge recommendations to care team.? Will complete orders.

## 2022-11-27 NOTE — PLAN OF CARE
Reason for Admission: L frontal CVA, ICA stenosis and carotid endarterectomy     Cognitive/Mentation: A/Ox 4  Neuros/CMS: Intact ex slow/hesitant speech  VS: Stable. Tele: NSR.  GI: BS +, + flatus, last BM PTA. Continent.  : Voiding adequately. Continent.  Pulmonary: LS clear.  Pain: Pt denies pain.     Drains: None  Skin: L neck incision   Activity: SBA.  Diet: Regular with thin liquids. Takes pills whole.     Aggression Stoplight Score: Green  Therapies recs: Home  Discharge: Home today with daughter    End of shift summary: Pt denies pain from surgery and will be discharging home with his daughter this afternoon.  Pt reports constipation with scheduled senna and miralax.  AISHA drain was removed this morning.  Pt has some neck swelling, provided education to patient and family on signs of infection and to watch for incision site swelling.  Neuros and vitals were completed per orders.  No change in patient condition during shift.

## 2022-11-27 NOTE — DISCHARGE SUMMARY
[facility]  Hospitalist Discharge Summary      Date of Admission:  11/25/2022  Date of Discharge:  11/27/2022  Discharging Provider: Donato Craig MD  Discharge Service: Hospitalist Service    Discharge Diagnoses     CVA    Follow-ups Needed After Discharge   Follow-up Appointments     Follow Up ([facility])      Follow-up with Dr. Mensah in vascular surgery clinic in 1 month    The vascular surgery clinic coordinator will call you within 3 business   days after leaving the hospital to schedule your appointment.      With general vascular surgery questions, concerns, or to request/change an   appointment, please call the [facility] Vascular Surgery Clinic:    [address] [facility]  [address]  [place] [address]    Phone: [phone]         Follow-up and recommended labs and tests       Follow up with me,  Trev Mensah MD, within 2 weeks. to   evaluate after surgery.  The following labs/tests are recommended: Left   Carotid Duplex in three months @ Office.             Unresulted Labs Ordered in the Past 30 Days of this Admission     No orders found from 10/26/2022 to 11/26/2022.          Discharge Disposition   Discharged to home  Condition at discharge: Stable    Hospital Course   Cale Wagoner is a 89 year old male with a history of HTN, HLD, CAD, TIA who presented with speech difficulty.    Acute ischemic stroke, undetermined etiology  Expressive aphasia, improving  HTN  HLD  Prior history of TIA  Fluctuating altered mental//acute encephalopathy  Last known time well is somewhat unclear. He lives with his wife who has dementia who could not confirm time of onset.  Around 1:30 PM while talking with family on phone patient was noted to have difficulty speaking with word finding difficulty and slow speech.  Hence presented to ED.  Stroke code called in the ED.  No complaints of focal numbness/weakness/vision issues.  He has had prior TIA in 2014 when he experienced some  left-sided weakness and difficulty speaking but brain MRI negative for stroke.  In the ED, /82, other vitals stable, afebrile.  Noted to have expressive aphasia.  EKG with NSR, no acute ischemic changes.  CT head, CTA head and neck showed multiple mild stenosis, no evidence of large vessel occlusion.  Approximately 60 to 70% stenosis involving proximal left ICA.  No acute bleed.  Hyperacute MRI done that showed subtle area of hyperintensity on DWI involving left paracentral lobule which may represent subtle acute infarct.  Labs mainly remarkable for sodium 130, at rest all in acceptable range.  Patient notes that speech difficulty has improved while in the ED and he was able to converse somewhat fluently during the encounter.  Not a candidate for any neuro intervention due to uncertainty of timing of symptom onset.  11/26 -> Stroke code called when patient developed recurrence of word finding difficulty and nausea, also indicated some blurring of vision. CTA head/neck with stable L ICA stenosis, MCA M1 stenosis, L PCA stenosis.   Plan:  - Consulted to stroke neurology  -Vascular surgery consulted ->  Now POD#1 s/p left carotid endarterectomy  -Resume  PTA losartan, metoprolol.  -TTE pending  -continue statin, aspirin, and continue plavix for 1 month, after this he would continue aspirin indefinitely  -TC 92, LDL 25, HDL 38.  Continue PTA atorvastatin.  -HbA1c 5.4.  -follow-up appointment with vascular surgery in 2 weeks and duplex in 3 months.    CAD  Drug-eluting stent placement x2 to mid and distal circumflex 2017  No recent chest pain.  Plan:  - Was previously on baby ASA -> Antiplatelet recommendation post surgery as per vascular surgery post CEA      Consultations This Hospital Stay   NEUROLOGY IP STROKE CONSULT  SPEECH LANGUAGE PATH ADULT IP CONSULT  PHARMACY IP CONSULT  PHARMACY IP CONSULT  PHARMACY IP CONSULT  VASCULAR SURGERY IP CONSULT  PATIENT LEARNING CENTER IP CONSULT  PHYSICAL THERAPY ADULT IP  CONSULT  OCCUPATIONAL THERAPY ADULT IP CONSULT  REHAB ADMISSIONS LIAISON IP CONSULT  CARE MANAGEMENT / SOCIAL WORK IP CONSULT  PHARMACY IP CONSULT  PHARMACY IP CONSULT  SMOKING CESSATION PROGRAM IP CONSULT    Code Status   Full Code    Time Spent on this Encounter   I, Donato Craig MD, personally saw the patient today and spent greater than 30 minutes discharging this patient.       Donato Craig MD  [facility] NEUROSCIENCE UNIT  [address] [place] [address]  Phone: [phone]  ______________________________________________________________________    Physical Exam   Vital Signs: Temp: 97.3  F (36.3  C) Temp src: Oral BP: 105/52 Pulse: 74   Resp: 16 SpO2: 96 % O2 Device: Nasal cannula Oxygen Delivery: 2 LPM  Weight: 170 lbs 0 oz  ----------------------------------------------------------------------------------------       Primary Care Physician   Physician No Ref-Primary    Discharge Orders      Follow Up ([facility])    Follow-up with Dr. Mensah in vascular surgery clinic in 1 month    The vascular surgery clinic coordinator will call you within 3 business days after leaving the hospital to schedule your appointment.      With general vascular surgery questions, concerns, or to request/change an appointment, please call the [facility] Vascular Surgery Clinic:    [address] [facility]  [address]  [place] [address]    Phone: [phone]     Brief Discharge Instructions    Wound Care:  Okay to shower. Do not scrub your incision. Steristrips will fall off on their own over the next several weeks. Sutures are absorbable and do not need to be removed.     Follow-up and recommended labs and tests     Follow up with me,  Trev Mensah MD, within 2 weeks. to evaluate after surgery.  The following labs/tests are recommended: Left Carotid Duplex in three months @ Office.     Activity    Your activity upon discharge: activity as tolerated and no driving for today.  May shower.    Will be  staying with his daughter Luna in [place] for the next several days--discussed with daughter     Reason for your hospital stay    You had a stroke and had vascular surgical intervention.     Diet    Follow this diet upon discharge: Advance to a regular diet as tolerated     Stroke Hospital Follow Up    [employer] will call you to coordinate care as prescribed by your provider. If you don t hear from a representative within 2 business days, please call [phone].         Significant Results and Procedures   Most Recent 3 CBC's:  Recent Labs   Lab Test 11/26/22  1130 11/26/22  1005 11/25/22  1445   WBC 5.2 5.0 6.5   HGB 14.2 15.0 13.5   MCV 93 94 94    161 160     Most Recent 3 BMP's:  Recent Labs   Lab Test 11/27/22  0734 11/27/22  0558 11/27/22  0005 11/26/22  2109 11/26/22  0946 11/26/22  0927 11/25/22  1445 11/25/22  1445 04/15/21  0639   NA  --   --   --   --   --  132*  --  130* 133   POTASSIUM  --   --   --   --   --  4.5  --  4.2 3.4   CHLORIDE  --   --   --   --   --  96  --  96 100   CO2  --   --   --   --   --  28  --  29 30   BUN  --   --   --   --   --  17  --  19 12   CR  --   --   --  1.16  --  1.13  --  1.05 0.96   ANIONGAP  --   --   --   --   --  8  --  5 3   RUSTY  --   --   --   --   --  8.6  --  8.0* 8.0*   * 118* 132*  --    < > 138*   < > 83 122*    < > = values in this interval not displayed.     Most Recent 2 LFT's:  Recent Labs   Lab Test 04/15/21  0639 04/13/21 2000   AST 19 28   ALT 23 34   ALKPHOS 46 71   BILITOTAL 1.6* 0.9     Most Recent 3 INR's:  Recent Labs   Lab Test 11/25/22  1445 10/23/19  0845 09/12/14  1510   INR 1.16* 1.06 1.01   ,   Results for orders placed or performed during the hospital encounter of 11/25/22   CT Head Perfusion w Contrast    Narrative    CT BRAIN PERFUSION 11/25/2022 3:05 PM    HISTORY: Code Stroke    TECHNIQUE: Time sequential axial CT images of the head were acquired  during the administration of 125ML isovue 370 IV. Color  perfusion maps  of the brain were created from this time sequential axial source data.      Radiation dose for this scan was reduced using automated exposure  control, adjustment of the mA and/or kV according to patient size, or  iterative reconstruction technique.    COMPARISON: None.      Impression    IMPRESSION: Nondiagnostic attempt at perfusion imaging.    KAYDEN COHEN MD         SYSTEM ID:  [id number]   CT Head w/o Contrast    Narrative    CT SCAN OF THE HEAD WITHOUT CONTRAST   11/25/2022 2:26 PM     HISTORY: Code Stroke, aphasia.    TECHNIQUE: Axial images of the head and coronal reformations without  IV contrast material. Radiation dose for this scan was reduced using  automated exposure control, adjustment of the mA and/or kV according  to patient size, or iterative reconstruction technique.    COMPARISON: None.      Impression    IMPRESSION:  No evidence of hemorrhage. Volume loss and white matter  hypoattenuation likely represent chronic small vessel ischemic change.  No acute osseous abnormality.    Findings were discussed by phone between Dr. Cohen and Dr. Salvador at  2:28 PM on 11/25/2022.    KAYDEN COHEN MD         SYSTEM ID:  [id number]   CTA Head Neck w Contrast    Narrative    CT ANGIOGRAM OF THE HEAD AND NECK WITH CONTRAST November 25, 2022 2:27  PM     HISTORY: Code Stroke.     TECHNIQUE: CT angiography with an injection of 75mL Isovue-370 IV with  scans through the head and neck. Images were transferred to a separate  3-D workstation where multiplanar reformations and 3-D images were  created. Estimates of carotid stenoses are made relative to the distal  internal carotid artery diameters except as noted. Radiation dose for  this scan was reduced using automated exposure control, adjustment of  the mA and/or kV according to patient size, or iterative  reconstruction technique.    COMPARISON: CTA of the head and neck 10/23/2019.     CT ANGIOGRAM HEAD FINDINGS: No evidence of large vessel  occlusion or  high-grade stenosis. Scattered atherosclerotic plaquing with multiple  mild stenoses. 4 mm saccular outpouching of the left internal carotid  artery at the expected location of a posterior communicating artery,  unchanged. Otherwise, no convincing aneurysms. Dural venous sinuses  are unremarkable. Incidental basilar artery fenestration.    CT ANGIOGRAM NECK FINDINGS: No evidence of large vessel occlusion.  Approximately 30-40% stenosis of the proximal right internal carotid  artery. Approximately 60-70% stenosis involving the proximal left  internal carotid artery. No evidence of dissection.      Impression    IMPRESSION:   CTA Head:   1. No evidence of large vessel occlusion or high-grade stenosis.  2. Scattered atherosclerotic plaquing with multiple mild stenoses.  3. 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   4. No other convincing aneurysms.    CTA Neck:   1. No evidence of large vessel occlusion.  2. Approximately 60-70% stenosis involving the proximal left internal  carotid artery most likely related to mostly noncalcified plaque.  Carotid Doppler ultrasound correlation may be helpful for further  characterization.  3. Approximately 30-40% stenosis involving the proximal right internal  carotid artery.    KAYDEN CURTIS MD         SYSTEM ID:  [id number]   MR Brain w/o Contrast    Narrative    MRI BRAIN WITHOUT CONTRAST  11/25/2022 3:36 PM    HISTORY: Hyperacute protocol    TECHNIQUE: Multiplanar, multisequence MRI of the brain without  gadolinium IV contrast material.      COMPARISON:  None.      Impression    IMPRESSION:   Subtle area of hyperintensity on diffusion weighted imaging involving  the left paracentral lobule which may represent subtle acute infarct.    Presumed artifactual subarachnoid FLAIR hyperintensity within the  basilar cisterns and left posterior fossa (further workup  could be  pursued if indicated).    Volume loss and white matter T2 hyperintensities which likely  represent chronic small vessel ischemic change.    KAYDEN CURTIS MD         SYSTEM ID:  [id number]   CT Head w/o Contrast    Narrative    EXAM: CTA HEAD NECK WITH CONTRAST  LOCATION: [facility]  DATE/TIME: 11/26/2022, 10:30 AM    INDICATION: Worsening stroke deficits. Evaluate LVO.  COMPARISON: CTA [place] [facility] [place] and neck 11/25/2022.  CONTRAST: 75 mL Isovue 370.  TECHNIQUE: Head and neck CT angiogram with IV contrast. Noncontrast head CT followed by axial helical CT images of the head and neck vessels obtained during the arterial phase of intravenous contrast administration. Axial 2D reconstructed images and   multiplanar 3D MIP reconstructed images of the head and neck vessels were performed by the technologist. Dose reduction techniques were used. All stenosis measurements made according to NASCET criteria unless otherwise specified.    FINDINGS:   NONCONTRAST HEAD CT:   INTRACRANIAL CONTENTS: No finding for intracranial hemorrhage or mass or convincing finding for acute infarct. Mild to moderate prominence of the lateral and third ventricles and sulci. Mild presumed sequelae of chronic microvascular ischemic change. No   transcortical areas of low-attenuation change. Cerebellar tonsils are normally positioned. Sella is unremarkable for technique. Corpus callosum is normally formed.    VISUALIZED ORBITS/SINUSES/MASTOIDS: Prior cataract surgeries. No paranasal sinus mucosal disease. No middle ear or mastoid effusion.    BONES/SOFT TISSUES: Calvarium is intact, without suspicious lytic or blastic foci. Degenerative changes both temporomandibular joints.    HEAD CTA:  ANTERIOR CIRCULATION: Mild atherosclerotic plaque both carotid siphons. Mild associated luminal narrowing. Small right posterior communicating artery infundibulum. There is stable mild to moderate narrowing of the proximal and  mid to distal right M1   segment of the middle cerebral artery. More distal right MCA is unremarkable. Left MCA is unremarkable. Both anterior cerebral arteries are normal in appearance.    POSTERIOR CIRCULATION: Left vertebral artery is dominant. Both intracranial vertebral arteries and basilar artery are normal in appearance. Small caliber right posterior communicating artery. Left posterior communicating artery not identified. Short   segment mild to moderate stenosis is seen within the P3 segment left posterior cerebral artery, stable.    DURAL VENOUS SINUSES: No findings for dural venous sinus thrombosis.    NECK CTA:  RIGHT CAROTID: Moderate calcified and noncalcified plaque at the level of the right carotid bulb/bifurcation. Mild associated luminal narrowing, less than 40% by modified NASCET criteria.    LEFT CAROTID: Heavy predominantly noncalcified plaque is seen at the level of the left carotid bulb/bifurcation with severe associated luminal narrowing, approximately 60-70% by modified NASCET criteria. Mild left-sided narrowing of the left common   carotid artery.    VERTEBRAL ARTERIES: Left vertebral artery is minimally larger than the right. No findings for dissection or significant stenosis.    AORTIC ARCH: Mild left-sided plaque within the visualized aortic arch and proximal great vessels.    NONVASCULAR STRUCTURES: Visualized lung apices are clear. Visualized osseous structures are without suspicious lytic or blastic foci.      Impression    IMPRESSION:   HEAD CT:  1.  No finding for intracranial hemorrhage, mass, or acute infarct.    2.  Mild to moderate volume loss and mild presumed sequelae of chronic microvascular ischemic change, similar to prior.    HEAD CTA:   1.  No vascular cutoff, aneurysm, or flow-limiting stenosis.    2.  Stable areas of narrowing are seen within the proximal and mid to distal right M1 segment and left posterior cerebral artery.    NECK CTA:  1.  Extensive noncalcified  atherosclerotic plaque is seen at the left carotid bulb/bifurcation with approximately 60-70% stenosis by modified NASCET criteria.    2.  Moderate atherosclerotic plaque right carotid bulb/bifurcation with approximately 40% or less stenosis by modified NASCET criteria.    Hospital called at 10:26 AM. Results of the noncontrast and contrast-enhanced portions of the exam were communicated to GUERLINE Colvin at 11:02 AM.     CT Head Perfusion w Contrast    Narrative    EXAM: CT HEAD PERFUSION W CONTRAST  LOCATION: [facility]  DATE/TIME: 11/26/2022 10:50 AM    INDICATION: Speech chnages. Evaluate for penumbra  COMPARISON: MRI brain 11/25/2022 and CTA/CTP 11/25/2022  TECHNIQUE: CT head perfusion with IV contrast. Following acquisition of axial helical CT images of the head and neck vessels during the arterial phase of intravenous contrast administration, additional CT cerebral perfusion was performed utilizing a   second contrast bolus. Perfusion data were post processed with generation of standard perfusion maps and estimation of ischemic/infarcted volumes utilizing standard threshold values. Dose reduction techniques were used.  CONTRAST: 50 mL Isovue 370      FINDINGS:   CT PERFUSION:  PERFUSION MAPS: Symmetrical cerebral perfusion. No focal deficits in cerebral blood flow or volume to suggest ischemia/oligemia.    RAPID ANALYSIS:  CBF<30%: 0 mL  Tmax>6sec: 0 mL  Mismatch volume: 0 mL  Mismatch ratio: None        Impression    IMPRESSION:   1.  Normal cerebral perfusion.   CTA Head Neck with Contrast    Narrative    EXAM: CTA HEAD NECK WITH CONTRAST  LOCATION: [facility]  DATE/TIME: 11/26/2022, 10:30 AM    INDICATION: Worsening stroke deficits. Evaluate LVO.  COMPARISON: CTA [place] and neck 11/25/2022.  CONTRAST: 75 mL Isovue 370.  TECHNIQUE: Head and neck CT angiogram with IV contrast. Noncontrast head CT followed by axial helical CT images of the head and  neck vessels obtained during the arterial phase of intravenous contrast administration. Axial 2D reconstructed images and   multiplanar 3D MIP reconstructed images of the head and neck vessels were performed by the technologist. Dose reduction techniques were used. All stenosis measurements made according to NASCET criteria unless otherwise specified.    FINDINGS:   NONCONTRAST HEAD CT:   INTRACRANIAL CONTENTS: No finding for intracranial hemorrhage or mass or convincing finding for acute infarct. Mild to moderate prominence of the lateral and third ventricles and sulci. Mild presumed sequelae of chronic microvascular ischemic change. No   transcortical areas of low-attenuation change. Cerebellar tonsils are normally positioned. Sella is unremarkable for technique. Corpus callosum is normally formed.    VISUALIZED ORBITS/SINUSES/MASTOIDS: Prior cataract surgeries. No paranasal sinus mucosal disease. No middle ear or mastoid effusion.    BONES/SOFT TISSUES: Calvarium is intact, without suspicious lytic or blastic foci. Degenerative changes both temporomandibular joints.    HEAD CTA:  ANTERIOR CIRCULATION: Mild atherosclerotic plaque both carotid siphons. Mild associated luminal narrowing. Small right posterior communicating artery infundibulum. There is stable mild to moderate narrowing of the proximal and mid to distal right M1   segment of the middle cerebral artery. More distal right MCA is unremarkable. Left MCA is unremarkable. Both anterior cerebral arteries are normal in appearance.    POSTERIOR CIRCULATION: Left vertebral artery is dominant. Both intracranial vertebral arteries and basilar artery are normal in appearance. Small caliber right posterior communicating artery. Left posterior communicating artery not identified. Short   segment mild to moderate stenosis is seen within the P3 segment left posterior cerebral artery, stable.    DURAL VENOUS SINUSES: No findings for dural venous sinus  thrombosis.    NECK CTA:  RIGHT CAROTID: Moderate calcified and noncalcified plaque at the level of the right carotid bulb/bifurcation. Mild associated luminal narrowing, less than 40% by modified NASCET criteria.    LEFT CAROTID: Heavy predominantly noncalcified plaque is seen at the level of the left carotid bulb/bifurcation with severe associated luminal narrowing, approximately 60-70% by modified NASCET criteria. Mild left-sided narrowing of the left common   carotid artery.    VERTEBRAL ARTERIES: Left vertebral artery is minimally larger than the right. No findings for dissection or significant stenosis.    AORTIC ARCH: Mild left-sided plaque within the visualized aortic arch and proximal great vessels.    NONVASCULAR STRUCTURES: Visualized lung apices are clear. Visualized osseous structures are without suspicious lytic or blastic foci.      Impression    IMPRESSION:   HEAD CT:  1.  No finding for intracranial hemorrhage, mass, or acute infarct.    2.  Mild to moderate volume loss and mild presumed sequelae of chronic microvascular ischemic change, similar to prior.    HEAD CTA:   1.  No vascular cutoff, aneurysm, or flow-limiting stenosis.    2.  Stable areas of narrowing are seen within the proximal and mid to distal right M1 segment and left posterior cerebral artery.    NECK CTA:  1.  Extensive noncalcified atherosclerotic plaque is seen at the left carotid bulb/bifurcation with approximately 60-70% stenosis by modified NASCET criteria.    2.  Moderate atherosclerotic plaque right carotid bulb/bifurcation with approximately 40% or less stenosis by modified NASCET criteria.    Hospital called at 10:26 AM. Results of the noncontrast and contrast-enhanced portions of the exam were communicated to GUERLINE Colvin at 11:02 AM.     Echocardiogram Complete - For age > 60 yrs     Value    LVEF  55-60%    Narrative    [id number]  [facility]  [id number]  [id number]^JUAN^BETTYE     [facility]  [facility]  Echocardiography Laboratory  [facility] [place] [address]     Name: EDMAR LEMUS  MRN: [id number]  : 1933  Study Date: 2022 10:04 AM  Age: 89 yrs  Gender: Male  Patient Location: [facility]  Reason For Study: Cerebrovascular Incident  Ordering Physician: JUAN HERRING  Referring Physician: AMANDA PMD  Performed By: Trell Bang RDCS     BSA: 1.9 m2  Height: 66 in  Weight: 170 lb  HR: 69  BP: 105/52 mmHg  ______________________________________________________________________________  Procedure  Complete Portable Echo Adult. Optison (NDC #2833-5353) given intravenously.  ______________________________________________________________________________  Interpretation Summary     Technically difficult imaging  A carduac source of embolus was not identified  Sinus rhythm was noted.  There is no atrial shunt seen.  Left ventricular systolic function is normal.  The visual ejection fraction is 55-60%.  The left ventricle is normal in size.  The right ventricle is normal in structure, function and size.  The left atrium is mildly dilated.  Doppler interrogation does not demonstrate signficant stenosis or  insufficiency involvng cardiac valves.     No change since 2021     ______________________________________________________________________________  Left Ventricle  The left ventricle is normal in size. There is normal left ventricular wall  thickness. Left ventricular systolic function is normal. The visual ejection  fraction is 55-60%. Grade I or early diastolic dysfunction. No regional wall  motion abnormalities noted. There is no thrombus seen in the left ventricle.     Right Ventricle  The right ventricle is normal in structure, function and size. There is no  mass or thrombus in the right ventricle.     Atria  The left atrium is mildly dilated. Right atrial size is normal. There is no  atrial shunt seen. The left atrial appendage is not well visualized.     Mitral  Valve  The mitral valve leaflets appear normal. There is no evidence of stenosis,  fluttering, or prolapse. There is no mitral regurgitation noted. There is no  mitral valve stenosis.     Tricuspid Valve  Normal tricuspid valve. The right ventricular systolic pressure is  approximated at 26.4 mmHg plus the right atrial pressure. Right ventricle  systolic pressure estimate normal. There is mild (1+) tricuspid regurgitation.  There is no tricuspid stenosis.     Aortic Valve  The aortic valve is trileaflet. No aortic regurgitation is present. No aortic  stenosis is present.     Pulmonic Valve  The pulmonic valve is not well visualized. There is no pulmonic valvular  regurgitation. There is no pulmonic valvular stenosis.     Vessels  The aortic root is normal size. Normal size ascending aorta. Inferior vena  cava not well visualized for estimation of right atrial pressure. Pulmonary  arteries not well visualized.     Pericardium  The pericardium appears normal. There is no pleural effusion.     Rhythm  Sinus rhythm was noted.  ______________________________________________________________________________  MMode/2D Measurements & Calculations  IVSd: 1.1 cm  IVSs: 4.1 cm     LVIDd: 4.2 cm  LVIDs: 3.0 cm  LVPWd: 0.78 cm  FS: 29.0 %  LV mass(C)d: 127.3 grams  LV mass(C)dI: 68.2 grams/m2  Ao root diam: 3.5 cm  asc Aorta Diam: 3.3 cm  LA Volume (BP): 74.6 ml  LA Volume Index (BP): 39.9 ml/m2  RWT: 0.37     Doppler Measurements & Calculations  MV E max kenton: 62.4 cm/sec  MV A max kenton: 86.7 cm/sec  MV E/A: 0.72  MV dec slope: 216.1 cm/sec2  PA acc time: 0.17 sec  TR max kenton: 256.7 cm/sec  TR max P.4 mmHg  E/E' av.6  Lateral E/e': 9.9  Medial E/e': 9.4     ______________________________________________________________________________  Report approved by: Dr. Stanislaw Barajas 2022 11:29 AM               Discharge Medications   Current Discharge Medication List      START taking these medications    Details    clopidogrel (PLAVIX) 75 MG tablet Take 1 tablet (75 mg) by mouth daily for 30 days  Qty: 30 tablet, Refills: 0    Associated Diagnoses: Cerebrovascular accident (CVA), unspecified mechanism (H)         CONTINUE these medications which have NOT CHANGED    Details   Acetaminophen (TYLENOL PO) Take 500 mg by mouth every 8 hours as needed for mild pain or fever      aspirin 81 MG EC tablet Take 81 mg by mouth At Bedtime      atorvastatin (LIPITOR) 20 MG tablet Take 1 tablet (20 mg) by mouth daily  Qty: 90 tablet, Refills: 0    Associated Diagnoses: Hyperlipidemia LDL goal <70      FAMOTIDINE PO Take 20 mg by mouth 2 times daily      losartan (COZAAR) 100 MG tablet TAKE ONE TABLET (100 mg) BY MOUTH ONE TIME DAILY  Qty: 90 tablet, Refills: 3    Comments: Profile Rx: patient will contact pharmacy when needed  Associated Diagnoses: Benign essential hypertension      METAMUCIL FIBER PO Take 2 Tablespoonful by mouth every morning      metoprolol succinate ER (TOPROL-XL) 25 MG 24 hr tablet Take 2 tablets (50 mg) by mouth daily  Qty: 90 tablet, Refills: 3    Associated Diagnoses: Coronary artery disease involving native coronary artery of native heart without angina pectoris; Benign essential hypertension      Multiple Vitamins-Minerals (MULTIVITAMIN & MINERAL PO) Take 1 tablet by mouth daily      nitroGLYcerin (NITROSTAT) 0.4 MG sublingual tablet For chest pain place 1 tablet under the tongue every 5 minutes for 3 doses. If symptoms persist 5 minutes after 1st dose call 911.  Qty: 25 tablet, Refills: 1    Associated Diagnoses: Chest pain, unspecified type      polyethylene glycol (MIRALAX) 17 GM/Dose powder Take 17 g by mouth 2 times daily  Qty: 510 g, Refills: 0    Associated Diagnoses: Fecal impaction (H)           Allergies   Allergies   Allergen Reactions     Penicillins

## 2022-11-27 NOTE — PLAN OF CARE
Pt is here L frontal stoke and L ICA stenosis. POD1 of L CEA. A&Ox4. Neuros intact. VSS, Bp soft but above parameters. Neck incision swollen otherwise CDI. AISHA 5cc. Bedrest overnight. Continent of bowel and bladder. Voiding in urinal without difficulty. Passing flatus. Regular diet. Takes pills whole with water. Denies pain. Pt scoring green on the aggression stoplight tool. Discharge pending, continue to monitor.

## 2022-11-27 NOTE — PROGRESS NOTES
Pt discharging home with daughter. RN reviewed paperwork ,discharge medication and answered questions. Personal belongings sent olvin with Pt.

## 2022-11-27 NOTE — ANESTHESIA CARE TRANSFER NOTE
Patient: Cale Wagoner    Procedure: Procedure(s):  LEFT CAROTID ENDARTERECTOMY WITH ELECTROENCEPHALOGRAM MONITORING. Left external jugular patch       Diagnosis: TIA (transient ischemic attack) [G45.9]  Symptomatic carotid artery stenosis, unspecified laterality [I65.29]  Diagnosis Additional Information: No value filed.    Anesthesia Type:   General     Note:    Oropharynx: oropharynx clear of all foreign objects  Level of Consciousness: awake  Oxygen Supplementation: nasal cannula    Independent Airway: airway patency satisfactory and stable  Dentition: dentition unchanged  Vital Signs Stable: post-procedure vital signs reviewed and stable  Report to RN Given: handoff report given  Patient transferred to: PACU    Handoff Report: Identifed the Patient, Identified the Reponsible Provider, Reviewed the pertinent medical history, Discussed the surgical course, Reviewed Intra-OP anesthesia mangement and issues during anesthesia, Set expectations for post-procedure period and Allowed opportunity for questions and acknowledgement of understanding      Vitals:  Vitals Value Taken Time   /74 11/26/22 1853   Temp     Pulse 99 11/26/22 1859   Resp 8 11/26/22 1859   SpO2 95 % 11/26/22 1859   Vitals shown include unvalidated device data.    Electronically Signed By: ANNELIESE Hawkins CRNA  November 26, 2022  7:02 PM

## 2022-11-27 NOTE — ANESTHESIA POSTPROCEDURE EVALUATION
Patient: Cale Wagoner    Procedure: Procedure(s):  LEFT CAROTID ENDARTERECTOMY WITH ELECTROENCEPHALOGRAM MONITORING. Left external jugular patch       Anesthesia Type:  General    Note:  Disposition: Inpatient   Postop Pain Control: Uneventful            Sign Out: Well controlled pain   PONV: No   Neuro/Psych: Uneventful            Sign Out: Acceptable/Baseline neuro status   Airway/Respiratory: Uneventful            Sign Out: Acceptable/Baseline resp. status   CV/Hemodynamics: Uneventful            Sign Out: Acceptable CV status   Other NRE:    DID A NON-ROUTINE EVENT OCCUR? No           Last vitals:  Vitals Value Taken Time   /66 11/26/22 1930   Temp 36.4  C (97.6  F) 11/26/22 1920   Pulse 86 11/26/22 1936   Resp 9 11/26/22 1936   SpO2 96 % 11/26/22 1936   Vitals shown include unvalidated device data.    Electronically Signed By: Ira Jain  November 26, 2022  8:45 PM

## 2022-11-27 NOTE — PLAN OF CARE
SLP: Orders received. Chart reviewed and discussed with care team.  Patient passed the swallow screen and observed him eating breakfast without difficulty. His speech/language symptoms have resolved and per his daughter is back to his baseline. He was able to name objects in his room, generate a sentence and read aloud without difficulty. SLP not indicated due to symptoms have resolved.  Defer discharge recommendations to the medical team.  Will complete orders.

## 2022-11-27 NOTE — BRIEF OP NOTE
[facility]    Brief Operative Note    Pre-operative diagnosis: TIA (transient ischemic attack) [G45.9]  Symptomatic carotid artery stenosis, unspecified laterality [I65.29]  Post-operative diagnosis Same as pre-operative diagnosis    Procedure:  Extended left carotid endarterectomy with left external jugular vein patch angioplasty    Surgeon: Surgeon(s) and Role:     * Trev Mensah MD - Primary     * Darrell Davies MD - Resident - Assisting  Anesthesia: General   Estimated Blood Loss: 50 mL from 11/26/2022  3:42 PM to 11/26/2022  6:46 PM      Drains:  Left neck drain  Specimens: * No specimens in log *  Findings:   Ulcerated left carotid plaque. External jugular vein patch angioplasty.  No EEG changes  Intact RUE & LUE strength, tongue midline, no facial droop at end of case    Complications: None.  Implants: * No implants in log *    Plan:  Stop heparin gtt  Continue aspirin and plavix  Clear liquids  Goal systolic -140

## 2022-11-27 NOTE — PLAN OF CARE
Physical Therapy Discharge Summary    Reason for therapy discharge:    All goals and outcomes met, no further needs identified.    Progress towards therapy goal(s). See goals on Care Plan in [facility] electronic health record for goal details.  Goals met    Therapy recommendation(s):    No further therapy is recommended. Recommend ambulation for exercise.

## 2022-11-27 NOTE — ANESTHESIA PROCEDURE NOTES
Airway         Procedure Start/Stop Times: 11/26/2022 3:50 PM  Staff -        Performed By: CRNAIndications and Patient Condition       Indications for airway management: sarah-procedural and airway protection       Induction type:intravenous       Mask difficulty assessment: 1 - vent by mask    Final Airway Details       Final airway type: endotracheal airway       Successful airway: ETT - single  Endotracheal Airway Details        ETT size (mm): 8.0       Cuffed: yes       Successful intubation technique: direct laryngoscopy       DL Blade Type: MAC 3       Grade View of Cords: 2       Adjucts: stylet       Position: Right       Measured from: lips       Secured at (cm): 23    Post intubation assessment        Placement verified by: capnometry, equal breath sounds and chest rise        Number of attempts at approach: 1       Number of other approaches attempted: 0       Secured with: pink tape       Ease of procedure: easy       Dentition: Intact and Unchanged    Medication(s) Administered   Medication Administration Time: 11/26/2022 3:50 PM

## 2022-11-27 NOTE — PROGRESS NOTES
[facility]    Stroke Progress Note    Interval EventsS/p L CEA yesterday 11/26    Speech fluent today, he feels he is back to his baseline. Slight L tongue deviation.      HPI Summary  89 year old male with PMH HTN, HLD, CAD, TIAs. Presented 11/25 with new word finding difficulty and slow speech, uncertain time of onset. Hyperacute MRI did show a subtle area of hyperintensity on DWI imaging involving the left paracentral lobule which did have corresponding signal change on FLAIR.  Given these findings and the uncertainty regarding timing not felt to be a TNK candidate. CTA showed severe L ICA stenosis. Patient returned to his baseline after admission but then developed recurrence of symptoms 11/26. Repeat Head CT with no acute findings. CTA head/neck with stable L ICA stenosis, MCA M1 stenosis, L PCA stenosis.     Stroke Evaluation Summarized     MRI/Head CT Hyperacute MRI 11/25: left paracentral lobule infarct with matched FLAIR/DWI hyperintensity     Head CT 11/26: no acute findings, mild to moderate chronic SVID   Intracranial Vasculature CTA head: no LVO, R MCA M1 multifocal stenosis, L PCA stenosis   Cervical Vasculature CTA neck: L ICA non calcified plaque with 60-70% stenosism R ICA 30-40% stenosis      Echocardiogram pending   EKG/Telemetry Sinus rhythm   Other Testing Not Applicable       LDL  11/25/2022: 25 mg/dL   A1C  11/25/2022: 5.4 %   Troponin 11/26/2022: 15 ng/L           Impression   1. Acute ischemic stroke of L frontal due to large artery athero (severe L ICA stenosis)     2. Moderate to severe L ICA stenosis, non-calcified plaque - symptomatic with recurrent/stuttering left hemispheric symptoms. Now s/p L CEA.    Plan  - Neurochecks and Vital Signs every 4 hours  - Goal normotension, long term outpatient BP goal <130/80  - Agree with plan for DAPT x 1 month (aspirin 81 mg + Plavix 75 mg) followed by aspirin 325 mg daily indefinitely for secondary stroke  "prevention  - Statin: continue PTA atorvastatin 20 mg for now, goal LDL 40-70  - TTE pending  - Telemetry, EKG  - Bedside Glucose Monitoring  - Nutrition: per nursing  - PT/OT/SLP  - Stroke Education  - Euthermia, Euglycemia    Patient Follow-up    - in the next 1-2 week(s) with PCP  - in 6-8 weeks with general neurology ([phone])    Will follow for echo results. Depending on those results, patient can likely discharge later today.    Faviola Colvin PA-C  Vascular Neurology    To page me or covering stroke neurology team member, click here: [facility]   Choose \"On Call\" tab at top, then search dropdown box for \"Neurology Adult\", select location, press Enter, then look for stroke/neuro ICU/telestroke.  ______________________________________________________    Clinically Significant Risk Factors Present on Admission                 Medications   Scheduled Meds    acetaminophen  975 mg Oral Q8H     aspirin  81 mg Oral Daily     atorvastatin  20 mg Oral or Feeding Tube Daily     clopidogrel  75 mg Oral Daily     enoxaparin ANTICOAGULANT  40 mg Subcutaneous Q24H     metoprolol succinate ER  25 mg Oral Daily     polyethylene glycol  17 g Oral Daily     senna-docusate  1 tablet Oral BID     sodium chloride (PF)  3 mL Intracatheter Q8H     sodium chloride 0.9 %  100 mL Intravenous Once       Infusion Meds    - MEDICATION INSTRUCTIONS -       - MEDICATION INSTRUCTIONS -       BETA BLOCKER NOT PRESCRIBED       sodium chloride 70 mL/hr at 11/26/22 2129     sodium chloride         PRN Meds  [START ON 11/29/2022] acetaminophen, sore throat, bisacodyl, hydrALAZINE, labetalol, lidocaine 4%, lidocaine 4%, lidocaine (buffered or not buffered), lidocaine (buffered or not buffered), magnesium hydroxide, - MEDICATION INSTRUCTIONS -, - MEDICATION INSTRUCTIONS -, naloxone **OR** naloxone **OR** naloxone **OR** naloxone, nitroGLYcerin, ondansetron **OR** ondansetron, oxyCODONE IR **OR** oxyCODONE, prochlorperazine **OR** prochlorperazine, " BETA BLOCKER NOT PRESCRIBED, sodium chloride (PF), sodium chloride (PF), sodium chloride       PHYSICAL EXAMINATION  Temp:  [97.3  F (36.3  C)-98.2  F (36.8  C)] 97.3  F (36.3  C)  Pulse:  [] 74  Resp:  [8-19] 16  BP: (100-150)/(52-78) 105/52  MAP:  [95 mmHg-106 mmHg] 101 mmHg  Arterial Line BP: (161-172)/(61-73) 171/69  SpO2:  [92 %-98 %] 96 %      General Exam  General:  patient lying in bed without any acute distress    HEENT:  normocephalic/atraumatic, L neck incision  Pulmonary:  no respiratory distress  Extremities:  no edema  Skin:  intact     Neuro Exam  Mental Status:  alert, oriented x 3, follows commands, speech clear and fluent, naming and repetition normal  Cranial Nerves:  visual fields intact, PERRL, EOMI with normal smooth pursuit, facial sensation intact and symmetric, facial movements symmetric, hearing not formally tested but intact to conversation, palate elevation symmetric and uvula midline, no dysarthria, shoulder shrug strong bilaterally, slight tongue deviation to the L, slight L ptosis  Motor:  normal muscle tone and bulk, slight tremor R hand, able to move all limbs spontaneously, strength 5/5 throughout upper and lower extremities, no pronator drift  Reflexes:  toes down-going  Sensory:  light touch sensation intact and symmetric throughout upper and lower extremities, no extinction on double simultaneous stimulation   Coordination:  normal finger-to-nose and heel-to-shin bilaterally without dysmetria, rapid alternating movements symmetric  Station/Gait:  deferred    Stroke Scales    NIHSS  1a. Level of Consciousness 0-->Alert, keenly responsive   1b. LOC Questions 0-->Answers both questions correctly   1c. LOC Commands 0-->Performs both tasks correctly   2.   Best Gaze 0-->Normal   3.   Visual 0-->No visual loss   4.   Facial Palsy 0-->Normal symmetrical movements   5a. Motor Arm, Left 0-->No drift, limb holds 90 (or 45) degrees for full 10 secs   5b. Motor Arm, Right 0-->No drift,  limb holds 90 (or 45) degrees for full 10 secs   6a. Motor Leg, Left 0-->No drift, leg holds 30 degree position for full 5 secs   6b. Motor Leg, right 0-->No drift, leg holds 30 degree position for full 5 secs   7.   Limb Ataxia 0-->Absent   8.   Sensory 0-->Normal, no sensory loss   9.   Best Language 0-->No aphasia, normal   10. Dysarthria 0-->Normal   11. Extinction and Inattention  0-->No abnormality   Total 0 (11/27/22 0824)     Imaging  I personally reviewed all imaging; relevant findings per HPI.     Lab Results Data   CBC  Recent Labs   Lab 11/26/22  1130 11/26/22  1005 11/25/22  1445   WBC 5.2 5.0 6.5   RBC 4.51 4.72 4.29*   HGB 14.2 15.0 13.5   HCT 41.8 44.2 40.4    161 160     Basic Metabolic Panel    Recent Labs   Lab 11/27/22  0734 11/27/22  0558 11/27/22  0005 11/26/22  2109 11/26/22  0946 11/26/22  0927 11/25/22  1445   NA  --   --   --   --   --  132* 130*   POTASSIUM  --   --   --   --   --  4.5 4.2   CHLORIDE  --   --   --   --   --  96 96   CO2  --   --   --   --   --  28 29   BUN  --   --   --   --   --  17 19   CR  --   --   --  1.16  --  1.13 1.05   * 118* 132*  --    < > 138* 83   RUSTY  --   --   --   --   --  8.6 8.0*    < > = values in this interval not displayed.     Liver Panel  No results for input(s): PROTTOTAL, ALBUMIN, BILITOTAL, ALKPHOS, AST, ALT, BILIDIRECT in the last 168 hours.  INR    Recent Labs   Lab Test 11/25/22  1445 10/23/19  0845 09/12/14  1510   INR 1.16* 1.06 1.01      Lipid Profile    Recent Labs   Lab Test 11/25/22  1445 10/19/20  1628 08/14/19  0950 03/15/16  0854 08/31/15  1303 04/15/15  0740 02/03/15  0846   CHOL 92 120 122   < > 175 219* 208*   HDL 38* 49 46   < > 40* 42 40*   LDL 25 53 59   < > 98 155* 144*   TRIG 144 92 85   < > 184* 109 122   CHOLHDLRATIO  --   --   --   --  4.4 5.2* 5.2*    < > = values in this interval not displayed.     A1C    Recent Labs   Lab Test 11/25/22  1445   A1C 5.4     Troponin    Recent Labs   Lab 11/26/22  0927  11/25/22  1445   TROPONINIS 15 15          Data   I have personally spent a total of 35 minutes providing care today, time spent in reviewing medical records and reviewing tests, examining the patient and obtaining history, coordination of care, and discussion with the patient and/or family regarding diagnostic results, prognosis, symptom management, risks and benefits of management options, and development of plan of care. Greater than 50% was spent in counseling and coordination of care.

## 2022-11-27 NOTE — PROGRESS NOTES
11/27/22 0808   Appointment Info   Signing Clinician's Name / Credentials (PT) Lizy Hernandez, PT, DPT   Living Environment   People in Home spouse   Current Living Arrangements house   Home Accessibility stairs within home   Number of Stairs, Within Home, Primary greater than 10 stairs   Stair Railings, Within Home, Primary railings on both sides of stairs   Transportation Anticipated car, drives self   Self-Care   Usual Activity Tolerance good   Current Activity Tolerance good   Regular Exercise No   Equipment Currently Used at Home none   Fall history within last six months no   Activity/Exercise/Self-Care Comment Patient independent at baseline without device, takes care of his wife w/ dementia. Family is looking into possibly more services for wife to decrease burden of care, but pt reports it's going fine right now.   General Information   Onset of Illness/Injury or Date of Surgery 11/25/22   Referring Physician Darrell Davies MD   Patient/Family Therapy Goals Statement (PT) to go home   Pertinent History of Current Problem (include personal factors and/or comorbidities that impact the POC) Per chart: Extended left carotid enterectomy with distal external jugular vein patch angioplasty. An 89-year-old patient suffered an expressive aphasia that lasted for several hours with resolution.  CTA revealed a 60-70% stenosis of the left proximal ICA, which was likely ulcerated.   Existing Precautions/Restrictions fall;other (see comments)  (hob > 30 degrees)   Weight-Bearing Status - LLE full weight-bearing   Weight-Bearing Status - RLE full weight-bearing   Cognition   Affect/Mental Status (Cognition) WNL   Orientation Status (Cognition) oriented x 4   Pain Assessment   Patient Currently in Pain No   Integumentary/Edema   Integumentary/Edema other (describe)   Integumentary/Edema Comments age related changes and incision from procedure yesterday, otherwise intact   Posture    Posture Forward head position    Range of Motion (ROM)   Range of Motion ROM is WFL   Strength (Manual Muscle Testing)   Strength (Manual Muscle Testing) strength is WFL   Strength Comments WFL and even bilaterally   Bed Mobility   Bed Mobility no deficits identified   Transfers   Transfers no deficits identified   Comment, (Transfers) steady to no device   Gait/Stairs (Locomotion)   [place] Level (Gait) supervision   Distance in Feet (Required for LE Total Joints) 100ft   Distance in Feet (Gait) 300ft   Pattern (Gait) step-through   Comment, (Gait/Stairs) no device, steady with slightly WBOS and externally rotated LE.   Balance   Balance Comments ambulates w/ slightly WBOS at baseline, no overt deficits and appears to be at baseline   Sensory Examination   Sensory Perception WFL   Clinical Impression   Criteria for Skilled Therapeutic Intervention Yes, treatment indicated   PT Diagnosis (PT) impaired functional mobility   Influenced by the following impairments decreased strength   Functional limitations due to impairments gait, stairs   Clinical Presentation (PT Evaluation Complexity) Stable/Uncomplicated   Clinical Presentation Rationale clinical  judgement   Clinical Decision Making (Complexity) low complexity   Planned Therapy Interventions (PT) gait training   Risk & Benefits of therapy have been explained evaluation/treatment results reviewed;care plan/treatment goals reviewed;risks/benefits reviewed;current/potential barriers reviewed;participants voiced agreement with care plan;participants included;patient   PT Total Evaluation Time   PT Eval, Low Complexity Minutes (61693) 12   Physical Therapy Goals   PT Frequency One time eval and treatment only   PT Predicted Duration/Target Date for Goal Attainment 11/27/22   PT Goals Bed Mobility;Transfers;Gait;Stairs   PT: Bed Mobility Independent;Goal Met   PT: Transfers Independent;Goal Met   PT: Gait Modified independent;Greater than 200 feet   PT: Stairs Modified independent;Greater than  10 stairs;Rail on both sides   Interventions   Interventions Quick Adds Gait Training   Gait Training   Gait Training Minutes ([id number]) 10   Symptoms Noted During/After Treatment (Gait Training) none   Treatment Detail/Skilled Intervention Gait at good pace with slightly WBOS and external rotation in LE, but functional without LOB or unsteadiness. Stairs x 16 w/ B railing reciprocal stepping, no unsteadiness w/  a good pace. Tolerated well, no symptoms. Discussed plan, patient reports feeling at his baseline at this point, no interest in further therapies. Discussed pt can ambulate for exercise/activity tolerance while he remains in hospital and when he d/c's home.   [place] Level (Gait Training) independent   Physical Assistance Level (Gait Training) verbal cues   PT Discharge Planning   PT Plan goals met, d/c   PT Discharge Recommendation (DC Rec) home   PT Rationale for DC Rec Patient appears to be at/near his baseline for functional mobility, able to navigate hallway distances without device and stairs w/ B railing. Anticipated to be able to d/c home without further PT.   PT Brief overview of current status indepedent without device   Total Session Time   Timed Code Treatment Minutes 10   Total Session Time (sum of timed and untimed services) 22

## 2022-11-27 NOTE — PROGRESS NOTES
Vascular Surgery Progress Note    No issues overnight. Sore throat, likely form endotrachial intubation. BP well controlled overnight.    /52 (BP Location: Left arm)   Pulse 74   Temp 97.3  F (36.3  C) (Oral)   Resp 16   Wt 77.1 kg (170 lb)   SpO2 96%   BMI 27.44 kg/m      Exam  Resting comfortably in bed  Speech fluent, no dysarthria, face symmetric. Tongue slightly deviated to L. Intact motor/sensation in all four extremities.  L neck dressing removed. Dried serosang drainage on steristrips. Mild neck swelling. Drain removed.    Labs reviewed.    Assessment: 89 y.o. male with symptomatic L carotid stenosis with recurrent TIAs yesterday. Now POD#1 s/p left carotid endarterectomy.    Plan:  - continue statin, aspirin, and continue plavix for 1 month, after this he would continue aspirin indefinitely  - drain removed at bedside, incision is healthy  - okay to discharge from vascular surgery standpoing  - follow-up with Dr. Mensah in vascular surgery clinic in one month    Seen and discussed with staff, Dr. Mensah.    Darrell Davies MD    STAFF: Patient examined this morning.  He is doing very well.     Speech is normal.   Complains of a sore throat likely from intubation    -- No swallowing issues yesterday preoperative     Wd=A minimal swelling.   Cn XII and Neuro=A     Minimal AISHA output--removed    Impression: Doing very well following left CEA for very symptomatic   ulcerated left carotid stenosis.  DAP as above.     From vascular surgical standpoint patient may be discharged   Pending hospitalist and neurology review.           I called and updated his daughter Luna ([phone] Cell) about his progress.  She lives in [place] and plans to take her father home with her for the next several days.  Her mother who has dementia will be taking care of by other family members who live in the [facility].  Discussed our follow-up appointment in 2 weeks and duplex in 3 months.    Trev Mensah MD

## 2022-11-29 NOTE — PROGRESS NOTES
Connected Care Resource Center Contact  [facility] [place]/Voicemail     Clinical Data: Transitional Care Management Outreach     Outreach attempted x 2.  Unable to leave message on patient's voicemail with [facility]'s 24/7 scheduling and nurse triage phone number [phone] ([phone]) for questions/concerns or schedule an appointment with an [facility] provider.     Plan:  [facility] will do no further outreaches at this time.       Noemy Schroeder  Community Health Worker  [facility] Care Resource [place], [facility]      *Connected Care Resource Team does NOT follow patient ongoing. Referrals are identified based on internal discharge reports and the outreach is to ensure patient has an understanding of their discharge instructions.

## 2022-12-06 NOTE — TELEPHONE ENCOUNTER
Stroke RN Care Coordination - Chart Review Note    SITUATION     Cale Wagoner is a 89 year old male who is receiving support for:  Clinic Care Coordination - Post Hospital (Stroke Neurology Follow-Up)    BACKGROUND     Per inpatient stroke neurology PA:     Impression   1. Acute ischemic stroke of L frontal due to large artery athero (severe L ICA stenosis)     2. Moderate to severe L ICA stenosis, non-calcified plaque - symptomatic with recurrent/stuttering left hemispheric symptoms. Now s/p L CEA.     Patient Follow-up    - in the next 1-2 week(s) with PCP  - in 6-8 weeks with general neurology ([phone])    ASSESSMENT     Central scheduling has 2 attempted outreach attempts to pt per referral order. Both attempts made they were unable to leave a VM as the mailbox was full.    VISHAL Wiggins, per Faviola CUNHA, she states the pt was discharging to home with family supervision. Please outreach to pt's family member to advise on general neurology follow up and provide scheduling line to [facility]. Please fax referral order to their preferred location.    Catie CONCEPCION, RN, SCRN  RN Stroke Neurology Care Coordinator  [facility] Neuroscience Service Line

## 2022-12-13 NOTE — PROGRESS NOTES
[place] [facility]    Cale Wagoner returns for follow-up.  Patient presented with expressive aphasia on 11/25/2022.  60 to 70% stenosis proximal left ICA with ulceration and 30 to 40% stenosis  on the right.  Patient had recurrent expressive aphasia implying this was a crescendo TIA.    11/26/2022 extended left CEA with distal external jugular vein patch.  Noted significant stenosis with calcified irregularity in the organized thrombus at the time of surgery.  Did well postop with no recurrent symptoms and discharged POD#1      Admit A1c= 5.4   LDL= 25 on Lipitor.  Non-smoker.    PRESENT SITUATION: Has done very well following surgery.  No recurrent symptoms particular expressive aphasia.  No mandibular numbness.  Did have a bruise above his right medial knee following surgery of uncertain etiology which is improving.  Does not check his blood pressure at home.    Exam: Alert and appropriate.  Normal affect.   Blood pressure 152/79.  Pulse 91.   Well-healing left carotid incision    IMPRESSION: Doing very well following left CEA for symptomatic ulcerated left carotid stenosis with minimal changes on the right.  We will finish off his Plavix but continue on baby aspirin.    Discussed risk factors.  Well-controlled LDL on Lipitor which we will continue.  Blood pressure somewhat higher today but this certainly may be due to the bad weather in traffic.  Goal of blood pressure 120 to 130 systolic and he should check this at home.    Left carotid duplex in 3 months.     Trev Mensah MD   This note was created using Dragon voice recognition software which may result in transcription errors.

## 2022-12-15 NOTE — PROGRESS NOTES
Patient is here to discuss post op.    BP (!) 152/79 (BP Location: Left arm, Patient Position: Chair, Cuff Size: Adult Regular)   Pulse 91   SpO2 97%     Questions patient would like addressed today are: N/A.    Refills are needed: No    Has homecare services and agency name:  Nicole Patel

## 2022-12-20 NOTE — TELEPHONE ENCOUNTER
Pt called regarding this refill request. Dr. Orellana's note was relayed, see below. Pt states he does have an appointment for a physical coming up in January with his new PCP outside of [place]. Nurse advised to reach out to his new PCP to request refills. Nurse offered to help him schedule at [facility] if he prefers to come back to the clinic. He states he does not want to at this time. He had no further questions or concerns at this time.     Melissa DAWSON RN  [facility]

## 2023-01-01 ENCOUNTER — HOSPITAL ENCOUNTER (OUTPATIENT)
Dept: ULTRASOUND IMAGING | Facility: CLINIC | Age: 88
Discharge: HOME | End: 2023-04-27
Attending: SURGERY
Payer: COMMERCIAL

## 2023-01-01 ENCOUNTER — TELEPHONE (OUTPATIENT)
Dept: DERMATOLOGY | Facility: CLINIC | Age: 88
End: 2023-01-01
Payer: COMMERCIAL

## 2023-01-01 ENCOUNTER — OFFICE VISIT (OUTPATIENT)
Dept: OTHER | Facility: CLINIC | Age: 88
End: 2023-01-01
Attending: SURGERY
Payer: COMMERCIAL

## 2023-01-01 ENCOUNTER — OFFICE VISIT (OUTPATIENT)
Dept: DERMATOLOGY | Facility: CLINIC | Age: 88
End: 2023-01-01
Payer: COMMERCIAL

## 2023-01-01 ENCOUNTER — APPOINTMENT (OUTPATIENT)
Dept: CT IMAGING | Facility: CLINIC | Age: 88
DRG: 086 | End: 2023-01-01
Attending: EMERGENCY MEDICINE
Payer: COMMERCIAL

## 2023-01-01 ENCOUNTER — HOSPITAL ENCOUNTER (INPATIENT)
Facility: CLINIC | Age: 88
LOS: 1 days | Discharge: ANOTHER HOSPITAL | DRG: 086 | End: 2023-06-03
Attending: EMERGENCY MEDICINE | Admitting: EMERGENCY MEDICINE
Payer: COMMERCIAL

## 2023-01-01 ENCOUNTER — TELEPHONE (OUTPATIENT)
Dept: DERMATOLOGY | Facility: CLINIC | Age: 88
End: 2023-01-01

## 2023-01-01 VITALS — SYSTOLIC BLOOD PRESSURE: 170 MMHG | HEART RATE: 62 BPM | DIASTOLIC BLOOD PRESSURE: 82 MMHG

## 2023-01-01 VITALS
HEIGHT: 65 IN | DIASTOLIC BLOOD PRESSURE: 50 MMHG | SYSTOLIC BLOOD PRESSURE: 104 MMHG | OXYGEN SATURATION: 100 % | HEART RATE: 85 BPM | TEMPERATURE: 97.5 F | RESPIRATION RATE: 21 BRPM | WEIGHT: 169.97 LBS | BODY MASS INDEX: 28.32 KG/M2

## 2023-01-01 DIAGNOSIS — L01.00 IMPETIGO: ICD-10-CM

## 2023-01-01 DIAGNOSIS — S01.01XA LACERATION OF SCALP, INITIAL ENCOUNTER: ICD-10-CM

## 2023-01-01 DIAGNOSIS — Z98.890 HISTORY OF LEFT-SIDED CAROTID ENDARTERECTOMY: ICD-10-CM

## 2023-01-01 DIAGNOSIS — G45.1 TIA INVOLVING LEFT INTERNAL CAROTID ARTERY: Primary | ICD-10-CM

## 2023-01-01 DIAGNOSIS — I65.22 CAROTID ARTERY STENOSIS, SYMPTOMATIC, LEFT: ICD-10-CM
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
## 2023-01-01 DIAGNOSIS — R41.0 CONFUSION: ICD-10-CM

## 2023-01-01 DIAGNOSIS — E78.5 HYPERLIPIDEMIA LDL GOAL <70: ICD-10-CM

## 2023-01-01 DIAGNOSIS — L01.00 IMPETIGO: Primary | ICD-10-CM

## 2023-01-01 DIAGNOSIS — S09.90XA CLOSED HEAD INJURY, INITIAL ENCOUNTER: ICD-10-CM

## 2023-01-01 DIAGNOSIS — I62.9 INTRACRANIAL HEMORRHAGE (H): ICD-10-CM

## 2023-01-01 DIAGNOSIS — S02.119A CLOSED FRACTURE OF RIGHT SIDE OF OCCIPITAL BONE, UNSPECIFIED OCCIPITAL FRACTURE TYPE, INITIAL ENCOUNTER (H): ICD-10-CM

## 2023-01-01 LAB
ABO/RH(D): NORMAL
ALBUMIN SERPL BCG-MCNC: 4.2 G/DL (ref 3.5–5.2)
ALP SERPL-CCNC: 66 U/L (ref 40–129)
ALT SERPL W P-5'-P-CCNC: 23 U/L (ref 10–50)
ANION GAP SERPL CALCULATED.3IONS-SCNC: 13 MMOL/L (ref 7–15)
ANTIBODY SCREEN: NEGATIVE
APTT PPP: 34 SECONDS (ref 22–38)
AST SERPL W P-5'-P-CCNC: 36 U/L (ref 10–50)
BASOPHILS # BLD AUTO: 0.1 10E3/UL (ref 0–0.2)
BASOPHILS NFR BLD AUTO: 1 %
BILIRUB SERPL-MCNC: 0.9 MG/DL
BUN SERPL-MCNC: 21.7 MG/DL (ref 8–23)
CALCIUM SERPL-MCNC: 9.1 MG/DL (ref 8.2–9.6)
CHLORIDE SERPL-SCNC: 90 MMOL/L (ref 98–107)
CREAT BLD-MCNC: 1.5 MG/DL (ref 0.7–1.3)
CREAT SERPL-MCNC: 1.36 MG/DL (ref 0.67–1.17)
DEPRECATED HCO3 PLAS-SCNC: 21 MMOL/L (ref 22–29)
EOSINOPHIL # BLD AUTO: 0.1 10E3/UL (ref 0–0.7)
EOSINOPHIL NFR BLD AUTO: 1 %
ERYTHROCYTE [DISTWIDTH] IN BLOOD BY AUTOMATED COUNT: 12.7 % (ref 10–15)
GFR SERPL CREATININE-BSD FRML MDRD: 44 ML/MIN/1.73M2
GFR SERPL CREATININE-BSD FRML MDRD: 49 ML/MIN/1.73M2
GLUCOSE SERPL-MCNC: 114 MG/DL (ref 70–99)
HCT VFR BLD AUTO: 41.5 % (ref 40–53)
HGB BLD-MCNC: 14.6 G/DL (ref 13.3–17.7)
IMM GRANULOCYTES # BLD: 0 10E3/UL
IMM GRANULOCYTES NFR BLD: 0 %
INR PPP: 1.11 (ref 0.85–1.15)
LYMPHOCYTES # BLD AUTO: 2.3 10E3/UL (ref 0.8–5.3)
LYMPHOCYTES NFR BLD AUTO: 21 %
MCH RBC QN AUTO: 31.6 PG (ref 26.5–33)
MCHC RBC AUTO-ENTMCNC: 35.2 G/DL (ref 31.5–36.5)
MCV RBC AUTO: 90 FL (ref 78–100)
MONOCYTES # BLD AUTO: 1.3 10E3/UL (ref 0–1.3)
MONOCYTES NFR BLD AUTO: 12 %
NEUTROPHILS # BLD AUTO: 7.2 10E3/UL (ref 1.6–8.3)
NEUTROPHILS NFR BLD AUTO: 65 %
NRBC # BLD AUTO: 0 10E3/UL
NRBC BLD AUTO-RTO: 0 /100
PLATELET # BLD AUTO: 167 10E3/UL (ref 150–450)
POTASSIUM SERPL-SCNC: 4.5 MMOL/L (ref 3.4–5.3)
PROT SERPL-MCNC: 7.2 G/DL (ref 6.4–8.3)
RADIOLOGIST FLAGS: ABNORMAL
RADIOLOGIST FLAGS: ABNORMAL
RBC # BLD AUTO: 4.62 10E6/UL (ref 4.4–5.9)
SODIUM SERPL-SCNC: 124 MMOL/L (ref 136–145)
SPECIMEN EXPIRATION DATE: NORMAL
WBC # BLD AUTO: 10.9 10E3/UL (ref 4–11)

## 2023-01-01 PROCEDURE — G0390 TRAUMA RESPONS W/HOSP CRITI: HCPCS

## 2023-01-01 PROCEDURE — 80053 COMPREHEN METABOLIC PANEL: CPT | Performed by: EMERGENCY MEDICINE

## 2023-01-01 PROCEDURE — 86850 RBC ANTIBODY SCREEN: CPT | Performed by: EMERGENCY MEDICINE

## 2023-01-01 PROCEDURE — 94002 VENT MGMT INPAT INIT DAY: CPT

## 2023-01-01 PROCEDURE — 250N000009 HC RX 250: Performed by: EMERGENCY MEDICINE

## 2023-01-01 PROCEDURE — 99292 CRITICAL CARE ADDL 30 MIN: CPT

## 2023-01-01 PROCEDURE — 96374 THER/PROPH/DIAG INJ IV PUSH: CPT

## 2023-01-01 PROCEDURE — 85025 COMPLETE CBC W/AUTO DIFF WBC: CPT | Performed by: EMERGENCY MEDICINE

## 2023-01-01 PROCEDURE — 74177 CT ABD & PELVIS W/CONTRAST: CPT

## 2023-01-01 PROCEDURE — 120N000001 HC R&B MED SURG/OB

## 2023-01-01 PROCEDURE — 250N000011 HC RX IP 250 OP 636: Performed by: EMERGENCY MEDICINE

## 2023-01-01 PROCEDURE — 85730 THROMBOPLASTIN TIME PARTIAL: CPT | Performed by: EMERGENCY MEDICINE

## 2023-01-01 PROCEDURE — 99214 OFFICE O/P EST MOD 30 MIN: CPT | Performed by: STUDENT IN AN ORGANIZED HEALTH CARE EDUCATION/TRAINING PROGRAM

## 2023-01-01 PROCEDURE — 36415 COLL VENOUS BLD VENIPUNCTURE: CPT | Performed by: EMERGENCY MEDICINE

## 2023-01-01 PROCEDURE — 93882 EXTRACRANIAL UNI/LTD STUDY: CPT | Mod: LT

## 2023-01-01 PROCEDURE — 96375 TX/PRO/DX INJ NEW DRUG ADDON: CPT

## 2023-01-01 PROCEDURE — 99291 CRITICAL CARE FIRST HOUR: CPT | Mod: 25

## 2023-01-01 PROCEDURE — 86901 BLOOD TYPING SEROLOGIC RH(D): CPT | Performed by: EMERGENCY MEDICINE

## 2023-01-01 PROCEDURE — 99213 OFFICE O/P EST LOW 20 MIN: CPT | Performed by: SURGERY

## 2023-01-01 PROCEDURE — 70450 CT HEAD/BRAIN W/O DYE: CPT

## 2023-01-01 PROCEDURE — 85610 PROTHROMBIN TIME: CPT | Performed by: EMERGENCY MEDICINE

## 2023-01-01 PROCEDURE — 999N000157 HC STATISTIC RCP TIME EA 10 MIN

## 2023-01-01 PROCEDURE — G0463 HOSPITAL OUTPT CLINIC VISIT: HCPCS | Mod: 25

## 2023-01-01 PROCEDURE — 82565 ASSAY OF CREATININE: CPT

## 2023-01-01 PROCEDURE — 0HQ0XZZ REPAIR SCALP SKIN, EXTERNAL APPROACH: ICD-10-PCS | Performed by: EMERGENCY MEDICINE

## 2023-01-01 PROCEDURE — 250N000011 HC RX IP 250 OP 636

## 2023-01-01 PROCEDURE — 99222 1ST HOSP IP/OBS MODERATE 55: CPT | Performed by: SURGERY

## 2023-01-01 PROCEDURE — 31500 INSERT EMERGENCY AIRWAY: CPT

## 2023-01-01 PROCEDURE — 72125 CT NECK SPINE W/O DYE: CPT

## 2023-01-01 RX ORDER — FENTANYL CITRATE 50 UG/ML
100 INJECTION, SOLUTION INTRAMUSCULAR; INTRAVENOUS ONCE
Status: DISCONTINUED | OUTPATIENT
Start: 2023-01-01 | End: 2023-01-01

## 2023-01-01 RX ORDER — FENTANYL CITRATE 50 UG/ML
50 INJECTION, SOLUTION INTRAMUSCULAR; INTRAVENOUS ONCE
Status: COMPLETED | OUTPATIENT
Start: 2023-01-01 | End: 2023-01-01

## 2023-01-01 RX ORDER — ONDANSETRON 2 MG/ML
4 INJECTION INTRAMUSCULAR; INTRAVENOUS ONCE
Status: COMPLETED | OUTPATIENT
Start: 2023-01-01 | End: 2023-01-01

## 2023-01-01 RX ORDER — IOPAMIDOL 755 MG/ML
85 INJECTION, SOLUTION INTRAVASCULAR ONCE
Status: COMPLETED | OUTPATIENT
Start: 2023-01-01 | End: 2023-01-01

## 2023-01-01 RX ORDER — FENTANYL CITRATE 50 UG/ML
INJECTION, SOLUTION INTRAMUSCULAR; INTRAVENOUS
Status: COMPLETED
Start: 2023-01-01 | End: 2023-01-01

## 2023-01-01 RX ORDER — ONDANSETRON 2 MG/ML
INJECTION INTRAMUSCULAR; INTRAVENOUS
Status: DISCONTINUED
Start: 2023-01-01 | End: 2023-01-01 | Stop reason: HOSPADM

## 2023-01-01 RX ORDER — PROPOFOL 10 MG/ML
INJECTION, EMULSION INTRAVENOUS
Status: COMPLETED
Start: 2023-01-01 | End: 2023-01-01

## 2023-01-01 RX ORDER — PROPOFOL 10 MG/ML
5-75 INJECTION, EMULSION INTRAVENOUS CONTINUOUS
Status: DISCONTINUED | OUTPATIENT
Start: 2023-01-01 | End: 2023-01-01 | Stop reason: HOSPADM

## 2023-01-01 RX ORDER — MUPIROCIN 20 MG/G
OINTMENT TOPICAL
Qty: 30 G | Refills: 3 | Status: SHIPPED | OUTPATIENT
Start: 2023-01-01

## 2023-01-01 RX ADMIN — TRANEXAMIC ACID 1 G: 1 INJECTION, SOLUTION INTRAVENOUS at 14:41

## 2023-01-01 RX ADMIN — FENTANYL CITRATE 100 MCG: 50 INJECTION, SOLUTION INTRAMUSCULAR; INTRAVENOUS at 14:05

## 2023-01-01 RX ADMIN — ONDANSETRON 4 MG: 2 INJECTION INTRAMUSCULAR; INTRAVENOUS at 13:32

## 2023-01-01 RX ADMIN — NICARDIPINE HYDROCHLORIDE 2.5 MG/HR: 0.2 INJECTION INTRAVENOUS at 14:51

## 2023-01-01 RX ADMIN — SODIUM CHLORIDE 66 ML: 9 INJECTION, SOLUTION INTRAVENOUS at 14:31

## 2023-01-01 RX ADMIN — PROPOFOL 50 MCG/KG/MIN: 10 INJECTION, EMULSION INTRAVENOUS at 17:12

## 2023-01-01 RX ADMIN — FENTANYL CITRATE 50 MCG: 50 INJECTION, SOLUTION INTRAMUSCULAR; INTRAVENOUS at 15:50

## 2023-01-01 RX ADMIN — FENTANYL CITRATE 50 MCG: 50 INJECTION, SOLUTION INTRAMUSCULAR; INTRAVENOUS at 17:12

## 2023-01-01 RX ADMIN — IOPAMIDOL 85 ML: 755 INJECTION, SOLUTION INTRAVENOUS at 14:31

## 2023-01-01 ASSESSMENT — ACTIVITIES OF DAILY LIVING (ADL)
ADLS_ACUITY_SCORE: 35
ADLS_ACUITY_SCORE: 35

## 2023-01-23 NOTE — LETTER
1/23/2023         RE: Cale Wagoner  [address] [facility] [id number]        Dear Colleague,    Thank you for referring your patient, Cale Wagoner, to the [facility]. Please see a copy of my visit note below.    [facility] Dermatology Note    Encounter Date: Jan 23, 2023    Dermatology Problem List:  -   ______________________________________    Impression/Plan:  Cale was seen today for derm problem.    Diagnoses and all orders for this visit:    Impetigo  -     mupirocin (BACTROBAN) 2 % external ointment; Use 2 times a day to affected area using an amount sufficient to cover the area  - acute, recurrent  -Exam shows a areas of superficial erosion, slightly coin shaped involving the left nasal columella, left abdomen, and left leg, consistent with nonbullous impetigo  - Treat empirically with Bactroban response to treatment will be beneficial with diagnosis      Follow-up in .       Staff Involved:  Staff Only    Get Christianson MD   of Dermatology  Department of Dermatology  [facility] [employer]      CC:   Chief Complaint   Patient presents with     Derm Problem     Lesion on the nose, L knee, bilateral waist line,        History of Present Illness:  Mr. Cale Wagoner is a 89 year old male who presents as a return patient.    Spot inside L nasal vestibule, L lower leg and 2 spots on the flanks. Present for months to years.     LLE lesion is enlarging     Labs:      Physical exam:  Vitals: There were no vitals taken for this visit.  GEN: well developed, well-nourished, in no acute distress, in a pleasant mood.     SKIN: Denny phototype 1  - Focused examination of the face, trunk and LLE was performed.  -Round areas of superficial erosion left lower extremity, left flank, around the medial superior border of the left nostril  - 2 pink papules on left lower extremity some with some  superficial scale  - No other lesions of concern on areas examined.             Past Medical History:   Past Medical History:   Diagnosis Date     Benign essential hypertension      Bloating      CAD (coronary artery disease)     cardiac cath 2017:  MOSES to mid-distal Cx x2     Hyperlipidemia      Kidney stones      NSTEMI (non-ST elevated myocardial infarction) (H)      PFO (patent foramen ovale)      Skin cancer     unknown what type     TIA (transient ischemic attack)      Past Surgical History:   Procedure Laterality Date     APPENDECTOMY       CARDIAC CATHERIZATION  09/15/2017     MOSES to mid-distal Cx x2     ENDARTERECTOMY CAROTID Left 11/26/2022    Procedure: LEFT CAROTID ENDARTERECTOMY WITH ELECTROENCEPHALOGRAM MONITORING. 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 His smoking use included cigarettes. He has quit using smokeless tobacco. He reports current alcohol use. He reports that he does not use drugs.    Family History:  Family History   Problem Relation Age of Onset     Respiratory Father         pneumonia     Unknown/Adopted Father      Alzheimer Disease Brother      Alzheimer Disease Sister      Emphysema Sister      Unknown/Adopted Mother        Medications:  Current Outpatient Medications   Medication Sig Dispense Refill     Acetaminophen (TYLENOL PO) Take 500 mg by mouth every 8 hours as needed for mild pain or fever       aspirin 81 MG EC tablet Take 81 mg by mouth At Bedtime       atorvastatin (LIPITOR) 20 MG tablet Take 1 tablet (20 mg) by mouth daily 90 tablet 0     FAMOTIDINE PO Take 20 mg by mouth 2 times daily       losartan (COZAAR) 100 MG tablet TAKE ONE TABLET (100 mg) BY MOUTH ONE TIME DAILY 90 tablet 3      METAMUCIL FIBER PO Take 2 Tablespoonful by mouth every morning       metoprolol succinate ER (TOPROL-XL) 25 MG 24 hr tablet Take 2 tablets (50 mg) by mouth daily 90 tablet 3     Multiple Vitamins-Minerals (MULTIVITAMIN & MINERAL PO) Take 1 tablet by mouth daily       mupirocin (BACTROBAN) 2 % external ointment Use 2 times a day to affected area using an amount sufficient to cover the area 30 g 3     nitroGLYcerin (NITROSTAT) 0.4 MG sublingual tablet For chest pain place 1 tablet under the tongue every 5 minutes for 3 doses. If symptoms persist 5 minutes after 1st dose call 911. 25 tablet 1     polyethylene glycol (MIRALAX) 17 GM/Dose powder Take 17 g by mouth 2 times daily 510 g 0     clopidogrel (PLAVIX) 75 MG tablet Take 1 tablet (75 mg) by mouth daily for 30 days 30 tablet 0     Allergies   Allergen Reactions     Penicillins                    Again, thank you for allowing me to participate in the care of your patient.        Sincerely,        Get Christianson MD

## 2023-01-23 NOTE — TELEPHONE ENCOUNTER
Called pt and let him know he can come at 1:45 today.    Millie MALIK RN  Dermatology   [phone]

## 2023-01-23 NOTE — TELEPHONE ENCOUNTER
[facility] Call Center    Phone Message    May a detailed message be left on voicemail: yes     Reason for Call: Other: Patient has an appt today at 2:30 with Dr Christianson and wants to know if he can come in about 1 hour earlier.      Action Taken: Other: OX DERM    Travel Screening: Not Applicable

## 2023-01-23 NOTE — PROGRESS NOTES
[facility] Dermatology Note    Encounter Date: Jan 23, 2023    Dermatology Problem List:  -   ______________________________________    Impression/Plan:  Cale was seen today for derm problem.    Diagnoses and all orders for this visit:    Impetigo  -     mupirocin (BACTROBAN) 2 % external ointment; Use 2 times a day to affected area using an amount sufficient to cover the area  - acute, recurrent  -Exam shows a areas of superficial erosion, slightly coin shaped involving the left nasal columella, left abdomen, and left leg, consistent with nonbullous impetigo  - Treat empirically with Bactroban response to treatment will be beneficial with diagnosis      Follow-up in .       Staff Involved:  Staff Only    Get Christianson MD   of Dermatology  Department of Dermatology  [facility]      CC:   Chief Complaint   Patient presents with     Derm Problem     Lesion on the nose, L knee, bilateral waist line,        History of Present Illness:  Mr. Cale Wagoner is a 89 year old male who presents as a return patient.    Spot inside L nasal vestibule, L lower leg and 2 spots on the flanks. Present for months to years.     LLE lesion is enlarging     Labs:      Physical exam:  Vitals: There were no vitals taken for this visit.  GEN: well developed, well-nourished, in no acute distress, in a pleasant mood.     SKIN: Denny phototype 1  - Focused examination of the face, trunk and LLE was performed.  -Round areas of superficial erosion left lower extremity, left flank, around the medial superior border of the left nostril  - 2 pink papules on left lower extremity some with some superficial scale  - No other lesions of concern on areas examined.             Past Medical History:   Past Medical History:   Diagnosis Date     Benign essential hypertension      Bloating      CAD (coronary artery disease)     cardiac cath 2017:  MOSES to mid-distal Cx x2      Hyperlipidemia      Kidney stones      NSTEMI (non-ST elevated myocardial infarction) (H)      PFO (patent foramen ovale)      Skin cancer     unknown what type     TIA (transient ischemic attack)      Past Surgical History:   Procedure Laterality Date     APPENDECTOMY       CARDIAC CATHERIZATION  09/15/2017     MOSES to mid-distal Cx x2     ENDARTERECTOMY CAROTID Left 11/26/2022    Procedure: LEFT CAROTID ENDARTERECTOMY WITH ELECTROENCEPHALOGRAM MONITORING. 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 His smoking use included cigarettes. He has quit using smokeless tobacco. He reports current alcohol use. He reports that he does not use drugs.    Family History:  Family History   Problem Relation Age of Onset     Respiratory Father         pneumonia     Unknown/Adopted Father      Alzheimer Disease Brother      Alzheimer Disease Sister      Emphysema Sister      Unknown/Adopted Mother        Medications:  Current Outpatient Medications   Medication Sig Dispense Refill     Acetaminophen (TYLENOL PO) Take 500 mg by mouth every 8 hours as needed for mild pain or fever       aspirin 81 MG EC tablet Take 81 mg by mouth At Bedtime       atorvastatin (LIPITOR) 20 MG tablet Take 1 tablet (20 mg) by mouth daily 90 tablet 0     FAMOTIDINE PO Take 20 mg by mouth 2 times daily       losartan (COZAAR) 100 MG tablet TAKE ONE TABLET (100 mg) BY MOUTH ONE TIME DAILY 90 tablet 3     METAMUCIL FIBER PO Take 2 Tablespoonful by mouth every morning       metoprolol succinate ER (TOPROL-XL) 25 MG 24 hr tablet Take 2 tablets (50 mg) by mouth daily 90 tablet 3     Multiple Vitamins-Minerals (MULTIVITAMIN & MINERAL PO) Take 1 tablet by mouth daily       mupirocin  (BACTROBAN) 2 % external ointment Use 2 times a day to affected area using an amount sufficient to cover the area 30 g 3     nitroGLYcerin (NITROSTAT) 0.4 MG sublingual tablet For chest pain place 1 tablet under the tongue every 5 minutes for 3 doses. If symptoms persist 5 minutes after 1st dose call 911. 25 tablet 1     polyethylene glycol (MIRALAX) 17 GM/Dose powder Take 17 g by mouth 2 times daily 510 g 0     clopidogrel (PLAVIX) 75 MG tablet Take 1 tablet (75 mg) by mouth daily for 30 days 30 tablet 0     Allergies   Allergen Reactions     Penicillins

## 2023-03-30 NOTE — TELEPHONE ENCOUNTER
[facility] Call Center    Phone Message    May a detailed message be left on voicemail: yes     Reason for Call: Symptoms or Concerns     If patient has red-flag symptoms, warm transfer to triage line    Current symptom or concern: Sores on left leg and stomach area     Symptoms have been present for:  4 month(s)    Has patient previously been seen for this? Yes    By Dr. Christianson:    Date: 01/2023    Are there any new or worsening symptoms? Yes. Sores seem to be spreading     mupirocin (BACTROBAN)  Has not helped. Please call pt to discuss      Action Taken: Message routed to:  [facility] Derm    Travel Screening: Not Applicable

## 2023-03-30 NOTE — TELEPHONE ENCOUNTER
Called and spoke with pt, pt states areas that he was seen for in January have been spreading and growing. Now on arms and groin. Pt has used the ointment and refilled x3 with no improvement. Appt scheduled with Dr. Christianson.    Thank you,  Millie MALIK RN  Dermatology   [phone]

## 2023-04-26 NOTE — PROGRESS NOTES
[facility]    Cale Wagoner returns for vascular follow-up.  89-year-old independent gentleman presented with expressive aphasia on 11/25/2022.  Ulcerated 60 to 70% proximal left ICA with 30 to 40% stenosis on the right.  Had crescendo TIA with expressive aphasia during his hospitalization.    Emergent extended left CEA with distal external jugular vein patch performed 11/26/2022 revealing organized thrombus along with irregular calcified stenosis.  Did well postop with no recurrent neurological symptoms.    PMH: Medications: Cozaar, Toprol-XL, famotidine, Lipitor, Plavix (completed), aspirin     Medical: Hypertension    History atypical chest pain-history of CAD with non-STEMI     Remote coronary stenting x2     11/27/2022 echocardiogram: EF 55 to 60%      No significant valvular disease    Hyperlipidemia on statin-LDL= 25    GERD         Non-smoker    ROS: No recurrent episodes of expressive aphasia.  No issues with surgical incision.  Mild mandibular numbness is slowly resolving.    Here today with his son-in-law.  His daughter reports that he was having very vague symptoms and went to the ED on 4/3/2023.  Reports no evidence of stroke though part of her note said that there may have been a small stroke.  Only symptoms are noting he is increasing forgetfulness since the surgery event.      Exam: Alert and appropriate.  Comfortable.  Here with his son-in-law.   Blood pressure 170/82.  Pulse 62   Well-healed left carotid incision.   Chest= clear   Cardiovascular= regular rate    Left carotid duplex reveals that the CEA is widely patent with no evidence of recurrent stenosis.  Velocities are normal normal throughout the carotid bifurcation.        IMPRESSION: #1.  Widely patent left CEA following symptomatic ulcerated carotid stenosis with expressive aphasia.  He has completed his course of Plavix and will continue on a children's aspirin.  Follow-up carotid duplex in 1 year.     #2.  30  to 40% asymptomatic right carotid stenosis by CTA.  No specific treatment.  Follow-up duplex 1 year.     #3.  Vague episode of neurological changes on 4/3/2023.  Evaluated ED.  Apparently no stroke, though these records are not available to me for review     #4.  Hypertensive today.  Last visit systolic blood pressure 152 on his postoperative check.  He needs to evaluate this further particular his blood pressure at home.  His son I will make sure he has a working blood pressure cuff so he can monitor this.     #5.  Overall good risk factor control.  Non-smoker.  LDL at goal on statin.  Normal echocardiogram at the time of the neurological event.     #6.   No history of PAD with excellent DP/PT pulses at the time of surgery.    25 minutes with patient and son today including chart review.    Trev Mensah MD   This note was created using Dragon voice recognition software which may result in transcription errors.

## 2023-04-27 NOTE — PROGRESS NOTES
[facility]        Patient is here for a  follow up.      Pt is currently taking Aspirin, Statin and Plavix.    BP (!) 170/82 (BP Location: Left arm, Patient Position: Chair, Cuff Size: Adult Regular)   Pulse 62     The provider has been notified that the patient has no concerns.     Questions patient would like addressed today are: N/A.    Refills are needed: N/A    Has homecare services and agency name:  Nicole Boykin MA

## 2023-06-03 PROBLEM — S01.01XA LACERATION OF SCALP, INITIAL ENCOUNTER: Status: ACTIVE | Noted: 2023-01-01

## 2023-06-03 PROBLEM — S09.90XA CLOSED HEAD INJURY, INITIAL ENCOUNTER: Status: ACTIVE | Noted: 2023-01-01

## 2023-06-03 PROBLEM — R41.0 CONFUSION: Status: ACTIVE | Noted: 2023-01-01
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
## 2023-06-03 NOTE — ED NOTES
Patient leaving at this time. Report called to Effie GUAMAN [phone]. Records sent with EMS crew.

## 2023-06-03 NOTE — ED TRIAGE NOTES
Pt comes from home today where pt fell down, possibly from down the stairs. Hx of stroke, on plavix. Laceration to back of head. Vomiting.

## 2023-06-03 NOTE — ED PROVIDER NOTES
History   Chief Complaint:  Head Injury and Nausea & Vomiting     HPI   Cale Wagoner is a 89 year old male patient is with a history of NSTEMI, TIA, hyperlipidemia, CAD, and hypertension, potentially on Plavix who presents after an unwitnessed fall.  Patient currently resides at home and helps to take care of his demented wife.  Home health nurse arrived at their home today for regular checkup when they found him at the bottom of the stairs in the basement.  It appears that he may have been trying to lift a dehumidifier from the basement upstairs but likely had fallen backwards down the stairs.  Unclear how many steps the patient had fallen down.  There was fresh blood at the bottom of the stairwell.  Patient does have a large occipital/posterior scalp hematoma.  No active bleeding.  Patient is perseverative and unable to answer any questions at bedside.  He is not sure where he is at.  Unclear of time.    Independent Historian:   EMS - They report The majority of history above.  Patient being perseverative confused and altered unable to provide any additional history.    Review of External Notes:   Reviewed vascular surgery visit from 4/27/2023.  Patient with a history of TIA and expressive aphasia in November 2022.  Patient had a carotid endarterectomy with distal external jugular vein patch performed on November 26, 2022.  Report from this vascular surgery visit reveals that he had completed Plavix course.    Medications:    Acetaminophen (TYLENOL PO)  aspirin 81 MG EC tablet  atorvastatin (LIPITOR) 20 MG tablet  clopidogrel (PLAVIX) 75 MG tablet  FAMOTIDINE PO  losartan (COZAAR) 100 MG tablet  METAMUCIL FIBER PO  metoprolol succinate ER (TOPROL-XL) 25 MG 24 hr tablet  Multiple Vitamins-Minerals (MULTIVITAMIN & MINERAL PO)  mupirocin (BACTROBAN) 2 % external ointment  nitroGLYcerin (NITROSTAT) 0.4 MG sublingual tablet  polyethylene glycol (MIRALAX) 17 GM/Dose powder      Past Medical History:    Past  "Medical History:   Diagnosis Date     Benign essential hypertension      Bloating      CAD (coronary artery disease)      Hyperlipidemia      Kidney stones      NSTEMI (non-ST elevated myocardial infarction) (H)      PFO (patent foramen ovale)      Skin cancer      TIA (transient ischemic attack)      Past Surgical History:    Past Surgical History:   Procedure Laterality Date     APPENDECTOMY       CARDIAC CATHERIZATION  09/15/2017     MOSES to mid-distal Cx x2     ENDARTERECTOMY CAROTID Left 11/26/2022    Procedure: LEFT CAROTID ENDARTERECTOMY WITH ELECTROENCEPHALOGRAM MONITORING. 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     EYE SURGERY      cataracts     HERNIA REPAIR      twice     SOFT TISSUE SURGERY      skin cancer right temple      Physical Exam     Patient Vitals for the past 24 hrs:   BP Temp Temp src Pulse Resp SpO2 Height Weight   06/03/23 1720 -- -- -- 85 -- -- -- --   06/03/23 1719 -- -- -- -- 21 -- -- --   06/03/23 1714 -- -- -- 84 16 100 % -- --   06/03/23 1714 104/50 -- -- 85 16 100 % -- --   06/03/23 1709 -- -- -- 86 16 100 % -- --   06/03/23 1709 -- 97.5  F (36.4  C) Bladder -- -- -- -- --   06/03/23 1705 -- -- -- 87 16 100 % -- --   06/03/23 1704 131/62 -- -- 87 17 100 % -- --   06/03/23 1659 -- -- -- 87 20 100 % -- --   06/03/23 1655 -- -- -- 87 16 100 % -- --   06/03/23 1654 -- -- -- 89 15 100 % -- --   06/03/23 1653 -- -- -- -- -- -- 1.651 m (5' 5\") 77.1 kg (169 lb 15.6 oz)   06/03/23 1649 -- -- -- 88 16 100 % -- --   06/03/23 1649 108/77 -- -- 87 16 100 % -- --   06/03/23 1644 -- -- -- 88 16 100 % -- --   06/03/23 1639 -- -- -- 89 16 100 % -- --   06/03/23 1635 -- -- -- 87 16 100 % -- --   06/03/23 1634 133/62 -- -- 87 16 100 % -- --   06/03/23 1629 -- -- -- 89 16 100 % -- --   06/03/23 1625 (!) 120/100 -- -- -- -- -- -- --   06/03/23 1624 -- -- -- 91 21 100 % -- --   06/03/23 1620 (!) 154/72 -- -- 90 16 " 100 % -- --   06/03/23 1614 -- -- -- 90 16 100 % -- --   06/03/23 1610 (!) 153/86 -- -- -- -- -- -- --   06/03/23 1609 -- -- -- 90 16 100 % -- --   06/03/23 1605 (!) 147/70 -- -- 92 16 100 % -- --   06/03/23 1600 (!) 144/86 -- -- -- -- -- -- --   06/03/23 1559 -- -- -- 90 16 100 % -- --   06/03/23 1554 -- -- -- 89 16 100 % -- --   06/03/23 1550 111/76 -- -- 92 16 100 % -- --   06/03/23 1545 (!) 149/93 -- -- -- -- -- -- --   06/03/23 1544 -- -- -- 94 16 100 % -- --   06/03/23 1540 (!) 142/62 -- -- -- -- -- -- --   06/03/23 1539 -- -- -- 92 16 100 % -- --   06/03/23 1535 (!) 145/62 -- -- 89 16 100 % -- --   06/03/23 1530 (!) 144/70 -- -- -- -- -- -- --   06/03/23 1529 -- -- -- 89 16 100 % -- --   06/03/23 1525 -- -- -- 88 16 100 % -- --   06/03/23 1525 (!) 144/74 -- -- -- -- -- -- --   06/03/23 1520 (!) 145/74 -- -- 89 16 100 % -- --   06/03/23 1515 (!) 156/74 -- -- -- -- -- -- --   06/03/23 1514 -- -- -- 94 16 100 % -- --   06/03/23 1510 (!) 151/73 -- -- -- -- -- -- --   06/03/23 1509 -- -- -- 89 16 100 % -- --   06/03/23 1505 -- -- -- 89 -- -- -- --   06/03/23 1505 (!) 152/72 -- -- 89 16 100 % -- --   06/03/23 1500 (!) 159/77 -- -- 86 16 100 % -- --   06/03/23 1455 (!) 196/103 -- -- 84 16 100 % -- --   06/03/23 1450 (!) 192/100 -- -- 85 16 100 % -- --   06/03/23 1445 (!) 190/97 -- -- 85 16 100 % -- --   06/03/23 1440 (!) 181/92 -- -- 84 16 100 % -- --   06/03/23 1435 (!) 178/91 -- -- 86 13 100 % -- --   06/03/23 1430 (!) 164/89 -- -- -- -- -- -- --   06/03/23 1409 -- -- -- 86 16 -- -- --   06/03/23 1409 (!) 192/106 -- -- 89 -- 97 % -- --   06/03/23 1406 (!) 209/110 -- -- -- -- -- -- --   06/03/23 1404 -- -- -- 103 12 -- -- --   06/03/23 1403 -- -- -- -- -- 96 % -- --   06/03/23 1400 (!) 213/115 -- -- -- -- -- -- --   06/03/23 1359 -- -- -- 105 10 97 % -- --   06/03/23 1355 -- -- -- 80 19 -- -- --   06/03/23 1355 (!) 166/84 -- -- -- -- -- -- --   06/03/23 1330 (!) 154/67 -- -- -- -- -- -- --   06/03/23 1323 (!)  150/74 98.1  F (36.7  C) Temporal 76 18 95 % -- --      General: Confused, altered, appears frail and elderly.      In mild distress  HEENT:  Head:  Large left posterior/superior scalp hematoma with laceration.  Hemostatic.    Ears:  External ears are normal  Mouth/Throat:  Oropharynx is without erythema or exudate and mucous membranes are moist.  No dental trauma.    Eyes:   Conjunctivae normal and EOM are normal. No scleral icterus.    Pupils are equal, round, and reactive to light.   Neck:   Normal range of motion. Neck supple.  CV:  Normal rate, regular rhythm, normal heart sounds and radial pulses are 2+ and symmetric.  Resp:  Breath sounds are clear bilaterally    Non-labored, no retractions or accessory muscle use  GI:  Abdomen is soft, no distension, no tenderness. No rebound or guarding.  No CVA tenderness bilaterally  MS:  Normal range of motion. No edema.    No chest wall discomfort or crepitus on compression of the lateral aspects of the wall.    Normal strength in all 4 extremities.     Back atraumatic.    No midline cervical, thoracic, or lumbar tenderness  Skin:  Warm and dry.  Laceration and hematoma to left posterior superior scalp  Neuro: Confused, altered, perseverative.  Unable to answer questions.  Appears to be moving all 4 extremities.  GCS: 14  Psych:  Unable to assess due to AMS.      Emergency Department Course     ECG results from 11/25/22   EKG 12-lead, tracing only     Value    Systolic Blood Pressure     Diastolic Blood Pressure     Ventricular Rate 70    Atrial Rate 70    ND Interval 202    QRS Duration 84        QTc 401    P Axis 58    R AXIS 23    T Axis 85    Interpretation ECG      Sinus rhythm  Normal ECG  When compared with ECG of 13-APR-2021 19:57,  Nonspecific T wave abnormality now evident in Lateral leads  Confirmed by GENERATED REPORT, COMPUTER ([id number]),  Magalie Maguire ([id number]) on 11/25/2022 5:08:37 PM       Imaging:  CT Cervical Spine w/o Contrast   Final Result    Abnormal   IMPRESSION:   HEAD CT:   1.  Multicompartment subacute intracranial hemorrhage including bilateral parenchymal contusions, basilar predominant subarachnoid hemorrhage, and multifocal subdural hemorrhages.   2.  Negative for mass effect or midline shift.   3.  Right occipital calvarial nondisplaced fracture.      CERVICAL SPINE CT:   1.  No CT evidence for acute fracture or post traumatic subluxation.         [Critical Result: Acute intracranial hemorrhage]      Finding was identified on 6/3/2023 3:15 PM CDT.       1.  Dr. Hernandez was contacted by me on 6/3/2023 3:30 PM CDT and verbalized understanding of the critical result.       CT Chest/Abdomen/Pelvis w Contrast   Final Result   IMPRESSION:    1.  No acute findings in the chest, abdomen or pelvis.   2.  Cholelithiasis.   3.  Large 3.5 cm diverticulum proximal sigmoid colon that contains either a soft tissue lesion and/or impacted stool. Consider nonurgent flexible sigmoidoscopy.      CT Head w/o Contrast   Final Result   Abnormal   IMPRESSION:   HEAD CT:   1.  Multicompartment subacute intracranial hemorrhage including bilateral parenchymal contusions, basilar predominant subarachnoid hemorrhage, and multifocal subdural hemorrhages.   2.  Negative for mass effect or midline shift.   3.  Right occipital calvarial nondisplaced fracture.      CERVICAL SPINE CT:   1.  No CT evidence for acute fracture or post traumatic subluxation.         [Critical Result: Acute intracranial hemorrhage]      Finding was identified on 6/3/2023 3:15 PM CDT.       1.  Dr. Hernandez was contacted by me on 6/3/2023 3:30 PM CDT and verbalized understanding of the critical result.          Report per radiology    Laboratory:  Labs Ordered and Resulted from Time of ED Arrival to Time of ED Departure   COMPREHENSIVE METABOLIC PANEL - Abnormal       Result Value    Sodium 124 (*)     Potassium 4.5      Chloride 90 (*)     Carbon Dioxide (CO2) 21 (*)     Anion Gap 13      Urea Nitrogen  21.7      Creatinine 1.36 (*)     Calcium 9.1      Glucose 114 (*)     Alkaline Phosphatase 66      AST 36      ALT 23      Protein Total 7.2      Albumin 4.2      Bilirubin Total 0.9      GFR Estimate 49 (*)    ISTAT CREATININE POCT - Abnormal    Creatinine POCT 1.5 (*)     GFR, ESTIMATED POCT 44 (*)    INR - Normal    INR 1.11     PARTIAL THROMBOPLASTIN TIME - Normal    aPTT 34     CBC WITH PLATELETS AND DIFFERENTIAL    WBC Count 10.9      RBC Count 4.62      Hemoglobin 14.6      Hematocrit 41.5      MCV 90      MCH 31.6      MCHC 35.2      RDW 12.7      Platelet Count 167      % Neutrophils 65      % Lymphocytes 21      % Monocytes 12      % Eosinophils 1      % Basophils 1      % Immature Granulocytes 0      NRBCs per 100 WBC 0      Absolute Neutrophils 7.2      Absolute Lymphocytes 2.3      Absolute Monocytes 1.3      Absolute Eosinophils 0.1      Absolute Basophils 0.1      Absolute Immature Granulocytes 0.0      Absolute NRBCs 0.0     ISTAT CREATININE POCT   TYPE AND SCREEN, ADULT    ABO/RH(D) O POS      Antibody Screen Negative      SPECIMEN EXPIRATION DATE [id number]     ABO/RH TYPE AND SCREEN        -Intubation    Date/Time: 6/3/2023 2:09 PM    Performed by: Trell Hernandez MD  Authorized by: Trell Hernandez MD    Emergent condition/consent implied    Pre-procedure details:     Indications: airway protection and altered consciousness      Patient status:  Altered mental status    Look externally: large tongue      Mallampati score:  III    Obstruction: none      Neck mobility: reduced      Pharmacologic strategy: RSI      Induction agents:  Etomidate    Paralytics:  Rocuronium  Procedure details:     Preoxygenation:  Nonrebreather mask    CPR in progress: no      Number of attempts:  1  Successful intubation attempt details:     Intubation method:  Oral    Intubation technique: video assisted      Laryngoscope blade:  Mac 4    Bougie used: yes      Tube size (mm):  7.5    Tube type:  Cuffed    Tube  visualized through cords: yes    Placement assessment:     ETT at teeth/gumline (cm):  22    Tube secured with:  ETT cheema    Breath sounds:  Equal and absent over the epigastrium    Placement verification: chest rise, colorimetric ETCO2, direct visualization, equal breath sounds and tube exhalation      CXR findings:  Appropriate position  Post-procedure details:     Procedure completion:  Patient tolerated the procedure well with no immediate complications    PROCEDURE    Patient Tolerance:  Patient tolerated the procedure well with no immediate complications    [facility]    -Laceration Repair    Date/Time: 6/3/2023 5:18 PM    Performed by: Trell Hernandez MD  Authorized by: Trell Hernandez MD    Emergent condition/consent implied      ANESTHESIA (see MAR for exact dosages):     Anesthesia method:  None  LACERATION DETAILS     Location:  Scalp    Scalp location:  L parietal    Length (cm):  2.1    REPAIR TYPE:     Repair type:  Simple      EXPLORATION:     Contaminated: no      TREATMENT:     Amount of cleaning:  Standard    Irrigation solution:  Sterile saline    Irrigation method:  Syringe    Visualized foreign bodies/material removed: no      SKIN REPAIR     Repair method:  Staples    Number of staples:  8    APPROXIMATION     Approximation:  Close    POST-PROCEDURE DETAILS     Dressing:  Non-adherent dressing and antibiotic ointment        PROCEDURE    Patient Tolerance:  Patient tolerated the procedure well with no immediate complications     Emergency Department Course & Assessments:     Interventions:  Medications   niCARdipine 40 mg in 200 mL NS (CARDENE) infusion (0 mg/hr Intravenous Paused 6/3/23 1649)   propofol (DIPRIVAN) infusion (0 mcg/kg/min × 77.1 kg Intravenous ED/Periop/Clinic Infusing on Admission/transfer 6/3/23 8484)   ondansetron (ZOFRAN) injection 4 mg (4 mg Intravenous $Given 6/3/23 1332)   iopamidol (ISOVUE-370) solution 85 mL (85 mLs Intravenous $Given 6/3/23 1431)    sodium chloride 0.9 % bag 500mL for CT scan flush use (66 mLs Intravenous $Given 6/3/23 1431)   fentaNYL (PF) (SUBLIMAZE) 100 MCG/2ML injection (100 mcg  $Given 6/3/23 1405)   tranexamic acid (CYKLOKAPRON) bolus 1 g vial attach to NaCl 50 or 100 mL bag ADULT (1 g Intravenous $Given 6/3/23 1441)   fentaNYL (PF) (SUBLIMAZE) injection 50 mcg (50 mcg Intravenous $Given 6/3/23 1550)   fentaNYL (PF) (SUBLIMAZE) injection 50 mcg (50 mcg Intravenous $Given 6/3/23 1712)      Independent Interpretation (X-rays, CTs, rhythm strip):  Independently reviewed CT imaging of the head which shows diffuse intraparenchymal hemorrhage and subarachnoid hemorrhage.    Consultations/Discussion of Management or Tests:  1400 I spoke with Dr. Hernández of trauma surgery who evaluated the patient emergency department.  1408 I spoke with [place], the patient's daughter over the phone.  Her Sister Luna is coming from [place] to be here at the bedside.  1430 I spoke with Dr. Hernández of trauma surgery regarding CT findings  1431 I spoke with Heriberto Parrish PA-C of Neurosurgery who will evaluate the patient in the ED.   1452 I spoke with Dr. Loja of Intensivist service in ICU at [facility] who agreed to admission.   1530 I spoke with Dr. Seth, [facility], regarding CT findings. Intracranial hemorrhage, SDH, SAH.  Right occipital calvarial fracture.   1540 I spoke with Dr. Loja and Heriberto Parrish PA-C Neurosurgery.  Neurosurgery is requesting transfer to Level I trauma center at this time.   1600 I spoke with , [facility] Trauma Service, regarding patient presentation and request to transfer to level 1 trauma center per NSG recommendations.   1610 I spoke with Dr. La at [facility] ED who agreed to transfer ED to ED.         Social Determinants of Health affecting care:   None    Disposition:  The patient was transferred by EMS to [facility] emergency department under the care of Dr. La.  I did speak with Dr. Acharya of trauma surgery who  agreed to transfer.  Reason for transfer: Need for level I trauma center.     Impression & Plan    CMS Diagnoses: None  Trauma:  Level of trauma activation: Partial  Full Primary and Secondary survey with appropriate immobilization of spine completed in exam section.  C-collar and immobilization: applied prior to arrival.  [place] Clearance: Given intubated status patient will remain in [place] upon admission.  Hard collar switched to [place] prior to admission.  GCS at arrival: 14  GCS at disposition: unchanged  Consults prior to admission or transfer: Neurosurgery called at 1430  Procedures done in the ED: Laceration repair and Intubation  Disposition: Transfer. Transportation ordered at 0410 PM    Medical Decision Making:  Patient is a 89-year-old male who had an unwitnessed fall at home today.  Patient typically lives independently and helps to take care of his demented wife at home.  The home care nurse came to assess his wife today when they found him confused and altered at the bottom of the stairwell in the basement in a pool of blood.  He had a large hematoma and laceration to the left posterior parietal scalp.  Patient on arrival was sitting upright in bed and confused/perseverative with one word responses.  He was not aware of time or location on arrival.  High concern for potential skull fracture or intracranial hemorrhage on initial arrival given the ongoing confusion.  There initially was concern he had been on Plavix although it does appear that his Plavix was discontinued after short course from a TIA in November 2022.  A partial trauma was activated given the altered mentation in the setting of suspected trauma and fall downstairs today.  CT images were ordered for his head, cervical spine, and chest abdomen and pelvis.  Unfortunately with any positional manipulation patient had significant dry heaving and nausea.  He was unable to lie flat and given this patient was moved to one of our stabilization  rooms where he was intubated for airway protection and to allow further emergent CT imaging today.  CT imaging unfortunately shows multicompartment intracranial hemorrhage including bilateral parenchymal contusions, basilar subarachnoid hemorrhage, and multifocal subdural hemorrhages.  At this time there is no evidence of mass effect or midline shift.  There is also an associated right occipital calvarial nondisplaced fracture.  No fracture or posttraumatic subluxation seen within the cervical spine.  In terms of traumatic etiologies of chest abdomen pelvis no acute findings.  Dr. Hernández with surgery did assess the patient at bedside here and recommended neurosurgical consultation.  I spoke with Heriberto Parrish PA-C on behalf of Dr. Leigh who recommended transfer to level 1 trauma center given complexity of traumatic injuries intracranially today.  I did speak with our closest level 1 trauma center [facility] who unfortunately did not have any capabilities for trauma transfer today.  I then spoke with the trauma surgeon at [facility] who agreed to transfer as they did have ICU availability.  I spoke with Dr. La of emergency medicine at [facility] who agreed to ED to ED transfer to assist with this trauma transfer.  Patient does remain critically ill at this time.  He is mildly hyponatremic but appears to have chronic hyponatremia as well.  Will defer hypertonic saline administration at this time.  I did repair the laceration to the left posterior scalp.  8 staples were used for this.  I updated the patient's daughters at bedside and discussed the critical nature of his presentation today.  Unfortunately patient does not have a healthcare power of  and so likely his 5 children will need to help to make decisions on his behalf as they continue to provide emergent resuscitative cares over the next 24 to 48 hours.  1 daughter did comment to me that he would want to leave the hospital in the  same condition as he was in earlier prior to his injury.  Ultimately this may be challenging given his significant traumatic brain injury and intracranial hemorrhage sustained today.  Further goals of care discussion to occur upon reassessment at [facility].  Patient remains full code at this time.  In addition to sedative medications, patient has received pain control medications with intermittent doses of fentanyl as well as a nicardipine drip for ongoing hypertensive emergency in the setting of intracranial hemorrhage and trauma.  Patient also received a dose of TXA given intracranial hemorrhage secondary to traumatic injuries.  Patient will be transferred by critical care EMS rig to Level 1 trauma center [facility] ED for further evaluation and admission.    Critical Care time:  was 95 minutes for this patient excluding procedures.    Diagnosis:    ICD-10-CM    1. Fall, initial encounter  W19.XXXA       2. Closed head injury, initial encounter  S09.90XA       3. Laceration of scalp, initial encounter  S01.01XA       4. Confusion  R41.0       5. Intracranial hemorrhage (H)  I62.9       6. Closed fracture of right side of occipital bone, unspecified occipital fracture type, initial encounter (H)  S02.119A          Discharge Medications:  New Prescriptions    No medications on file      6/3/2023   Trell Hernandez MD White, Scott, MD  06/03/23 0065

## 2023-06-03 NOTE — CONSULTS
General Surgery Consultation      Cale Wagoner MRN# [id number]   YOB: 1933 Age: 89 year old   Date of Admission: 6/3/2023     Reason for consult: I was asked by Trell Hernandez MD to evaluate this patient for trauma.           Assessment and Plan:   Patient is a 89-year-old male with past medical history of NSTEMI status post stenting in 2017, hypertension, TIA, who presented after a fall down stairs while carrying a dehumidifier and was intubated for airway protection after emesis.  He was found to have multifocal subarachnoid and intraparenchymal hemorrhage on CT.  -Recommend neurosurgery consult  -ICU admission, appreciate cares of intensivist  -Surgery will continue to follow with tertiary exam in the near future  -No indication for surgical intervention from a general surgery perspective at this time.    Patient seen and discussed with Dr. Hernández             Chief Complaint:   Fall    Unable to obtain a history from the patient due to intubation         History of Present Illness:   This patient is a 89 year old male who presents after a fall down stairs. He was found to have a scalp laceration and was perseverant in the ED with altered mental status.  He was found to have laceration on his posterior scalp and was perseverating upon arrival to the emergency department.  He was taken to CT for imaging and began vomiting at that time.  He was transferred back to the stabilization bay for intubation.  Following intubation, a CT scan revealed significant right frontoparietal subarachnoid hemorrhage and a left occipital intraparenchymal hemorrhage with surrounding subarachnoid hemorrhage.    Patient is a history of Plavix however has not taken this in approximately 1 year.  Reportedly, he lives with his wife and his her primary caregiver.  The emergency department attending physician reached out to the patient's family who are on their way currently.              Past Medical History:     Past  Medical History:   Diagnosis Date     Benign essential hypertension      Bloating      CAD (coronary artery disease)     cardiac cath 2017:  MOSES to mid-distal Cx x2     Hyperlipidemia      Kidney stones      NSTEMI (non-ST elevated myocardial infarction) (H)      PFO (patent foramen ovale)      Skin cancer     unknown what type     TIA (transient ischemic attack)              Past Surgical History:     Past Surgical History:   Procedure Laterality Date     APPENDECTOMY       CARDIAC CATHERIZATION  09/15/2017     MOSES to mid-distal Cx x2     ENDARTERECTOMY CAROTID Left 2022    Procedure: LEFT CAROTID ENDARTERECTOMY WITH ELECTROENCEPHALOGRAM MONITORING. 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     EYE SURGERY      cataracts     HERNIA REPAIR      twice     SOFT TISSUE SURGERY      skin cancer right temple               Social History:     Social History     Tobacco Use     Smoking status: Former     Types: Cigarettes     Quit date: 1968     Years since quittin.8     Smokeless tobacco: Former   Vaping Use     Vaping status: Not on file   Substance Use Topics     Alcohol use: Yes     Comment: little             Family History:     Family History   Problem Relation Age of Onset     Respiratory Father         pneumonia     Unknown/Adopted Father      Alzheimer Disease Brother      Alzheimer Disease Sister      Emphysema Sister      Unknown/Adopted Mother              Immunizations:     Immunization History   Administered Date(s) Administered     COVID-19 Bivalent 12+ (Pfizer) 2022     COVID-19 MONOVALENT 12+ (Pfizer) 2021, 2021, 10/19/2021     COVID-19 Monovalent 12+ (Pfizer ) 2022     Influenza (High Dose) 3 valent vaccine 09/15/2017, 2019     Influenza (IIV3) PF 10/08/1999     Influenza Vaccine 50-64 or 18-64 w/egg allergy (Flublok) 2021     Influenza Vaccine 65+ (Fluzone HD)  10/19/2020     Pneumo Conj 13-V (2010&after) 09/13/2017     Pneumococcal 23 valent 10/08/1999, 10/09/2010     TD,PF 7+ (Tenivac) 01/01/2008     TDAP Vaccine (Adacel) 08/12/2010     Td (Adult), Adsorbed 06/07/2004             Allergies:     Allergies   Allergen Reactions     Penicillins              Medications:   No current facility-administered medications on file prior to encounter.  Acetaminophen (TYLENOL PO), Take 500 mg by mouth every 8 hours as needed for mild pain or fever  aspirin 81 MG EC tablet, Take 81 mg by mouth At Bedtime  atorvastatin (LIPITOR) 20 MG tablet, Take 1 tablet (20 mg) by mouth daily  clopidogrel (PLAVIX) 75 MG tablet, Take 1 tablet (75 mg) by mouth daily for 30 days  FAMOTIDINE PO, Take 20 mg by mouth 2 times daily  losartan (COZAAR) 100 MG tablet, TAKE ONE TABLET (100 mg) BY MOUTH ONE TIME DAILY  METAMUCIL FIBER PO, Take 2 Tablespoonful by mouth every morning  metoprolol succinate ER (TOPROL-XL) 25 MG 24 hr tablet, Take 2 tablets (50 mg) by mouth daily  Multiple Vitamins-Minerals (MULTIVITAMIN & MINERAL PO), Take 1 tablet by mouth daily  mupirocin (BACTROBAN) 2 % external ointment, Use 2 times a day to affected area using an amount sufficient to cover the area  nitroGLYcerin (NITROSTAT) 0.4 MG sublingual tablet, For chest pain place 1 tablet under the tongue every 5 minutes for 3 doses. If symptoms persist 5 minutes after 1st dose call 911.  polyethylene glycol (MIRALAX) 17 GM/Dose powder, Take 17 g by mouth 2 times daily              Review of Systems:   Review of systems not obtained due to patient factors - intubation            Physical Exam:   BP (!) 192/106   Pulse 89   Temp 98.1  F (36.7  C) (Temporal)   Resp 18   SpO2 97%   General: Intubated, sedated  HEENT: Pupils are 2 mm and sluggish bilaterally in the setting of recent RSI with paralytic given, endotracheal tube present.  Approximately 2 cm left posterior scalp laceration with minimal bleeding.  No bony crepitus of  posterior midline cervical spine.  Cervical collar placed.  Chest: No crepitus to palpation throughout chest wall  Respiratory: Mechanically ventilated  CV: Regular rate and rhythm with palpable bilateral radial and DP pulses 2+/2  Abdomen: Soft, nondistended, no palpable masses  Extremities: No crepitus on palpation of long bones on extremities x4  Skin: 2 cm laceration to posterior scalp, no other lesions noted         Data:   I have reviewed all laboratory and imaging studies of the last 24 hours.  Final reads from CT pending, concern for right frontoparietal subarachnoid hemorrhage and left occipital intraparenchymal hemorrhage.

## 2023-08-20 RX ORDER — MUPIROCIN 20 MG/G
OINTMENT TOPICAL
Qty: 30 G | Refills: 0 | OUTPATIENT
Start: 2023-08-20

## (undated) DEVICE — IOM SUPPLIES

## (undated) DEVICE — DRSG STERI STRIP 1/2X4" R1547

## (undated) DEVICE — DRAIN JACKSON PRATT 15FR ROUND SIL LF JP-2229

## (undated) DEVICE — SYR 01ML 27GA 0.5" NDL TBC 309623

## (undated) DEVICE — SYR 03ML LL W/O NDL 309657

## (undated) DEVICE — Device

## (undated) DEVICE — BLADE KNIFE BEAVER MINI BEAVER6400

## (undated) DEVICE — DECANTER VIAL 2006S

## (undated) DEVICE — DRAPE IOBAN INCISE 23X17" 6650EZ

## (undated) DEVICE — SUCTION CANISTER MEDIVAC LINER 3000ML W/LID 65651-530

## (undated) DEVICE — SU PROLENE 6-0 C-1DA 30" 8706H

## (undated) DEVICE — PREP CHLORAPREP W/ORANGE TINT 10.5ML 930715

## (undated) DEVICE — SOL NACL 0.9% INJ 250ML BAG 2B1322Q

## (undated) DEVICE — GLOVE BIOGEL PI ULTRATOUCH SZ 7.5 41175

## (undated) DEVICE — NDL ANGIOCATH 20GA 1.25" 4056

## (undated) DEVICE — PACK VASCULAR SCV15VAFSB

## (undated) DEVICE — NDL 19GA 1.5"

## (undated) DEVICE — SU MONOCRYL 4-0 PS-2 18" UND Y496G

## (undated) DEVICE — SOL NACL 0.9% IRRIG 1000ML BOTTLE 2F7124

## (undated) DEVICE — SURGICEL HEMOSTAT 2X3" 1953

## (undated) DEVICE — ESU GROUND PAD UNIVERSAL W/O CORD

## (undated) DEVICE — NDL BLUNT 19GA 1.5"

## (undated) DEVICE — SOL WATER IRRIG 1000ML BOTTLE 2F7114

## (undated) DEVICE — SU SILK 3-0 TIE 24" SA74H

## (undated) DEVICE — SU SILK 2-0 TIE 24" SA75H

## (undated) DEVICE — SYR 10ML FINGER CONTROL W/O NDL 309695

## (undated) DEVICE — NDL 22GA 1.5"

## (undated) DEVICE — LINEN TOWEL PACK X5 5464

## (undated) DEVICE — SU PROLENE 7-0 BV-1DA 4X30" M8703

## (undated) DEVICE — DECANTER BAG 2002S

## (undated) DEVICE — SU SILK 4-0 TIE 12X30" A303H

## (undated) DEVICE — SU SILK 2-0 FSL 18" 677G

## (undated) DEVICE — SU VICRYL 3-0 SH 27" J316H

## (undated) RX ORDER — REGADENOSON 0.08 MG/ML
INJECTION, SOLUTION INTRAVENOUS
Status: DISPENSED
Start: 2018-03-08

## (undated) RX ORDER — HEPARIN SODIUM 1000 [USP'U]/ML
INJECTION, SOLUTION INTRAVENOUS; SUBCUTANEOUS
Status: DISPENSED
Start: 2022-01-01

## (undated) RX ORDER — CLOPIDOGREL BISULFATE 75 MG/1
TABLET ORAL
Status: DISPENSED
Start: 2017-09-15

## (undated) RX ORDER — PROPOFOL 10 MG/ML
INJECTION, EMULSION INTRAVENOUS
Status: DISPENSED
Start: 2022-01-01

## (undated) RX ORDER — KETOROLAC TROMETHAMINE 30 MG/ML
INJECTION, SOLUTION INTRAMUSCULAR; INTRAVENOUS
Status: DISPENSED
Start: 2022-01-01

## (undated) RX ORDER — FENTANYL CITRATE 50 UG/ML
INJECTION, SOLUTION INTRAMUSCULAR; INTRAVENOUS
Status: DISPENSED
Start: 2022-01-01

## (undated) RX ORDER — BUPIVACAINE HYDROCHLORIDE 5 MG/ML
INJECTION, SOLUTION EPIDURAL; INTRACAUDAL
Status: DISPENSED
Start: 2022-01-01

## (undated) RX ORDER — CLINDAMYCIN PHOSPHATE 900 MG/50ML
INJECTION, SOLUTION INTRAVENOUS
Status: DISPENSED
Start: 2022-01-01

## (undated) RX ORDER — METOPROLOL SUCCINATE 25 MG/1
TABLET, EXTENDED RELEASE ORAL
Status: DISPENSED
Start: 2022-01-01

## (undated) RX ORDER — ASPIRIN 300 MG/1
SUPPOSITORY RECTAL
Status: DISPENSED
Start: 2022-01-01

## (undated) RX ORDER — ASPIRIN 81 MG/1
TABLET, CHEWABLE ORAL
Status: DISPENSED
Start: 2017-09-15

## (undated) RX ORDER — GLYCOPYRROLATE 0.2 MG/ML
INJECTION, SOLUTION INTRAMUSCULAR; INTRAVENOUS
Status: DISPENSED
Start: 2022-01-01

## (undated) RX ORDER — FENTANYL CITRATE 50 UG/ML
INJECTION, SOLUTION INTRAMUSCULAR; INTRAVENOUS
Status: DISPENSED
Start: 2017-09-15

## (undated) RX ORDER — HYDROMORPHONE HYDROCHLORIDE 1 MG/ML
INJECTION, SOLUTION INTRAMUSCULAR; INTRAVENOUS; SUBCUTANEOUS
Status: DISPENSED
Start: 2022-01-01

## (undated) RX ORDER — ONDANSETRON 2 MG/ML
INJECTION INTRAMUSCULAR; INTRAVENOUS
Status: DISPENSED
Start: 2022-01-01

## (undated) RX ORDER — NEOSTIGMINE METHYLSULFATE 1 MG/ML
VIAL (ML) INJECTION
Status: DISPENSED
Start: 2022-01-01

## (undated) RX ORDER — LIDOCAINE HYDROCHLORIDE 10 MG/ML
INJECTION, SOLUTION INFILTRATION; PERINEURAL
Status: DISPENSED
Start: 2022-01-01

## (undated) RX ORDER — LIDOCAINE HYDROCHLORIDE 10 MG/ML
INJECTION, SOLUTION EPIDURAL; INFILTRATION; INTRACAUDAL; PERINEURAL
Status: DISPENSED
Start: 2017-09-15

## (undated) RX ORDER — NITROGLYCERIN 5 MG/ML
VIAL (ML) INTRAVENOUS
Status: DISPENSED
Start: 2017-09-15

## (undated) RX ORDER — HEPARIN SODIUM 1000 [USP'U]/ML
INJECTION, SOLUTION INTRAVENOUS; SUBCUTANEOUS
Status: DISPENSED
Start: 2017-09-15

## (undated) RX ORDER — DEXAMETHASONE SODIUM PHOSPHATE 4 MG/ML
INJECTION, SOLUTION INTRA-ARTICULAR; INTRALESIONAL; INTRAMUSCULAR; INTRAVENOUS; SOFT TISSUE
Status: DISPENSED
Start: 2022-01-01